# Patient Record
Sex: FEMALE | Race: WHITE | NOT HISPANIC OR LATINO | Employment: OTHER | URBAN - METROPOLITAN AREA
[De-identification: names, ages, dates, MRNs, and addresses within clinical notes are randomized per-mention and may not be internally consistent; named-entity substitution may affect disease eponyms.]

---

## 2017-01-18 ENCOUNTER — ALLSCRIPTS OFFICE VISIT (OUTPATIENT)
Dept: OTHER | Facility: OTHER | Age: 52
End: 2017-01-18

## 2017-01-19 ENCOUNTER — GENERIC CONVERSION - ENCOUNTER (OUTPATIENT)
Dept: OTHER | Facility: OTHER | Age: 52
End: 2017-01-19

## 2017-04-05 ENCOUNTER — ALLSCRIPTS OFFICE VISIT (OUTPATIENT)
Dept: OTHER | Facility: OTHER | Age: 52
End: 2017-04-05

## 2017-05-08 ENCOUNTER — ALLSCRIPTS OFFICE VISIT (OUTPATIENT)
Dept: OTHER | Facility: OTHER | Age: 52
End: 2017-05-08

## 2017-06-07 ENCOUNTER — GENERIC CONVERSION - ENCOUNTER (OUTPATIENT)
Dept: OTHER | Facility: OTHER | Age: 52
End: 2017-06-07

## 2017-07-03 ENCOUNTER — ALLSCRIPTS OFFICE VISIT (OUTPATIENT)
Dept: OTHER | Facility: OTHER | Age: 52
End: 2017-07-03

## 2017-08-14 ENCOUNTER — ALLSCRIPTS OFFICE VISIT (OUTPATIENT)
Dept: OTHER | Facility: OTHER | Age: 52
End: 2017-08-14

## 2017-08-14 DIAGNOSIS — M50.920 MID-CERVICAL DISC DISORDER, UNSPECIFIED: ICD-10-CM

## 2017-08-14 DIAGNOSIS — M54.50 LOW BACK PAIN: ICD-10-CM

## 2017-08-14 DIAGNOSIS — G89.4 CHRONIC PAIN SYNDROME: ICD-10-CM

## 2017-08-14 DIAGNOSIS — M54.16 RADICULOPATHY OF LUMBAR REGION: ICD-10-CM

## 2017-08-14 DIAGNOSIS — M54.2 CERVICALGIA: ICD-10-CM

## 2017-08-14 DIAGNOSIS — M79.10 MYALGIA: ICD-10-CM

## 2017-08-17 ENCOUNTER — GENERIC CONVERSION - ENCOUNTER (OUTPATIENT)
Dept: OTHER | Facility: OTHER | Age: 52
End: 2017-08-17

## 2017-10-04 ENCOUNTER — ALLSCRIPTS OFFICE VISIT (OUTPATIENT)
Dept: OTHER | Facility: OTHER | Age: 52
End: 2017-10-04

## 2017-10-06 ENCOUNTER — APPOINTMENT (EMERGENCY)
Dept: RADIOLOGY | Facility: HOSPITAL | Age: 52
End: 2017-10-06
Payer: COMMERCIAL

## 2017-10-06 ENCOUNTER — HOSPITAL ENCOUNTER (EMERGENCY)
Facility: HOSPITAL | Age: 52
Discharge: HOME | End: 2017-10-06
Admitting: EMERGENCY MEDICINE
Payer: COMMERCIAL

## 2017-10-06 VITALS
BODY MASS INDEX: 31 KG/M2 | DIASTOLIC BLOOD PRESSURE: 84 MMHG | HEART RATE: 82 BPM | WEIGHT: 175 LBS | RESPIRATION RATE: 13 BRPM | OXYGEN SATURATION: 100 % | SYSTOLIC BLOOD PRESSURE: 154 MMHG | TEMPERATURE: 98.5 F

## 2017-10-06 DIAGNOSIS — E86.0 MILD DEHYDRATION: ICD-10-CM

## 2017-10-06 DIAGNOSIS — J06.9 URI (UPPER RESPIRATORY INFECTION): Primary | ICD-10-CM

## 2017-10-06 DIAGNOSIS — M94.0 COSTOCHONDRITIS: ICD-10-CM

## 2017-10-06 LAB
ALBUMIN SERPL BCP-MCNC: 3.7 G/DL (ref 3.5–5)
ALP SERPL-CCNC: 127 U/L (ref 46–116)
ALT SERPL W P-5'-P-CCNC: 37 U/L (ref 12–78)
ANION GAP SERPL CALCULATED.3IONS-SCNC: 13 MMOL/L (ref 4–13)
AST SERPL W P-5'-P-CCNC: 34 U/L (ref 5–45)
BASOPHILS # BLD AUTO: 0 THOUSANDS/ΜL (ref 0–0.1)
BASOPHILS NFR BLD AUTO: 1 % (ref 0–1)
BILIRUB SERPL-MCNC: 0.4 MG/DL (ref 0.2–1)
BUN SERPL-MCNC: 12 MG/DL (ref 5–25)
CALCIUM SERPL-MCNC: 9.7 MG/DL (ref 8.3–10.1)
CHLORIDE SERPL-SCNC: 105 MMOL/L (ref 100–108)
CO2 SERPL-SCNC: 24 MMOL/L (ref 21–32)
CREAT SERPL-MCNC: 0.88 MG/DL (ref 0.6–1.3)
EOSINOPHIL # BLD AUTO: 0.2 THOUSAND/ΜL (ref 0–0.61)
EOSINOPHIL NFR BLD AUTO: 2 % (ref 0–6)
ERYTHROCYTE [DISTWIDTH] IN BLOOD BY AUTOMATED COUNT: 14.7 % (ref 11.6–15.1)
GFR SERPL CREATININE-BSD FRML MDRD: 77 ML/MIN/1.73SQ M
GLUCOSE SERPL-MCNC: 102 MG/DL (ref 65–140)
HCT VFR BLD AUTO: 38.4 % (ref 37–47)
HGB BLD-MCNC: 12.3 G/DL (ref 12–16)
LYMPHOCYTES # BLD AUTO: 2 THOUSANDS/ΜL (ref 0.6–4.47)
LYMPHOCYTES NFR BLD AUTO: 27 % (ref 14–44)
MCH RBC QN AUTO: 26.9 PG (ref 27–31)
MCHC RBC AUTO-ENTMCNC: 32.1 G/DL (ref 31.4–37.4)
MCV RBC AUTO: 84 FL (ref 82–98)
MONOCYTES # BLD AUTO: 0.6 THOUSAND/ΜL (ref 0.17–1.22)
MONOCYTES NFR BLD AUTO: 8 % (ref 4–12)
NEUTROPHILS # BLD AUTO: 4.5 THOUSANDS/ΜL (ref 1.85–7.62)
NEUTS SEG NFR BLD AUTO: 62 % (ref 43–75)
NRBC BLD AUTO-RTO: 0 /100 WBCS
PLATELET # BLD AUTO: 443 THOUSANDS/UL (ref 130–400)
PMV BLD AUTO: 7.9 FL (ref 8.9–12.7)
POTASSIUM SERPL-SCNC: 3.7 MMOL/L (ref 3.5–5.3)
PROT SERPL-MCNC: 7.6 G/DL (ref 6.4–8.2)
RBC # BLD AUTO: 4.57 MILLION/UL (ref 4.2–5.4)
SODIUM SERPL-SCNC: 142 MMOL/L (ref 136–145)
TROPONIN I SERPL-MCNC: <0.02 NG/ML
WBC # BLD AUTO: 7.3 THOUSAND/UL (ref 4.8–10.8)

## 2017-10-06 PROCEDURE — 71020 HB CHEST X-RAY 2VW FRONTAL&LATL: CPT

## 2017-10-06 PROCEDURE — 99285 EMERGENCY DEPT VISIT HI MDM: CPT

## 2017-10-06 PROCEDURE — 94640 AIRWAY INHALATION TREATMENT: CPT

## 2017-10-06 PROCEDURE — 96361 HYDRATE IV INFUSION ADD-ON: CPT

## 2017-10-06 PROCEDURE — 84484 ASSAY OF TROPONIN QUANT: CPT | Performed by: PHYSICIAN ASSISTANT

## 2017-10-06 PROCEDURE — 85025 COMPLETE CBC W/AUTO DIFF WBC: CPT | Performed by: PHYSICIAN ASSISTANT

## 2017-10-06 PROCEDURE — 96360 HYDRATION IV INFUSION INIT: CPT

## 2017-10-06 PROCEDURE — 93005 ELECTROCARDIOGRAM TRACING: CPT | Performed by: PHYSICIAN ASSISTANT

## 2017-10-06 PROCEDURE — 36415 COLL VENOUS BLD VENIPUNCTURE: CPT | Performed by: PHYSICIAN ASSISTANT

## 2017-10-06 PROCEDURE — 80053 COMPREHEN METABOLIC PANEL: CPT | Performed by: PHYSICIAN ASSISTANT

## 2017-10-06 RX ORDER — IPRATROPIUM BROMIDE AND ALBUTEROL SULFATE 2.5; .5 MG/3ML; MG/3ML
3 SOLUTION RESPIRATORY (INHALATION)
Status: DISCONTINUED | OUTPATIENT
Start: 2017-10-06 | End: 2017-10-06 | Stop reason: HOSPADM

## 2017-10-06 RX ORDER — ALBUTEROL SULFATE 2.5 MG/3ML
2.5 SOLUTION RESPIRATORY (INHALATION) ONCE
Status: COMPLETED | OUTPATIENT
Start: 2017-10-06 | End: 2017-10-06

## 2017-10-06 RX ORDER — PREDNISONE 20 MG/1
40 TABLET ORAL DAILY
Qty: 8 TABLET | Refills: 0 | Status: SHIPPED | OUTPATIENT
Start: 2017-10-06 | End: 2017-10-10

## 2017-10-06 RX ORDER — ALBUTEROL SULFATE 2.5 MG/3ML
2.5 SOLUTION RESPIRATORY (INHALATION) EVERY 6 HOURS PRN
Qty: 75 ML | Refills: 0 | Status: SHIPPED | OUTPATIENT
Start: 2017-10-06 | End: 2018-08-28

## 2017-10-06 RX ORDER — HYDROCODONE POLISTIREX AND CHLORPHENIRAMINE POLISTIREX 10; 8 MG/5ML; MG/5ML
5 SUSPENSION, EXTENDED RELEASE ORAL EVERY 12 HOURS PRN
Qty: 120 ML | Refills: 0 | Status: SHIPPED | OUTPATIENT
Start: 2017-10-06 | End: 2017-10-16

## 2017-10-06 RX ORDER — ALBUTEROL SULFATE 90 UG/1
2 AEROSOL, METERED RESPIRATORY (INHALATION) EVERY 6 HOURS PRN
Qty: 1 INHALER | Refills: 0 | Status: SHIPPED | OUTPATIENT
Start: 2017-10-06 | End: 2017-11-05

## 2017-10-06 RX ORDER — PREDNISONE 20 MG/1
60 TABLET ORAL ONCE
Status: COMPLETED | OUTPATIENT
Start: 2017-10-06 | End: 2017-10-06

## 2017-10-06 RX ADMIN — IPRATROPIUM BROMIDE AND ALBUTEROL SULFATE 3 ML: .5; 3 SOLUTION RESPIRATORY (INHALATION) at 15:51

## 2017-10-06 RX ADMIN — PREDNISONE 60 MG: 20 TABLET ORAL at 15:50

## 2017-10-06 RX ADMIN — SODIUM CHLORIDE 1000 ML: 0.9 INJECTION, SOLUTION INTRAVENOUS at 15:30

## 2017-10-06 RX ADMIN — IPRATROPIUM BROMIDE AND ALBUTEROL SULFATE 3 ML: .5; 3 SOLUTION RESPIRATORY (INHALATION) at 16:27

## 2017-10-06 RX ADMIN — ALBUTEROL SULFATE 2.5 MG: 2.5 SOLUTION RESPIRATORY (INHALATION) at 16:27

## 2017-10-06 NOTE — DISCHARGE INSTRUCTIONS
Costochondritis, Ambulatory Care   GENERAL INFORMATION:   Costochondritis  is a condition that causes pain in the cartilage that connects your ribs to your sternum (breastbone)  Cartilage is the tough, bendable tissue that protects your bones  Common symptoms include the following:   · Sharp or dull, aching pain that may come and go or that gets worse over time    · Pain when you touch your chest    · Pain that spreads to your back, abdomen, or down your arm    · Pain that gets worse when you move, breathe deeply, or push or lift an object    · Trouble sleeping or doing your usual activities because of the pain  Seek immediate care for the following symptoms:   · Fever    · The painful areas of your chest look swollen and red, and feel warm to the touch    · Pain prevents you from sleeping  Treatment for costochondritis  may not be needed  Costochondritis pain may go away without treatment, usually within a year  Treatment may include any of the following:  · Acetaminophen  decreases pain  Acetaminophen is available without a doctor's order  Ask how much to take and how often to take it  Follow directions  Acetaminophen can cause liver damage if not taken correctly  · NSAIDs  help decrease swelling and pain or fever  This medicine is available with or without a doctor's order  NSAIDs can cause stomach bleeding or kidney problems in certain people  If you take blood thinner medicine, always ask if NSAIDs are safe for you  Always read the medicine label and follow directions  Do not give these medicines to children under 10months of age without direction from your child's doctor  Manage your symptoms:   · Rest as needed  Avoid painful movements and activities  Do not carry objects, such as a purse or backpack, if this causes pain  Avoid activities such as weightlifting until your pain decreases or goes away  Ask your healthcare provider which activities are best for you to do while you recover      · Apply heat to help decrease pain  Apply heat on the area for 20 to 30 minutes every 2 hours for as many days as directed  · Apply ice to help decrease swelling and pain  Ice may also help prevent tissue damage  Use an ice pack, or put crushed ice in a plastic bag  Cover it with a towel and place it on the painful area for 15 to 20 minutes every hour or as directed  · Do gentle stretching exercises   a doorway and put your hands on the door frame at the level of your ears or shoulders  Take 1 step forward and gently stretch your chest  Try this with your hands higher up on the doorway  Follow up with your healthcare provider as directed:  Write down your questions so you remember to ask them during your visits  CARE AGREEMENT:   You have the right to help plan your care  Learn about your health condition and how it may be treated  Discuss treatment options with your caregivers to decide what care you want to receive  You always have the right to refuse treatment  The above information is an  only  It is not intended as medical advice for individual conditions or treatments  Talk to your doctor, nurse or pharmacist before following any medical regimen to see if it is safe and effective for you  © 2014 [address] is for End User's use only and may not be sold, redistributed or otherwise used for commercial purposes  All illustrations and images included in CareNotes® are the copyrighted property of [employer]  or Jose Harman

## 2017-10-06 NOTE — ED PROVIDER NOTES
History  Chief Complaint   Patient presents with    Chest Pain     Pt reports started with "cold" symptoms on Monday, saw Dr Pati Beavers today and sent here for further eval   Pt c/o chest pain when breathing and cough  Patient is a 52-year-old presenting today with a sore throat and a nonproductive cough x3 days that has gradually worsened and now is mildly short of breath  States that she has had a history of a colectomy she became dehydrated very easily  Was sent in by her PCP for hydration as she has dry mucous membranes  Has been tolerating foods and liquids  Generalized fatigue  Small amount of chest discomfort that is worsened with deep inhalation and coughing and on palpation  Chronic abdominal pain  Denies nausea, vomiting, constipation, diarrhea, rash, headache  Differential includes but is not limited to URI, pneumonia, influenza, bowel obstruction, pneumothorax, strep pharyngitis  Prior to Admission Medications   Prescriptions Last Dose Informant Patient Reported? Taking? ALPRAZolam (XANAX) 0 5 mg tablet 10/5/2017 at Unknown time Self Yes Yes   Sig: Take 0 5 mg by mouth daily at bedtime as needed for anxiety  aspirin 81 MG tablet 10/6/2017 at Unknown time Self Yes Yes   Sig: Take 81 mg by mouth daily  cyclobenzaprine (FLEXERIL) 10 mg tablet 10/5/2017 at Unknown time Self Yes Yes   Sig: Take 10 mg by mouth 3 (three) times a day as needed for muscle spasms  gabapentin (NEURONTIN) 100 mg capsule 10/5/2017 at Unknown time Self Yes Yes   Sig: Take 100 mg by mouth daily at bedtime     levothyroxine 75 mcg tablet 10/6/2017 at Unknown time Self Yes Yes   Sig: Take 75 mcg by mouth daily  oxyCODONE-acetaminophen (PERCOCET) 5-325 mg per tablet 10/5/2017 at Unknown time Self Yes Yes   Sig: Take 1 tablet by mouth every 4 (four) hours as needed for moderate pain Indications: Pain        Facility-Administered Medications: None       Past Medical History:   Diagnosis Date    Anxiety     Cervical disc disorder     Chronic pain     Depression     Dysuria     Lung abnormality     nodules    Lung nodule     Pituitary abnormality (HCC)     tumor    Thrombocytopenia (HCC)     Thyroid disease        Past Surgical History:   Procedure Laterality Date    APPENDECTOMY      AUGMENTATION MAMMAPLASTY Bilateral     CHOLECYSTECTOMY      COLECTOMY      COLON SURGERY      EPIDURAL BLOCK INJECTION N/A 6/23/2016    Procedure: BLOCK / INJECTION EPIDURAL STEROID CERVICAL  C7-T1;  Surgeon: India Velarde MD;  Location: [facility] MA[facility] OR;  Service:     EPIDURAL BLOCK INJECTION N/A 3/31/2016    Procedure: BLOCK / INJECTION EPIDURAL STEROID CERVICAL C7-T1  (C-ARM); Surgeon: India Velarde MD;  Location: [facility] MAIN OR;  Service:     HYSTERECTOMY      PITUITARY SURGERY      DE Hal Oni Mikayla 85 DX/THER SBST EPIDURAL/SUBRACH CERV/THORACIC N/A 1/21/2016    Procedure: BLOCK / INJECTION EPIDURAL STEROID CERVICAL C7-T1 (C-ARM); Surgeon: India Velarde MD;  Location: [facility] MAIN OR;  Service: Pain Management        History reviewed  No pertinent family history  I have reviewed and agree with the history as documented  Social History   Substance Use Topics    Smoking status: Never Smoker    Smokeless tobacco: Never Used    Alcohol use No        Review of Systems   Constitutional: Negative  Negative for appetite change, chills and fever  HENT: Positive for sore throat  Negative for congestion, dental problem, ear pain, facial swelling, mouth sores, postnasal drip, sinus pressure and trouble swallowing  Eyes: Negative  Respiratory: Positive for cough  Negative for apnea, choking, chest tightness, shortness of breath, wheezing and stridor  Cardiovascular: Negative  Negative for chest pain and leg swelling  Gastrointestinal: Positive for abdominal pain  Negative for abdominal distention, anal bleeding, blood in stool, constipation, diarrhea, nausea, rectal pain and vomiting  Genitourinary: Negative  Musculoskeletal: Negative  Negative for arthralgias, neck pain and neck stiffness  Skin: Negative  Negative for rash  Neurological: Negative  Negative for facial asymmetry and headaches  All other systems reviewed and are negative  Physical Exam  ED Triage Vitals [10/06/17 1431]   Temperature Pulse Respirations Blood Pressure SpO2   98 5 °F (36 9 °C) 93 22 154/84 100 %      Temp Source Heart Rate Source Patient Position - Orthostatic VS BP Location FiO2 (%)   Oral Monitor Sitting Right arm --      Pain Score       4           Physical Exam   Constitutional: She is oriented to person, place, and time  She appears well-developed and well-nourished  HENT:   Head: Normocephalic and atraumatic  Right Ear: External ear normal    Left Ear: External ear normal    Nose: Nose normal    Mouth/Throat: Oropharynx is clear and moist    Dry mucous membranes    Eyes: Conjunctivae and EOM are normal  Pupils are equal, round, and reactive to light  Neck: Normal range of motion  Neck supple  Cardiovascular: Normal rate, regular rhythm, normal heart sounds and intact distal pulses  Pulmonary/Chest: Effort normal and breath sounds normal  No respiratory distress  She has no wheezes  She has no rales  She exhibits tenderness  spo2 is 100% within normal limits, mildly increase RR  Good breath sounds bilaterally, equal  No wheezing, rhonchi  Abdominal: Soft  Bowel sounds are normal  She exhibits no distension and no mass  There is tenderness  There is no rebound and no guarding  No hernia  Active bowel sounds  Diffusely tender, per patient this is showed normal and is not exacerbated or worse than normal  No rebound guarding or peritoneal signs  Neurological: She is alert and oriented to person, place, and time  Skin: Skin is warm and dry  Capillary refill takes less than 2 seconds  Rash noted  No erythema  No pallor  Nursing note and vitals reviewed        ED Medications  Medications ipratropium-albuterol (DUO-NEB) 0 5-2 5 mg/mL inhalation solution 3 mL (3 mL Nebulization Given 10/6/17 1551)   ipratropium-albuterol (DUO-NEB) 0 5-2 5 mg/mL inhalation solution 3 mL (3 mL Nebulization Given 10/6/17 1627)   sodium chloride 0 9 % bolus 1,000 mL (1,000 mL Intravenous New Bag 10/6/17 1530)   predniSONE tablet 60 mg (60 mg Oral Given 10/6/17 1550)   albuterol inhalation solution 2 5 mg (2 5 mg Nebulization Given 10/6/17 1627)       Diagnostic Studies  Labs Reviewed   CBC AND DIFFERENTIAL - Abnormal        Result Value Ref Range Status    MCH 26 9 (*) 27 0 - 31 0 pg Final    MPV 7 9 (*) 8 9 - 12 7 fL Final    Platelets 493 (*) 946 - 400 Thousands/uL Final    WBC 7 30  4 80 - 10 80 Thousand/uL Final    RBC 4 57  4 20 - 5 40 Million/uL Final    Hemoglobin 12 3  12 0 - 16 0 g/dL Final    Hematocrit 38 4  37 0 - 47 0 % Final    MCV 84  82 - 98 fL Final    MCHC 32 1  31 4 - 37 4 g/dL Final    RDW 14 7  11 6 - 15 1 % Final    nRBC 0  /100 WBCs Final    Neutrophils Relative 62  43 - 75 % Final    Lymphocytes Relative 27  14 - 44 % Final    Monocytes Relative 8  4 - 12 % Final    Eosinophils Relative 2  0 - 6 % Final    Basophils Relative 1  0 - 1 % Final    Neutrophils Absolute 4 50  1 85 - 7 62 Thousands/µL Final    Lymphocytes Absolute 2 00  0 60 - 4 47 Thousands/µL Final    Monocytes Absolute 0 60  0 17 - 1 22 Thousand/µL Final    Eosinophils Absolute 0 20  0 00 - 0 61 Thousand/µL Final    Basophils Absolute 0 00  0 00 - 0 10 Thousands/µL Final   COMPREHENSIVE METABOLIC PANEL - Abnormal     Alkaline Phosphatase 127 (*) 46 - 116 U/L Final    Sodium 142  136 - 145 mmol/L Final    Potassium 3 7  3 5 - 5 3 mmol/L Final    Comment: Slightly Hemolyzed;  Results May be Affected    Chloride 105  100 - 108 mmol/L Final    CO2 24  21 - 32 mmol/L Final    Anion Gap 13  4 - 13 mmol/L Final    BUN 12  5 - 25 mg/dL Final    Creatinine 0 88  0 60 - 1 30 mg/dL Final    Comment: Standardized to IDMS reference method Glucose 102  65 - 140 mg/dL Final    Comment:   If the patient is fasting, the ADA then defines impaired fasting glucose as > 100 mg/dL and diabetes as > or equal to 123 mg/dL  Specimen collection should occur prior to Sulfasalazine administration due to the potential for falsely depressed results  Specimen collection should occur prior to Sulfapyridine administration due to the potential for falsely elevated results  Calcium 9 7  8 3 - 10 1 mg/dL Final    AST 34  5 - 45 U/L Final    Comment: Slightly Hemolyzed; Results May be Affected  Specimen collection should occur prior to Sulfasalazine administration due to the potential for falsely depressed results  ALT 37  12 - 78 U/L Final    Comment:   Specimen collection should occur prior to Sulfasalazine administration due to the potential for falsely depressed results  Total Protein 7 6  6 4 - 8 2 g/dL Final    Albumin 3 7  3 5 - 5 0 g/dL Final    Total Bilirubin 0 40  0 20 - 1 00 mg/dL Final    eGFR 77  ml/min/1 73sq m Final    Narrative:     National Kidney Disease Education Program recommendations are as follows:  GFR calculation is accurate only with a steady state creatinine  Chronic Kidney disease less than 60 ml/min/1 73 sq  meters  Kidney failure less than 15 ml/min/1 73 sq  meters  TROPONIN I - Normal    Troponin I <0 02  <=0 04 ng/mL Final    Comment: 3Autovalidation override    Narrative:     [employer] Chemistry analyzer 99% cutoff is > 0 04 ng/mL in network labs    o cTnI 99% cutoff is useful only when applied to patients in the clinical setting of myocardial ischemia  o cTnI 99% cutoff should be interpreted in the context of clinical history, ECG findings and possibly cardiac imaging to establish correct diagnosis  o cTnI 99% cutoff may be suggestive but clearly not indicative of a coronary event without the clinical setting of myocardial ischemia  XR chest 2 views   ED Interpretation   No acute intrapulmonary abnormality         Final Result      No active pulmonary disease  Workstation performed: [id number]             Procedures  Procedures      Phone Contacts  ED Phone Contact    ED Course  ED Course as of Oct 06 1659   Fri Oct 06, 2017   1612 Patient re-evaluated, doing much better  No respiratory distress  MDM  Number of Diagnoses or Management Options  Diagnosis management comments: Discussed with patient results of her lab test and imaging studies  Upon re-evaluation patient has significantly increased air flow bilaterally  Appears very comfortable without any difficulty breathing  She is feeling much better after IV fluids and nebulizer treatments and prednisone  Likely the beginning of bronchitis  Mild dehydration and costochondritis as chest pain is reproducible  Will prescribe test the next, informed not to drive or operate heavy machinery while taking  Will also prescribe a nebulizer as well as as albuterol solution  Next 4 days of prednisone  Strict return precautions for any worsening otherwise would like patient to follow up with her PCP for re-evaluation in 3 days  Patient verbalizes understanding and agrees with the above assessment and plan  Amount and/or Complexity of Data Reviewed  Clinical lab tests: reviewed and ordered  Tests in the radiology section of CPT®: ordered and reviewed  Review and summarize past medical records: yes  Independent visualization of images, tracings, or specimens: yes      CritCare Time    Disposition  Final diagnoses:   URI (upper respiratory infection)   Costochondritis   Mild dehydration     ED Disposition     ED Disposition Condition Comment    Discharge  Danielle Meyer U  53  discharge to home/self care      Condition at discharge: Good        Follow-up Information     Follow up With Specialties Details Why Contact Info Additional [address] Emergency Department Emergency Medicine Go to If symptoms worsen such as fevers, chest pain, difficulty breathing  [facility]  [phone] [facility], Ronald Swenson [place], [address]  [address], DO Family Medicine Schedule an appointment as soon as possible for a visit in 3 days for re-evaluation of your symptoms    [address] [place] [address]  [phone]           Patient's Medications   Discharge Prescriptions    ALBUTEROL (2 5 MG/3 ML) 0 083 % NEBULIZER SOLUTION    Take 3 mL by nebulization every 6 (six) hours as needed for wheezing       Start Date: 10/6/2017 End Date: --       Order Dose: 2 5 mg       Quantity: 75 mL    Refills: 0    ALBUTEROL (PROVENTIL HFA,VENTOLIN HFA) 90 MCG/ACT INHALER    Inhale 2 puffs every 6 (six) hours as needed for wheezing or shortness of breath for up to 30 days       Start Date: 10/6/2017 End Date: 11/5/2017       Order Dose: 2 puffs       Quantity: 1 Inhaler    Refills: 0    HYDROCODONE-CHLORPHENIRAMINE POLISTIREX (TUSSIONEX) 10-8 MG/5 ML ER SUSPENSION    Take 5 mL by mouth every 12 (twelve) hours as needed for cough for up to 10 days Max Daily Amount: 10 mL       Start Date: 10/6/2017 End Date: 10/16/2017       Order Dose: 5 mL       Quantity: 120 mL    Refills: 0    PREDNISONE 20 MG TABLET    Take 2 tablets by mouth daily for 4 days       Start Date: 10/6/2017 End Date: 10/10/2017       Order Dose: 40 mg       Quantity: 8 tablet    Refills: 0       Outpatient Discharge Orders  Nebulizer         ED Provider  Electronically Signed by       Nhung Hoyos PA-C  10/06/17 6803

## 2017-10-09 LAB
ATRIAL RATE: 78 BPM
P AXIS: 26 DEGREES
PR INTERVAL: 138 MS
QRS AXIS: 15 DEGREES
QRSD INTERVAL: 80 MS
QT INTERVAL: 420 MS
QTC INTERVAL: 478 MS
T WAVE AXIS: -23 DEGREES
VENTRICULAR RATE: 78 BPM

## 2017-11-13 ENCOUNTER — GENERIC CONVERSION - ENCOUNTER (OUTPATIENT)
Dept: OTHER | Facility: OTHER | Age: 52
End: 2017-11-13

## 2017-11-13 DIAGNOSIS — M54.50 LOW BACK PAIN: ICD-10-CM

## 2017-11-13 DIAGNOSIS — M54.16 RADICULOPATHY OF LUMBAR REGION: ICD-10-CM

## 2017-11-13 DIAGNOSIS — M79.10 MYALGIA: ICD-10-CM

## 2017-11-13 DIAGNOSIS — M50.120 MID-CERVICAL DISC DISORDER, UNSPECIFIED (CODE): ICD-10-CM

## 2017-11-13 DIAGNOSIS — M54.2 CERVICALGIA: ICD-10-CM

## 2017-11-13 DIAGNOSIS — G89.4 CHRONIC PAIN SYNDROME: ICD-10-CM

## 2017-12-07 ENCOUNTER — GENERIC CONVERSION - ENCOUNTER (OUTPATIENT)
Dept: OTHER | Facility: OTHER | Age: 52
End: 2017-12-07

## 2018-01-08 ENCOUNTER — ALLSCRIPTS OFFICE VISIT (OUTPATIENT)
Dept: OTHER | Facility: OTHER | Age: 53
End: 2018-01-08

## 2018-01-08 ENCOUNTER — GENERIC CONVERSION - ENCOUNTER (OUTPATIENT)
Dept: OTHER | Facility: OTHER | Age: 53
End: 2018-01-08

## 2018-01-08 ENCOUNTER — TRANSCRIBE ORDERS (OUTPATIENT)
Dept: ADMINISTRATIVE | Facility: HOSPITAL | Age: 53
End: 2018-01-08

## 2018-01-08 DIAGNOSIS — M79.10 MYALGIA: ICD-10-CM

## 2018-01-08 DIAGNOSIS — G89.4 CHRONIC PAIN SYNDROME: ICD-10-CM

## 2018-01-08 DIAGNOSIS — M50.120 CERVICAL DISC DISORDER WITH RADICULOPATHY OF MID-CERVICAL REGION: Primary | ICD-10-CM

## 2018-01-08 DIAGNOSIS — M50.120 MID-CERVICAL DISC DISORDER, UNSPECIFIED (CODE): ICD-10-CM

## 2018-01-08 DIAGNOSIS — M54.2 CERVICALGIA: ICD-10-CM

## 2018-01-09 NOTE — MISCELLANEOUS
Message   Recorded as Task   Date: 01/18/2017 10:35 AM, Created By: Pattie Quevedo   Task Name: Follow Up   Assigned To: [address] [facility] [facility] Procedure,Team   Regarding Patient: Ji Fleming, Status: Active   Comment:    Katy Rodriguez - 18 Jan 2017 10:35 AM     TASK CREATED  Patient is being billed 2,000$ for every UDS  E-mail sent to [employer] from [facility]  Active Problems    1  Anal fissure (565 0) (K60 2)   2  Cervical disc disorder with radiculopathy of mid-cervical region (723 4) (M50 120)   3  Cervical myofascial pain syndrome (729 1) (M79 1)   4  Chronic cervical pain (723 1,338 29) (M54 2,G89 29)   5  Chronic pain syndrome (338 4) (G89 4)   6  Coccydynia (724 79) (M53 3)   7  Constipation (564 00) (K59 00)   8  Continuous opioid dependence (304 01) (F11 20)   9  Degenerative cervical spinal stenosis (723 0) (M48 02)   10  Dysuria (788 1) (R30 0)   11  Essential thrombocythemia (238 71) (D47 3)   12  Hemorrhoids With Complication (161 5)   13  Hydronephrosis (591) (N13 30)   14  Knee pain, left (719 46) (M25 562)   15  Left ankle sprain (845 00) (S93 402A)   16  Left shoulder pain (719 41) (M25 512)   17  Long-term current use of opiate analgesic (V58 69) (Z79 891)   18  Mild carpal tunnel syndrome, left (354 0) (G56 02)   19  Mild carpal tunnel syndrome, right (354 0) (G56 01)   20  Pruritus ani (698 0) (L29 0)   21  Rectal/anal hemorrhage (569 3) (K62 5)   22  Slow transit constipation (564 01) (K59 01)   23  Strain of peroneal tendon of left foot, initial encounter (845 19) (S86 312A)   24  Tendinitis of left rotator cuff (726 10) (M75 82)   25  Thrombocytopenia (287 5) (D69 6)   26  Thrombosed external hemorrhoids (455 4) (K64 5)   27  Wrist pain, acute, right (518 43) (M22 051)    Current Meds   1  Advil 200 MG Oral Tablet; as needed; Therapy: 06FCJ2988 to  Requested for: 45NGG9692 Recorded   2  ALPRAZolam 0 5 MG Oral Tablet; 1 tab daily; Therapy: 58PVO6209 to  Requested for: 24YIY7919 Recorded   3  Aspirin  MG Oral Tablet Delayed Release; 1 Every Day; Therapy: 13JKH9568 to  Requested for: 58QKP5735 Recorded   4  Cyclobenzaprine HCl - 10 MG Oral Tablet; TAKE 1 TABLET 3 times daily PRN muscle   spasms; Therapy: 90Zhy0438 to (Evaluate:17Feb2017)  Requested for: 77LZT1511; Last   Rx:18Jan2017 Ordered   5  Gralise 600 MG Oral Tablet; TAKE 3 TABLETS AT DINNER TIME; Therapy: 79MBT5872 to (Evaluate:19Mar2017)  Requested for: 94YQR1398; Last   Rx:18Jan2017 Ordered   6  Levothyroxine Sodium 100 MCG Oral Tablet; 1 tab daily; Therapy: 29Feb2012 to  Requested for: 74EYJ4817 Recorded   7  Oxycodone-Acetaminophen 5-325 MG Oral Tablet; TAKE 0 5 TABLET Twice daily PRN   pain; Therapy: 18CPP9800 to (Evaluate:19Mar2017); Last Rx:18Jan2017 Ordered   8  Reglan 10 MG Oral Tablet (Metoclopramide HCl); take 1 tab three times daily before   meals; Therapy: 48Dxq6201 to  Requested for: 82OZV5142 Recorded   9  Vitamin D3 1000 UNIT Oral Tablet; 2 tabs daily; Therapy: 59ALB9497 to  Requested for: 39HBC1778 Recorded    Allergies    1  Amitiza CAPS   2  BIAXIN   3  Erythromycin TABS   4  Levaquin TABS   5  MORPHINE   6  Penicillins   7  Tetracyclines   8  MACROBID    9   VALIUM    Signatures   Electronically signed by : Jakob Jacobs MA; Jan 19 2017 12:22PM EST                       (Author)

## 2018-01-09 NOTE — PROGRESS NOTES
Assessment   1  Chronic pain syndrome (338 4) (G89 4)   2  Chronic cervical pain (723 1,338 29) (M54 2,G89 29)   3  Cervical disc disorder with radiculopathy of mid-cervical region (723 4) (M50 120)   4  Cervical myofascial pain syndrome (729 1) (M79 1)   5  Low back pain (724 2) (M54 5)   6  Lumbar radiculopathy (724 4) (M54 16)    Plan   Cervical disc disorder with radiculopathy of mid-cervical region, Cervical myofascial pain    syndrome, Chronic cervical pain, Chronic pain syndrome    · Cyclobenzaprine HCl - 10 MG Oral Tablet; TAKE 1 TABLET 3 times daily PRN    muscle spasms   Rx By: Via Bactest; Dispense: 30 Days ; #:90 Tablet; Refill: 1;For: Cervical disc disorder with radiculopathy of mid-cervical region, Cervical myofascial pain syndrome, Chronic cervical pain, Chronic pain syndrome; ILIR = N; Sent To: [facility]   · Gabapentin 600 MG Oral Tablet; TAKE 1 TABLET 3 TIMES DAILY   Rx By: Via Bactest; Dispense: 30 Days ; #:90 Tablet; Refill: 1;For: Cervical disc disorder with radiculopathy of mid-cervical region, Cervical myofascial pain syndrome, Chronic cervical pain, Chronic pain syndrome; ILIR = N; Sent To: [facility]   · * MRI CERVICAL SPINE WO CONTRAST; Status:Need Information - Financial    Authorization; Requested OGL:[id number]; Perform:[facility]; SZO:[id number]; For:Cervical disc disorder with radiculopathy of mid-cervical region, Cervical myofascial pain syndrome, Chronic cervical pain, Chronic pain syndrome; Ordered By:Mariama Cash;   · 1 Sandie Hebert MD, [facility] Co-Management  Consult regarding migraine    headaches  Status: Active  Requested for: [id number]   Ordered; For: Cervical disc disorder with radiculopathy of mid-cervical region, Cervical myofascial pain syndrome, Chronic cervical pain, Chronic pain syndrome; Ordered By: Via Bactest Performed:  Due: 01GTI5535  Care Summary provided  : Yes   · 1 - Paul Brownlee MD  (Neurosurgery) Co-Management  *  Status: Active  Requested for:    24TLT9813   Ordered; For: Cervical disc disorder with radiculopathy of mid-cervical region, Cervical myofascial pain syndrome, Chronic cervical pain, Chronic pain syndrome; Ordered By: Zohaib Watts Performed:  Due: 37LUM3280  Care Summary provided  : Yes  Cervical disc disorder with radiculopathy of mid-cervical region, Cervical myofascial pain    syndrome, Chronic cervical pain, Chronic pain syndrome, Low back pain, Lumbar    radiculopathy    · Oxycodone-Acetaminophen 5-325 MG Oral Tablet; TAKE 0 5 TABLET Twice daily    PRN pain; Do Not Fill Before: 53PKE5675   Rx By: Zohaib Watts; Dispense: 30 Days ; #:30 Tablet; Refill: 0;For: Cervical disc disorder with radiculopathy of mid-cervical region, Cervical myofascial pain syndrome, Chronic cervical pain, Chronic pain syndrome, Low back pain, Lumbar radiculopathy; ILIR = N; Print Rx    Discussion/Summary      My impressions and treatment recommendations were discussed in detail with the patient, who verbalized understanding and had no further questions  patient continues to complain primarily of neck pain with cervical radiculopathy into the right upper extremity  She states that she had about 3 weeks worth of relief following the trigger point injections on December 7, 2017  The patient is amenable to undergoing a new MRI of the cervical spine and possibly speaking again to the neurosurgeons about possible cervical spine surgery  She also had several questions regarding the Abbott spinal cord stimulator trial, but I suggested having her see a neurosurgeon before proceeding with a spinal cord stimulator trial  The patient verbalized understanding  As such, I have ordered a MRI of the cervical spine and will have the patient follow up with Dr Kamilah Angelo after the MRI of the cervical spine has been performed     patient also has been complaining of migraine headaches, so I felt a reasonable to have the patient see   Manisha Preston in this regard  that the patient reports overall reduced pain and improved level functioning without significant side effects, I felt a reasonable to continue the patient on cyclobenzaprine 10 mg 1 tablet 3 times daily as needed for muscle spasms, gabapentin 600 mg 3 times daily, and oxycodone/acetaminophen 5/325 mg 0 5 tablet twice daily as needed for pain  risks and side effects of chronic opioid treatment were discussed in detail with the patient  Side effects include but are not limited to nausea, vomiting, GI intolerance, sedation, constipation, mental clouding, opioid-induced hyperalgesia, endocrine dysfunction, addiction, dependence, and tolerance  The patient was asked to take his medications only as prescribed and directed, never in excess, and never for any other reason other than for pain control  The patient was also asked to keep his medications out of the reach of others and away from children, preferably in a locked drawer  The patient verbalized understanding and wished to use these opioid medications  is planned with the patient in 2 months time or sooner as warranted  Discharge instructions were provided  I personally saw and examined the patient and I agree with the above discussed plan of care  The patient has the current Goals: Decreased pain and improved level functioning  Patient is able to Self-Care  Chief Complaint   1  Back Pain   2  Neck Pain    History of Present Illness   Ms Shireen Mahmood is a pleasant 69-year-old  female who presents to [facility] for interval re-evaluation of the above-stated pain complaints  The patient has a past medical and chronic pain history as outlined in the impression section below  She was last seen on December 7, 2017 at which time she had trigger point injections performed to her cervical paraspinal musculature   The patient has been maintained on cyclobenzaprine 10 mg 1 tablet 3 times daily as needed for muscle spasms, gabapentin 600 mg 3 times daily, and oxycodone/acetaminophen 5/325 mg 0 5 tablet twice daily as needed for pain    today's office visit, the patient's pain score is 8/10 on the verbal numerical pain rating scale  The patient states that her pain is constant in nature and describes the quality of her pain as âburning, dull/aching, sharp, throbbing, pressure-like, shooting, and pins and needles  â She continues to use half a tablet of oxycodone/acetaminophen 5/325 mg twice daily as needed for pain  She also uses 3 tablets of gabapentin per day as well as 3 tablets of cyclobenzaprine per day  She reports 30% relief of symptoms with the combination of her medications  She also reported 3 weeks worth of relief following the trigger point injections on December 7, 2017  The patient is reporting worsening right upper extremity weakness, however  She would like to undergo a new MRI of the cervical spine and also meet with her neurosurgeon to see if her symptoms are worsening  than as stated above, the patient denies any interval changes in medications, medical condition, mental condition, symptoms, or allergies since the last office visit  Shannan Juarez presents with complaints of gradual onset of constant episodes of severe bilateral posterior and bilateral anterior neck pain, described as sharp, dull, aching, burning and throbbing, radiating to the bilateral trapezius, bilateral shoulder, bilateral upper arm and bilateral forearm  On a scale of 1 to 10, the patient rates the pain as 8  Symptoms are unchanged  Shannan Juarez presents with complaints of gradual onset of constant episodes of severe bilateral lower back pain, described as sharp, dull, aching, burning and throbbing, radiating to the left thigh  On a scale of 1 to 10, the patient rates the pain as 8  Symptoms are unchanged        Review of Systems        Constitutional: no fever,-- no recent weight gain-- and-- no recent weight loss  Eyes: no double vision-- and-- no blurry vision  Cardiovascular: no chest pain,-- no palpitations-- and-- no lower extremity edema  Respiratory: no complaints of shortness of breath-- and-- no wheezing  Musculoskeletal: muscle weakness,-- joint stiffness-- and-- decreased range of motion, but-- no difficulty walking,-- no joint swelling,-- no limb swelling-- and-- no pain in extremity  Neurological: no dizziness,-- no difficulty swallowing,-- no memory loss,-- no loss of consciousness-- and-- no seizures  Gastrointestinal: no nausea,-- no vomiting,-- no constipation-- and-- no diarrhea  Genitourinary: no difficulty initiating urine stream,-- no genital pain-- and-- no frequent urination  Integumentary: no complaints of skin rash  Psychiatric: no depression  Endocrine: no excessive thirst,-- no adrenal disease,-- no hypothyroidism-- and-- no hyperthyroidism  Hematologic/Lymphatic: no tendency for easy bruising-- and-- no tendency for easy bleeding  ROS reviewed  Active Problems   1  Anal fissure (565 0) (K60 2)   2  Cervical disc disorder with radiculopathy of mid-cervical region (723 4) (M50 120)   3  Cervical myofascial pain syndrome (729 1) (M79 1)   4  Chronic cervical pain (723 1,338 29) (M54 2,G89 29)   5  Chronic pain syndrome (338 4) (G89 4)   6  Coccydynia (724 79) (M53 3)   7  Constipation (564 00) (K59 00)   8  Continuous opioid dependence (304 01) (F11 20)   9  Degenerative cervical spinal stenosis (723 0) (M48 02)   10  Dysuria (788 1) (R30 0)   11  Essential thrombocythemia (238 71) (D47 3)   12  Hemorrhoids With Complication (035 6)   13  Hydronephrosis (591) (N13 30)   14  Knee pain, left (719 46) (M25 562)   15  Left ankle sprain (845 00) (S93 402A)   16  Left shoulder pain (719 41) (M25 512)   17  Long-term current use of opiate analgesic (V58 69) (Z79 891)   18  Low back pain (724 2) (M54 5)   19   Lumbar radiculopathy (724 4) (M54 16)   20  Mild carpal tunnel syndrome, left (354 0) (G56 02)   21  Mild carpal tunnel syndrome, right (354 0) (G56 01)   22  Pruritus ani (698 0) (L29 0)   23  Rectal/anal hemorrhage (569 3) (K62 5)   24  Slow transit constipation (564 01) (K59 01)   25  Strain of peroneal tendon of left foot, initial encounter (845 19) (S86 312A)   26  Tendinitis of left rotator cuff (726 10) (M75 82)   27  Thrombocytopenia (287 5) (D69 6)   28  Thrombosed external hemorrhoids (455 4) (K64 5)   29  Wrist pain, acute, right (719 43) (M25 531)    Past Medical History   1  History of Cervical radicular pain (723 4) (M54 12)   2  History of H/O umbilical hernia repair (V15 29) (Z98 890,Z87 19)   3  History of asthma (V12 69) (Z87 09)   4  History of chest pain (V13 89) (Z87 898)   5  History of esophageal reflux (V12 79) (Z87 19)   6  History of tinnitus (V12 49) (Z86 69)   7  History of Leg pain (729 5) (M79 606)   8  History of Nasal congestion (478 19) (R09 81)   9  History of Weight gain (783 1) (R63 5)    Surgical History   1  History of Breast Surgery Reduction Procedure   2  History of  Section   3  History of Gallbladder Surgery   4  History of Hysterectomy   5  History of Oophorectomy - Unilateral (Removal Of One Ovary)   6  History of Oviductal Surgery Tubotubal Anastomosis   7  History of Pituitary Ablation   8  History of Total Colectomy, Rectal Mucosectomy & Create Ileal [place]   9  History of Umbilical Hernia Repair    Family History   Maternal Grandfather    1  Family history of myocardial infarction (V17 3) (Z82 49)  Paternal Grandfather    2  Family history of myocardial infarction (V17 3) (Z82 49)    Social History    · Four children   ·    · Never smoked tobacco (V49 89) (Z78 9)   · No alcohol use    Current Meds    1  Advil 200 MG Oral Tablet; as needed; Therapy: 15WJB4158 to  Requested for: 82PLL0105 Recorded   2  ALPRAZolam 0 5 MG Oral Tablet; 1 tab daily;      Therapy: 04OCT7922 to  Requested for: 11JKW9906 Recorded   3  Aspirin  MG Oral Tablet Delayed Release; 1 Every Day; Therapy: 05RTG8278 to  Requested for: 07SCT7727 Recorded   4  Cyclobenzaprine HCl - 10 MG Oral Tablet; TAKE 1 TABLET 3 times daily PRN muscle     spasms; Therapy: 10Qam9713 to (Evaluate:12Jan2018)  Requested for: 36GUZ4189; Last     Rx:13Nov2017 Ordered   5  Gabapentin 600 MG Oral Tablet; TAKE 1 TABLET 3 TIMES DAILY; Therapy: 67KIS4533 to (Evaluate:12Jan2018)  Requested for: 06YIY3719; Last     Rx:13Nov2017 Ordered   6  Levothyroxine Sodium 100 MCG Oral Tablet; 1 tab daily; Therapy: 67Lpj2849 to  Requested for: 65BAC7948 Recorded   7  Oxycodone-Acetaminophen 5-325 MG Oral Tablet; TAKE 0 5 TABLET Twice daily PRN     pain; Therapy: 64CYU3835 to (Evaluate:12Jan2018); Last Rx:13Nov2017 Ordered   8  Ventolin  (90 Base) MCG/ACT Inhalation Aerosol Solution; Therapy: (Recorded:13Nov2017) to Recorded   9  Vitamin D3 1000 UNIT Oral Tablet; 2 tabs daily; Therapy: 60ZFZ0214 to  Requested for: 65UTL9013 Recorded    Allergies   1  Amitiza CAPS   2  BIAXIN   3  Erythromycin TABS   4  Levaquin TABS   5  MORPHINE   6  Penicillins   7  Tetracyclines   8  MACROBID  9  VALIUM    Vitals   Vital Signs    Recorded: 86ZRD5894 11:34AM   Temperature 97 F   Heart Rate 80   Respiration 16   Systolic 90   Diastolic 58   Height 5 ft 4 in   Weight 170 lb    BMI Calculated 29 18   BSA Calculated 1 83   Pain Scale 8     Physical Exam        Constitutional      General appearance: Well developed, well nourished, alert, in no distress, non-toxic and no overt pain behavior  Eyes      Sclera: anicteric      HEENT      Hearing grossly intact  Pulmonary      Respiratory effort: Even and unlabored  Cardiovascular      Examination of extremities: No edema or pitting edema present  Skin      Skin and subcutaneous tissue: Normal without rashes or lesions, well hydrated         Psychiatric Mood and affect: Mood and affect appropriate  Neurologic      Cranial nerves: Cranial nerves II-XII grossly intact  Musculoskeletal      Gait and station: Normal        Cervical Spine examination demonstrates Cervical Spine:      Appearance: Normal normal lordosis  Tenderness: cervical spine tenderness,-- left paraspinal tenderness-- and-- left trapezius muscle  Cervical Sensory Exam:   -- Decreased sensation noted in the C6 and C7 dermatomes on the left as compared to the right  Flexion was restricted-- and-- was painful  Extension was restricted-- and-- was painful  Left lateral flexion was not restricted-- and-- was painless  Right lateral flexion was not restricted-- and-- was painless  Rotation to the left was not restricted-- and-- was painless  Rotation to the right was not restricted-- and-- was painless  Cervical facet loading is positive on the left and negative on the right       hand strength was normal bilaterally  wrist strength was normal bilaterally  elbow strength was normal bilaterally  shoulder strength was normal on the right side  Left shoulder flexion was 4/5   Left shoulder extension was 5/5  Left shoulder abduction was 4/5   Left shoulder adduction was 5/5   Lumbar/Sacral Spine examination demonstrates Lumbosacral Spine:      Tenderness: None except at lumbar spine  Flexion was not restricted-- and-- was painful  Extension was not restricted-- and-- was painful  Left lateral flexion was not restricted-- and-- was painless  Right lateral flexion was not restricted-- and-- was painless  Rotation to the left was not restricted-- and-- was painless  Rotation to the right was not restricted-- and-- was painless  Foot and ankle strength was normal bilaterally  Knee strength was normal bilaterally  Hip strength was normal bilaterally        Signatures    Electronically signed by : BERNADETTE Wright ; Jan 8 2018 12:10PM EST (Author)

## 2018-01-09 NOTE — MISCELLANEOUS
Message   Recorded as Task   Date: 09/15/2016 03:53 PM, Created By: Heath West   Task Name: Care Coordination   Assigned To: [address]   Regarding Patient: Stanley Sue, Status: Active   Comment:    Katy Rodriguez - 15 Sep 2016 3:53 PM     TASK CREATED  Patient called stating that the [address] you prescribed was too expensive  Can you send Flexeril to her pharmacy? Brian Cash - 15 Sep 2016 4:15 PM     TASK REPLIED TO: Previously Assigned To [address] [facility] [facility],Team  Cyclobenzaprine sent to pharmacy  Francisca Kovacs - 15 Sep 2016 4:22 PM     TASK EDITED    Pt aware  Active Problems    1  Anal fissure (565 0) (K60 2)   2  Cervical disc disorder with radiculopathy of mid-cervical region (723 4) (M50 12)   3  Cervical myofascial pain syndrome (729 1) (M79 1)   4  Cervical radicular pain (723 4) (M54 12)   5  Chronic cervical pain (723 1,338 29) (M54 2,G89 29)   6  Chronic pain syndrome (338 4) (G89 4)   7  Coccydynia (724 79) (M53 3)   8  Constipation (564 00) (K59 00)   9  Continuous opioid dependence (304 01) (F11 20)   10  Degenerative cervical spinal stenosis (723 0) (M48 02)   11  Dysuria (788 1) (R30 0)   12  Essential thrombocythemia (238 71) (D47 3)   13  Hemorrhoids With Complication (636 5)   14  Hydronephrosis (591) (N13 30)   15  Knee pain, left (719 46) (M25 562)   16  Left ankle sprain (845 00) (S93 402A)   17  Left shoulder pain (719 41) (M25 512)   18  Long-term current use of opiate analgesic (V58 69) (Z79 891)   19  Mild carpal tunnel syndrome, left (354 0) (G56 02)   20  Mild carpal tunnel syndrome, right (354 0) (G56 01)   21  Pruritus ani (698 0) (L29 0)   22  Rectal/anal hemorrhage (569 3) (K62 5)   23  Slow transit constipation (564 01) (K59 01)   24  Strain of peroneal tendon of left foot, initial encounter (845 19) (S86 312A)   25  Tendinitis of left rotator cuff (726 10) (M75 82)   26  Thrombocytopenia (287 5) (D69 6)   27   Thrombosed external hemorrhoids (455  4) (K64 5)   28  Wrist pain, acute, right (719 43) (P73 555)    Current Meds   1  Advil 200 MG Oral Tablet; as needed; Therapy: 82FAZ0334 to  Requested for: 62EFN6635 Recorded   2  ALPRAZolam 0 5 MG Oral Tablet; 1 tab daily; Therapy: 81IVQ4647 to  Requested for: 22FRF4691 Recorded   3  Aspirin  MG Oral Tablet Delayed Release; 1 Every Day; Therapy: 12YHY4559 to  Requested for: 97RGA7687 Recorded   4  Cyclobenzaprine HCl - 10 MG Oral Tablet; TAKE 1 TABLET 3 times daily PRN muscle   spasms; Therapy: 19Rqd6247 to (Evaluate:64Vqf4795)  Requested for: 98Ono6276; Last   Rx:96Gmf7449 Ordered   5  Cyclobenzaprine HCl - 10 MG Oral Tablet; TAKE 1 TABLET 3 times daily PRN muscle   spasms; Therapy: 60ZHA4328 to (Evaluate:28Cdu0362)  Requested for: 57Gnj3144; Last   Rx:12Xbw2154 Ordered   6  Gralise 600 MG Oral Tablet; TAKE 3 TABLETS AT DINNER TIME; Therapy: 77VLP8221 to (Evaluate:00Xwa1746)  Requested for: 19Wmb9891; Last   Rx:37Tjr2265 Ordered   7  Levothyroxine Sodium 100 MCG Oral Tablet; 1 tab daily; Therapy: 17Tjz0388 to  Requested for: 47FNM9936 Recorded   8  Oxycodone-Acetaminophen 5-325 MG Oral Tablet; TAKE 0 5 TABLET Twice daily PRN   pain; Last Rx:55Vzc6019 Ordered   9  Reglan 10 MG Oral Tablet (Metoclopramide HCl); take 1 tab three times daily before   meals; Therapy: 20Jrn1738 to  Requested for: 07QKJ4173 Recorded   10  Vitamin D3 1000 UNIT Oral Tablet; 2 tabs daily; Therapy: 23UCC7468 to  Requested for: 06AVQ7534 Recorded    Allergies    1  Amitiza CAPS   2  BIAXIN   3  Erythromycin TABS   4  Levaquin TABS   5  MORPHINE   6  Penicillins   7  Tetracyclines   8  MACROBID    9   VALIUM    Signatures   Electronically signed by : Anitha Phan RN; Sep 15 2016  4:23PM EST                       (Author)

## 2018-01-09 NOTE — MISCELLANEOUS
Signatures   Electronically signed by : BERNADETTE Aparicio ; Oct  4 2017  8:59AM EST                       (Author)

## 2018-01-10 NOTE — CONSULTS
Assessment    1  Mild carpal tunnel syndrome, right (354 0) (G56 01)   2  Left shoulder pain (719 41) (M25 512)   3  Tendinitis of left rotator cuff (726 10) (M75 82)   4  Degenerative cervical spinal stenosis (723 0) (M48 02)   5  Cervical radicular pain (723 4) (M54 12)   6  Mild carpal tunnel syndrome, left (354 0) (G56 02)   7  Wrist pain, acute, right (719 43) (M25 531)   8  History of Total Colectomy, Rectal Mucosectomy & Create Ileal [place]    Plan   Cervical radicular pain, Chronic cervical pain, Chronic pain syndrome, Tendinitis of left  rotator cuff    · *1 - [facility] Physical Therapy  Consult and treatment modalities as  indicated  Status: Hold For - Scheduling  Requested for: 83CZJ1965   Ordered; For: Cervical radicular pain, Chronic cervical pain, Chronic pain syndrome, Tendinitis of left rotator cuff; Ordered By: Mervat Ramirez Performed:  Order Comments: 2- 3 times weekly 4- 6 weeks Due: 91NMB8811  Care Summary provided  : Yes    *1 - [facility] Physician Referral  Consult  Status: Hold For - Scheduling  Requested for: 88MUH7484  Ordered; For: Left shoulder pain, Tendinitis of left rotator cuff, Wrist pain, acute, right;  Ordered By: Mervat Ramirez  Performed:   Due: 22WJA4138  Follow-up visit in 3 months Evaluation and Treatment  Follow-up, after pain management consultation and treatment, and physical therapy, Dr Ruel glez (with [facility])  Status: Hold For - Scheduling  Requested for: 29OOE1838  Ordered; For: Cervical disc disorder with radiculopathy of mid-cervical region, Cervical radicular pain, Chronic cervical pain, Chronic pain syndrome;  Ordered By: Mervat Ramirez  Performed:   Due: 29OTZ8424     Discussion/Summary    69-year-old female, accompanied by her , presents for neck arm shoulder and hand symptoms left greater than right   MRI cervical spine is reviewed in detail by Dr Ruel Abrams, areas of degenerative changes, minimal central and foraminal impingement appreciated at C5-C6 and C6-C7, on the right greater than left  Additionally examination reveals restrictions of the left shoulder consistent with internal derangement  As well as signs of carpal tunnel syndrome of the left greater than right hand  These findings were reviewed in detail with the patient and her   Recommend continue with pain management additionally course of physical therapy her neck is advised, additionally recommend consultation with orthopedic surgery for shoulder exact evaluation and right wrist  Followup in 3 months to assess status of conditions is also planned  These findings, impressions and recommendations were reviewed in great detail with the patient and her  they expressed understanding and agreement, she specifically was in favor of conservative management and evaluation and treatment of her other comorbid conditions  Her and her 's questions were answered completely and to heir satisfaction  Consultation with orthopedic surgery has been arranged, physical therapy has been ordered, and patient understands continue her followup with Dr Denice Porras  Pain Management  Additionally she understands should there be a significant change in her neurologic status she is to call and return sooner for reassessment  Chief Complaint    1  Arm Pain   2  Hand Problem   3  Neck Pain   4  Shoulder Pain  Initial consultation, neck pain, left shoulder pain, left arm pain, right wrist pain, numbness and tingling in the fingers of the left hand  History of Present Illness  67-year-old female, accompanied by her , presents with the above complaints  Patient reports she's had progressive worsening of her symptoms over the last year however her right neck and right arm have been problematic for more than 15 years, and she reports a history of cervical spasm, upper back spasm and pain starting when she was approximately 6years old   Currently her left neck, upper back, shoulder, arm and hand symptoms are more bothersome than the right  She reports no specific injury precipitating these events, however 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, currently is an  and  with repetitive motion of the upper extremities  She presents today with a brace on her right hand which she purchased at [facility] for right wrist pain  She is extremely reluctant to move her left shoulder secondary to pain in the anterior compartment of her shoulder  She reports she is most comfortable when she is recumbent although admits ambulation is not aggravate to her condition, and she is also relatively comfortable when seated  Her primary care provider has made referral to pain management (Dr Irlanda Alvarado), who she reports has recommended LEONEL  She has had no other conservative management to date, physical therapy or chiropractic manipulation  Reports is somewhat of an insurance issue  She freely admits she favors her right arm for many years and feels this may have exacerbated her left arm symptoms  She has had a MRI cervical spine ordered by her family physician 08-  Mark Diaz presents with complaints of constant episodes of moderate bilateral posterior neck pain, described as sharp, dull, aching, burning, stinging and throbbing, radiating to the bilateral trapezius, bilateral shoulder, left arm, left upper arm, left forearm and left hand  On a scale of 1 to 10, the patient rates the pain as 10  The symptoms resulted from a no known event  Associated symptoms include neck stiffness, muscle spasm, tenderness, impaired range of motion, shoulder pain, headache, upper extremity weakness, tinnitus, impaired hearing, impaired memory and impaired vision  Mark Diaz presents with complaints of shoulder pain     Associated symptoms include clicking, decreased range of motion, numbness in the arm, pain in the arm and pain in the neck, but no swelling, no redness, no warmth and no chest pain  The patient is being seen for an initial evaluation of carpal tunnel syndrome  Symptoms are in both wrists  The patient is right hand dominant  Symptoms:  hand tingling, hand numbness, hand weakness and hand clumsiness, but no muscle atrophy  The patient is currently experiencing symptoms  No associated symptoms are reported  Pennie Barthel presents with complaints of hand problem  Associated symptoms include weak grasp, difficulty opening jars, numbness, tingling and weakness  Pennie Barthel presents with complaints of pain in the arms  Associated symptoms include stiffness, decreased range of motion, numbness, tingling and weakness  Pennie Barthel presents with complaints of shoulder symptoms  wrist symptoms      Review of Systems    Constitutional: feeling tired    The patient presents with complaints of visual disturbances, described as halo vision, floaters and photophobia  ENT: no complaints of earache, no loss of hearing, no nose bleeds, no nasal discharge, no sore throat, no hoarseness  Cardiovascular: No complaints of slow heart rate, no fast heart rate, no chest pain, no palpitations, no leg claudication, no lower extremity edema  Respiratory: No complaints of shortness of breath, no wheezing, no cough, no SOB on exertion, no orthopnea, no PND  Gastrointestinal: No complaints of abdominal pain, no constipation, no nausea or vomiting, no diarrhea, no bloody stools  Genitourinary: No complaints of dysuria, no incontinence, no pelvic pain, no dysmenorrhea, no vaginal discharge or bleeding  Musculoskeletal: arthralgias, joint swelling, limb pain, myalgias and joint stiffness  Integumentary: No complaints of skin rash or lesions, no itching, no skin wounds, no breast pain or lump  Neurological: headache, numbness, tingling (hands and feet) and dizziness, but as noted in HPI     Psychiatric: anxiety and depression  Endocrine: No complaints of proptosis, no hot flashes, no muscle weakness, no deepening of the voice, no feelings of weakness  Hematologic/Lymphatic: No complaints of swollen glands, no swollen glands in the neck, does not bleed easily, does not bruise easily  ROS reviewed  Active Problems    1  Anal fissure (565 0) (K60 2)   2  Cervical disc disorder with radiculopathy of mid-cervical region (723 4) (M50 12)   3  Chronic cervical pain (723 1,338 29) (M54 2,G89 29)   4  Chronic pain syndrome (338 4) (G89 4)   5  Coccydynia (724 79) (M53 3)   6  Constipation (564 00) (K59 00)   7  Dysuria (788 1) (R30 0)   8  Essential thrombocythemia (238 71) (D47 3)   9  Hemorrhoids With Complication (678 8)   10  Hydronephrosis (591) (N13 30)   11  Pruritus ani (698 0) (L29 0)   12  Rectal/anal hemorrhage (569 3) (K62 5)   13  Slow transit constipation (564 01) (K59 01)   14  Thrombocytopenia (287 5) (D69 6)   15  Thrombosed external hemorrhoids (455 4) (K64 5)    Past Medical History    1  History of H/O umbilical hernia repair (V15 29) (Z98 89,Z87 19)   2  History of asthma (V12 69) (Z87 09)   3  History of chest pain (V13 89) (Z87 898)   4  History of esophageal reflux (V12 79) (Z87 19)   5  History of tinnitus (V12 49) (Z86 69)   6  History of Leg pain (729 5) (M79 606)   7  History of Nasal congestion (478 19) (R09 81)   8  History of Weight gain (783 1) (R63 5)    The active problems and past medical history were reviewed and updated today  Surgical History    1  History of Breast Surgery Reduction Procedure   2  History of  Section   3  History of Gallbladder Surgery   4  History of Hysterectomy   5  History of Oophorectomy - Unilateral (Removal Of One Ovary)   6  History of Oviductal Surgery Tubotubal Anastomosis   7  History of Pituitary Ablation   8  History of Total Colectomy, Rectal Mucosectomy & Create Ileal [place]   9   History of Umbilical Hernia Repair    The surgical history was reviewed and updated today  Family History    1  Family history of myocardial infarction (V17 3) (Z82 49)    2  Family history of myocardial infarction (V17 3) (Z82 49)    The family history was reviewed and updated today  Social History    · Four children   ·    · Never smoked tobacco (V49 89) (Z78 9)   · No alcohol use  The social history was reviewed and updated today  The social history was reviewed and is unchanged  Current Meds   1  Advil 200 MG Oral Tablet; as needed; Therapy: 50LMT2903 to  Requested for: 55GUH6791 Recorded   2  ALPRAZolam 0 5 MG Oral Tablet; 1 tab daily; Therapy: 73OXU3449 to  Requested for: 56PTV6573 Recorded   3  Aspirin  MG Oral Tablet Delayed Release; 1 Every Day; Therapy: 24AGK2791 to  Requested for: 52KFK7522 Recorded   4  Cyclobenzaprine HCl - 10 MG Oral Tablet; 1 tab daily; Therapy: 54DLY3269 to  Requested for: 40TLZ1712 Recorded   5  Gabapentin 300 MG Oral Capsule; TAKE 1 CAPSULE 3 TIMES DAILY; Therapy: 17CLN9812 to (Evaluate:61Kpz1362)  Requested for: 25YZG6314; Last   Rx:07Jan2016 Ordered   6  Levothyroxine Sodium 100 MCG Oral Tablet; 1 tab daily; Therapy: 63Tuy0646 to  Requested for: 45XZS2926 Recorded   7  Percocet 5-325 MG Oral Tablet; TAKE 1 TABLET EVERY 6 HOURS AS NEEDED FOR   PAIN;   Therapy: (Recorded:18Jan2016) to Recorded   8  Reglan 10 MG Oral Tablet; take 1 tab three times daily before meals; Therapy: 68Gou0649 to  Requested for: 19SAD9278 Recorded   9  Vitamin D3 1000 UNIT Oral Tablet; 2 tabs daily; Therapy: 75UFU2670 to  Requested for: 31EYG9040 Recorded    The medication list was reviewed and updated today  Allergies    1  Amitiza CAPS   2  BIAXIN   3  Erythromycin TABS   4  Levaquin TABS   5  MORPHINE   6  Penicillins   7  Tetracyclines   8  MACROBID    9   VALIUM    Vitals  Vital Signs [Data Includes: Current Encounter]    Recorded: G5150090 01:49PM   Temperature 98 4 F    Heart Rate 90    Respiration 16 Systolic 597    Diastolic 77    Height 5 ft 3 in    Patient Refused Weight Yes Yes     Physical Exam     Constitutional   General appearance: Abnormal   uncomfortable, within normal limits of ideal weight and appearance reflects stated age  Head and Face Normal on inspection  Neck No JVD  Respiratory Respiratory effort: Normal   Auscultation of lungs: Clear to auscultation bilaterally  Cardiovascular Auscultation of heart: Rate is regular  Rhythm is regular  No heart murmur appreciated  Musculo: Spine   Spine:    Cervical Spine examination demonstrates Cervical Spine: Appearance: Normal  Tenderness: left paraspinal (muscular, paraspinal, C3, C4, C5, C6 and C7 ), left trapezius muscle and right paraspinal (muscular, paraspinal, C3, C4, C5, C6 and C7 )  Flexion was not restricted and was painless  Extension was restricted and was painful  Left lateral flexion was restricted and was painful  Right lateral flexion was restricted and was painful  Rotation to the left was restricted and was painful  Rotation to the right was restricted and was painful  Motor: Normal  (Left shoulder also has marked decreased range of motion, marked anterior compartment, rotator cuff tendinitis, restricted external rotation compared to the right, internal rotation compared to the right, forward elevation is within normal limits bilaterally)  Motor Exam: all motor groups within normal limits of strength & tone bilaterally  Sensory Exam: all sensory within normal limits bilaterally  Deep Tendon Reflexes: all reflexes within normal limits bilaterally  Positive Phalen's and Tinel's sign is also noted left greater than right  Skin warm and dry  Neurologic - Mental Status: Alert and Oriented x3  Mood and affect: Affect is normal   Grossly nonfocal    Motor System General Motor Strength: No pronator drift and no parietal drift  Motor System - Upper Extremities: Normal to inspection and palpation   Strength: Deltoids 5/5 bilaterally  Biceps 5/5 bilaterally  Triceps 5/5 bilaterally  Extensor carpi radials is 5/5 bilaterally  Extensor digitorum 5/5 bilaterally  Intrinsic 5/5 bilaterally   5/5 bilaterally  Muscle tone: Normal bilaterally  Muscle Bulk: Normal bilaterally  Motor System - Lower Extremities: Normal to inspection and palpation  Strength: iliopsoas 5/5 bilaterally  Quadriceps 5/5 bilaterally  Hamstrings 5/5 bilaterally  Gastrocnemius 5/5 bilaterally  Reflexes: Biceps reflexes are 2+ bilaterally  Triceps reflexes are 2+ bilaterally  Achilles reflexes are 2+ bilaterally  Babinski's reflex is 2+ down going bilaterally  Ankle clonus is absent bilaterally  Deep tendon reflexes: 2+ right biceps, 2+ left biceps, 2+ right triceps, 2+ left triceps, 2+ right patella, 2+ left patella, 2+ right ankle jerk, 2+ left ankle jerk, no ankle clonus on the right and no ankle clonus on the left  Rios sign was not present:   Coordination: Finger to nose was normal    Sensory: Sensation grossly intact to light touch  Sensation grossly intact to light touch  Gait and Station: [place] with a normal gait  Results/Data    I personally reviewed the MRI cervical spine 12 Aug 2015 in detail with the patient  MRI Review Multilevel cervical disc degeneration, most prominent at C5-C6 and C6-C7 with borderline foraminal stenosis  Future Appointments    Date/Time Provider Specialty Site   04/18/2016 01:00 PM Jagruti Kirby [facility] Neurosurgery [facility] NEUROSURGICAL   01/21/2016 01:30 PM BERNADETTE Blackburn  Pain Management [facility]   02/15/2016 01:15 PM BERNADETTE Blackburn   Pain Management [facility] SPINE & PAIN MED [facility]     Signatures   Electronically signed by : Tali Minaya [facility]; Jan 18 2016  3:57PM EST                       (Author)    Electronically signed by : Tali Minaya [facility]; Jan 18 2016  4:00PM EST                       (Author)    Electronically signed by : BERNADETTE Andrade ; Jan 18 2016  9:32PM EST (Author)    Electronically signed by : BERNADETTE Nicole ; Jan 18 2016  9:32PM EST                       (Author)

## 2018-01-10 NOTE — MISCELLANEOUS
Message   Recorded as Task   Date: 01/28/2016 10:18 AM, Created By: Kaleb Augustin   Task Name: Follow Up   Assigned To: [address] [facility] [address] Procedure,Team   Regarding Patient: Bharti Wong, Status: Active   Comment:    Francisca Kovacs - 28 Jan 2016 10:18 AM     TASK CREATED  LMOM for pt to return call post C7-T1 JACOBY done on 1/21/16 with Francisca Emmanuel - 28 Jan 2016 10:18 AM     TASK EDITED   Francisca Kovacs - 09 Feb 2016 9:10 AM     TASK EDITED  2nd attempt to reach pt*    LMOM to return call  Francisca Kovacs - 10 Feb 2016 11:59 AM     TASK EDITED  Spoke to ptcristhian that the nerve pain she was experiencing is 100% gone  Her ROM has improved  She continues to have muscle spasms in her neck and alexy shoulders, she is taking cyclobenzaprine as prescribed by her PCP  Pt confirmed f/u 2/15/16   Brian Cash - 10 Feb 2016 1:08 PM     TASK REPLIED TO: Previously Assigned To [place]  Thanks  I will switch her to baclofen  Prescription sent to pharmacy  Francisca Kovacs - 10 Feb 2016 1:21 PM     TASK EDITED  Spoke to pt, instructed pt to stop the cyclobenzaprine and to start the baclofen for her muscle spasms  Pt verbalized understanding  Active Problems    1  Anal fissure (565 0) (K60 2)   2  Cervical disc disorder with radiculopathy of mid-cervical region (723 4) (M50 12)   3  Cervical radicular pain (723 4) (M54 12)   4  Chronic cervical pain (723 1,338 29) (M54 2,G89 29)   5  Chronic pain syndrome (338 4) (G89 4)   6  Coccydynia (724 79) (M53 3)   7  Constipation (564 00) (K59 00)   8  Degenerative cervical spinal stenosis (723 0) (M48 02)   9  Dysuria (788 1) (R30 0)   10  Essential thrombocythemia (238 71) (D47 3)   11  Hemorrhoids With Complication (496 5)   12  Hydronephrosis (591) (N13 30)   13  Left shoulder pain (719 41) (M25 512)   14  Mild carpal tunnel syndrome, left (354 0) (G56 02)   15   Mild carpal tunnel syndrome, right (354 0) (G56 01)   16  Pruritus ani (698 0) (L29 0)   17  Rectal/anal hemorrhage (569 3) (K62 5)   18  Slow transit constipation (564 01) (K59 01)   19  Tendinitis of left rotator cuff (726 10) (M75 82)   20  Thrombocytopenia (287 5) (D69 6)   21  Thrombosed external hemorrhoids (455 4) (K64 5)   22  Wrist pain, acute, right (719 43) (M25 531)    Current Meds   1  Advil 200 MG Oral Tablet; as needed; Therapy: 01OYB4953 to  Requested for: 44JRC4632 Recorded   2  ALPRAZolam 0 5 MG Oral Tablet; 1 tab daily; Therapy: 56YXV4289 to  Requested for: 32QQX3054 Recorded   3  Aspirin  MG Oral Tablet Delayed Release; 1 Every Day; Therapy: 72XHW2129 to  Requested for: 01IFX0168 Recorded   4  Baclofen 10 MG Oral Tablet; TAKE 1 TABLET 3 TIMES DAILY AS NEEDED FOR MUSCLE   SPASM; Therapy: 20VXP5482 to (Evaluate:11Mar2016)  Requested for: 04NII0871; Last   Rx:21Idx2570 Ordered   5  Gabapentin 300 MG Oral Capsule; TAKE 1 CAPSULE 3 TIMES DAILY; Therapy: 49YUN8739 to (Evaluate:06Feb2016)  Requested for: 83NCF6003; Last   Rx:07Jan2016 Ordered   6  Levothyroxine Sodium 100 MCG Oral Tablet; 1 tab daily; Therapy: 48Ggd7686 to  Requested for: 82GGK9833 Recorded   7  Percocet 5-325 MG Oral Tablet (Oxycodone-Acetaminophen); TAKE 1 TABLET EVERY 6   HOURS AS NEEDED FOR PAIN;   Therapy: (Recorded:18Jan2016) to Recorded   8  Reglan 10 MG Oral Tablet (Metoclopramide HCl); take 1 tab three times daily before   meals; Therapy: 82Fvs4398 to  Requested for: 60FCN4042 Recorded   9  Vitamin D3 1000 UNIT Oral Tablet; 2 tabs daily; Therapy: 22MMN7959 to  Requested for: 97PIY9707 Recorded    Allergies    1  Amitiza CAPS   2  BIAXIN   3  Erythromycin TABS   4  Levaquin TABS   5  MORPHINE   6  Penicillins   7  Tetracyclines   8  MACROBID    9   VALIUM    Signatures   Electronically signed by : Gera Ortiz RN; Feb 10 2016  1:22PM EST                       (Author)

## 2018-01-11 NOTE — MISCELLANEOUS
Signatures   Electronically signed by : BERNADETTE Ovalle ; Jul  3 2017  3:58PM EST                       (Author)

## 2018-01-11 NOTE — MISCELLANEOUS
Message   Recorded as Task   Date: 08/16/2017 02:16 PM, Created By: Sandra Oliveros   Task Name: Miscellaneous   Assigned To: [facility] Clinical,Team   Regarding Patient: Melania Mckeon, Status: Active   CommentCleotilde Serum - 16 Aug 2017 2:16 Elmarie Fu  Dr Trish Martin is not covered can you order Gabapentin ? [address] [employer] [address] - 16 Aug 2017 2:56 PM     TASK REPLIED TO: Previously Assigned To [address]  Is that what the patient wants to do? Has she tried gabapentin in the past?   Chikis Erickson - 16 Aug 2017 3:06 PM     TASK IN PROGRESS   Blade Edwards - 16 Aug 2017 3:07 PM     TASK EDITED  Attempted to call patient, Erica Nicolas - 17 Aug 2017 11:03 AM     TASK EDITED  Attempted to call patient, Richard Borden - 17 Aug 2017 2:05 PM     TASK EDITED  Pt has tried gabapentin in the past and is ok going back to that  Please let her know if it can be called in or does she need to pick it up  Please leave message - she is at work and it is difficult to answer her phone   Brandon Masterson - 17 Aug 2017 2:05 PM     TASK EDITED   Hans Hermosillo - 17 Aug 2017 2:21 PM     TASK EDITED  Please advise  [facility] [employer] [address] - 17 Aug 2017 3:05 PM     TASK REPLIED TO: Previously Assigned To [address] to pharmacy  Chikis Erickson - 17 Aug 2017 3:26 PM     TASK EDITED  Left detailed message on voice mail as requested by patient        Active Problems    1  Anal fissure (565 0) (K60 2)   2  Cervical disc disorder with radiculopathy of mid-cervical region (723 4) (M50 120)   3  Cervical myofascial pain syndrome (729 1) (M79 1)   4  Chronic cervical pain (723 1,338 29) (M54 2,G89 29)   5  Chronic pain syndrome (338 4) (G89 4)   6  Coccydynia (724 79) (M53 3)   7  Constipation (564 00) (K59 00)   8  Continuous opioid dependence (304 01) (F11 20)   9  Degenerative cervical spinal stenosis (723 0) (M48 02)   10  Dysuria (788 1) (R30 0)   11   Essential thrombocythemia (238 71) (D47 3)   12  Hemorrhoids With Complication (993 4)   13  Hydronephrosis (591) (N13 30)   14  Knee pain, left (719 46) (M25 562)   15  Left ankle sprain (845 00) (S93 402A)   16  Left shoulder pain (719 41) (M25 512)   17  Long-term current use of opiate analgesic (V58 69) (Z79 891)   18  Low back pain (724 2) (M54 5)   19  Lumbar radiculopathy (724 4) (M54 16)   20  Mild carpal tunnel syndrome, left (354 0) (G56 02)   21  Mild carpal tunnel syndrome, right (354 0) (G56 01)   22  Pruritus ani (698 0) (L29 0)   23  Rectal/anal hemorrhage (569 3) (K62 5)   24  Slow transit constipation (564 01) (K59 01)   25  Strain of peroneal tendon of left foot, initial encounter (845 19) (S86 312A)   26  Tendinitis of left rotator cuff (726 10) (M75 82)   27  Thrombocytopenia (287 5) (D69 6)   28  Thrombosed external hemorrhoids (455 4) (K64 5)   29  Wrist pain, acute, right (719 43) (M25 531)    Current Meds   1  Advil 200 MG Oral Tablet; as needed; Therapy: 91ZXN9467 to  Requested for: 61BDD8191 Recorded   2  ALPRAZolam 0 5 MG Oral Tablet; 1 tab daily; Therapy: 52BIQ7437 to  Requested for: 22VIY3429 Recorded   3  Aspirin  MG Oral Tablet Delayed Release; 1 Every Day; Therapy: 33NPK9615 to  Requested for: 86NRL4478 Recorded   4  Cyclobenzaprine HCl - 10 MG Oral Tablet; TAKE 1 TABLET 3 times daily PRN muscle   spasms; Therapy: 22Ooc7152 to (Evaluate:24Hzr2117)  Requested for: 38Mdk0063; Last   Rx:49Dso7912 Ordered   5  Gabapentin 600 MG Oral Tablet; TAKE 1 TABLET 3 TIMES DAILY; Therapy: 35UQH1619 to (Evaluate:13Svn1299)  Requested for: 94Qva8440; Last   Rx:51Ieu0724 Ordered   6  Levothyroxine Sodium 100 MCG Oral Tablet; 1 tab daily; Therapy: 87Gui9495 to  Requested for: 00QWU7926 Recorded   7  Oxycodone-Acetaminophen 5-325 MG Oral Tablet; TAKE 0 5 TABLET Twice daily PRN   pain; Therapy: 36UBH0308 to (Evaluate:13Oct2017); Last Rx:31Vvb7630 Ordered   8   Vitamin D3 1000 UNIT Oral Tablet; 2 tabs daily; Therapy: 94YWX3957 to  Requested for: 04BEH7765 Recorded    Allergies    1  Amitiza CAPS   2  BIAXIN   3  Erythromycin TABS   4  Levaquin TABS   5  MORPHINE   6  Penicillins   7  Tetracyclines   8  MACROBID    9   VALIUM    Signatures   Electronically signed by : Martin Rolle, ; Aug 17 2017  3:27PM EST                       (Author)

## 2018-01-12 NOTE — MISCELLANEOUS
Signatures   Electronically signed by : BERNADETTE Irene ; Jul  3 2017  1:09PM EST                       (Author)

## 2018-01-13 VITALS
BODY MASS INDEX: 30.12 KG/M2 | TEMPERATURE: 98.1 F | HEIGHT: 63 IN | SYSTOLIC BLOOD PRESSURE: 102 MMHG | DIASTOLIC BLOOD PRESSURE: 62 MMHG | HEART RATE: 62 BPM | WEIGHT: 170 LBS

## 2018-01-13 VITALS
TEMPERATURE: 98 F | WEIGHT: 170 LBS | DIASTOLIC BLOOD PRESSURE: 64 MMHG | SYSTOLIC BLOOD PRESSURE: 118 MMHG | BODY MASS INDEX: 29.02 KG/M2 | HEIGHT: 64 IN

## 2018-01-13 VITALS
DIASTOLIC BLOOD PRESSURE: 66 MMHG | RESPIRATION RATE: 16 BRPM | HEIGHT: 64 IN | SYSTOLIC BLOOD PRESSURE: 114 MMHG | HEART RATE: 72 BPM

## 2018-01-13 NOTE — MISCELLANEOUS
Message   Recorded as Task   Date: 09/23/2016 02:16 PM, Created By: Amy Prado   Task Name: Care Coordination   Assigned To: [address]   Regarding Patient: Herbert Cameron, Status: In Progress   Comment:    Brian Cash - 23 Sep 2016 2:16 PM     TASK CREATED  Alprazolam was positive on the patient's last UDT  Because of the new FDA black box warning for opioid medications with benzodiazepines, the patient will have to decide whether she wants to continue opioid medication prescribing by myself or continue benzodiazepine therapy with her prescribing physician  If she continues opioid medications with myself, she will have to be weaned off of benzodiazepines  YearReviews Melanie Newelly - 23 Sep 2016 2:56 PM     TASK EDITED  Attempted to call the pt  and LMOM to CB  Glenny Armstrong - 23 Sep 2016 2:56 PM     TASK IN PROGRESS   Mahsa James - 23 Sep 2016 3:23 PM     TASK EDITED  Pt left vm on the [facility] triage line at 3:08 that this is a frustrating system to reach someone at  Someone left her message to c/b about her UDS  She is going out of [place] tomorrow and requests c/b today please  C/B # [phone]   Glenny Armstrong - 23 Sep 2016 3:33 PM     TASK EDITED  S/w the pt  and she does take 0 5mg of xanax at [facility] and she has been taking it for the past ten years and she has no problem with it and she only takes the pain medication when she needs it  Dr Husam Ontiveros orders the xanax and she states you were aware of that  How does she wean off of the xanax? AS to advise and CB in a week after she is back from vacation  Amy Prado - 26 Sep 2016 7:10 AM     TASK REPLIED TO: Previously Assigned To Amy Prado  Please have patient speak to Dr aPm Winkler about weaning instructions regarding the Xanax  Glenny Armstrong - 26 Sep 2016 10:38 AM     TASK EDITED  Attempted to call the pt  and left a detailed mom in regards to the previous task  Pt  to CB if any questions  Glenny Armstrong - 26 Sep 2016 10:38 AM     TASK IN PROGRESS        Active Problems    1  Anal fissure (565 0) (K60 2)   2  Cervical disc disorder with radiculopathy of mid-cervical region (723 4) (M50 120)   3  Cervical myofascial pain syndrome (729 1) (M79 1)   4  Cervical radicular pain (723 4) (M54 12)   5  Chronic cervical pain (723 1,338 29) (M54 2,G89 29)   6  Chronic pain syndrome (338 4) (G89 4)   7  Coccydynia (724 79) (M53 3)   8  Constipation (564 00) (K59 00)   9  Continuous opioid dependence (304 01) (F11 20)   10  Degenerative cervical spinal stenosis (723 0) (M48 02)   11  Dysuria (788 1) (R30 0)   12  Essential thrombocythemia (238 71) (D47 3)   13  Hemorrhoids With Complication (524 1)   14  Hydronephrosis (591) (N13 30)   15  Knee pain, left (719 46) (M25 562)   16  Left ankle sprain (845 00) (S93 402A)   17  Left shoulder pain (719 41) (M25 512)   18  Long-term current use of opiate analgesic (V58 69) (Z79 891)   19  Mild carpal tunnel syndrome, left (354 0) (G56 02)   20  Mild carpal tunnel syndrome, right (354 0) (G56 01)   21  Pruritus ani (698 0) (L29 0)   22  Rectal/anal hemorrhage (569 3) (K62 5)   23  Slow transit constipation (564 01) (K59 01)   24  Strain of peroneal tendon of left foot, initial encounter (845 19) (S86 312A)   25  Tendinitis of left rotator cuff (726 10) (M75 82)   26  Thrombocytopenia (287 5) (D69 6)   27  Thrombosed external hemorrhoids (455 4) (K64 5)   28  Wrist pain, acute, right (719 43) (M25 531)    Current Meds   1  Advil 200 MG Oral Tablet; as needed; Therapy: 34NLX5339 to  Requested for: 79FGG8702 Recorded   2  ALPRAZolam 0 5 MG Oral Tablet; 1 tab daily; Therapy: 77FKF3651 to  Requested for: 81YBE1048 Recorded   3  Aspirin  MG Oral Tablet Delayed Release; 1 Every Day; Therapy: 92OVA9859 to  Requested for: 89ZBL2464 Recorded   4   Cyclobenzaprine HCl - 10 MG Oral Tablet; TAKE 1 TABLET 3 times daily PRN muscle   spasms; Therapy: 66Rbj2823 to (Evaluate:47Jpg1836)  Requested for: 40Eyf3620; Last   Rx:89Phk2135 Ordered   5  Cyclobenzaprine HCl - 10 MG Oral Tablet; TAKE 1 TABLET 3 times daily PRN muscle   spasms; Therapy: 13PQK3015 to (Evaluate:36Itx3782)  Requested for: 33Mqo4177; Last   Rx:68Tmb9166 Ordered   6  Gralise 600 MG Oral Tablet; TAKE 3 TABLETS AT DINNER TIME; Therapy: 53JVZ7879 to (Evaluate:65Vbo6833)  Requested for: 95Txw6763; Last   Rx:62Tza9742 Ordered   7  Levothyroxine Sodium 100 MCG Oral Tablet; 1 tab daily; Therapy: 72Qvw6259 to  Requested for: 94AQO2792 Recorded   8  Oxycodone-Acetaminophen 5-325 MG Oral Tablet; TAKE 0 5 TABLET Twice daily PRN   pain; Last Rx:99Pap2601 Ordered   9  Reglan 10 MG Oral Tablet (Metoclopramide HCl); take 1 tab three times daily before   meals; Therapy: 54Bxm0144 to  Requested for: 19LBP4410 Recorded   10  Vitamin D3 1000 UNIT Oral Tablet; 2 tabs daily; Therapy: 87BJN1925 to  Requested for: 63RYA9050 Recorded    Allergies    1  Amitiza CAPS   2  BIAXIN   3  Erythromycin TABS   4  Levaquin TABS   5  MORPHINE   6  Penicillins   7  Tetracyclines   8  MACROBID    9   VALIUM    Signatures   Electronically signed by : Marisol Gaxiola, ; Sep 30 2016  9:52AM EST                       (Author)

## 2018-01-14 VITALS
SYSTOLIC BLOOD PRESSURE: 122 MMHG | WEIGHT: 170 LBS | HEART RATE: 6 BPM | BODY MASS INDEX: 39.34 KG/M2 | TEMPERATURE: 98.4 F | DIASTOLIC BLOOD PRESSURE: 72 MMHG | HEIGHT: 55 IN

## 2018-01-14 NOTE — MISCELLANEOUS
Message   Recorded as Task   Date: 03/08/2016 02:00 PM, Created By: Becky Ortiz   Task Name: Care Coordination   Assigned To: [facility] [place]   Regarding Patient: Laura Ortiz, Status: Active   Comment:    Francisca Kovacs - 08 Mar 2016 2:00 PM     TASK CREATED  Received approval from Dr Uribe for pt to hold ASA 6 days prior to C7-T1 JACOBY  Call placed to pt to give further instruction, lmom for pt to return call  Francisca Kovacs - 08 Mar 2016 2:02 PM     TASK EDITED   Becky Ortiz - 08 Mar 2016 2:02 PM     TASK EDITED   Becky Ortiz - 09 Mar 2016 9:42 AM     TASK EDITED  Pt returned call, aware of approval to hold ASA  Pt is scheduled for C7-T1 JACOBY on 3/31/16  Instructed pt start holding asa on 3/25/16  Pt verbalized understanding  Fer Flores - 09 Mar 2016 10:01 AM     TASK REPLIED TO: Previously Assigned To [place]  Thanks  Active Problems    1  Anal fissure (565 0) (K60 2)   2  Cervical disc disorder with radiculopathy of mid-cervical region (723 4) (M50 12)   3  Cervical radicular pain (723 4) (M54 12)   4  Chronic cervical pain (723 1,338 29) (M54 2,G89 29)   5  Chronic pain syndrome (338 4) (G89 4)   6  Coccydynia (724 79) (M53 3)   7  Constipation (564 00) (K59 00)   8  Degenerative cervical spinal stenosis (723 0) (M48 02)   9  Dysuria (788 1) (R30 0)   10  Essential thrombocythemia (238 71) (D47 3)   11  Hemorrhoids With Complication (817 2)   12  Hydronephrosis (591) (N13 30)   13  Left shoulder pain (719 41) (M25 512)   14  Mild carpal tunnel syndrome, left (354 0) (G56 02)   15  Mild carpal tunnel syndrome, right (354 0) (G56 01)   16  Pruritus ani (698 0) (L29 0)   17  Rectal/anal hemorrhage (569 3) (K62 5)   18  Slow transit constipation (564 01) (K59 01)   19  Tendinitis of left rotator cuff (726 10) (M75 82)   20  Thrombocytopenia (287 5) (D69 6)   21  Thrombosed external hemorrhoids (455 4) (K64 5)   22   Wrist pain, acute, right (842 22) (B00 142)    Current Meds   1  Advil 200 MG Oral Tablet; as needed; Therapy: 81YBA4576 to  Requested for: 56RCR8633 Recorded   2  ALPRAZolam 0 5 MG Oral Tablet; 1 tab daily; Therapy: 17PJB1036 to  Requested for: 69MAA1954 Recorded   3  Aspirin  MG Oral Tablet Delayed Release; 1 Every Day; Therapy: 94DPD0054 to  Requested for: 98JQK2663 Recorded   4  Baclofen 10 MG Oral Tablet; TAKE 1 TABLET 3 TIMES DAILY AS NEEDED FOR MUSCLE   SPASM; Therapy: 52HQR8806 to (Evaluate:11Mar2016)  Requested for: 49CNT2403; Last   Rx:10Feb2016 Ordered   5  Gabapentin 300 MG Oral Capsule; TAKE 1 CAPSULE 3 TIMES DAILY; Therapy: 94BRY8373 to (Frutoso Chasten)  Requested for: 47BNT4443; Last   Rx:07Mar2016 Ordered   6  Levothyroxine Sodium 100 MCG Oral Tablet; 1 tab daily; Therapy: 41Qar9463 to  Requested for: 55KZP7176 Recorded   7  Percocet 5-325 MG Oral Tablet (Oxycodone-Acetaminophen); TAKE 1 TABLET EVERY 6   HOURS AS NEEDED FOR PAIN;   Therapy: (Recorded:18Jan2016) to Recorded   8  Reglan 10 MG Oral Tablet (Metoclopramide HCl); take 1 tab three times daily before   meals; Therapy: 26Gsf3340 to  Requested for: 57FLW9912 Recorded   9  Vitamin D3 1000 UNIT Oral Tablet; 2 tabs daily; Therapy: 57TYE2182 to  Requested for: 31VYO4535 Recorded    Allergies    1  Amitiza CAPS   2  BIAXIN   3  Erythromycin TABS   4  Levaquin TABS   5  MORPHINE   6  Penicillins   7  Tetracyclines   8  MACROBID    9   VALIUM    Signatures   Electronically signed by : Charo Thurman RN; Mar  9 2016 10:16AM EST                       (Author)

## 2018-01-15 NOTE — MISCELLANEOUS
Message   Recorded as Task   Date: 04/07/2016 11:16 AM, Created By: Harpal Govea   Task Name: Follow Up   Assigned To: [address] [facility] Procedure,Team   Regarding Patient: Rolo Wilkins, Status: In Progress   Comment:    Katy Rodriguez - 07 Apr 2016 11:16 AM     TASK CREATED  L/ M to return call  S/P C7-T1 JACOBY done 3/31/16 by Dr Howard RodriguezKaty - 08 Apr 2016 9:32 AM     TASK IN PROGRESS   Katy Rodriguez - 14 Apr 2016 2:30 PM     TASK EDITED  L/m to return call and reminded her of her appt with Dr Howard Buchanan on 4/25/16  S/p C7-T1 JACOBY done 3/31/16 by Dr Howard RodriguezJully - 21 Apr 2016 10:15 AM     TASK EDITED  Patient reports she received zero relief from her procedure  Her pain level today is a 5-6  She states that her pain is worse now then it was before she had the procedure  Patient has a f/u appt with you on 4/25/16  S/p C7-T1 JACOBY done 3/31/16 by Dr Howard Grecoy - 21 Apr 2016 11:29 AM     TASK REPLIED TO: Previously Assigned To Brian Cash  Options:    1  Repeat JACOBY  2  Surgery  3  Spinal cord stimulator trial    Please inform patient and let me know which way she would like to proceed  MichaelJully - 21 Apr 2016 2:22 PM     TASK EDITED  Patient is interested in doing the Spinal cord stimulator and would like to discuss it with you at her f/u appt on 4/25/16  S/p C7-T1 JACOBY done 3/31/16 by Dr Howard Dewey - 21 Apr 2016 3:13 PM     TASK REPLIED TO: Previously Assigned To [place]  THanks  Please remind me when she comes in about this        Active Problems    1  Anal fissure (565 0) (K60 2)   2  Cervical disc disorder with radiculopathy of mid-cervical region (723 4) (M50 12)   3  Cervical radicular pain (723 4) (M54 12)   4  Chronic cervical pain (723 1,338 29) (M54 2,G89 29)   5  Chronic pain syndrome (338 4) (G89 4)   6  Coccydynia (724 79) (M53 3)   7  Constipation (564 00) (K59 00)   8  Degenerative cervical spinal stenosis (723 0) (M48 02)   9  Dysuria (788 1) (R30 0)   10  Essential thrombocythemia (238 71) (D47 3)   11  Hemorrhoids With Complication (146 9)   12  Hydronephrosis (591) (N13 30)   13  Left shoulder pain (719 41) (M25 512)   14  Mild carpal tunnel syndrome, left (354 0) (G56 02)   15  Mild carpal tunnel syndrome, right (354 0) (G56 01)   16  Pruritus ani (698 0) (L29 0)   17  Rectal/anal hemorrhage (569 3) (K62 5)   18  Slow transit constipation (564 01) (K59 01)   19  Tendinitis of left rotator cuff (726 10) (M75 82)   20  Thrombocytopenia (287 5) (D69 6)   21  Thrombosed external hemorrhoids (455 4) (K64 5)   22  Wrist pain, acute, right (719 43) (M25 531)    Current Meds   1  Advil 200 MG Oral Tablet; as needed; Therapy: 36GGW8797 to  Requested for: 32ZTO9933 Recorded   2  ALPRAZolam 0 5 MG Oral Tablet; 1 tab daily; Therapy: 35NVE1471 to  Requested for: 71JYL8115 Recorded   3  Aspirin  MG Oral Tablet Delayed Release; 1 Every Day; Therapy: 38DCC5360 to  Requested for: 94FLS2303 Recorded   4  Baclofen 10 MG Oral Tablet; TAKE 1 TABLET 3 TIMES DAILY AS NEEDED FOR MUSCLE   SPASM; Therapy: 70ZBN8891 to (Evaluate:11Mar2016)  Requested for: 60ADZ6260; Last   Rx:36Grn9260 Ordered   5  Gabapentin 300 MG Oral Capsule; TAKE 1 CAPSULE 3 TIMES DAILY; Therapy: 22NXA4476 to (Verneice Na)  Requested for: 52RKD0345; Last   Rx:07Mar2016 Ordered   6  Levothyroxine Sodium 100 MCG Oral Tablet; 1 tab daily; Therapy: 38Fum7723 to  Requested for: 54GIW5536 Recorded   7  Percocet 5-325 MG Oral Tablet (Oxycodone-Acetaminophen); TAKE 1 TABLET EVERY 6   HOURS AS NEEDED FOR PAIN;   Therapy: (Recorded:18Jan2016) to Recorded   8  Reglan 10 MG Oral Tablet (Metoclopramide HCl); take 1 tab three times daily before   meals; Therapy: 92Cdl3592 to  Requested for: 28WQV9874 Recorded   9  Vitamin D3 1000 UNIT Oral Tablet; 2 tabs daily; Therapy: 01ETS7385 to  Requested for: 82FLW7598 Recorded    Allergies    1  Amitiza CAPS   2   BIAXIN   3  Erythromycin TABS   4  Levaquin TABS   5  MORPHINE   6  Penicillins   7  Tetracyclines   8  MACROBID    9  VALIUM    Signatures   Electronically signed by : Cammy Escalante MA;  Apr 21 2016  3:33PM EST                       (Author)

## 2018-01-17 NOTE — MISCELLANEOUS
Message   Recorded as Task   Date: 2017 10:47 AM, Created By: Tiara Mann   Task Name: Care Coordination   Assigned To: [address] [facility] [address] [facility] [address], [address] Procedure,Team   Regarding Patient: Nghia Aguilar, Status: In Progress   Comment:    Katy Rodriguez - 12 May 2017 10:47 AM     TASK CREATED  Per Tima T  @ [facility] MRI Lumbar was denied  We can do Peer to Peer or [facility] the [facility]  P to P # [phone]  [facility]# [id number]   Brian Cash - 12 May 2017 11:09 AM     TASK REPLIED TO: Previously Assigned To Leonidas Josue  Patient needs to either come in for a formal appointment to evaluate lumbar region or move forward with PT/chiropractic care before MRI L-spine is approved  Katy Rodriguez - 18 May 2017 9:44 AM     TASK EDITED  L/m with dr Emy Webster recommendation and to call us back with a decision  Katy Rodriguez - 18 May 2017 12:47 PM     TASK EDITED   Tiara Mann - 18 May 2017 12:47 PM     TASK EDITED   Katy Rodriguez - 24 May 2017 2:12 PM     TASK EDITED  L/m stating Dr Vern Maldonado recommendation and to return our call with her decision  Active Problems    1  Anal fissure (565 0) (K60 2)   2  Cervical disc disorder with radiculopathy of mid-cervical region (723 4) (M50 120)   3  Cervical myofascial pain syndrome (729 1) (M79 1)   4  Chronic cervical pain (723 1,338 29) (M54 2,G89 29)   5  Chronic pain syndrome (338 4) (G89 4)   6  Coccydynia (724 79) (M53 3)   7  Constipation (564 00) (K59 00)   8  Continuous opioid dependence (304 01) (F11 20)   9  Degenerative cervical spinal stenosis (723 0) (M48 02)   10  Dysuria (788 1) (R30 0)   11  Essential thrombocythemia (238 71) (D47 3)   12  Hemorrhoids With Complication (240 5)   13  Hydronephrosis (591) (N13 30)   14  Knee pain, left (719 46) (M25 562)   15  Left ankle sprain (845 00) (S93 402A)   16  Left shoulder pain (719 41) (M25 512)   17  Long-term current use of opiate analgesic (V58 69) (Z79 891)   18  Low back pain (724 2) (M54 5)   19   Lumbar radiculopathy (724 4) (M54 16)   20  Mild carpal tunnel syndrome, left (354 0) (G56 02)   21  Mild carpal tunnel syndrome, right (354 0) (G56 01)   22  Pruritus ani (698 0) (L29 0)   23  Rectal/anal hemorrhage (569 3) (K62 5)   24  Slow transit constipation (564 01) (K59 01)   25  Strain of peroneal tendon of left foot, initial encounter (845 19) (S86 312A)   26  Tendinitis of left rotator cuff (726 10) (M75 82)   27  Thrombocytopenia (287 5) (D69 6)   28  Thrombosed external hemorrhoids (455 4) (K64 5)   29  Wrist pain, acute, right (719 43) (M25 531)    Current Meds   1  Advil 200 MG Oral Tablet; as needed; Therapy: 54XOV6303 to  Requested for: 62TXA0087 Recorded   2  ALPRAZolam 0 5 MG Oral Tablet; 1 tab daily; Therapy: 52BYH3549 to  Requested for: 40YIB3540 Recorded   3  Aspirin  MG Oral Tablet Delayed Release; 1 Every Day; Therapy: 41WOS3115 to  Requested for: 80RUS5064 Recorded   4  Cyclobenzaprine HCl - 10 MG Oral Tablet; TAKE 1 TABLET 3 times daily PRN muscle   spasms; Therapy: 15Qrv0675 to (Evaluate:04Jun2017)  Requested for: 05Apr2017; Last   Rx:05Apr2017 Ordered   5  Gralise 600 MG Oral Tablet; TAKE 3 TABLETS AT DINNER TIME; Therapy: 16FRM3092 to (Evaluate:04Jun2017)  Requested for: 43ZIO0543; Last   Rx:05Apr2017 Ordered   6  Levothyroxine Sodium 100 MCG Oral Tablet; 1 tab daily; Therapy: 86Uwq8989 to  Requested for: 86USB4362 Recorded   7  Oxycodone-Acetaminophen 5-325 MG Oral Tablet; TAKE 0 5 TABLET Twice daily PRN   pain; Therapy: 09CSP6389 to (Evaluate:14Nzm0477); Last Rx:03Fqn6702 Ordered   8  Reglan 10 MG Oral Tablet (Metoclopramide HCl); take 1 tab three times daily before   meals; Therapy: 82Jmw5710 to  Requested for: 67INB8319 Recorded   9  Vitamin D3 1000 UNIT Oral Tablet; 2 tabs daily; Therapy: 35DLW9858 to  Requested for: 41DJS0200 Recorded    Allergies    1  Amitiza CAPS   2  BIAXIN   3  Erythromycin TABS   4  Levaquin TABS   5  MORPHINE   6  Penicillins   7  Tetracyclines   8  MACROBID    9   VALIUM    Signatures   Electronically signed by : Loreto Stover MA; Jun 7 2017  9:05AM EST                       (Author)

## 2018-01-22 VITALS
HEIGHT: 64 IN | SYSTOLIC BLOOD PRESSURE: 104 MMHG | BODY MASS INDEX: 29.02 KG/M2 | DIASTOLIC BLOOD PRESSURE: 62 MMHG | TEMPERATURE: 98.2 F | WEIGHT: 170 LBS

## 2018-01-23 VITALS
RESPIRATION RATE: 16 BRPM | TEMPERATURE: 97 F | BODY MASS INDEX: 29.02 KG/M2 | HEIGHT: 64 IN | SYSTOLIC BLOOD PRESSURE: 90 MMHG | HEART RATE: 80 BPM | DIASTOLIC BLOOD PRESSURE: 58 MMHG | WEIGHT: 170 LBS

## 2018-01-23 NOTE — MISCELLANEOUS
Message   Recorded as Task   Date: 01/08/2018 01:29 PM, Created By: Francia Varma   Task Name: Miscellaneous   Assigned To: [address] [facility] [facility] Clinical,Team   Regarding Patient: Chasity Gorman, Status: Active   Comment:    Francia Varma - 08 Jan 2018 1:29 PM     TASK CREATED  Patient called [place] office stating she scheduled MRI appt on 01/29/17 and to please cb when MRI was Authorized  Please call patient at   Huber Paredes - 08 Jan 2018 2:18 PM     TASK EDITED  s/w pt, advised that the auth dept will contact her when the [place] is obtained  Pt verbalized understanding  Will cb if questions or concerns arise  Active Problems    1  Anal fissure (565 0) (K60 2)   2  Cervical disc disorder with radiculopathy of mid-cervical region (723 4) (M50 120)   3  Cervical myofascial pain syndrome (729 1) (M79 1)   4  Chronic cervical pain (723 1,338 29) (M54 2,G89 29)   5  Chronic pain syndrome (338 4) (G89 4)   6  Coccydynia (724 79) (M53 3)   7  Constipation (564 00) (K59 00)   8  Continuous opioid dependence (304 01) (F11 20)   9  Degenerative cervical spinal stenosis (723 0) (M48 02)   10  Dysuria (788 1) (R30 0)   11  Essential thrombocythemia (238 71) (D47 3)   12  Hemorrhoids With Complication (524 5)   13  Hydronephrosis (591) (N13 30)   14  Knee pain, left (719 46) (M25 562)   15  Left ankle sprain (845 00) (S93 402A)   16  Left shoulder pain (719 41) (M25 512)   17  Long-term current use of opiate analgesic (V58 69) (Z79 891)   18  Low back pain (724 2) (M54 5)   19  Lumbar radiculopathy (724 4) (M54 16)   20  Mild carpal tunnel syndrome, left (354 0) (G56 02)   21  Mild carpal tunnel syndrome, right (354 0) (G56 01)   22  Pruritus ani (698 0) (L29 0)   23  Rectal/anal hemorrhage (569 3) (K62 5)   24  Slow transit constipation (564 01) (K59 01)   25  Strain of peroneal tendon of left foot, initial encounter (845 19) (S86 312A)   26  Tendinitis of left rotator cuff (726 10) (B85 82)   27  Thrombocytopenia (287 5) (D69 6)   28  Thrombosed external hemorrhoids (455 4) (K64 5)   29  Wrist pain, acute, right (719 43) (M25 531)    Current Meds   1  Advil 200 MG Oral Tablet; as needed; Therapy: 57XSO6679 to  Requested for: 30EVK7038 Recorded   2  ALPRAZolam 0 5 MG Oral Tablet; 1 tab daily; Therapy: 62KVE1942 to  Requested for: 57IVT0115 Recorded   3  Aspirin  MG Oral Tablet Delayed Release; 1 Every Day; Therapy: 47PDG7467 to  Requested for: 61DKE4468 Recorded   4  Cyclobenzaprine HCl - 10 MG Oral Tablet; TAKE 1 TABLET 3 times daily PRN muscle   spasms; Therapy: 18Xtq1101 to (Reinaldo Rideau)  Requested for: 41FXC4300; Last   Rx:08Jan2018 Ordered   5  Gabapentin 600 MG Oral Tablet; TAKE 1 TABLET 3 TIMES DAILY; Therapy: 69ZDP6747 to (Reinaldo Rideau)  Requested for: 92EWX1337; Last   Rx:08Jan2018 Ordered   6  Levothyroxine Sodium 100 MCG Oral Tablet; 1 tab daily; Therapy: 06Vng0143 to  Requested for: 04AUG5059 Recorded   7  Oxycodone-Acetaminophen 5-325 MG Oral Tablet; TAKE 0 5 TABLET Twice daily PRN   pain; Therapy: 81UAB6922 to (Evaluate:18Mar2018); Last Rx:08Jan2018 Ordered   8  Ventolin  (90 Base) MCG/ACT Inhalation Aerosol Solution; Therapy: (Recorded:13Nov2017) to Recorded   9  Vitamin D3 1000 UNIT Oral Tablet; 2 tabs daily; Therapy: 77POW7804 to  Requested for: 48TTS0534 Recorded    Allergies    1  Amitiza CAPS   2  BIAXIN   3  Erythromycin TABS   4  Levaquin TABS   5  MORPHINE   6  Penicillins   7  Tetracyclines   8  MACROBID    9   VALIUM    Signatures   Electronically signed by : Brenda Reece, ; Jan 8 2018  2:18PM EST                       (Author)

## 2018-01-24 VITALS
DIASTOLIC BLOOD PRESSURE: 76 MMHG | WEIGHT: 170 LBS | BODY MASS INDEX: 29.02 KG/M2 | TEMPERATURE: 98 F | HEIGHT: 64 IN | SYSTOLIC BLOOD PRESSURE: 98 MMHG | HEART RATE: 98 BPM

## 2018-01-29 ENCOUNTER — HOSPITAL ENCOUNTER (OUTPATIENT)
Dept: RADIOLOGY | Facility: HOSPITAL | Age: 53
Discharge: HOME | End: 2018-01-29
Attending: ANESTHESIOLOGY
Payer: COMMERCIAL

## 2018-01-29 DIAGNOSIS — M54.2 CERVICALGIA: ICD-10-CM

## 2018-01-29 DIAGNOSIS — M79.10 MYALGIA: ICD-10-CM

## 2018-01-29 DIAGNOSIS — G89.4 CHRONIC PAIN SYNDROME: ICD-10-CM

## 2018-01-29 DIAGNOSIS — M50.120 MID-CERVICAL DISC DISORDER, UNSPECIFIED (CODE): ICD-10-CM

## 2018-01-29 PROCEDURE — 72141 MRI NECK SPINE W/O DYE: CPT

## 2018-01-30 ENCOUNTER — TELEPHONE (OUTPATIENT)
Dept: PAIN MEDICINE | Facility: CLINIC | Age: 53
End: 2018-01-30

## 2018-01-30 NOTE — TELEPHONE ENCOUNTER
S/W pt  Advised pt of same  Pt states she has the referral for Dr Sherri Frank and will keep us posted on everything

## 2018-01-30 NOTE — TELEPHONE ENCOUNTER
----- Message from Tory Guo MD sent at 1/30/2018 12:19 PM EST -----  The patient's MRI of the cervical spine dated January 30, 2018 shows a small right disc herniation at the C6-C7 level along with a central disc herniation at the T3-T4 level   Based on my last note, I think the patient should follow up with Dr Jerrod Wells for further treatment options before considering a spinal cord stimulator trial

## 2018-03-12 ENCOUNTER — TELEPHONE (OUTPATIENT)
Dept: PAIN MEDICINE | Facility: CLINIC | Age: 53
End: 2018-03-12

## 2018-03-12 NOTE — TELEPHONE ENCOUNTER
Patient called [facility] states has [employer] and does not require insurance referral

## 2018-03-15 ENCOUNTER — OFFICE VISIT (OUTPATIENT)
Dept: PAIN MEDICINE | Facility: CLINIC | Age: 53
End: 2018-03-15
Payer: COMMERCIAL

## 2018-03-15 VITALS
TEMPERATURE: 97.8 F | WEIGHT: 175 LBS | RESPIRATION RATE: 15 BRPM | DIASTOLIC BLOOD PRESSURE: 50 MMHG | SYSTOLIC BLOOD PRESSURE: 118 MMHG | HEIGHT: 63 IN | HEART RATE: 75 BPM | BODY MASS INDEX: 31.01 KG/M2

## 2018-03-15 DIAGNOSIS — G89.4 CHRONIC PAIN SYNDROME: Primary | ICD-10-CM

## 2018-03-15 DIAGNOSIS — M54.2 NECK PAIN: ICD-10-CM

## 2018-03-15 DIAGNOSIS — M50.120 CERVICAL DISC DISORDER WITH RADICULOPATHY OF MID-CERVICAL REGION: ICD-10-CM

## 2018-03-15 PROCEDURE — 99214 OFFICE O/P EST MOD 30 MIN: CPT | Performed by: ANESTHESIOLOGY

## 2018-03-15 RX ORDER — OXYCODONE HYDROCHLORIDE AND ACETAMINOPHEN 5; 325 MG/1; MG/1
0.5 TABLET ORAL 2 TIMES DAILY PRN
Qty: 30 TABLET | Refills: 0 | Status: SHIPPED | OUTPATIENT
Start: 2018-03-24 | End: 2018-03-15 | Stop reason: SDUPTHER

## 2018-03-15 RX ORDER — TRAZODONE HYDROCHLORIDE 50 MG/1
TABLET ORAL
Refills: 0 | COMMUNITY
Start: 2018-01-10

## 2018-03-15 RX ORDER — OXYCODONE HYDROCHLORIDE AND ACETAMINOPHEN 5; 325 MG/1; MG/1
0.5 TABLET ORAL 2 TIMES DAILY PRN
Qty: 30 TABLET | Refills: 0 | Status: SHIPPED | OUTPATIENT
Start: 2018-04-23 | End: 2018-05-15 | Stop reason: SDUPTHER

## 2018-03-15 RX ORDER — ALBUTEROL SULFATE 90 UG/1
AEROSOL, METERED RESPIRATORY (INHALATION)
COMMUNITY

## 2018-03-15 RX ORDER — CYCLOBENZAPRINE HCL 10 MG
10 TABLET ORAL 3 TIMES DAILY PRN
Qty: 90 TABLET | Refills: 1 | Status: SHIPPED | OUTPATIENT
Start: 2018-03-15 | End: 2018-05-15 | Stop reason: SDUPTHER

## 2018-03-15 RX ORDER — GABAPENTIN 600 MG/1
TABLET ORAL
Refills: 0 | COMMUNITY
Start: 2018-01-08 | End: 2018-03-15 | Stop reason: ALTCHOICE

## 2018-03-15 RX ORDER — MELATONIN
2 DAILY
COMMUNITY
Start: 2011-07-11

## 2018-03-15 RX ORDER — IBUPROFEN 200 MG
TABLET ORAL
COMMUNITY
Start: 2011-07-11 | End: 2019-07-15 | Stop reason: ALTCHOICE

## 2018-03-15 NOTE — LETTER
March 15, 2018     Skyla CarranzasDO  [address] [facility] [address] [place] [address]    Patient: May Menjivar   YOB: 1965   Date of Visit: 3/15/2018       Dear Dr Marie Alcantara: Thank you for referring Lilian Ramirez to me for evaluation  Below are my notes for this consultation  If you have questions, please do not hesitate to call me  I look forward to following your patient along with you  Sincerely,        Fang Hayward MD        CC: No Recipients  Fang Hayward MD  3/15/2018 12:18 PM  Sign at close encounter  Pain Medicine Follow-Up Note    Assessment:  1  Chronic pain syndrome    2  Neck pain    3  Cervical disc disorder with radiculopathy of mid-cervical region        Plan:  New Medications Ordered This Visit   Medications    cholecalciferol (VITAMIN D3) 1,000 units tablet     Sig: Take 2 tablets by mouth daily    traZODone (DESYREL) 50 mg tablet     Refill:  0    ibuprofen (ADVIL) 200 mg tablet     Sig: Take by mouth    albuterol (VENTOLIN HFA) 90 mcg/act inhaler     Sig: Inhale    Gabapentin, Once-Daily, (GRALISE) 600 MG TABS     Sig: Take 3 tablets (1,800 mg total) by mouth every evening     Dispense:  90 tablet     Refill:  1    cyclobenzaprine (FLEXERIL) 10 mg tablet     Sig: Take 1 tablet (10 mg total) by mouth 3 (three) times a day as needed for muscle spasms     Dispense:  90 tablet     Refill:  1     My impressions and treatment recommendations were discussed in detail with the patient who verbalized understanding and had no further questions  At today's office visit, the patient is requesting that she switch back to Gralise 600 mg 3 tablets in the evening  She states that this worked better for her than the gabapentin and she recently changed her insurance  She would like to trial Gralise again    I also felt a reasonable to continue her on cyclobenzaprine 10 mg 1 tablet 3 times daily as needed for muscle spasms as well as oxycodone/acetaminophen 5/325 mg 0 5 tablets twice daily as needed for pain  The risks and side effects of chronic opioid treatment were discussed in detail with the patient  Side effects include but are not limited to nausea, vomiting, GI intolerance, sedation, constipation, mental clouding, opioid-induced hyperalgesia, endocrine dysfunction, addiction, dependence, and tolerance  The patient was asked to take his medications only as prescribed and directed, never in excess, and never for any other reason other than for pain control  The patient was also asked to keep his medications out of the reach of others and away from children, preferably in a locked drawer  The patient verbalized understanding and wished to use these opioid medications  [place] Prescription Drug Monitoring Program report was reviewed and was appropriate     Follow-up is planned in 2 months time or sooner as warranted  Discharge instructions were provided  I personally saw and examined the patient and I agree with the above discussed plan of care  History of Present Illness:    Katerina Corcoran is a 47 y o  female who presents to [facility] for interval re-evaluation of the above stated pain complaints  The patient has a past medical and chronic pain history as outlined in the assessment section  She was last seen on January 8, 2018 at which time she was maintained on oxycodone/acetaminophen 5/325 mg 0 5 tablets twice daily as needed for pain, gabapentin 600 mg 3 times daily, and cyclobenzaprine 10 mg 1 tablet 3 times daily as needed for muscle spasms  At today's office visit, the patient's pain score is 8/10 on the verbal numerical pain rating scale  The patient is complaining primarily of neck pain with bilateral upper extremity radicular symptoms  She also complains of occasional low back pain and intermittent lower extremity radicular symptoms    She states that her pain is worse in the morning and night and constant in nature  She describes the quality of her pain as dull/aching, throbbing, pressure-like, shooting, and numbness    The patient states that she is using half a tablet of oxycodone/acetaminophen twice a day  She also takes gabapentin 600 mg 3 times daily and cyclobenzaprine 10 mg 3 times daily  She is wondering if she can switch back to Gralise from the gabapentin since the Gralise worked better for her  Other than as stated above, the patient denies any interval changes in medications, medical condition, mental condition, symptoms, or allergies since the last office visit  Review of Systems:    Review of Systems   Respiratory: Negative for shortness of breath  Cardiovascular: Negative for chest pain  Gastrointestinal: Negative for constipation, diarrhea, nausea and vomiting  Musculoskeletal: Positive for gait problem (decreased range of motion) and joint swelling (joint stiffness)  Negative for arthralgias and myalgias  Skin: Negative for rash  Neurological: Positive for weakness (muscle)  Negative for dizziness and seizures  All other systems reviewed and are negative          Patient Active Problem List   Diagnosis    Chronic pain syndrome    Acute muscle stiffness of neck    Cervical disc disorder with radiculopathy of mid-cervical region    Neck pain       Past Medical History:   Diagnosis Date    Anxiety     Cervical disc disorder     Chronic pain     Depression     Dysuria     Lung abnormality     nodules    Lung nodule     Pituitary abnormality (HCC)     tumor    Thrombocytopenia ([address] )     Thyroid disease        Past Surgical History:   Procedure Laterality Date    APPENDECTOMY      AUGMENTATION MAMMAPLASTY Bilateral     CHOLECYSTECTOMY      COLECTOMY      COLON SURGERY      EPIDURAL BLOCK INJECTION N/A 6/23/2016    Procedure: BLOCK / INJECTION EPIDURAL STEROID CERVICAL  C7-T1;  Surgeon: Juventino Marx MD;  Location: [facility] MAIN OR;  Service:     EPIDURAL BLOCK INJECTION N/A 3/31/2016    Procedure: BLOCK / INJECTION EPIDURAL STEROID CERVICAL C7-T1  (C-ARM); Surgeon: Arely Chan MD;  Location: [facility] MAIN OR;  Service:     HYSTERECTOMY      PITUITARY SURGERY      CA [address] DX/THER SBST EPIDURAL/SUBRACH CERV/THORACIC N/A 1/21/2016    Procedure: BLOCK / INJECTION EPIDURAL STEROID CERVICAL C7-T1 (C-ARM); Surgeon: Arely Chan MD;  Location: [facility] MAIN OR;  Service: Pain Management        No family history on file  Social History     Occupational History    Not on file  Social History Main Topics    Smoking status: Never Smoker    Smokeless tobacco: Never Used    Alcohol use No    Drug use: No    Sexual activity: Not on file         Current Outpatient Prescriptions:     cholecalciferol (VITAMIN D3) 1,000 units tablet, Take 2 tablets by mouth daily, Disp: , Rfl:     ibuprofen (ADVIL) 200 mg tablet, Take by mouth, Disp: , Rfl:     albuterol (2 5 mg/3 mL) 0 083 % nebulizer solution, Take 3 mL by nebulization every 6 (six) hours as needed for wheezing, Disp: 75 mL, Rfl: 0    albuterol (VENTOLIN HFA) 90 mcg/act inhaler, Inhale, Disp: , Rfl:     aspirin 81 MG tablet, Take 81 mg by mouth daily  , Disp: , Rfl:     cyclobenzaprine (FLEXERIL) 10 mg tablet, Take 1 tablet (10 mg total) by mouth 3 (three) times a day as needed for muscle spasms, Disp: 90 tablet, Rfl: 1    Gabapentin, Once-Daily, (GRALISE) 600 MG TABS, Take 3 tablets (1,800 mg total) by mouth every evening, Disp: 90 tablet, Rfl: 1    levothyroxine 75 mcg tablet, Take 75 mcg by mouth daily  , Disp: , Rfl:     oxyCODONE-acetaminophen (PERCOCET) 5-325 mg per tablet, Take 1 tablet by mouth every 4 (four) hours as needed for moderate pain Indications: Pain , Disp: , Rfl:     traZODone (DESYREL) 50 mg tablet, , Disp: , Rfl: 0    Allergies   Allergen Reactions    Levaquin [Levofloxacin In D5w] Shortness Of Breath     Also hives      Amitiza [Lubiprostone] Other (See Comments) and Hives     Reaction Date: 10Aug2011;   Migraines and vomiting    Biaxin [Clarithromycin] Hives    Erythromycin Hives     Reaction Date: 10Aug2011;    Lambert Bulls [Nitrofurantoin Monohyd Macro] Other (See Comments)     C/o passing out    Penicillins Hives     Reaction Date: 10Aug2011;     Tetracyclines & Related Hives    Morphine And Related Rash    Valium [Diazepam] Rash and Vomiting     Reaction Date: 14Jun2012;        Physical Exam:    /50 (BP Location: Left arm, Patient Position: Sitting, Cuff Size: Standard)   Pulse 75   Temp 97 8 °F (36 6 °C) (Oral)   Resp 15   Ht 5' 3" (1 6 m)   Wt 79 4 kg (175 lb)   BMI 31 00 kg/m²      Constitutional:normal, well developed, well nourished, alert, in no distress and non-toxic and no overt pain behavior  Eyes:anicteric  HEENT:grossly intact  Neck:supple, symmetric, trachea midline and no masses   Pulmonary:even and unlabored  Cardiovascular:No edema or pitting edema present  Skin:Normal without rashes or lesions and well hydrated  Psychiatric:Mood and affect appropriate  Neurologic:Cranial Nerves II-XII grossly intact  Musculoskeletal:normal      Imaging  No orders to display     MRI CERVICAL SPINE WITHOUT CONTRAST   1/29/2018     INDICATION:  G89 4: Chronic pain syndrome  M54 2: Cervicalgia  M50 120: Mid-cervical disc disorder, unspecified  M79 1: Myalgia  History taken directly from the electronic ordering system  CHRONIC NECK PAIN X 10 YEARS    GETTING WORSE    LOSING FEELING IN FINGERS     COMPARISON:  8/12/2015     TECHNIQUE:  Sagittal T1, sagittal T2, sagittal inversion recovery, axial T2, axial  2D merge          IMAGE QUALITY:  Diagnostic     FINDINGS:     ALIGNMENT:  Normal alignment of the cervical spine  No compression fracture  No subluxation  No scoliosis      MARROW SIGNAL:  Scattered degenerative endplate changes  No focally suspicious marrow lesions    No bone marrow edema or compression abnormality       CERVICAL AND VISUALIZED THORACIC CORD:  Normal signal within the visualized cord      PREVERTEBRAL AND PARASPINAL SOFT TISSUES:   Normal              VISUALIZED POSTERIOR FOSSA:  The visualized posterior fossa demonstrates no abnormal signal      CERVICAL DISC SPACES:          C2-C3:  Normal      C3-C4:  Normal      C4-C5:  Normal      C5-C6:  There is a disc osteophyte complex with a superimposed left neural foraminal disc protrusion  Moderate central canal narrowing  Moderate to severe left neural foraminal narrowing  Mild right neural foraminal narrowing  This level is similar   to the prior study      C6-C7:  Small right paracentral disc herniation, protrusion type with disc osteophyte complex noted  Mild to moderate right neural foraminal narrowing  Minimal central canal narrowing  Mild left neural foraminal narrowing  This level is similar to   the prior study      C7-T1:  Normal      UPPER THORACIC DISC SPACES:  At T3-T4 there is a central disc herniation, extrusion type, herniated disc material extending caudally along the dorsal margin of T5  There is approximately 12 mm of inferior extension of this disc herniation which causes   mild to moderate central canal narrowing at this level  Neural foramina bilaterally patent  This level is similar to the prior study      Otherwise visualized thoracic levels are intact      IMPRESSION:     Scattered spondylotic changes at C5-C6, C6-C7 and T3-T4 are stable from the previous study  No new disc herniation    No cord compression or cord signal abnormality                 Workstation performed: [id number]      Imaging     MRI cervical spine wo contrast (Order #[id number]) on 1/29/2018 - Imaging Information   Result History     MRI cervical spine wo contrast (Order #[id number]) on 1/30/2018 - Order Result History Report   Result Notes     Notes Recorded by Soledad Greene MD on 1/30/2018 at 12:19 PM EST  The patient's MRI of the cervical spine dated January 30, 2018 shows a small right disc herniation at the C6-C7 level along with a central disc herniation at the T3-T4 level   Based on my last note, I think the patient should follow up with Dr Elma Sterling for further treatment options before considering a spinal cord stimulator trial

## 2018-03-15 NOTE — PROGRESS NOTES
Pain Medicine Follow-Up Note    Assessment:  1  Chronic pain syndrome    2  Neck pain    3  Cervical disc disorder with radiculopathy of mid-cervical region        Plan:  New Medications Ordered This Visit   Medications    cholecalciferol (VITAMIN D3) 1,000 units tablet     Sig: Take 2 tablets by mouth daily    traZODone (DESYREL) 50 mg tablet     Refill:  0    ibuprofen (ADVIL) 200 mg tablet     Sig: Take by mouth    albuterol (VENTOLIN HFA) 90 mcg/act inhaler     Sig: Inhale    Gabapentin, Once-Daily, (GRALISE) 600 MG TABS     Sig: Take 3 tablets (1,800 mg total) by mouth every evening     Dispense:  90 tablet     Refill:  1    cyclobenzaprine (FLEXERIL) 10 mg tablet     Sig: Take 1 tablet (10 mg total) by mouth 3 (three) times a day as needed for muscle spasms     Dispense:  90 tablet     Refill:  1     My impressions and treatment recommendations were discussed in detail with the patient who verbalized understanding and had no further questions  At today's office visit, the patient is requesting that she switch back to Gralise 600 mg 3 tablets in the evening  She states that this worked better for her than the gabapentin and she recently changed her insurance  She would like to trial Gralise again  I also felt a reasonable to continue her on cyclobenzaprine 10 mg 1 tablet 3 times daily as needed for muscle spasms as well as oxycodone/acetaminophen 5/325 mg 0 5 tablets twice daily as needed for pain  The risks and side effects of chronic opioid treatment were discussed in detail with the patient  Side effects include but are not limited to nausea, vomiting, GI intolerance, sedation, constipation, mental clouding, opioid-induced hyperalgesia, endocrine dysfunction, addiction, dependence, and tolerance  The patient was asked to take his medications only as prescribed and directed, never in excess, and never for any other reason other than for pain control   The patient was also asked to keep his medications out of the reach of others and away from children, preferably in a locked drawer  The patient verbalized understanding and wished to use these opioid medications  [place] Prescription Drug Monitoring Program report was reviewed and was appropriate     Follow-up is planned in 2 months time or sooner as warranted  Discharge instructions were provided  I personally saw and examined the patient and I agree with the above discussed plan of care  History of Present Illness:    Mayra Romero is a 47 y o  female who presents to [facility] for interval re-evaluation of the above stated pain complaints  The patient has a past medical and chronic pain history as outlined in the assessment section  She was last seen on January 8, 2018 at which time she was maintained on oxycodone/acetaminophen 5/325 mg 0 5 tablets twice daily as needed for pain, gabapentin 600 mg 3 times daily, and cyclobenzaprine 10 mg 1 tablet 3 times daily as needed for muscle spasms  At today's office visit, the patient's pain score is 8/10 on the verbal numerical pain rating scale  The patient is complaining primarily of neck pain with bilateral upper extremity radicular symptoms  She also complains of occasional low back pain and intermittent lower extremity radicular symptoms  She states that her pain is worse in the morning and night and constant in nature  She describes the quality of her pain as dull/aching, throbbing, pressure-like, shooting, and numbness    The patient states that she is using half a tablet of oxycodone/acetaminophen twice a day  She also takes gabapentin 600 mg 3 times daily and cyclobenzaprine 10 mg 3 times daily  She is wondering if she can switch back to Gralise from the gabapentin since the Gralise worked better for her      Other than as stated above, the patient denies any interval changes in medications, medical condition, mental condition, symptoms, or allergies since the last office visit  Review of Systems:    Review of Systems   Respiratory: Negative for shortness of breath  Cardiovascular: Negative for chest pain  Gastrointestinal: Negative for constipation, diarrhea, nausea and vomiting  Musculoskeletal: Positive for gait problem (decreased range of motion) and joint swelling (joint stiffness)  Negative for arthralgias and myalgias  Skin: Negative for rash  Neurological: Positive for weakness (muscle)  Negative for dizziness and seizures  All other systems reviewed and are negative  Patient Active Problem List   Diagnosis    Chronic pain syndrome    Acute muscle stiffness of neck    Cervical disc disorder with radiculopathy of mid-cervical region    Neck pain       Past Medical History:   Diagnosis Date    Anxiety     Cervical disc disorder     Chronic pain     Depression     Dysuria     Lung abnormality     nodules    Lung nodule     Pituitary abnormality (HCC)     tumor    Thrombocytopenia ([address] )     Thyroid disease        Past Surgical History:   Procedure Laterality Date    APPENDECTOMY      AUGMENTATION MAMMAPLASTY Bilateral     CHOLECYSTECTOMY      COLECTOMY      COLON SURGERY      EPIDURAL BLOCK INJECTION N/A 6/23/2016    Procedure: BLOCK / INJECTION EPIDURAL STEROID CERVICAL  C7-T1;  Surgeon: Enriqueta Escalona MD;  Location: [place] [address] MAIN OR;  Service:     EPIDURAL BLOCK INJECTION N/A 3/31/2016    Procedure: BLOCK / INJECTION EPIDURAL STEROID CERVICAL C7-T1  (C-ARM); Surgeon: Enriqueta Escalona MD;  Location: [facility] MAIN OR;  Service:     HYSTERECTOMY      PITUITARY SURGERY      VT [address] DX/THER SBST EPIDURAL/SUBRACH CERV/THORACIC N/A 1/21/2016    Procedure: BLOCK / INJECTION EPIDURAL STEROID CERVICAL C7-T1 (C-ARM); Surgeon: Enriqueta Escalona MD;  Location: [facility] MAIN OR;  Service: Pain Management        No family history on file  Social History     Occupational History    Not on file       Social History Main Topics    Smoking status: Never Smoker    Smokeless tobacco: Never Used    Alcohol use No    Drug use: No    Sexual activity: Not on file         Current Outpatient Prescriptions:     cholecalciferol (VITAMIN D3) 1,000 units tablet, Take 2 tablets by mouth daily, Disp: , Rfl:     ibuprofen (ADVIL) 200 mg tablet, Take by mouth, Disp: , Rfl:     albuterol (2 5 mg/3 mL) 0 083 % nebulizer solution, Take 3 mL by nebulization every 6 (six) hours as needed for wheezing, Disp: 75 mL, Rfl: 0    albuterol (VENTOLIN HFA) 90 mcg/act inhaler, Inhale, Disp: , Rfl:     aspirin 81 MG tablet, Take 81 mg by mouth daily  , Disp: , Rfl:     cyclobenzaprine (FLEXERIL) 10 mg tablet, Take 1 tablet (10 mg total) by mouth 3 (three) times a day as needed for muscle spasms, Disp: 90 tablet, Rfl: 1    Gabapentin, Once-Daily, (GRALISE) 600 MG TABS, Take 3 tablets (1,800 mg total) by mouth every evening, Disp: 90 tablet, Rfl: 1    levothyroxine 75 mcg tablet, Take 75 mcg by mouth daily  , Disp: , Rfl:     oxyCODONE-acetaminophen (PERCOCET) 5-325 mg per tablet, Take 1 tablet by mouth every 4 (four) hours as needed for moderate pain Indications: Pain , Disp: , Rfl:     traZODone (DESYREL) 50 mg tablet, , Disp: , Rfl: 0    Allergies   Allergen Reactions    Levaquin [Levofloxacin In D5w] Shortness Of Breath     Also hives      Amitiza [Lubiprostone] Other (See Comments) and Hives     Reaction Date: 10Aug2011;   Migraines and vomiting    Biaxin [Clarithromycin] Hives    Erythromycin Hives     Reaction Date: 10Aug2011;     Macrobid [Nitrofurantoin Monohyd Macro] Other (See Comments)     C/o passing out    Penicillins Hives     Reaction Date: 10Aug2011;     Tetracyclines & Related Hives    Morphine And Related Rash    Valium [Diazepam] Rash and Vomiting     Reaction Date: 14Jun2012;        Physical Exam:    /50 (BP Location: Left arm, Patient Position: Sitting, Cuff Size: Standard)   Pulse 75   Temp 97 8 °F (36 6 °C) (Oral)   Resp 15   Ht 5' 3" (1 6 m)   Wt 79 4 kg (175 lb)   BMI 31 00 kg/m²     Constitutional:normal, well developed, well nourished, alert, in no distress and non-toxic and no overt pain behavior  Eyes:anicteric  HEENT:grossly intact  Neck:supple, symmetric, trachea midline and no masses   Pulmonary:even and unlabored  Cardiovascular:No edema or pitting edema present  Skin:Normal without rashes or lesions and well hydrated  Psychiatric:Mood and affect appropriate  Neurologic:Cranial Nerves II-XII grossly intact  Musculoskeletal:normal      Imaging  No orders to display     MRI CERVICAL SPINE WITHOUT CONTRAST   1/29/2018     INDICATION:  G89 4: Chronic pain syndrome  M54 2: Cervicalgia  M50 120: Mid-cervical disc disorder, unspecified  M79 1: Myalgia  History taken directly from the electronic ordering system  CHRONIC NECK PAIN X 10 YEARS    GETTING WORSE    LOSING FEELING IN FINGERS     COMPARISON:  8/12/2015     TECHNIQUE:  Sagittal T1, sagittal T2, sagittal inversion recovery, axial T2, axial  2D merge          IMAGE QUALITY:  Diagnostic     FINDINGS:     ALIGNMENT:  Normal alignment of the cervical spine  No compression fracture  No subluxation  No scoliosis      MARROW SIGNAL:  Scattered degenerative endplate changes  No focally suspicious marrow lesions  No bone marrow edema or compression abnormality       CERVICAL AND VISUALIZED THORACIC CORD:  Normal signal within the visualized cord      PREVERTEBRAL AND PARASPINAL SOFT TISSUES:   Normal              VISUALIZED POSTERIOR FOSSA:  The visualized posterior fossa demonstrates no abnormal signal      CERVICAL DISC SPACES:          C2-C3:  Normal      C3-C4:  Normal      C4-C5:  Normal      C5-C6:  There is a disc osteophyte complex with a superimposed left neural foraminal disc protrusion  Moderate central canal narrowing  Moderate to severe left neural foraminal narrowing  Mild right neural foraminal narrowing    This level is similar   to the prior study      C6-C7:  Small right paracentral disc herniation, protrusion type with disc osteophyte complex noted  Mild to moderate right neural foraminal narrowing  Minimal central canal narrowing  Mild left neural foraminal narrowing  This level is similar to   the prior study      C7-T1:  Normal      UPPER THORACIC DISC SPACES:  At T3-T4 there is a central disc herniation, extrusion type, herniated disc material extending caudally along the dorsal margin of T5  There is approximately 12 mm of inferior extension of this disc herniation which causes   mild to moderate central canal narrowing at this level  Neural foramina bilaterally patent  This level is similar to the prior study      Otherwise visualized thoracic levels are intact      IMPRESSION:     Scattered spondylotic changes at C5-C6, C6-C7 and T3-T4 are stable from the previous study  No new disc herniation  No cord compression or cord signal abnormality                 Workstation performed: [id number]      Imaging     MRI cervical spine wo contrast (Order #[id number]) on 1/29/2018 - Imaging Information   Result History     MRI cervical spine wo contrast (Order #[id number]) on 1/30/2018 - Order Result History Report   Result Notes     Notes Recorded by Elizabeth Quinonez MD on 1/30/2018 at 12:19 PM EST  The patient's MRI of the cervical spine dated January 30, 2018 shows a small right disc herniation at the C6-C7 level along with a central disc herniation at the T3-T4 level   Based on my last note, I think the patient should follow up with Dr Dixie Mcdaniel for further treatment options before considering a spinal cord stimulator trial

## 2018-03-16 ENCOUNTER — TELEPHONE (OUTPATIENT)
Dept: OBGYN CLINIC | Facility: HOSPITAL | Age: 53
End: 2018-03-16

## 2018-03-16 ENCOUNTER — TELEPHONE (OUTPATIENT)
Dept: PAIN MEDICINE | Facility: CLINIC | Age: 53
End: 2018-03-16

## 2018-03-16 NOTE — TELEPHONE ENCOUNTER
S/w pt and advised meds were sent yesterday and receipt confrimed by pharmacy  S/w pharmacy and meds are being filled at this time and PA for gralise approved  Pt will be out of oxycodone on Monday  EPIC script states to fill 4/23  Advised pt I will check with [facility]   Please advise  Advised pt office will update her Monday due to late hour

## 2018-03-16 NOTE — TELEPHONE ENCOUNTER
Caller: patient  Call back number: [phone]    Patient called stating her oxycodone was supposed to be sent to the pharmacy  So was flexeral  Neither of them were received by the pharmacy  She is asking why these were not sent   Please advise

## 2018-03-19 NOTE — TELEPHONE ENCOUNTER
S/W Ilana Gallagher at [address] [facility]  Verified that pt does have two e-scripts on file  Pt can have the first one filled on 3/24/18 and the second e-script filled on 4/23/18  LVMMOM for patient letting her know that she can get the prescription filled on 3/24/18  Advised pt to call our office if she has any questions  Provided c/b #, [place]

## 2018-03-19 NOTE — TELEPHONE ENCOUNTER
S/W pt  Pt states that she normally picks up her prescription for oxycodone-acetaminophen 5-325 mg  At her last office visit she said AS advised her that he can now e-scribe the prescription  Pt states that it says her prescription is not to be refilled before 4/23/18  But, she said she  should have a prescription from March-April  Please advise  Thanks

## 2018-03-19 NOTE — TELEPHONE ENCOUNTER
Pt is calling and is awaiting a phone call back concerning her medications   Please call pt back at [phone]

## 2018-03-19 NOTE — TELEPHONE ENCOUNTER
Aware  Thanks  2 e-prescriptions were sent at her last office visit  Please confirm with the pharmacy

## 2018-04-25 ENCOUNTER — TELEPHONE (OUTPATIENT)
Dept: PAIN MEDICINE | Facility: CLINIC | Age: 53
End: 2018-04-25

## 2018-04-25 NOTE — TELEPHONE ENCOUNTER
JORDAN    S/w Martine Longoria who requested diagnosis codees and if there was a plan to taper pt off meds  Provided diagnosis codes  Advised unsure if there is a plan to taper  Pharmacist advised no further info needed at this time and pt will be able to fill script today

## 2018-05-15 ENCOUNTER — OFFICE VISIT (OUTPATIENT)
Dept: PAIN MEDICINE | Facility: CLINIC | Age: 53
End: 2018-05-15
Payer: COMMERCIAL

## 2018-05-15 VITALS
HEART RATE: 93 BPM | HEIGHT: 63 IN | RESPIRATION RATE: 15 BRPM | WEIGHT: 175 LBS | BODY MASS INDEX: 31.01 KG/M2 | SYSTOLIC BLOOD PRESSURE: 102 MMHG | DIASTOLIC BLOOD PRESSURE: 50 MMHG

## 2018-05-15 DIAGNOSIS — G89.29 CHRONIC LEFT-SIDED LOW BACK PAIN WITH LEFT-SIDED SCIATICA: ICD-10-CM

## 2018-05-15 DIAGNOSIS — M54.42 CHRONIC LEFT-SIDED LOW BACK PAIN WITH LEFT-SIDED SCIATICA: ICD-10-CM

## 2018-05-15 DIAGNOSIS — M54.16 LUMBAR RADICULOPATHY: ICD-10-CM

## 2018-05-15 DIAGNOSIS — G89.4 CHRONIC PAIN SYNDROME: Primary | ICD-10-CM

## 2018-05-15 DIAGNOSIS — M54.2 NECK PAIN: ICD-10-CM

## 2018-05-15 DIAGNOSIS — M50.120 CERVICAL DISC DISORDER WITH RADICULOPATHY OF MID-CERVICAL REGION: ICD-10-CM

## 2018-05-15 PROCEDURE — 99214 OFFICE O/P EST MOD 30 MIN: CPT | Performed by: ANESTHESIOLOGY

## 2018-05-15 RX ORDER — OXYCODONE HYDROCHLORIDE AND ACETAMINOPHEN 5; 325 MG/1; MG/1
0.5 TABLET ORAL 2 TIMES DAILY PRN
Qty: 30 TABLET | Refills: 0 | Status: SHIPPED | OUTPATIENT
Start: 2018-05-25 | End: 2018-07-24 | Stop reason: SDUPTHER

## 2018-05-15 RX ORDER — OXYCODONE HYDROCHLORIDE AND ACETAMINOPHEN 5; 325 MG/1; MG/1
0.5 TABLET ORAL 2 TIMES DAILY PRN
Qty: 30 TABLET | Refills: 0 | Status: SHIPPED | OUTPATIENT
Start: 2018-06-24 | End: 2018-07-24 | Stop reason: SDUPTHER

## 2018-05-15 RX ORDER — GABAPENTIN 600 MG/1
3 TABLET, FILM COATED ORAL EVERY EVENING
Qty: 90 TABLET | Refills: 1 | Status: SHIPPED | OUTPATIENT
Start: 2018-05-15 | End: 2018-07-24

## 2018-05-15 RX ORDER — CYCLOBENZAPRINE HCL 10 MG
10 TABLET ORAL 3 TIMES DAILY PRN
Qty: 90 TABLET | Refills: 1 | Status: SHIPPED | OUTPATIENT
Start: 2018-05-15 | End: 2018-09-24 | Stop reason: SDUPTHER

## 2018-05-15 NOTE — LETTER
May 15, 2018     Noel Caceres   [address] [facility]  [address]  [address] [place] [address]    Patient: Jazmyn Pink   YOB: 1965   Date of Visit: 5/15/2018       Dear Dr Pelaez Clubs: Thank you for referring Dario Lieberman to me for evaluation  Below are my notes for this consultation  If you have questions, please do not hesitate to call me  I look forward to following your patient along with you  Sincerely,        Renay Maxwell MD        CC: No Recipients  Renay Maxwell MD  5/15/2018 12:09 PM  Sign at close encounter  Pain Medicine Follow-Up Note    Assessment:  1  Chronic pain syndrome    2  Neck pain    3  Cervical disc disorder with radiculopathy of mid-cervical region    4  Chronic left-sided low back pain with left-sided sciatica    5   Lumbar radiculopathy        Plan:  Orders Placed This Encounter   Procedures    Ambulatory referral to Physical Therapy     Standing Status:   Future     Standing Expiration Date:   11/15/2018     Referral Priority:   Routine     Referral Type:   Physical Therapy     Referral Reason:   Specialty Services Required     Requested Specialty:   Physical Therapy     Number of Visits Requested:   1     Expiration Date:   5/15/2019       New Medications Ordered This Visit   Medications    oxyCODONE-acetaminophen (PERCOCET) 5-325 mg per tablet     Sig: Take 0 5 tablets by mouth 2 (two) times a day as needed for severe pain for up to 30 days Max Daily Amount: 1 tablet     Dispense:  30 tablet     Refill:  0    oxyCODONE-acetaminophen (PERCOCET) 5-325 mg per tablet     Sig: Take 0 5 tablets by mouth 2 (two) times a day as needed (pain) for up to 30 days Max Daily Amount: 1 tablet     Dispense:  30 tablet     Refill:  0    GRALISE 600 MG TABS     Sig: Take 3 tablets (1,800 mg total) by mouth every evening     Dispense:  90 tablet     Refill:  1    cyclobenzaprine (FLEXERIL) 10 mg tablet     Sig: Take 1 tablet (10 mg total) by mouth 3 (three) times a day as needed for muscle spasms     Dispense:  90 tablet     Refill:  1     My impressions and treatment recommendations were discussed in detail with the patient who verbalized understanding and had no further questions  Given that the patient reports overall reduced pain and improved level functioning without significant side effects, I felt a reasonable to continue the patient on oxycodone/acetaminophen 5/325 mg 0 5 tablets twice daily as needed for pain, Gralise 600 mg 3 tablets every evening, and cyclobenzaprine 10 mg 1 tablet 3 times daily as needed for muscle spasms  The risks and side effects of chronic opioid treatment were discussed in detail with the patient  Side effects include but are not limited to nausea, vomiting, GI intolerance, sedation, constipation, mental clouding, opioid-induced hyperalgesia, endocrine dysfunction, addiction, dependence, and tolerance  The patient was asked to take his medications only as prescribed and directed, never in excess, and never for any other reason other than for pain control  The patient was also asked to keep his medications out of the reach of others and away from children, preferably in a locked drawer  The patient verbalized understanding and wished to use these opioid medications  A urine drug test was collected at today's office visit as part of our medication management protocol  The point of care testing results were appropriate for what was being prescribed  The specimen will be sent for confirmatory testing  The drug screen is medically necessary because the patient is either dependent on opioid medication or is being considered for opioid medication therapy and the results could impact ongoing or future treatment  The drug screen is to evaluate for the presence or absence of prescribed, non-prescribed, and/or illicit drugs and substances      [place] Prescription Drug Monitoring Program report was reviewed and was appropriate      The patient is also complaining of low back pain with left lower extremity radicular symptoms in what appears to be the left S1 distribution  I did feel reasonable to prescribe the patient a course of physical therapy 2-3 times per week for 4-6 weeks  The patient does not respond to physical therapy, I will obtain an MRI of the lumbar spine  Follow-up is planned in 2 months time or sooner as warranted  Discharge instructions were provided  I personally saw and examined the patient and I agree with the above discussed plan of care  History of Present Illness:    Jerod Reynolds is a 47 y o  female who presents to [facility] for interval re-evaluation of the above stated pain complaints  The patient has a past medical and chronic pain history as outlined in the assessment section  She was last seen on March 15, 2018 at which time she was maintained on oxycodone/acetaminophen 5/325 mg 0 5 tablets twice daily as needed for pain, cyclobenzaprine 10 mg 1 tablet 3 times daily as needed for muscle spasms, and Gralise 600 mg 3 tablets every evening  At today's office visit, the patient's pain score is 8/10 on the verbal numerical pain rating scale  The patient states that her pain is primarily in her neck with radiation into the right upper extremity as well as her low back with radiation into the left lower extremity what appears to be the left S1 distribution  She states that her pain is worse at night and constant in nature  She describes the quality of her pain as dull/aching, sharp, throbbing, pressure-like, numbness, and pins and needles    The patient reports 30% relief of symptoms with the combination of her medications  She is currently using 0 5 tablets of oxycodone/acetaminophen twice daily as needed for pain  She denies opioid induced constipation      Other than as stated above, the patient denies any interval changes in medications, medical condition, mental condition, symptoms, or allergies since the last office visit  Review of Systems:    Review of Systems   Respiratory: Negative for shortness of breath  Cardiovascular: Negative for chest pain  Gastrointestinal: Negative for constipation, diarrhea, nausea and vomiting  Musculoskeletal: Positive for gait problem (difficulty walking/ decreased range of motion) and joint swelling (joint stiffness)  Negative for arthralgias and myalgias  Skin: Negative for rash  Neurological: Positive for weakness (muscle weakness)  Negative for dizziness and seizures  All other systems reviewed and are negative  Patient Active Problem List   Diagnosis    Chronic pain syndrome    Acute muscle stiffness of neck    Cervical disc disorder with radiculopathy of mid-cervical region    Neck pain    Chronic left-sided low back pain with left-sided sciatica    Lumbar radiculopathy       Past Medical History:   Diagnosis Date    Anxiety     Cervical disc disorder     Chronic pain     Depression     Dysuria     Lung abnormality     nodules    Lung nodule     Pituitary abnormality (HCC)     tumor    Thrombocytopenia (HCC)     Thyroid disease        Past Surgical History:   Procedure Laterality Date    APPENDECTOMY      AUGMENTATION MAMMAPLASTY Bilateral     CHOLECYSTECTOMY      COLECTOMY      COLON SURGERY      EPIDURAL BLOCK INJECTION N/A 6/23/2016    Procedure: BLOCK / INJECTION EPIDURAL STEROID CERVICAL  C7-T1;  Surgeon: Maranda Laurent MD;  [facility] MAIN OR;  Service:     EPIDURAL BLOCK INJECTION N/A 3/31/2016    Procedure: BLOCK / INJECTION EPIDURAL STEROID CERVICAL C7-T1  (C-ARM); Surgeon: Maranda Laurent MD;  Location: [facility] MAIN OR;  Service:     HYSTERECTOMY      PITUITARY SURGERY      IL Hal Oni Mikayla 84 DX/THER SBST EPIDURAL/SUBRACH CERV/THORACIC N/A 1/21/2016    Procedure: BLOCK / INJECTION EPIDURAL STEROID CERVICAL C7-T1 (C-ARM);   Surgeon: Maranda Laurent MD;  Location: [facility] MAIN OR;  Service: Pain Management        No family history on file  Social History     Occupational History    Not on file  Social History Main Topics    Smoking status: Never Smoker    Smokeless tobacco: Never Used    Alcohol use No    Drug use: No    Sexual activity: Not on file         Current Outpatient Prescriptions:     albuterol (2 5 mg/3 mL) 0 083 % nebulizer solution, Take 3 mL by nebulization every 6 (six) hours as needed for wheezing, Disp: 75 mL, Rfl: 0    albuterol (VENTOLIN HFA) 90 mcg/act inhaler, Inhale, Disp: , Rfl:     aspirin 81 MG tablet, Take 81 mg by mouth daily  , Disp: , Rfl:     cholecalciferol (VITAMIN D3) 1,000 units tablet, Take 2 tablets by mouth daily, Disp: , Rfl:     cyclobenzaprine (FLEXERIL) 10 mg tablet, Take 1 tablet (10 mg total) by mouth 3 (three) times a day as needed for muscle spasms, Disp: 90 tablet, Rfl: 1    GRALISE 600 MG TABS, Take 3 tablets (1,800 mg total) by mouth every evening, Disp: 90 tablet, Rfl: 1    ibuprofen (ADVIL) 200 mg tablet, Take by mouth, Disp: , Rfl:     levothyroxine 75 mcg tablet, Take 75 mcg by mouth daily  , Disp: , Rfl:     [START ON 5/25/2018] oxyCODONE-acetaminophen (PERCOCET) 5-325 mg per tablet, Take 0 5 tablets by mouth 2 (two) times a day as needed for severe pain for up to 30 days Max Daily Amount: 1 tablet, Disp: 30 tablet, Rfl: 0    traZODone (DESYREL) 50 mg tablet, , Disp: , Rfl: 0    [START ON 6/24/2018] oxyCODONE-acetaminophen (PERCOCET) 5-325 mg per tablet, Take 0 5 tablets by mouth 2 (two) times a day as needed (pain) for up to 30 days Max Daily Amount: 1 tablet, Disp: 30 tablet, Rfl: 0    Allergies   Allergen Reactions    Levaquin [Levofloxacin In D5w] Shortness Of Breath     Also hives      Amitiza [Lubiprostone] Other (See Comments) and Hives     Reaction Date: 10Aug2011;   Migraines and vomiting    Biaxin [Clarithromycin] Hives    Erythromycin Hives     Reaction Date: 10Aug2011;     Macrobid [Nitrofurantoin Monohyd Macro] Other (See Comments)     C/o passing out    Penicillins Hives     Reaction Date: 10Aug2011;     Tetracyclines & Related Hives    Morphine And Related Rash    Valium [Diazepam] Rash and Vomiting     Reaction Date: 14Jun2012;        Physical Exam:    /50 (BP Location: Right arm, Patient Position: Sitting, Cuff Size: Standard)   Pulse 93   Resp 15   Ht 5' 3" (1 6 m)   Wt 79 4 kg (175 lb)   BMI 31 00 kg/m²      Constitutional:overweight  Eyes:anicteric  HEENT:grossly intact  Neck:supple, symmetric, trachea midline and no masses   Pulmonary:even and unlabored  Cardiovascular:No edema or pitting edema present  Skin:Normal without rashes or lesions and well hydrated  Psychiatric:Mood and affect appropriate  Neurologic:Cranial Nerves II-XII grossly intact  Musculoskeletal:normal     Lumbar Spine Exam    Appearance:  Normal lordosis  Palpation/Tenderness:  left lumbar paraspinal tenderness  left sacroiliac joint tenderness   JAMAL testing is negative bilaterally  Sensory:  no sensory deficits noted  Range of Motion:  Lumbar facet loading is positive on the left and negative on the right  Imaging  No orders to display   1/29/18  MRI CERVICAL SPINE WITHOUT CONTRAST     INDICATION:  G89 4: Chronic pain syndrome  M54 2: Cervicalgia  M50 120: Mid-cervical disc disorder, unspecified  M79 1: Myalgia  History taken directly from the electronic ordering system  CHRONIC NECK PAIN X 10 YEARS    GETTING WORSE    LOSING FEELING IN FINGERS     COMPARISON:  8/12/2015     TECHNIQUE:  Sagittal T1, sagittal T2, sagittal inversion recovery, axial T2, axial  2D merge          IMAGE QUALITY:  Diagnostic     FINDINGS:     ALIGNMENT:  Normal alignment of the cervical spine  No compression fracture  No subluxation  No scoliosis      MARROW SIGNAL:  Scattered degenerative endplate changes  No focally suspicious marrow lesions    No bone marrow edema or compression abnormality       CERVICAL AND VISUALIZED THORACIC CORD:  Normal signal within the visualized cord      PREVERTEBRAL AND PARASPINAL SOFT TISSUES:   Normal              VISUALIZED POSTERIOR FOSSA:  The visualized posterior fossa demonstrates no abnormal signal      CERVICAL DISC SPACES:          C2-C3:  Normal      C3-C4:  Normal      C4-C5:  Normal      C5-C6:  There is a disc osteophyte complex with a superimposed left neural foraminal disc protrusion  Moderate central canal narrowing  Moderate to severe left neural foraminal narrowing  Mild right neural foraminal narrowing  This level is similar   to the prior study      C6-C7:  Small right paracentral disc herniation, protrusion type with disc osteophyte complex noted  Mild to moderate right neural foraminal narrowing  Minimal central canal narrowing  Mild left neural foraminal narrowing  This level is similar to   the prior study      C7-T1:  Normal      UPPER THORACIC DISC SPACES:  At T3-T4 there is a central disc herniation, extrusion type, herniated disc material extending caudally along the dorsal margin of T5  There is approximately 12 mm of inferior extension of this disc herniation which causes   mild to moderate central canal narrowing at this level  Neural foramina bilaterally patent  This level is similar to the prior study      Otherwise visualized thoracic levels are intact      IMPRESSION:     Scattered spondylotic changes at C5-C6, C6-C7 and T3-T4 are stable from the previous study  No new disc herniation    No cord compression or cord signal abnormality                 Workstation performed: [id number]      Imaging     MRI cervical spine wo contrast (Order #[id number]) on 1/29/2018 - Imaging Information   Result History     MRI cervical spine wo contrast (Order #[id number]) on 1/30/2018 - Order Result History Report   Result Notes     Notes Recorded by Tip Rose MD on 1/30/2018 at 12:19 PM EST  The patient's MRI of the cervical spine dated January 30, 2018 shows a small right disc herniation at the C6-C7 level along with a central disc herniation at the T3-T4 level   Based on my last note, I think the patient should follow up with Dr Rangel Napier for further treatment options before considering a spinal cord stimulator trial            Orders Placed This Encounter   Procedures    Ambulatory referral to Physical Therapy

## 2018-05-15 NOTE — PROGRESS NOTES
Pain Medicine Follow-Up Note    Assessment:  1  Chronic pain syndrome    2  Neck pain    3  Cervical disc disorder with radiculopathy of mid-cervical region    4  Chronic left-sided low back pain with left-sided sciatica    5  Lumbar radiculopathy        Plan:  Orders Placed This Encounter   Procedures    Ambulatory referral to Physical Therapy     Standing Status:   Future     Standing Expiration Date:   11/15/2018     Referral Priority:   Routine     Referral Type:   Physical Therapy     Referral Reason:   Specialty Services Required     Requested Specialty:   Physical Therapy     Number of Visits Requested:   1     Expiration Date:   5/15/2019       New Medications Ordered This Visit   Medications    oxyCODONE-acetaminophen (PERCOCET) 5-325 mg per tablet     Sig: Take 0 5 tablets by mouth 2 (two) times a day as needed for severe pain for up to 30 days Max Daily Amount: 1 tablet     Dispense:  30 tablet     Refill:  0    oxyCODONE-acetaminophen (PERCOCET) 5-325 mg per tablet     Sig: Take 0 5 tablets by mouth 2 (two) times a day as needed (pain) for up to 30 days Max Daily Amount: 1 tablet     Dispense:  30 tablet     Refill:  0    GRALISE 600 MG TABS     Sig: Take 3 tablets (1,800 mg total) by mouth every evening     Dispense:  90 tablet     Refill:  1    cyclobenzaprine (FLEXERIL) 10 mg tablet     Sig: Take 1 tablet (10 mg total) by mouth 3 (three) times a day as needed for muscle spasms     Dispense:  90 tablet     Refill:  1     My impressions and treatment recommendations were discussed in detail with the patient who verbalized understanding and had no further questions        Given that the patient reports overall reduced pain and improved level functioning without significant side effects, I felt a reasonable to continue the patient on oxycodone/acetaminophen 5/325 mg 0 5 tablets twice daily as needed for pain, Gralise 600 mg 3 tablets every evening, and cyclobenzaprine 10 mg 1 tablet 3 times daily as needed for muscle spasms  The risks and side effects of chronic opioid treatment were discussed in detail with the patient  Side effects include but are not limited to nausea, vomiting, GI intolerance, sedation, constipation, mental clouding, opioid-induced hyperalgesia, endocrine dysfunction, addiction, dependence, and tolerance  The patient was asked to take his medications only as prescribed and directed, never in excess, and never for any other reason other than for pain control  The patient was also asked to keep his medications out of the reach of others and away from children, preferably in a locked drawer  The patient verbalized understanding and wished to use these opioid medications  A urine drug test was collected at today's office visit as part of our medication management protocol  The point of care testing results were appropriate for what was being prescribed  The specimen will be sent for confirmatory testing  The drug screen is medically necessary because the patient is either dependent on opioid medication or is being considered for opioid medication therapy and the results could impact ongoing or future treatment  The drug screen is to evaluate for the presence or absence of prescribed, non-prescribed, and/or illicit drugs and substances  [place] Prescription Drug Monitoring Program report was reviewed and was appropriate      The patient is also complaining of low back pain with left lower extremity radicular symptoms in what appears to be the left S1 distribution  I did feel reasonable to prescribe the patient a course of physical therapy 2-3 times per week for 4-6 weeks  The patient does not respond to physical therapy, I will obtain an MRI of the lumbar spine  Follow-up is planned in 2 months time or sooner as warranted  Discharge instructions were provided  I personally saw and examined the patient and I agree with the above discussed plan of care      History of Present Illness:    Greta Coffman is a 47 y o  female who presents to [facility] for interval re-evaluation of the above stated pain complaints  The patient has a past medical and chronic pain history as outlined in the assessment section  She was last seen on March 15, 2018 at which time she was maintained on oxycodone/acetaminophen 5/325 mg 0 5 tablets twice daily as needed for pain, cyclobenzaprine 10 mg 1 tablet 3 times daily as needed for muscle spasms, and Gralise 600 mg 3 tablets every evening  At today's office visit, the patient's pain score is 8/10 on the verbal numerical pain rating scale  The patient states that her pain is primarily in her neck with radiation into the right upper extremity as well as her low back with radiation into the left lower extremity what appears to be the left S1 distribution  She states that her pain is worse at night and constant in nature  She describes the quality of her pain as dull/aching, sharp, throbbing, pressure-like, numbness, and pins and needles    The patient reports 30% relief of symptoms with the combination of her medications  She is currently using 0 5 tablets of oxycodone/acetaminophen twice daily as needed for pain  She denies opioid induced constipation  Other than as stated above, the patient denies any interval changes in medications, medical condition, mental condition, symptoms, or allergies since the last office visit  Review of Systems:    Review of Systems   Respiratory: Negative for shortness of breath  Cardiovascular: Negative for chest pain  Gastrointestinal: Negative for constipation, diarrhea, nausea and vomiting  Musculoskeletal: Positive for gait problem (difficulty walking/ decreased range of motion) and joint swelling (joint stiffness)  Negative for arthralgias and myalgias  Skin: Negative for rash  Neurological: Positive for weakness (muscle weakness)  Negative for dizziness and seizures  All other systems reviewed and are negative  Patient Active Problem List   Diagnosis    Chronic pain syndrome    Acute muscle stiffness of neck    Cervical disc disorder with radiculopathy of mid-cervical region    Neck pain    Chronic left-sided low back pain with left-sided sciatica    Lumbar radiculopathy       Past Medical History:   Diagnosis Date    Anxiety     Cervical disc disorder     Chronic pain     Depression     Dysuria     Lung abnormality     nodules    Lung nodule     Pituitary abnormality (HCC)     tumor    Thrombocytopenia (HCC)     Thyroid disease        Past Surgical History:   Procedure Laterality Date    APPENDECTOMY      AUGMENTATION MAMMAPLASTY Bilateral     CHOLECYSTECTOMY      COLECTOMY      COLON SURGERY      EPIDURAL BLOCK INJECTION N/A 6/23/2016    Procedure: BLOCK / INJECTION EPIDURAL STEROID CERVICAL  C7-T1;  Surgeon: Ortiz Gregg MD;  Location: [facility] MAIN OR;  Service:     EPIDURAL BLOCK INJECTION N/A 3/31/2016    Procedure: BLOCK / INJECTION EPIDURAL STEROID CERVICAL C7-T1  (C-ARM); Surgeon: Ortiz Gregg MD;  Location: [facility] MAIN OR;  Service:     HYSTERECTOMY      PITUITARY SURGERY      NM Hal Oni Mikayla 85 DX/THER SBST EPIDURAL/SUBRACH CERV/THORACIC N/A 1/21/2016    Procedure: BLOCK / INJECTION EPIDURAL STEROID CERVICAL C7-T1 (C-ARM); Surgeon: Ortiz Gregg MD;  Location: [facility] MAIN OR;  Service: Pain Management        No family history on file  Social History     Occupational History    Not on file       Social History Main Topics    Smoking status: Never Smoker    Smokeless tobacco: Never Used    Alcohol use No    Drug use: No    Sexual activity: Not on file         Current Outpatient Prescriptions:     albuterol (2 5 mg/3 mL) 0 083 % nebulizer solution, Take 3 mL by nebulization every 6 (six) hours as needed for wheezing, Disp: 75 mL, Rfl: 0    albuterol (VENTOLIN HFA) 90 mcg/act inhaler, Inhale, Disp: , Rfl:     aspirin 81 MG tablet, Take 81 mg by mouth daily  , Disp: , Rfl:     cholecalciferol (VITAMIN D3) 1,000 units tablet, Take 2 tablets by mouth daily, Disp: , Rfl:     cyclobenzaprine (FLEXERIL) 10 mg tablet, Take 1 tablet (10 mg total) by mouth 3 (three) times a day as needed for muscle spasms, Disp: 90 tablet, Rfl: 1    GRALISE 600 MG TABS, Take 3 tablets (1,800 mg total) by mouth every evening, Disp: 90 tablet, Rfl: 1    ibuprofen (ADVIL) 200 mg tablet, Take by mouth, Disp: , Rfl:     levothyroxine 75 mcg tablet, Take 75 mcg by mouth daily  , Disp: , Rfl:     [START ON 5/25/2018] oxyCODONE-acetaminophen (PERCOCET) 5-325 mg per tablet, Take 0 5 tablets by mouth 2 (two) times a day as needed for severe pain for up to 30 days Max Daily Amount: 1 tablet, Disp: 30 tablet, Rfl: 0    traZODone (DESYREL) 50 mg tablet, , Disp: , Rfl: 0    [START ON 6/24/2018] oxyCODONE-acetaminophen (PERCOCET) 5-325 mg per tablet, Take 0 5 tablets by mouth 2 (two) times a day as needed (pain) for up to 30 days Max Daily Amount: 1 tablet, Disp: 30 tablet, Rfl: 0    Allergies   Allergen Reactions    Levaquin [Levofloxacin In D5w] Shortness Of Breath     Also hives      Amitiza [Lubiprostone] Other (See Comments) and Hives     Reaction Date: 10Aug2011;   Migraines and vomiting    Biaxin [Clarithromycin] Hives    Erythromycin Hives     Reaction Date: 10Aug2011;     Macrobid [Nitrofurantoin Monohyd Macro] Other (See Comments)     C/o passing out    Penicillins Hives     Reaction Date: 10Aug2011;     Tetracyclines & Related Hives    Morphine And Related Rash    Valium [Diazepam] Rash and Vomiting     Reaction Date: 14Jun2012;        Physical Exam:    /50 (BP Location: Right arm, Patient Position: Sitting, Cuff Size: Standard)   Pulse 93   Resp 15   Ht 5' 3" (1 6 m)   Wt 79 4 kg (175 lb)   BMI 31 00 kg/m²     Constitutional:overweight  Eyes:anicteric  HEENT:grossly intact  Neck:supple, symmetric, trachea midline and no masses   Pulmonary:even and unlabored  Cardiovascular:No edema or pitting edema present  Skin:Normal without rashes or lesions and well hydrated  Psychiatric:Mood and affect appropriate  Neurologic:Cranial Nerves II-XII grossly intact  Musculoskeletal:normal     Lumbar Spine Exam    Appearance:  Normal lordosis  Palpation/Tenderness:  left lumbar paraspinal tenderness  left sacroiliac joint tenderness   JAMAL testing is negative bilaterally  Sensory:  no sensory deficits noted  Range of Motion:  Lumbar facet loading is positive on the left and negative on the right  Imaging  No orders to display   1/29/18  MRI CERVICAL SPINE WITHOUT CONTRAST     INDICATION:  G89 4: Chronic pain syndrome  M54 2: Cervicalgia  M50 120: Mid-cervical disc disorder, unspecified  M79 1: Myalgia  History taken directly from the electronic ordering system  CHRONIC NECK PAIN X 10 YEARS    GETTING WORSE    LOSING FEELING IN FINGERS     COMPARISON:  8/12/2015     TECHNIQUE:  Sagittal T1, sagittal T2, sagittal inversion recovery, axial T2, axial  2D merge          IMAGE QUALITY:  Diagnostic     FINDINGS:     ALIGNMENT:  Normal alignment of the cervical spine  No compression fracture  No subluxation  No scoliosis      MARROW SIGNAL:  Scattered degenerative endplate changes  No focally suspicious marrow lesions  No bone marrow edema or compression abnormality       CERVICAL AND VISUALIZED THORACIC CORD:  Normal signal within the visualized cord      PREVERTEBRAL AND PARASPINAL SOFT TISSUES:   Normal              VISUALIZED POSTERIOR FOSSA:  The visualized posterior fossa demonstrates no abnormal signal      CERVICAL DISC SPACES:          C2-C3:  Normal      C3-C4:  Normal      C4-C5:  Normal      C5-C6:  There is a disc osteophyte complex with a superimposed left neural foraminal disc protrusion  Moderate central canal narrowing  Moderate to severe left neural foraminal narrowing  Mild right neural foraminal narrowing    This level is similar   to the prior study      C6-C7:  Small right paracentral disc herniation, protrusion type with disc osteophyte complex noted  Mild to moderate right neural foraminal narrowing  Minimal central canal narrowing  Mild left neural foraminal narrowing  This level is similar to   the prior study      C7-T1:  Normal      UPPER THORACIC DISC SPACES:  At T3-T4 there is a central disc herniation, extrusion type, herniated disc material extending caudally along the dorsal margin of T5  There is approximately 12 mm of inferior extension of this disc herniation which causes   mild to moderate central canal narrowing at this level  Neural foramina bilaterally patent  This level is similar to the prior study      Otherwise visualized thoracic levels are intact      IMPRESSION:     Scattered spondylotic changes at C5-C6, C6-C7 and T3-T4 are stable from the previous study  No new disc herniation  No cord compression or cord signal abnormality                 Workstation performed: [id number]      Imaging     MRI cervical spine wo contrast (Order #[id number]) on 1/29/2018 - Imaging Information   Result History     MRI cervical spine wo contrast (Order #[id number]) on 1/30/2018 - Order Result History Report   Result Notes     Notes Recorded by Natalie Bashir MD on 1/30/2018 at 12:19 PM EST  The patient's MRI of the cervical spine dated January 30, 2018 shows a small right disc herniation at the C6-C7 level along with a central disc herniation at the T3-T4 level   Based on my last note, I think the patient should follow up with Dr Manuela Quan for further treatment options before considering a spinal cord stimulator trial            Orders Placed This Encounter   Procedures    Ambulatory referral to Physical Therapy

## 2018-07-24 ENCOUNTER — TELEPHONE (OUTPATIENT)
Dept: PAIN MEDICINE | Facility: CLINIC | Age: 53
End: 2018-07-24

## 2018-07-24 ENCOUNTER — OFFICE VISIT (OUTPATIENT)
Dept: PAIN MEDICINE | Facility: CLINIC | Age: 53
End: 2018-07-24
Payer: COMMERCIAL

## 2018-07-24 VITALS — SYSTOLIC BLOOD PRESSURE: 110 MMHG | DIASTOLIC BLOOD PRESSURE: 60 MMHG

## 2018-07-24 DIAGNOSIS — M54.2 NECK PAIN: ICD-10-CM

## 2018-07-24 DIAGNOSIS — M50.120 CERVICAL DISC DISORDER WITH RADICULOPATHY OF MID-CERVICAL REGION: ICD-10-CM

## 2018-07-24 DIAGNOSIS — G89.4 CHRONIC PAIN SYNDROME: Primary | ICD-10-CM

## 2018-07-24 PROCEDURE — 99214 OFFICE O/P EST MOD 30 MIN: CPT | Performed by: ANESTHESIOLOGY

## 2018-07-24 RX ORDER — GABAPENTIN 600 MG/1
600 TABLET ORAL 3 TIMES DAILY
Qty: 90 TABLET | Refills: 1 | Status: SHIPPED | OUTPATIENT
Start: 2018-07-24 | End: 2018-09-24 | Stop reason: SDUPTHER

## 2018-07-24 RX ORDER — OXYCODONE HYDROCHLORIDE AND ACETAMINOPHEN 5; 325 MG/1; MG/1
0.5 TABLET ORAL 2 TIMES DAILY PRN
Qty: 30 TABLET | Refills: 0 | Status: SHIPPED | OUTPATIENT
Start: 2018-07-24 | End: 2018-09-24 | Stop reason: SDUPTHER

## 2018-07-24 RX ORDER — OXYCODONE HYDROCHLORIDE AND ACETAMINOPHEN 5; 325 MG/1; MG/1
0.5 TABLET ORAL 2 TIMES DAILY PRN
Qty: 30 TABLET | Refills: 0 | Status: SHIPPED | OUTPATIENT
Start: 2018-08-23 | End: 2018-08-27 | Stop reason: SDUPTHER

## 2018-07-24 RX ORDER — HYDROCORTISONE ACETATE 25 MG/1
SUPPOSITORY RECTAL
Refills: 0 | COMMUNITY
Start: 2018-06-05

## 2018-07-24 NOTE — PROGRESS NOTES
Pt is c/o back pain   Pain Medicine Follow-Up Note    Assessment:  1  Chronic pain syndrome    2  Neck pain    3  Cervical disc disorder with radiculopathy of mid-cervical region        Plan:  New Medications Ordered This Visit   Medications    hydrocortisone (ANUSOL-HC) 25 mg suppository     Refill:  0    oxyCODONE-acetaminophen (PERCOCET) 5-325 mg per tablet     Sig: Take 0 5 tablets by mouth 2 (two) times a day as needed (pain) for up to 30 days Max Daily Amount: 1 tablet     Dispense:  30 tablet     Refill:  0    oxyCODONE-acetaminophen (PERCOCET) 5-325 mg per tablet     Sig: Take 0 5 tablets by mouth 2 (two) times a day as needed for severe pain for up to 30 days Max Daily Amount: 1 tablet     Dispense:  30 tablet     Refill:  0    gabapentin (NEURONTIN) 600 MG tablet     Sig: Take 1 tablet (600 mg total) by mouth 3 (three) times a day     Dispense:  90 tablet     Refill:  1     My impressions and treatment recommendations were discussed in detail with the patient who verbalized understanding and had no further questions  The patient has been reporting worsening right upper extremity radicular symptoms  She is also complaining of worsening pain in her upper back region as well as her neck  As such, I felt a reasonable to offer the patient a C6-C7 cervical epidural steroid injection since this could be potentially therapeutic  The procedures, its risks, and benefits were explained in detail to the patient  Risks include but are not limited to bleeding, infection, hematoma formation, abscess formation, weakness, headache, failure the pain to improve, nerve irritation or damage, and potential worsening of the pain  The patient verbalized understanding and wished to proceed with the procedure  I also felt a reasonable to continue the patient on oxycodone/acetaminophen 5/325 mg 0 5 tablets twice daily as needed for pain   The patient also reports that Gralise is too expensive and she would like to switch back to Gabapentin  As such, I have given the patient a prescription for gabapentin 600 mg 3 times daily  Side effects were reviewed with the patient  The risks and side effects of chronic opioid treatment were discussed in detail with the patient  Side effects include but are not limited to nausea, vomiting, GI intolerance, sedation, constipation, mental clouding, opioid-induced hyperalgesia, endocrine dysfunction, addiction, dependence, and tolerance  The patient was asked to take his medications only as prescribed and directed, never in excess, and never for any other reason other than for pain control  The patient was also asked to keep his medications out of the reach of others and away from children, preferably in a locked drawer  The patient verbalized understanding and wished to use these opioid medications  [place] Prescription Drug Monitoring Program report was reviewed and was appropriate     Follow-up is planned in 4 weeks time or sooner as warranted  Discharge instructions were provided  I personally saw and examined the patient and I agree with the above discussed plan of care  History of Present Illness:    Greta Coffman is a 47 y o  female who presents to [facility] for interval re-evaluation of the above stated pain complaints  The patient has a past medical and chronic pain history as outlined in the assessment section  She was last seen on May 15, 2018  At today's office visit, the patient's pain score is 7 to 8/10 on the verbal numerical pain rating scale  The patient states that her pain is steady throughout the day and constant in nature  She reports the quality of her pain as burning, dull/aching, sharp, throbbing, shooting, numbness, and pins and needles    The patient states about 30% pain relief with Gralise 600 mg 3 tablets daily and oxycodone/acetaminophen 5/325 mg 0 5 tablets twice daily as needed for pain   She does report that she would like to switch back to Gabapentin because Gralise is too expensive  She denies opioid induced constipation  Other than as stated above, the patient denies any interval changes in medications, medical condition, mental condition, symptoms, or allergies since the last office visit  Review of Systems:    Review of Systems   Respiratory: Negative for shortness of breath  Cardiovascular: Negative for chest pain  Gastrointestinal: Negative for constipation, diarrhea, nausea and vomiting  Musculoskeletal: Negative for arthralgias, gait problem, joint swelling and myalgias  Muscle weakness  Decreased rom  Joint stiffness   Skin: Negative for rash  Neurological: Negative for dizziness, seizures and weakness  All other systems reviewed and are negative  Patient Active Problem List   Diagnosis    Chronic pain syndrome    Acute muscle stiffness of neck    Cervical disc disorder with radiculopathy of mid-cervical region    Neck pain    Chronic left-sided low back pain with left-sided sciatica    Lumbar radiculopathy       Past Medical History:   Diagnosis Date    Anxiety     Cervical disc disorder     Chronic pain     Depression     Dysuria     Lung abnormality     nodules    Lung nodule     Pituitary abnormality (HCC)     tumor    Thrombocytopenia (HCC)     Thyroid disease        Past Surgical History:   Procedure Laterality Date    APPENDECTOMY      AUGMENTATION MAMMAPLASTY Bilateral     CHOLECYSTECTOMY      COLECTOMY      COLON SURGERY      EPIDURAL BLOCK INJECTION N/A 6/23/2016    Procedure: BLOCK / INJECTION EPIDURAL STEROID CERVICAL  C7-T1;  Surgeon: Tracy Cason MD;  Location: [facility] MAIN OR;  Service:     EPIDURAL BLOCK INJECTION N/A 3/31/2016    Procedure: BLOCK / INJECTION EPIDURAL STEROID CERVICAL C7-T1  (C-ARM);   Surgeon: Tracy Cason MD;  Location: [facility] MAIN OR;  Service:     HYSTERECTOMY      PITUITARY SURGERY      [place] NJX DX/THER SBST EPIDURAL/SUBRACH CERV/THORACIC N/A 1/21/2016    Procedure: BLOCK / INJECTION EPIDURAL STEROID CERVICAL C7-T1 (C-ARM); Surgeon: Anish Lanier MD;  Location: [facility] MAIN OR;  Service: Pain Management        No family history on file  Social History     Occupational History    Not on file  Social History Main Topics    Smoking status: Never Smoker    Smokeless tobacco: Never Used    Alcohol use No    Drug use: No    Sexual activity: Not on file         Current Outpatient Prescriptions:     albuterol (2 5 mg/3 mL) 0 083 % nebulizer solution, Take 3 mL by nebulization every 6 (six) hours as needed for wheezing, Disp: 75 mL, Rfl: 0    albuterol (VENTOLIN HFA) 90 mcg/act inhaler, Inhale, Disp: , Rfl:     aspirin 81 MG tablet, Take 81 mg by mouth daily  , Disp: , Rfl:     cholecalciferol (VITAMIN D3) 1,000 units tablet, Take 2 tablets by mouth daily, Disp: , Rfl:     cyclobenzaprine (FLEXERIL) 10 mg tablet, Take 1 tablet (10 mg total) by mouth 3 (three) times a day as needed for muscle spasms, Disp: 90 tablet, Rfl: 1    hydrocortisone (ANUSOL-HC) 25 mg suppository, , Disp: , Rfl: 0    ibuprofen (ADVIL) 200 mg tablet, Take by mouth, Disp: , Rfl:     levothyroxine 75 mcg tablet, Take 75 mcg by mouth daily  , Disp: , Rfl:     oxyCODONE-acetaminophen (PERCOCET) 5-325 mg per tablet, Take 0 5 tablets by mouth 2 (two) times a day as needed (pain) for up to 30 days Max Daily Amount: 1 tablet, Disp: 30 tablet, Rfl: 0    traZODone (DESYREL) 50 mg tablet, , Disp: , Rfl: 0    gabapentin (NEURONTIN) 600 MG tablet, Take 1 tablet (600 mg total) by mouth 3 (three) times a day, Disp: 90 tablet, Rfl: 1    [START ON 8/23/2018] oxyCODONE-acetaminophen (PERCOCET) 5-325 mg per tablet, Take 0 5 tablets by mouth 2 (two) times a day as needed for severe pain for up to 30 days Max Daily Amount: 1 tablet, Disp: 30 tablet, Rfl: 0    Allergies   Allergen Reactions    Levaquin [Levofloxacin In D5w] Shortness Of Breath     Also hives      Amitiza [Lubiprostone] Other (See Comments) and Hives     Reaction Date: 10Aug2011;   Migraines and vomiting    Biaxin [Clarithromycin] Hives    Cephradine     Erythromycin Hives     Reaction Date: 10Aug2011;    [employer] [Nitrofurantoin Monohyd Macro] Other (See Comments)     C/o passing out    Morphine     Penicillins Hives     Reaction Date: 10Aug2011;     Sulfa Antibiotics     Tetracycline     Tetracyclines & Related Hives    Morphine And Related Rash    Valium [Diazepam] Rash and Vomiting     Reaction Date: 14Jun2012;        Physical Exam:    /60     Constitutional:normal, well developed, well nourished, alert, in no distress and non-toxic and no overt pain behavior  Eyes:anicteric  HEENT:grossly intact  Neck:supple, symmetric, trachea midline and no masses   Pulmonary:even and unlabored  Cardiovascular:No edema or pitting edema present  Skin:Normal without rashes or lesions and well hydrated  Psychiatric:Mood and affect appropriate  Neurologic:Cranial Nerves II-XII grossly intact  Musculoskeletal:normal    MRI cervical spine wo contrast   Status: Final result   PACS Images     Show images for MRI cervical spine wo contrast   Order Report      Order Details   Study Result     MRI CERVICAL SPINE WITHOUT CONTRAST     INDICATION:  G89 4: Chronic pain syndrome  M54 2: Cervicalgia  M50 120: Mid-cervical disc disorder, unspecified  M79 1: Myalgia  History taken directly from the electronic ordering system  CHRONIC NECK PAIN X 10 YEARS    GETTING WORSE    LOSING FEELING IN FINGERS     COMPARISON:  8/12/2015     TECHNIQUE:  Sagittal T1, sagittal T2, sagittal inversion recovery, axial T2, axial  2D merge          IMAGE QUALITY:  Diagnostic     FINDINGS:     ALIGNMENT:  Normal alignment of the cervical spine  No compression fracture  No subluxation  No scoliosis      MARROW SIGNAL:  Scattered degenerative endplate changes  No focally suspicious marrow lesions    No bone marrow edema or compression abnormality       CERVICAL AND VISUALIZED THORACIC CORD:  Normal signal within the visualized cord      PREVERTEBRAL AND PARASPINAL SOFT TISSUES:   Normal              VISUALIZED POSTERIOR FOSSA:  The visualized posterior fossa demonstrates no abnormal signal      CERVICAL DISC SPACES:          C2-C3:  Normal      C3-C4:  Normal      C4-C5:  Normal      C5-C6:  There is a disc osteophyte complex with a superimposed left neural foraminal disc protrusion  Moderate central canal narrowing  Moderate to severe left neural foraminal narrowing  Mild right neural foraminal narrowing  This level is similar   to the prior study      C6-C7:  Small right paracentral disc herniation, protrusion type with disc osteophyte complex noted  Mild to moderate right neural foraminal narrowing  Minimal central canal narrowing  Mild left neural foraminal narrowing  This level is similar to   the prior study      C7-T1:  Normal      UPPER THORACIC DISC SPACES:  At T3-T4 there is a central disc herniation, extrusion type, herniated disc material extending caudally along the dorsal margin of T5  There is approximately 12 mm of inferior extension of this disc herniation which causes   mild to moderate central canal narrowing at this level  Neural foramina bilaterally patent  This level is similar to the prior study      Otherwise visualized thoracic levels are intact      IMPRESSION:     Scattered spondylotic changes at C5-C6, C6-C7 and T3-T4 are stable from the previous study  No new disc herniation    No cord compression or cord signal abnormality                 Workstation performed: [id number]

## 2018-07-24 NOTE — LETTER
July 24, 2018     Martina Wilder DO  [address] [facility]  [address]  [address] [place] [address]    Patient: Ezra Gonzalez   YOB: 1965   Date of Visit: 7/24/2018       Dear Dr Ros Rivera: Thank you for referring Wayna Halsted to me for evaluation  Below are my notes for this consultation  If you have questions, please do not hesitate to call me  I look forward to following your patient along with you  Sincerely,        Omar Fernandez MD        CC: No Recipients  Omar Fernandez MD  7/24/2018 11:43 AM  Sign at close encounter  Pt is c/o back pain   Pain Medicine Follow-Up Note    Assessment:  1  Chronic pain syndrome    2  Neck pain    3  Cervical disc disorder with radiculopathy of mid-cervical region        Plan:  New Medications Ordered This Visit   Medications    hydrocortisone (ANUSOL-HC) 25 mg suppository     Refill:  0    oxyCODONE-acetaminophen (PERCOCET) 5-325 mg per tablet     Sig: Take 0 5 tablets by mouth 2 (two) times a day as needed (pain) for up to 30 days Max Daily Amount: 1 tablet     Dispense:  30 tablet     Refill:  0    oxyCODONE-acetaminophen (PERCOCET) 5-325 mg per tablet     Sig: Take 0 5 tablets by mouth 2 (two) times a day as needed for severe pain for up to 30 days Max Daily Amount: 1 tablet     Dispense:  30 tablet     Refill:  0    gabapentin (NEURONTIN) 600 MG tablet     Sig: Take 1 tablet (600 mg total) by mouth 3 (three) times a day     Dispense:  90 tablet     Refill:  1     My impressions and treatment recommendations were discussed in detail with the patient who verbalized understanding and had no further questions  The patient has been reporting worsening right upper extremity radicular symptoms  She is also complaining of worsening pain in her upper back region as well as her neck  As such, I felt a reasonable to offer the patient a C6-C7 cervical epidural steroid injection since this could be potentially therapeutic      The procedures, its risks, and benefits were explained in detail to the patient  Risks include but are not limited to bleeding, infection, hematoma formation, abscess formation, weakness, headache, failure the pain to improve, nerve irritation or damage, and potential worsening of the pain  The patient verbalized understanding and wished to proceed with the procedure  I also felt a reasonable to continue the patient on oxycodone/acetaminophen 5/325 mg 0 5 tablets twice daily as needed for pain  The patient also reports that Gralise is too expensive and she would like to switch back to Gabapentin  As such, I have given the patient a prescription for gabapentin 600 mg 3 times daily  Side effects were reviewed with the patient  The risks and side effects of chronic opioid treatment were discussed in detail with the patient  Side effects include but are not limited to nausea, vomiting, GI intolerance, sedation, constipation, mental clouding, opioid-induced hyperalgesia, endocrine dysfunction, addiction, dependence, and tolerance  The patient was asked to take his medications only as prescribed and directed, never in excess, and never for any other reason other than for pain control  The patient was also asked to keep his medications out of the reach of others and away from children, preferably in a locked drawer  The patient verbalized understanding and wished to use these opioid medications  [place] Prescription Drug Monitoring Program report was reviewed and was appropriate     Follow-up is planned in 4 weeks time or sooner as warranted  Discharge instructions were provided  I personally saw and examined the patient and I agree with the above discussed plan of care  History of Present Illness:    Marguerite Goode is a 47 y o  female who presents to [facility] for interval re-evaluation of the above stated pain complaints   The patient has a past medical and chronic pain history as outlined in the assessment section  She was last seen on May 15, 2018  At today's office visit, the patient's pain score is 7 to 8/10 on the verbal numerical pain rating scale  The patient states that her pain is steady throughout the day and constant in nature  She reports the quality of her pain as burning, dull/aching, sharp, throbbing, shooting, numbness, and pins and needles    The patient states about 30% pain relief with Gralise 600 mg 3 tablets daily and oxycodone/acetaminophen 5/325 mg 0 5 tablets twice daily as needed for pain  She does report that she would like to switch back to Gabapentin because Gralise is too expensive  She denies opioid induced constipation  Other than as stated above, the patient denies any interval changes in medications, medical condition, mental condition, symptoms, or allergies since the last office visit  Review of Systems:    Review of Systems   Respiratory: Negative for shortness of breath  Cardiovascular: Negative for chest pain  Gastrointestinal: Negative for constipation, diarrhea, nausea and vomiting  Musculoskeletal: Negative for arthralgias, gait problem, joint swelling and myalgias  Muscle weakness  Decreased rom  Joint stiffness   Skin: Negative for rash  Neurological: Negative for dizziness, seizures and weakness  All other systems reviewed and are negative          Patient Active Problem List   Diagnosis    Chronic pain syndrome    Acute muscle stiffness of neck    Cervical disc disorder with radiculopathy of mid-cervical region    Neck pain    Chronic left-sided low back pain with left-sided sciatica    Lumbar radiculopathy       Past Medical History:   Diagnosis Date    Anxiety     Cervical disc disorder     Chronic pain     Depression     Dysuria     Lung abnormality     nodules    Lung nodule     Pituitary abnormality (HCC)     tumor    Thrombocytopenia ([address] )     Thyroid disease        Past Surgical History:   Procedure Laterality Date    APPENDECTOMY      AUGMENTATION MAMMAPLASTY Bilateral     CHOLECYSTECTOMY      COLECTOMY      COLON SURGERY      EPIDURAL BLOCK INJECTION N/A 6/23/2016    Procedure: BLOCK / INJECTION EPIDURAL STEROID CERVICAL  C7-T1;  Surgeon: Kleber Candelaria MD;  Location: [facility] MAIN OR;  Service:     EPIDURAL BLOCK INJECTION N/A 3/31/2016    Procedure: BLOCK / INJECTION EPIDURAL STEROID CERVICAL C7-T1  (C-ARM); Surgeon: Kleber aCndelaria MD;  Location: [facility] MAIN OR;  Service:     HYSTERECTOMY      PITUITARY SURGERY      [place] [address] DX/THER SBST EPIDURAL/SUBRACH CERV/THORACIC N/A 1/21/2016    Procedure: BLOCK / INJECTION EPIDURAL STEROID CERVICAL C7-T1 (C-ARM); Surgeon: Kleber Candelaria MD;  Location: [facility] MAIN OR;  Service: Pain Management        No family history on file  Social History     Occupational History    Not on file  Social History Main Topics    Smoking status: Never Smoker    Smokeless tobacco: Never Used    Alcohol use No    Drug use: No    Sexual activity: Not on file         Current Outpatient Prescriptions:     albuterol (2 5 mg/3 mL) 0 083 % nebulizer solution, Take 3 mL by nebulization every 6 (six) hours as needed for wheezing, Disp: 75 mL, Rfl: 0    albuterol (VENTOLIN HFA) 90 mcg/act inhaler, Inhale, Disp: , Rfl:     aspirin 81 MG tablet, Take 81 mg by mouth daily  , Disp: , Rfl:     cholecalciferol (VITAMIN D3) 1,000 units tablet, Take 2 tablets by mouth daily, Disp: , Rfl:     cyclobenzaprine (FLEXERIL) 10 mg tablet, Take 1 tablet (10 mg total) by mouth 3 (three) times a day as needed for muscle spasms, Disp: 90 tablet, Rfl: 1    hydrocortisone (ANUSOL-HC) 25 mg suppository, , Disp: , Rfl: 0    ibuprofen (ADVIL) 200 mg tablet, Take by mouth, Disp: , Rfl:     levothyroxine 75 mcg tablet, Take 75 mcg by mouth daily  , Disp: , Rfl:     oxyCODONE-acetaminophen (PERCOCET) 5-325 mg per tablet, Take 0 5 tablets by mouth 2 (two) times a day as needed (pain) for up to 30 days Max Daily Amount: 1 tablet, Disp: 30 tablet, Rfl: 0    traZODone (DESYREL) 50 mg tablet, , Disp: , Rfl: 0    gabapentin (NEURONTIN) 600 MG tablet, Take 1 tablet (600 mg total) by mouth 3 (three) times a day, Disp: 90 tablet, Rfl: 1    [START ON 8/23/2018] oxyCODONE-acetaminophen (PERCOCET) 5-325 mg per tablet, Take 0 5 tablets by mouth 2 (two) times a day as needed for severe pain for up to 30 days Max Daily Amount: 1 tablet, Disp: 30 tablet, Rfl: 0    Allergies   Allergen Reactions    Levaquin [Levofloxacin In D5w] Shortness Of Breath     Also hives      Amitiza [Lubiprostone] Other (See Comments) and Hives     Reaction Date: 10Aug2011;   Migraines and vomiting    Biaxin [Clarithromycin] Hives    Cephradine     Erythromycin Hives     Reaction Date: 10Aug2011;     Macrobid [Nitrofurantoin Monohyd Macro] Other (See Comments)     C/o passing out    Morphine     Penicillins Hives     Reaction Date: 10Aug2011;     Sulfa Antibiotics     Tetracycline     Tetracyclines & Related Hives    Morphine And Related Rash    Valium [Diazepam] Rash and Vomiting     Reaction Date: 14Jun2012;        Physical Exam:    /60     Constitutional:normal, well developed, well nourished, alert, in no distress and non-toxic and no overt pain behavior    Eyes:anicteric  HEENT:grossly intact  Neck:supple, symmetric, trachea midline and no masses   Pulmonary:even and unlabored  Cardiovascular:No edema or pitting edema present  Skin:Normal without rashes or lesions and well hydrated  Psychiatric:Mood and affect appropriate  Neurologic:Cranial Nerves II-XII grossly intact  Musculoskeletal:normal    MRI cervical spine wo contrast   Status: Final result   PACS Images     Show images for MRI cervical spine wo contrast   Order Report      Order Details   Study Result     MRI CERVICAL SPINE WITHOUT CONTRAST     INDICATION:  G89 4: Chronic pain syndrome  M54 2: Cervicalgia  M50 120: Mid-cervical disc disorder, unspecified  M79 1: Myalgia  History taken directly from the electronic ordering system  CHRONIC NECK PAIN X 10 YEARS    GETTING WORSE    LOSING FEELING IN FINGERS     COMPARISON:  8/12/2015     TECHNIQUE:  Sagittal T1, sagittal T2, sagittal inversion recovery, axial T2, axial  2D merge          IMAGE QUALITY:  Diagnostic     FINDINGS:     ALIGNMENT:  Normal alignment of the cervical spine  No compression fracture  No subluxation  No scoliosis      MARROW SIGNAL:  Scattered degenerative endplate changes  No focally suspicious marrow lesions  No bone marrow edema or compression abnormality       CERVICAL AND VISUALIZED THORACIC CORD:  Normal signal within the visualized cord      PREVERTEBRAL AND PARASPINAL SOFT TISSUES:   Normal              VISUALIZED POSTERIOR FOSSA:  The visualized posterior fossa demonstrates no abnormal signal      CERVICAL DISC SPACES:          C2-C3:  Normal      C3-C4:  Normal      C4-C5:  Normal      C5-C6:  There is a disc osteophyte complex with a superimposed left neural foraminal disc protrusion  Moderate central canal narrowing  Moderate to severe left neural foraminal narrowing  Mild right neural foraminal narrowing  This level is similar   to the prior study      C6-C7:  Small right paracentral disc herniation, protrusion type with disc osteophyte complex noted  Mild to moderate right neural foraminal narrowing  Minimal central canal narrowing  Mild left neural foraminal narrowing  This level is similar to   the prior study      C7-T1:  Normal      UPPER THORACIC DISC SPACES:  At T3-T4 there is a central disc herniation, extrusion type, herniated disc material extending caudally along the dorsal margin of T5  There is approximately 12 mm of inferior extension of this disc herniation which causes   mild to moderate central canal narrowing at this level  Neural foramina bilaterally patent    This level is similar to the prior study      Otherwise visualized thoracic levels are intact      IMPRESSION:     Scattered spondylotic changes at C5-C6, C6-C7 and T3-T4 are stable from the previous study  No new disc herniation    No cord compression or cord signal abnormality                 Workstation performed: [id number]

## 2018-07-24 NOTE — TELEPHONE ENCOUNTER
Pt was seen in office today by Dr Jaye Naqvi ordered a C6 C7 JACOBY  Pt is currently taking aspirin prescribed by Dr Pam Winkler  Faxed anti-coag hodl request to Dr Pam Winkler at [phone]   Will schedule once approved by Dr Pam Winkler

## 2018-07-31 PROBLEM — M54.2 CERVICALGIA: Status: ACTIVE | Noted: 2018-07-31

## 2018-07-31 NOTE — TELEPHONE ENCOUNTER
Received anti-coag hold request back from Dr Alexandr Phillips approving 6 day aspirin hold  Scheduled pt for C6 C7 JACOBY for 8/8/18  Went over pre-procedure instructions below:    Pt was instructed to hold aspirin for 6 days with last dose on 8/1/18  Pt is also taking advil and was instructed to hold for 1 day with last dose on 8/6/18    Nothing to eat or drink 1 hr prior to procedure  Need to arrange transportation  Proper clothing for procedure  If ill or placed on antibiotics please call to reschedule

## 2018-07-31 NOTE — TELEPHONE ENCOUNTER
I received a phone call from patient wanted to check statues for Asprin  Also, patient stated she have increase numbness and tingling in her hand since last appointment 07/24 but today 07/31 her hand went numb for about 20 mins where she couldn't feel anything and afraid to hold her grandchildren can someone please give her call back today thanks   VX#[phone]

## 2018-08-08 ENCOUNTER — HOSPITAL ENCOUNTER (OUTPATIENT)
Dept: RADIOLOGY | Facility: HOSPITAL | Age: 53
Setting detail: OUTPATIENT SURGERY
Discharge: HOME | End: 2018-08-08
Payer: COMMERCIAL

## 2018-08-08 ENCOUNTER — HOSPITAL ENCOUNTER (OUTPATIENT)
Facility: AMBULARY SURGERY CENTER | Age: 53
Setting detail: OUTPATIENT SURGERY
Discharge: HOME | End: 2018-08-08
Attending: ANESTHESIOLOGY | Admitting: ANESTHESIOLOGY
Payer: COMMERCIAL

## 2018-08-08 VITALS
SYSTOLIC BLOOD PRESSURE: 113 MMHG | HEART RATE: 77 BPM | WEIGHT: 185 LBS | TEMPERATURE: 97.7 F | BODY MASS INDEX: 32.77 KG/M2 | RESPIRATION RATE: 18 BRPM | DIASTOLIC BLOOD PRESSURE: 68 MMHG | OXYGEN SATURATION: 97 %

## 2018-08-08 PROCEDURE — 72020 X-RAY EXAM OF SPINE 1 VIEW: CPT

## 2018-08-08 PROCEDURE — 62321 NJX INTERLAMINAR CRV/THRC: CPT | Performed by: ANESTHESIOLOGY

## 2018-08-08 RX ORDER — METHYLPREDNISOLONE ACETATE 80 MG/ML
INJECTION, SUSPENSION INTRA-ARTICULAR; INTRALESIONAL; INTRAMUSCULAR; SOFT TISSUE AS NEEDED
Status: DISCONTINUED | OUTPATIENT
Start: 2018-08-08 | End: 2018-08-08 | Stop reason: HOSPADM

## 2018-08-08 RX ORDER — LIDOCAINE WITH 8.4% SOD BICARB 0.9%(10ML)
SYRINGE (ML) INJECTION AS NEEDED
Status: DISCONTINUED | OUTPATIENT
Start: 2018-08-08 | End: 2018-08-08 | Stop reason: HOSPADM

## 2018-08-08 NOTE — H&P (VIEW-ONLY)
Pt is c/o back pain   Pain Medicine Follow-Up Note    Assessment:  1  Chronic pain syndrome    2  Neck pain    3  Cervical disc disorder with radiculopathy of mid-cervical region        Plan:  New Medications Ordered This Visit   Medications    hydrocortisone (ANUSOL-HC) 25 mg suppository     Refill:  0    oxyCODONE-acetaminophen (PERCOCET) 5-325 mg per tablet     Sig: Take 0 5 tablets by mouth 2 (two) times a day as needed (pain) for up to 30 days Max Daily Amount: 1 tablet     Dispense:  30 tablet     Refill:  0    oxyCODONE-acetaminophen (PERCOCET) 5-325 mg per tablet     Sig: Take 0 5 tablets by mouth 2 (two) times a day as needed for severe pain for up to 30 days Max Daily Amount: 1 tablet     Dispense:  30 tablet     Refill:  0    gabapentin (NEURONTIN) 600 MG tablet     Sig: Take 1 tablet (600 mg total) by mouth 3 (three) times a day     Dispense:  90 tablet     Refill:  1     My impressions and treatment recommendations were discussed in detail with the patient who verbalized understanding and had no further questions  The patient has been reporting worsening right upper extremity radicular symptoms  She is also complaining of worsening pain in her upper back region as well as her neck  As such, I felt a reasonable to offer the patient a C6-C7 cervical epidural steroid injection since this could be potentially therapeutic  The procedures, its risks, and benefits were explained in detail to the patient  Risks include but are not limited to bleeding, infection, hematoma formation, abscess formation, weakness, headache, failure the pain to improve, nerve irritation or damage, and potential worsening of the pain  The patient verbalized understanding and wished to proceed with the procedure  I also felt a reasonable to continue the patient on oxycodone/acetaminophen 5/325 mg 0 5 tablets twice daily as needed for pain   The patient also reports that Gralise is too expensive and she would like to switch back to Gabapentin  As such, I have given the patient a prescription for gabapentin 600 mg 3 times daily  Side effects were reviewed with the patient  The risks and side effects of chronic opioid treatment were discussed in detail with the patient  Side effects include but are not limited to nausea, vomiting, GI intolerance, sedation, constipation, mental clouding, opioid-induced hyperalgesia, endocrine dysfunction, addiction, dependence, and tolerance  The patient was asked to take his medications only as prescribed and directed, never in excess, and never for any other reason other than for pain control  The patient was also asked to keep his medications out of the reach of others and away from children, preferably in a locked drawer  The patient verbalized understanding and wished to use these opioid medications  [place] Prescription Drug Monitoring Program report was reviewed and was appropriate     Follow-up is planned in 4 weeks time or sooner as warranted  Discharge instructions were provided  I personally saw and examined the patient and I agree with the above discussed plan of care  History of Present Illness:    Tung Villa is a 47 y o  female who presents to [facility] for interval re-evaluation of the above stated pain complaints  The patient has a past medical and chronic pain history as outlined in the assessment section  She was last seen on May 15, 2018  At today's office visit, the patient's pain score is 7 to 8/10 on the verbal numerical pain rating scale  The patient states that her pain is steady throughout the day and constant in nature  She reports the quality of her pain as burning, dull/aching, sharp, throbbing, shooting, numbness, and pins and needles    The patient states about 30% pain relief with Gralise 600 mg 3 tablets daily and oxycodone/acetaminophen 5/325 mg 0 5 tablets twice daily as needed for pain   She does report that she would like to switch back to Gabapentin because Gralise is too expensive  She denies opioid induced constipation  Other than as stated above, the patient denies any interval changes in medications, medical condition, mental condition, symptoms, or allergies since the last office visit  Review of Systems:    Review of Systems   Respiratory: Negative for shortness of breath  Cardiovascular: Negative for chest pain  Gastrointestinal: Negative for constipation, diarrhea, nausea and vomiting  Musculoskeletal: Negative for arthralgias, gait problem, joint swelling and myalgias  Muscle weakness  Decreased rom  Joint stiffness   Skin: Negative for rash  Neurological: Negative for dizziness, seizures and weakness  All other systems reviewed and are negative  Patient Active Problem List   Diagnosis    Chronic pain syndrome    Acute muscle stiffness of neck    Cervical disc disorder with radiculopathy of mid-cervical region    Neck pain    Chronic left-sided low back pain with left-sided sciatica    Lumbar radiculopathy       Past Medical History:   Diagnosis Date    Anxiety     Cervical disc disorder     Chronic pain     Depression     Dysuria     Lung abnormality     nodules    Lung nodule     Pituitary abnormality (HCC)     tumor    Thrombocytopenia (HCC)     Thyroid disease        Past Surgical History:   Procedure Laterality Date    APPENDECTOMY      AUGMENTATION MAMMAPLASTY Bilateral     CHOLECYSTECTOMY      COLECTOMY      COLON SURGERY      EPIDURAL BLOCK INJECTION N/A 6/23/2016    Procedure: BLOCK / INJECTION EPIDURAL STEROID CERVICAL  C7-T1;  Surgeon: Consuelo Morales MD;  Location: [place] [address] MAIN OR;  Service:     EPIDURAL BLOCK INJECTION N/A 3/31/2016    Procedure: BLOCK / INJECTION EPIDURAL STEROID CERVICAL C7-T1  (C-ARM);   Surgeon: Consuelo Morales MD;  Location: [facility] MAIN OR;  Service:     HYSTERECTOMY      PITUITARY SURGERY      OR NJX DX/THER SBST EPIDURAL/SUBRACH CERV/THORACIC N/A 1/21/2016    Procedure: BLOCK / INJECTION EPIDURAL STEROID CERVICAL C7-T1 (C-ARM); Surgeon: Yue Lozano MD;  Location: [facility] MAIN OR;  Service: Pain Management        No family history on file  Social History     Occupational History    Not on file  Social History Main Topics    Smoking status: Never Smoker    Smokeless tobacco: Never Used    Alcohol use No    Drug use: No    Sexual activity: Not on file         Current Outpatient Prescriptions:     albuterol (2 5 mg/3 mL) 0 083 % nebulizer solution, Take 3 mL by nebulization every 6 (six) hours as needed for wheezing, Disp: 75 mL, Rfl: 0    albuterol (VENTOLIN HFA) 90 mcg/act inhaler, Inhale, Disp: , Rfl:     aspirin 81 MG tablet, Take 81 mg by mouth daily  , Disp: , Rfl:     cholecalciferol (VITAMIN D3) 1,000 units tablet, Take 2 tablets by mouth daily, Disp: , Rfl:     cyclobenzaprine (FLEXERIL) 10 mg tablet, Take 1 tablet (10 mg total) by mouth 3 (three) times a day as needed for muscle spasms, Disp: 90 tablet, Rfl: 1    hydrocortisone (ANUSOL-HC) 25 mg suppository, , Disp: , Rfl: 0    ibuprofen (ADVIL) 200 mg tablet, Take by mouth, Disp: , Rfl:     levothyroxine 75 mcg tablet, Take 75 mcg by mouth daily  , Disp: , Rfl:     oxyCODONE-acetaminophen (PERCOCET) 5-325 mg per tablet, Take 0 5 tablets by mouth 2 (two) times a day as needed (pain) for up to 30 days Max Daily Amount: 1 tablet, Disp: 30 tablet, Rfl: 0    traZODone (DESYREL) 50 mg tablet, , Disp: , Rfl: 0    gabapentin (NEURONTIN) 600 MG tablet, Take 1 tablet (600 mg total) by mouth 3 (three) times a day, Disp: 90 tablet, Rfl: 1    [START ON 8/23/2018] oxyCODONE-acetaminophen (PERCOCET) 5-325 mg per tablet, Take 0 5 tablets by mouth 2 (two) times a day as needed for severe pain for up to 30 days Max Daily Amount: 1 tablet, Disp: 30 tablet, Rfl: 0    Allergies   Allergen Reactions    Levaquin [Levofloxacin In D5w] Shortness Of Breath     Also hives      Amitiza [Lubiprostone] Other (See Comments) and Hives     Reaction Date: 10Aug2011;   Migraines and vomiting    Biaxin [Clarithromycin] Hives    Cephradine     Erythromycin Hives     Reaction Date: 10Aug2011;    Merril Dates [Nitrofurantoin Monohyd Macro] Other (See Comments)     C/o passing out    Morphine     Penicillins Hives     Reaction Date: 10Aug2011;     Sulfa Antibiotics     Tetracycline     Tetracyclines & Related Hives    Morphine And Related Rash    Valium [Diazepam] Rash and Vomiting     Reaction Date: 14Jun2012;        Physical Exam:    /60     Constitutional:normal, well developed, well nourished, alert, in no distress and non-toxic and no overt pain behavior  Eyes:anicteric  HEENT:grossly intact  Neck:supple, symmetric, trachea midline and no masses   Pulmonary:even and unlabored  Cardiovascular:No edema or pitting edema present  Skin:Normal without rashes or lesions and well hydrated  Psychiatric:Mood and affect appropriate  Neurologic:Cranial Nerves II-XII grossly intact  Musculoskeletal:normal    MRI cervical spine wo contrast   Status: Final result   PACS Images     Show images for MRI cervical spine wo contrast   Order Report      Order Details   Study Result     MRI CERVICAL SPINE WITHOUT CONTRAST     INDICATION:  G89 4: Chronic pain syndrome  M54 2: Cervicalgia  M50 120: Mid-cervical disc disorder, unspecified  M79 1: Myalgia  History taken directly from the electronic ordering system  CHRONIC NECK PAIN X 10 YEARS    GETTING WORSE    LOSING FEELING IN FINGERS     COMPARISON:  8/12/2015     TECHNIQUE:  Sagittal T1, sagittal T2, sagittal inversion recovery, axial T2, axial  2D merge          IMAGE QUALITY:  Diagnostic     FINDINGS:     ALIGNMENT:  Normal alignment of the cervical spine  No compression fracture  No subluxation  No scoliosis      MARROW SIGNAL:  Scattered degenerative endplate changes  No focally suspicious marrow lesions    No bone marrow edema or compression abnormality       CERVICAL AND VISUALIZED THORACIC CORD:  Normal signal within the visualized cord      PREVERTEBRAL AND PARASPINAL SOFT TISSUES:   Normal              VISUALIZED POSTERIOR FOSSA:  The visualized posterior fossa demonstrates no abnormal signal      CERVICAL DISC SPACES:          C2-C3:  Normal      C3-C4:  Normal      C4-C5:  Normal      C5-C6:  There is a disc osteophyte complex with a superimposed left neural foraminal disc protrusion  Moderate central canal narrowing  Moderate to severe left neural foraminal narrowing  Mild right neural foraminal narrowing  This level is similar   to the prior study      C6-C7:  Small right paracentral disc herniation, protrusion type with disc osteophyte complex noted  Mild to moderate right neural foraminal narrowing  Minimal central canal narrowing  Mild left neural foraminal narrowing  This level is similar to   the prior study      C7-T1:  Normal      UPPER THORACIC DISC SPACES:  At T3-T4 there is a central disc herniation, extrusion type, herniated disc material extending caudally along the dorsal margin of T5  There is approximately 12 mm of inferior extension of this disc herniation which causes   mild to moderate central canal narrowing at this level  Neural foramina bilaterally patent  This level is similar to the prior study      Otherwise visualized thoracic levels are intact      IMPRESSION:     Scattered spondylotic changes at C5-C6, C6-C7 and T3-T4 are stable from the previous study  No new disc herniation    No cord compression or cord signal abnormality                 Workstation performed: [id number]

## 2018-08-08 NOTE — DISCHARGE INSTRUCTIONS
Epidural Steroid Injection   WHAT YOU NEED TO KNOW:   An epidural steroid injection (LEONEL) is a procedure to inject steroid medicine into the epidural space  The epidural space is between your spinal cord and vertebrae  Steroids reduce inflammation and fluid buildup in your spine that may be causing pain  You may be given pain medicine along with the steroids  ACTIVITY  · Do not drive or operate machinery today  · No strenuous activity today - bending, lifting, etc   · You may resume normal activites starting tomorrow - start slowly and as tolerated  · You may shower today, but no tub baths or hot tubs  · You may have numbness for several hours from the local anesthetic  Please use caution and common sense, especially with weight-bearing activities  CARE OF THE INJECTION SITE  · If you have soreness or pain, apply ice to the area today (20 minutes on/20 minutes off)  · Starting tomorrow, you may use warm, moist heat or ice if needed  · You may have an increase or change in your discomfort for 36-48 hours after your treatment  · Apply ice and continue with any pain medication you have been prescribed  · Notify [facility] if you have any of the following: redness, drainage, swelling, headache, stiff neck or fever above 100°F     SPECIAL INSTRUCTIONS  · Our office will contact you in approximately 7 days for a progress report  MEDICATIONS  · Continue to take all routine medications  · Our office may have instructed you to hold some medications  If you have a problem specifically related to your procedure, please call our office at [phone]  Problems not related to your procedure should be directed to your primary care physician

## 2018-08-08 NOTE — OP NOTE
ATTENDING PHYSICIAN:  India Velarde MD     PROCEDURE:  Cervical epidural steroid injection with steroid and local anesthetic under fluoroscopy at the C6-C7 level  PREPROCEDURE DIAGNOSIS:  Neck pain and upper extremity radicular symptoms  POSTPROCEDURE DIAGNOSIS:  Neck pain and upper extremity radicular symptoms  ANESTHESIA:  Local     ESTIMATED BLOOD LOSS:  Minimal     COMPLICATIONS:  None  LOCATION:  [address]  CONSENT:  Today's procedure, its potential benefits as well as its risks and potential side effects were reviewed  Discussed risks of the procedure including bleeding, infection, nerve irritation or damage, reactions to the medications, headache, failure of the pain to improve, and potential worsening of the pain were explained to the patient who verbalized understanding and who wished to proceed  Written informed consent is thereby obtained  DESCRIPTION OF THE PROCEDURE:  After written informed consent was obtained, the patient was taken to the fluoroscopy suite and placed in the prone position  Anatomical landmarks were identified by way of fluoroscopy in multiple views  The skin of the cervical region was prepped and draped in the usual sterile fashion  Strict aseptic technique was utilized  The skin and subcutaneous tissues at the needle entry site were infiltrated with 3 mL of 1% preservative-free lidocaine using a 25-gauge 1-1/2-inch needle  A 20-gauge Tuohy needle was then incrementally advanced under fluoroscopy using a loss of resistance technique  Upon entering into the epidural space, a positive loss of resistance to air was noted and a characteristic "pop" was felt  Proper placement into the epidural space was confirmed with a hanging column technique where preservative-free normal saline was noted to flow freely to gravity in to the epidural space as well as by the administration of contrast to delineate the epidural space   There were no paresthesias reported  After negative aspiration for CSF or heme, a 6 mL injectate consisting of 1 mL of Depo-Medrol 80 mg/mL and 1 mL of Depo-Medrol 40 mg/mL mixed with 4 mL of preservative-free normal saline was slowly injected  The patient tolerated the procedure well and all needles were removed with the tips intact  Hemostasis was maintained  There were no apparent paresthesias or complications  The skin was wiped clean and a Band-Aid was placed as appropriate  The patient was monitored for an appropriate period of time following the procedure and remained hemodynamically stable and neurovascularly intact following the procedure  The patient was ultimately discharged to home with supervision in good condition and instructed to call the office in a few days for an update or sooner as warranted  I was present for and participated in all key and critical portions of this procedure      Maranda Laurent MD  8/8/2018  1:21 PM

## 2018-08-15 ENCOUNTER — TELEPHONE (OUTPATIENT)
Dept: PAIN MEDICINE | Facility: CLINIC | Age: 53
End: 2018-08-15

## 2018-08-15 NOTE — TELEPHONE ENCOUNTER
S/p C6 C7 Cervical Epidural Steroid Injection on 8/8/18 by Dr Khadar Stubbs, follow up on 9/10 @ 9:30am        LMWCB#  Need rate of pain and % of relief

## 2018-08-17 NOTE — TELEPHONE ENCOUNTER
2nd attempt  Left message with call back number  Asked pt to get update on pain with pain score and % or relief

## 2018-08-27 ENCOUNTER — TELEPHONE (OUTPATIENT)
Dept: PAIN MEDICINE | Facility: MEDICAL CENTER | Age: 53
End: 2018-08-27

## 2018-08-27 RX ORDER — OXYCODONE HYDROCHLORIDE AND ACETAMINOPHEN 5; 325 MG/1; MG/1
0.5 TABLET ORAL 2 TIMES DAILY PRN
Qty: 30 TABLET | Refills: 0 | Status: SHIPPED | OUTPATIENT
Start: 2018-08-27 | End: 2018-09-24 | Stop reason: SDUPTHER

## 2018-08-27 NOTE — TELEPHONE ENCOUNTER
Sent new e-prescription for the pharmacy  Please let the pharmacists at her pharmacy know not to delete the e-prescriptions if they are sent to with two different fill dates

## 2018-08-27 NOTE — TELEPHONE ENCOUNTER
S/w pharmacy, the rx for Percocet was never received for 8/23/18  Pt requesting rx to be sent to [email or web address] on file  Pt has been out since 8/23

## 2018-08-27 NOTE — TELEPHONE ENCOUNTER
S/w pt, she is interested in a referral to a surgeon for her pain  Pt said she has an appt on 9/10 and she said she will pick it up then  Told pt that I am still waiting to hear back from [facility] regarding her medication refill

## 2018-08-27 NOTE — TELEPHONE ENCOUNTER
Dr Deras Never referral placed in EPIC  Please print and mail it to the patient or give her the number for their office so that she can call and set up the appointment

## 2018-08-27 NOTE — TELEPHONE ENCOUNTER
Patient states refill for oxyCODONE-acetaminophen (PERCOCET) 5-325 mg per tablet was never rec'd at pharmacy, please send to [facility] in [place], [place]; her last dose was 8/24/18 and she is in severe pain

## 2018-08-28 ENCOUNTER — OFFICE VISIT (OUTPATIENT)
Dept: URGENT CARE | Facility: CLINIC | Age: 53
End: 2018-08-28
Payer: COMMERCIAL

## 2018-08-28 VITALS
BODY MASS INDEX: 32.78 KG/M2 | HEART RATE: 106 BPM | HEIGHT: 63 IN | WEIGHT: 185 LBS | RESPIRATION RATE: 16 BRPM | OXYGEN SATURATION: 100 % | TEMPERATURE: 98.9 F | DIASTOLIC BLOOD PRESSURE: 78 MMHG | SYSTOLIC BLOOD PRESSURE: 122 MMHG

## 2018-08-28 DIAGNOSIS — L03.011 CELLULITIS OF FINGER OF RIGHT HAND: ICD-10-CM

## 2018-08-28 DIAGNOSIS — T14.8XXA ANIMAL BITE: Primary | ICD-10-CM

## 2018-08-28 PROCEDURE — 99213 OFFICE O/P EST LOW 20 MIN: CPT | Performed by: PHYSICIAN ASSISTANT

## 2018-08-28 PROCEDURE — 90715 TDAP VACCINE 7 YRS/> IM: CPT

## 2018-08-28 RX ORDER — AMOXICILLIN AND CLAVULANATE POTASSIUM 875; 125 MG/1; MG/1
1 TABLET, FILM COATED ORAL EVERY 12 HOURS SCHEDULED
Qty: 14 TABLET | Refills: 0 | Status: SHIPPED | OUTPATIENT
Start: 2018-08-28 | End: 2018-09-04

## 2018-08-28 NOTE — PATIENT INSTRUCTIONS
augmentin as she has tolerated pcn in the past  Ice the hand  Elevate, motrin for pain  Tetanus vaccine administered here  Follow up with PCP in 3-5 days  Proceed to  ER if symptoms worsen

## 2018-08-28 NOTE — PROGRESS NOTES
[facility]        NAME: Jagdeep Goodwin is a 47 y o  female  : 1965    MRN: [id number]  DATE: 2018  TIME: 6:51 PM    Assessment and Plan   Animal bite [T14  8XXA]  1  Animal bite     2  Cellulitis of finger of right hand  amoxicillin-clavulanate (AUGMENTIN) 875-125 mg per tablet         Patient Instructions     augmentin as she has tolerated pcn in the past  Ice the hand  Elevate, motrin for pain  Tetanus vaccine administered here  Follow up with PCP in 3-5 days  Proceed to  ER if symptoms worsen  Chief Complaint     Chief Complaint   Patient presents with    Animal Bite     x 2 days, right hand ring finger red/swollen/painful, took advil, cleaned area         History of Present Illness         28-year-old female presents with right ring finger pain and swelling for 2 days  Yesterday she was bit by a pet rat at home  The rash does not leave the house  The rash is not vaccinated  She does not know when her last tetanus shot is  No fever or chills  Says is painful to move her hand  Review of Systems   Review of Systems      Current Medications       Current Outpatient Prescriptions:     albuterol (VENTOLIN HFA) 90 mcg/act inhaler, Inhale, Disp: , Rfl:     aspirin 81 MG tablet, Take 81 mg by mouth daily  , Disp: , Rfl:     cholecalciferol (VITAMIN D3) 1,000 units tablet, Take 2 tablets by mouth daily, Disp: , Rfl:     cyclobenzaprine (FLEXERIL) 10 mg tablet, Take 1 tablet (10 mg total) by mouth 3 (three) times a day as needed for muscle spasms, Disp: 90 tablet, Rfl: 1    gabapentin (NEURONTIN) 600 MG tablet, Take 1 tablet (600 mg total) by mouth 3 (three) times a day, Disp: 90 tablet, Rfl: 1    hydrocortisone (ANUSOL-HC) 25 mg suppository, , Disp: , Rfl: 0    ibuprofen (ADVIL) 200 mg tablet, Take by mouth, Disp: , Rfl:     levothyroxine 75 mcg tablet, Take 75 mcg by mouth daily  , Disp: , Rfl:     oxyCODONE-acetaminophen (PERCOCET) 5-325 mg per tablet, Take 0 5 tablets by mouth 2 (two) times a day as needed for severe pain for up to 30 days Max Daily Amount: 1 tablet, Disp: 30 tablet, Rfl: 0    traZODone (DESYREL) 50 mg tablet, , Disp: , Rfl: 0    amoxicillin-clavulanate (AUGMENTIN) 875-125 mg per tablet, Take 1 tablet by mouth every 12 (twelve) hours for 7 days, Disp: 14 tablet, Rfl: 0    oxyCODONE-acetaminophen (PERCOCET) 5-325 mg per tablet, Take 0 5 tablets by mouth 2 (two) times a day as needed (pain) for up to 30 days Max Daily Amount: 1 tablet, Disp: 30 tablet, Rfl: 0    Current Allergies     Allergies as of 08/28/2018 - Reviewed 08/28/2018   Allergen Reaction Noted    Levaquin [levofloxacin in d5w] Shortness Of Breath 01/21/2016    Amitiza [lubiprostone] Other (See Comments) and Hives 08/10/2011    Biaxin [clarithromycin] Hives 01/21/2016    Cephradine      Erythromycin Hives 08/10/2011    Macrobid [nitrofurantoin monohyd macro] Other (See Comments) 01/21/2016    Morphine      Penicillins Hives 08/10/2011    Sulfa antibiotics      Tetracycline      Tetracyclines & related Hives 01/21/2016    Morphine and related Rash 01/21/2016    Valium [diazepam] Rash and Vomiting 06/14/2012            The following portions of the patient's history were reviewed and updated as appropriate: allergies, current medications, past family history, past medical history, past social history, past surgical history and problem list      Past Medical History:   Diagnosis Date    Anxiety     Asthma     exercise induced asthma    Cervical disc disorder     Chronic pain     Depression     Dysuria     Lung abnormality     nodules    Lung nodule     Pituitary abnormality ([facility] [address] )     tumor    Thrombocytopenia ([facility] [address] )     Thyroid disease        Past Surgical History:   Procedure Laterality Date    APPENDECTOMY      AUGMENTATION MAMMAPLASTY Bilateral     CHOLECYSTECTOMY      COLECTOMY      COLON SURGERY      EPIDURAL BLOCK INJECTION N/A 6/23/2016    Procedure: BLOCK / INJECTION EPIDURAL STEROID CERVICAL  C7-T1;  Surgeon: Dequan Fragoso MD;  Location[facility] MAIN OR;  Service:     EPIDURAL BLOCK INJECTION N/A 3/31/2016    Procedure: BLOCK / INJECTION EPIDURAL STEROID CERVICAL C7-T1  (C-ARM); Surgeon: Dequan Fragoso MD;  Location: [facility] MAIN OR;  Service:     EPIDURAL BLOCK INJECTION N/A 8/8/2018    Procedure: C6 C7 Cervical Epidural Steroid Injection (75832); Surgeon: Dequan Fragoso MD;  Location: [facility] MAIN OR;  Service: Pain Management     HYSTERECTOMY      PITUITARY SURGERY      [place] [address] DX/THER SBST EPIDURAL/SUBRACH CERV/THORACIC N/A 1/21/2016    Procedure: BLOCK / INJECTION EPIDURAL STEROID CERVICAL C7-T1 (C-ARM); Surgeon: Dequan Fragoso MD;  Location: [facility] MAIN OR;  Service: Pain Management        Family History   Problem Relation Age of Onset    Thyroid disease Mother     Hyperlipidemia Mother     Depression Mother     Thyroid disease Father     Hyperlipidemia Father     Depression Father          Medications have been verified  Objective   /78 (BP Location: Left arm, Patient Position: Sitting, Cuff Size: Standard)   Pulse (!) 106   Temp 98 9 °F (37 2 °C) (Tympanic)   Resp 16   Ht 5' 3" (1 6 m)   Wt 83 9 kg (185 lb)   SpO2 100%   BMI 32 77 kg/m²        Physical Exam     Physical Exam   Constitutional: She appears well-developed and well-nourished  Musculoskeletal:     Right hand ring finger erythema tenderness and swelling  She has full range of  Motion of the finger but is painful to move  No visible wound  No drainage expressed

## 2018-09-24 ENCOUNTER — OFFICE VISIT (OUTPATIENT)
Dept: PAIN MEDICINE | Facility: CLINIC | Age: 53
End: 2018-09-24
Payer: COMMERCIAL

## 2018-09-24 VITALS
SYSTOLIC BLOOD PRESSURE: 94 MMHG | RESPIRATION RATE: 16 BRPM | WEIGHT: 182 LBS | BODY MASS INDEX: 32.25 KG/M2 | DIASTOLIC BLOOD PRESSURE: 42 MMHG | HEIGHT: 63 IN | HEART RATE: 69 BPM

## 2018-09-24 DIAGNOSIS — M54.2 NECK PAIN: ICD-10-CM

## 2018-09-24 DIAGNOSIS — M50.120 CERVICAL DISC DISORDER WITH RADICULOPATHY OF MID-CERVICAL REGION: ICD-10-CM

## 2018-09-24 DIAGNOSIS — Z79.891 LONG-TERM CURRENT USE OF OPIATE ANALGESIC: ICD-10-CM

## 2018-09-24 DIAGNOSIS — F11.20 UNCOMPLICATED OPIOID DEPENDENCE (HCC): ICD-10-CM

## 2018-09-24 DIAGNOSIS — G89.4 CHRONIC PAIN SYNDROME: Primary | ICD-10-CM

## 2018-09-24 PROCEDURE — 80305 DRUG TEST PRSMV DIR OPT OBS: CPT | Performed by: ANESTHESIOLOGY

## 2018-09-24 PROCEDURE — 99214 OFFICE O/P EST MOD 30 MIN: CPT | Performed by: ANESTHESIOLOGY

## 2018-09-24 RX ORDER — OXYCODONE HYDROCHLORIDE AND ACETAMINOPHEN 5; 325 MG/1; MG/1
0.5 TABLET ORAL 2 TIMES DAILY PRN
Qty: 30 TABLET | Refills: 0 | Status: SHIPPED | OUTPATIENT
Start: 2018-10-26 | End: 2018-12-04 | Stop reason: SDUPTHER

## 2018-09-24 RX ORDER — GABAPENTIN 600 MG/1
600 TABLET ORAL 3 TIMES DAILY
Qty: 90 TABLET | Refills: 1 | Status: SHIPPED | OUTPATIENT
Start: 2018-09-24 | End: 2018-12-04 | Stop reason: SDUPTHER

## 2018-09-24 RX ORDER — CYCLOBENZAPRINE HCL 10 MG
10 TABLET ORAL 3 TIMES DAILY PRN
Qty: 90 TABLET | Refills: 1 | Status: SHIPPED | OUTPATIENT
Start: 2018-09-24 | End: 2018-12-04 | Stop reason: ALTCHOICE

## 2018-09-24 RX ORDER — OXYCODONE HYDROCHLORIDE AND ACETAMINOPHEN 5; 325 MG/1; MG/1
0.5 TABLET ORAL 2 TIMES DAILY PRN
Qty: 30 TABLET | Refills: 0 | Status: SHIPPED | OUTPATIENT
Start: 2018-09-26 | End: 2018-12-04 | Stop reason: SDUPTHER

## 2018-09-24 NOTE — PROGRESS NOTES
Pain Medicine Follow-Up Note    Assessment:  1  Chronic pain syndrome    2  Neck pain    3  Cervical disc disorder with radiculopathy of mid-cervical region        Plan:  Orders Placed This Encounter   Procedures    MRI cervical spine without contrast     Standing Status:   Future     Standing Expiration Date:   9/24/2022     Scheduling Instructions: There is no preparation for this test  Please leave your jewelry and valuables at home, wedding rings are the exception  Please bring your insurance cards, a form of photo ID and a list of your medications with you  Arrive 15 minutes prior to your appointment time in order to register  Please bring any prior CT or MRI studies of this area that were not performed at a [facility]  To schedule this appointment, please contact Central Scheduling at [phone]  Order Specific Question:   Is the patient pregnant? Answer:   Unknown     Order Specific Question:   What is the patient's sedation requirement?      Answer:   Unknown    Ambulatory referral to Neurosurgery     Standing Status:   Future     Standing Expiration Date:   3/24/2019     Referral Priority:   Routine     Referral Type:   Consult - AMB     Referral Reason:   Specialty Services Required     Referred to Provider:   Janel Lisa MD     Requested Specialty:   Neurosurgery     Number of Visits Requested:   1     Expiration Date:   9/24/2019       New Medications Ordered This Visit   Medications    mupirocin (BACTROBAN) 2 % ointment     Refill:  0    oxyCODONE-acetaminophen (PERCOCET) 5-325 mg per tablet     Sig: Take 0 5 tablets by mouth 2 (two) times a day as needed for severe pain for up to 30 days Max Daily Amount: 1 tablet     Dispense:  30 tablet     Refill:  0    oxyCODONE-acetaminophen (PERCOCET) 5-325 mg per tablet     Sig: Take 0 5 tablets by mouth 2 (two) times a day as needed (pain) for up to 30 days Max Daily Amount: 1 tablet     Dispense:  30 tablet     Refill: 0    cyclobenzaprine (FLEXERIL) 10 mg tablet     Sig: Take 1 tablet (10 mg total) by mouth 3 (three) times a day as needed for muscle spasms     Dispense:  90 tablet     Refill:  1    gabapentin (NEURONTIN) 600 MG tablet     Sig: Take 1 tablet (600 mg total) by mouth 3 (three) times a day     Dispense:  90 tablet     Refill:  1     My impressions and treatment recommendations were discussed in detail with the patient who verbalized understanding and had no further questions  The patient reports no significant relief following the C6-C7 cervical epidural steroid injection performed on August 8, 2018  She actually reports an acute worsening of her pain and states that her migraine headaches became out of control after the injection  As such, since the patient is not responding to cervical epidural steroid injections and considering that she still has sides of neck pain and cervical radiculopathy, I felt a reasonable to have the patient undergo a MRI of the cervical spine as well as have the patient follow up with Dr Deshawn Garcia for a neurosurgical evaluation  I also felt it reasonable to continue the patient on oxycodone/acetaminophen 5/325 mg 0 5 tablets twice daily as needed for pain, cyclobenzaprine 10 mg 3 times daily as needed for muscle spasms, and gabapentin 600 mg 3 times daily  The risks and side effects of chronic opioid treatment were discussed in detail with the patient  Side effects include but are not limited to nausea, vomiting, GI intolerance, sedation, constipation, mental clouding, opioid-induced hyperalgesia, endocrine dysfunction, addiction, dependence, and tolerance  The patient was asked to take his medications only as prescribed and directed, never in excess, and never for any other reason other than for pain control  The patient was also asked to keep his medications out of the reach of others and away from children, preferably in a locked drawer   The patient verbalized understanding and wished to use these opioid medications  A urine drug test was collected at today's office visit as part of our medication management protocol  The point of care testing results were appropriate for what was being prescribed  The specimen will be sent for confirmatory testing  The drug screen is medically necessary because the patient is either dependent on opioid medication or is being considered for opioid medication therapy and the results could impact ongoing or future treatment  The drug screen is to evaluate for the presence or absence of prescribed, non-prescribed, and/or illicit drugs and substances  [place] Prescription Drug Monitoring Program report was reviewed and was appropriate     Follow-up is planned in 2 months time or sooner as warranted  Discharge instructions were provided  I personally saw and examined the patient and I agree with the above discussed plan of care  History of Present Illness:    Shanelle Hook is a 47 y o  female who presents to [facility] for interval re-evaluation of the above stated pain complaints  The patient has a past medical and chronic pain history as outlined in the assessment section  She was last seen on August 8, 2018 at which time she underwent a C6-C7 cervical epidural steroid injection  She has also been maintained on oxycodone/acetaminophen 5/325 mg 0 5 tablets twice daily as needed for pain, gabapentin 600 mg 3 times daily, and cyclobenzaprine 10 mg 3 times daily as needed for muscle spasms  At today's office visit, the patient's pain score is 7 to 8/10 on the verbal numerical pain rating scale  The patient states that her pain is primarily in her neck with radiation into the bilateral upper extremities  She describes her pain as constant in nature and reports the quality of her pain as dull/aching, sharp, throbbing, pressure-like, and pins and needles    She reports that she had an acute worsening of the pain following the C6-C7 cervical epidural steroid injection on August 8, 2018  She does not wish to undergo repeat cervical epidural steroid injections  She does wish to meet with a surgeon to see if she has any surgical options  The patient reports that she is taking the medications as prescribed and she is getting some clinical benefit with the combination of her medications  Last Urine Drug Screen:  May 15, 2018    Other than as stated above, the patient denies any interval changes in medications, medical condition, mental condition, symptoms, or allergies since the last office visit  Review of Systems:    Review of Systems   Respiratory: Negative for shortness of breath  Cardiovascular: Negative for chest pain  Gastrointestinal: Negative for constipation, diarrhea, nausea and vomiting  Musculoskeletal: Positive for gait problem (decreased range of motion) and joint swelling (joint stiffness)  Negative for arthralgias and myalgias  Skin: Negative for rash  Neurological: Positive for weakness (muscle weakness)  Negative for dizziness and seizures  All other systems reviewed and are negative          Patient Active Problem List   Diagnosis    Chronic pain syndrome    Acute muscle stiffness of neck    Cervical disc disorder with radiculopathy of mid-cervical region    Neck pain    Chronic left-sided low back pain with left-sided sciatica    Lumbar radiculopathy    Cervicalgia       Past Medical History:   Diagnosis Date    Anxiety     Asthma     exercise induced asthma    Cervical disc disorder     Chronic pain     Depression     Dysuria     Lung abnormality     nodules    Lung nodule     Pituitary abnormality (HCC)     tumor    Thrombocytopenia (HCC)     Thyroid disease        Past Surgical History:   Procedure Laterality Date    APPENDECTOMY      AUGMENTATION MAMMAPLASTY Bilateral     CHOLECYSTECTOMY      COLECTOMY      COLON SURGERY      EPIDURAL BLOCK INJECTION N/A 6/23/2016    Procedure: BLOCK / INJECTION EPIDURAL STEROID CERVICAL  C7-T1;  Surgeon: Yue Lozano MD;  Location: [facility] MAIN OR;  Service:     EPIDURAL BLOCK INJECTION N/A 3/31/2016    Procedure: BLOCK / INJECTION EPIDURAL STEROID CERVICAL C7-T1  (C-ARM); Surgeon: Yue Lozano MD;  Location: [facility] MAIN OR;  Service:     EPIDURAL BLOCK INJECTION N/A 8/8/2018    Procedure: C6 C7 Cervical Epidural Steroid Injection (20284); Surgeon: Yue Lozano MD;  Location: [facility] MAIN OR;  Service: Pain Management     HYSTERECTOMY      PITUITARY SURGERY      [place] [address] DX/THER SBST EPIDURAL/SUBRACH CERV/THORACIC N/A 1/21/2016    Procedure: BLOCK / INJECTION EPIDURAL STEROID CERVICAL C7-T1 (C-ARM); Surgeon: Yue Lozano MD;  Location: [facility] MAIN OR;  Service: Pain Management        Family History   Problem Relation Age of Onset    Thyroid disease Mother     Hyperlipidemia Mother     Depression Mother     Thyroid disease Father     Hyperlipidemia Father     Depression Father        Social History     Occupational History    Not on file  Social History Main Topics    Smoking status: Never Smoker    Smokeless tobacco: Never Used    Alcohol use No    Drug use: No    Sexual activity: Not on file         Current Outpatient Prescriptions:     albuterol (VENTOLIN HFA) 90 mcg/act inhaler, Inhale, Disp: , Rfl:     aspirin 81 MG tablet, Take 81 mg by mouth daily  , Disp: , Rfl:     cholecalciferol (VITAMIN D3) 1,000 units tablet, Take 2 tablets by mouth daily, Disp: , Rfl:     cyclobenzaprine (FLEXERIL) 10 mg tablet, Take 1 tablet (10 mg total) by mouth 3 (three) times a day as needed for muscle spasms, Disp: 90 tablet, Rfl: 1    gabapentin (NEURONTIN) 600 MG tablet, Take 1 tablet (600 mg total) by mouth 3 (three) times a day, Disp: 90 tablet, Rfl: 1    hydrocortisone (ANUSOL-HC) 25 mg suppository, , Disp: , Rfl: 0    ibuprofen (ADVIL) 200 mg tablet, Take by mouth, Disp: , Rfl:     levothyroxine 75 mcg tablet, Take 75 mcg by mouth daily  , Disp: , Rfl:     mupirocin (BACTROBAN) 2 % ointment, , Disp: , Rfl: 0    [START ON 9/26/2018] oxyCODONE-acetaminophen (PERCOCET) 5-325 mg per tablet, Take 0 5 tablets by mouth 2 (two) times a day as needed for severe pain for up to 30 days Max Daily Amount: 1 tablet, Disp: 30 tablet, Rfl: 0    traZODone (DESYREL) 50 mg tablet, , Disp: , Rfl: 0    [START ON 10/26/2018] oxyCODONE-acetaminophen (PERCOCET) 5-325 mg per tablet, Take 0 5 tablets by mouth 2 (two) times a day as needed (pain) for up to 30 days Max Daily Amount: 1 tablet, Disp: 30 tablet, Rfl: 0    Allergies   Allergen Reactions    Levaquin [Levofloxacin In D5w] Shortness Of Breath     Also hives      Amitiza [Lubiprostone] Other (See Comments) and Hives     Reaction Date: 10Aug2011;   Migraines and vomiting    Biaxin [Clarithromycin] Hives    Cephradine     Erythromycin Hives     Reaction Date: 10Aug2011;     Macrobid [Nitrofurantoin Monohyd Macro] Other (See Comments)     C/o passing out    Morphine     Penicillins Hives     Reaction Date: 10Aug2011;     Sulfa Antibiotics     Tetracycline     Tetracyclines & Related Hives    Morphine And Related Rash    Valium [Diazepam] Rash and Vomiting     Reaction Date: 14Jun2012;        Physical Exam:    BP (!) 94/42 (BP Location: Left arm, Patient Position: Sitting, Cuff Size: Standard)   Pulse 69   Resp 16   Ht 5' 3" (1 6 m)   Wt 82 6 kg (182 lb)   BMI 32 24 kg/m²     Constitutional:obese  Eyes:anicteric  HEENT:grossly intact  Neck:supple, symmetric, trachea midline and no masses   Pulmonary:even and unlabored  Cardiovascular:No edema or pitting edema present  Skin:Normal without rashes or lesions and well hydrated  Psychiatric:Mood and affect appropriate  Neurologic:Cranial Nerves II-XII grossly intact  Musculoskeletal:normal      Imaging  MRI cervical spine without contrast    (Results Pending)         Orders Placed This Encounter   Procedures    MRI cervical spine without contrast    Ambulatory referral to Neurosurgery

## 2018-09-24 NOTE — LETTER
September 24, 2018     Kelley Mancilla DO  [facility]  [address]  [address] [place] [address]    Patient: Courtney Sorto   YOB: 1965   Date of Visit: 9/24/2018       Dear Dr Aubrey Grewal: Thank you for referring Carroll Gardiner to me for evaluation  Below are my notes for this consultation  If you have questions, please do not hesitate to call me  I look forward to following your patient along with you  Sincerely,        Tip Rose MD        CC: No Recipients  Tip Rose MD  9/24/2018 11:02 AM  Sign at close encounter  Pain Medicine Follow-Up Note    Assessment:  1  Chronic pain syndrome    2  Neck pain    3  Cervical disc disorder with radiculopathy of mid-cervical region        Plan:  Orders Placed This Encounter   Procedures    MRI cervical spine without contrast     Standing Status:   Future     Standing Expiration Date:   9/24/2022     Scheduling Instructions: There is no preparation for this test  Please leave your jewelry and valuables at home, wedding rings are the exception  Please bring your insurance cards, a form of photo ID and a list of your medications with you  Arrive 15 minutes prior to your appointment time in order to register  Please bring any prior CT or MRI studies of this area that were not performed at a [facility]  To schedule this appointment, please contact Central Scheduling at [phone]  Order Specific Question:   Is the patient pregnant? Answer:   Unknown     Order Specific Question:   What is the patient's sedation requirement?      Answer:   Unknown    Ambulatory referral to Neurosurgery     Standing Status:   Future     Standing Expiration Date:   3/24/2019     Referral Priority:   Routine     Referral Type:   Consult - AMB     Referral Reason:   Specialty Services Required     Referred to Provider:   Lu Carpenter MD     Requested Specialty:   Neurosurgery     Number of Visits Requested:   1     Expiration Date:   9/24/2019       New Medications Ordered This Visit   Medications    mupirocin (BACTROBAN) 2 % ointment     Refill:  0    oxyCODONE-acetaminophen (PERCOCET) 5-325 mg per tablet     Sig: Take 0 5 tablets by mouth 2 (two) times a day as needed for severe pain for up to 30 days Max Daily Amount: 1 tablet     Dispense:  30 tablet     Refill:  0    oxyCODONE-acetaminophen (PERCOCET) 5-325 mg per tablet     Sig: Take 0 5 tablets by mouth 2 (two) times a day as needed (pain) for up to 30 days Max Daily Amount: 1 tablet     Dispense:  30 tablet     Refill:  0    cyclobenzaprine (FLEXERIL) 10 mg tablet     Sig: Take 1 tablet (10 mg total) by mouth 3 (three) times a day as needed for muscle spasms     Dispense:  90 tablet     Refill:  1    gabapentin (NEURONTIN) 600 MG tablet     Sig: Take 1 tablet (600 mg total) by mouth 3 (three) times a day     Dispense:  90 tablet     Refill:  1     My impressions and treatment recommendations were discussed in detail with the patient who verbalized understanding and had no further questions  The patient reports no significant relief following the C6-C7 cervical epidural steroid injection performed on August 8, 2018  She actually reports an acute worsening of her pain and states that her migraine headaches became out of control after the injection  As such, since the patient is not responding to cervical epidural steroid injections and considering that she still has sides of neck pain and cervical radiculopathy, I felt a reasonable to have the patient undergo a MRI of the cervical spine as well as have the patient follow up with Dr Pieter Sparks for a neurosurgical evaluation  I also felt it reasonable to continue the patient on oxycodone/acetaminophen 5/325 mg 0 5 tablets twice daily as needed for pain, cyclobenzaprine 10 mg 3 times daily as needed for muscle spasms, and gabapentin 600 mg 3 times daily      The risks and side effects of chronic opioid treatment were discussed in detail with the patient  Side effects include but are not limited to nausea, vomiting, GI intolerance, sedation, constipation, mental clouding, opioid-induced hyperalgesia, endocrine dysfunction, addiction, dependence, and tolerance  The patient was asked to take his medications only as prescribed and directed, never in excess, and never for any other reason other than for pain control  The patient was also asked to keep his medications out of the reach of others and away from children, preferably in a locked drawer  The patient verbalized understanding and wished to use these opioid medications  A urine drug test was collected at today's office visit as part of our medication management protocol  The point of care testing results were appropriate for what was being prescribed  The specimen will be sent for confirmatory testing  The drug screen is medically necessary because the patient is either dependent on opioid medication or is being considered for opioid medication therapy and the results could impact ongoing or future treatment  The drug screen is to evaluate for the presence or absence of prescribed, non-prescribed, and/or illicit drugs and substances  [place] Prescription Drug Monitoring Program report was reviewed and was appropriate     Follow-up is planned in 2 months time or sooner as warranted  Discharge instructions were provided  I personally saw and examined the patient and I agree with the above discussed plan of care  History of Present Illness:    Kade Kim is a 47 y o  female who presents to [facility] for interval re-evaluation of the above stated pain complaints  The patient has a past medical and chronic pain history as outlined in the assessment section  She was last seen on August 8, 2018 at which time she underwent a C6-C7 cervical epidural steroid injection   She has also been maintained on oxycodone/acetaminophen 5/325 mg 0 5 tablets twice daily as needed for pain, gabapentin 600 mg 3 times daily, and cyclobenzaprine 10 mg 3 times daily as needed for muscle spasms  At today's office visit, the patient's pain score is 7 to 8/10 on the verbal numerical pain rating scale  The patient states that her pain is primarily in her neck with radiation into the bilateral upper extremities  She describes her pain as constant in nature and reports the quality of her pain as dull/aching, sharp, throbbing, pressure-like, and pins and needles    She reports that she had an acute worsening of the pain following the C6-C7 cervical epidural steroid injection on August 8, 2018  She does not wish to undergo repeat cervical epidural steroid injections  She does wish to meet with a surgeon to see if she has any surgical options  The patient reports that she is taking the medications as prescribed and she is getting some clinical benefit with the combination of her medications  Last Urine Drug Screen:  May 15, 2018    Other than as stated above, the patient denies any interval changes in medications, medical condition, mental condition, symptoms, or allergies since the last office visit  Review of Systems:    Review of Systems   Respiratory: Negative for shortness of breath  Cardiovascular: Negative for chest pain  Gastrointestinal: Negative for constipation, diarrhea, nausea and vomiting  Musculoskeletal: Positive for gait problem (decreased range of motion) and joint swelling (joint stiffness)  Negative for arthralgias and myalgias  Skin: Negative for rash  Neurological: Positive for weakness (muscle weakness)  Negative for dizziness and seizures  All other systems reviewed and are negative          Patient Active Problem List   Diagnosis    Chronic pain syndrome    Acute muscle stiffness of neck    Cervical disc disorder with radiculopathy of mid-cervical region    Neck pain    Chronic left-sided low back pain with left-sided sciatica    Lumbar radiculopathy    Cervicalgia       Past Medical History:   Diagnosis Date    Anxiety     Asthma     exercise induced asthma    Cervical disc disorder     Chronic pain     Depression     Dysuria     Lung abnormality     nodules    Lung nodule     Pituitary abnormality (HCC)     tumor    Thrombocytopenia (HCC)     Thyroid disease        Past Surgical History:   Procedure Laterality Date    APPENDECTOMY      AUGMENTATION MAMMAPLASTY Bilateral     CHOLECYSTECTOMY      COLECTOMY      COLON SURGERY      EPIDURAL BLOCK INJECTION N/A 6/23/2016    Procedure: BLOCK / INJECTION EPIDURAL STEROID CERVICAL  C7-T1;  Surgeon: Les Garcia MD;  Location: [facility] MAIN OR;  Service:     EPIDURAL BLOCK INJECTION N/A 3/31/2016    Procedure: BLOCK / INJECTION EPIDURAL STEROID CERVICAL C7-T1  (C-ARM); Surgeon: Les Garcia MD;  Location: [facility] MAIN OR;  Service:     EPIDURAL BLOCK INJECTION N/A 8/8/2018    Procedure: C6 C7 Cervical Epidural Steroid Injection (65066); Surgeon: Les Garcia MD;  Location: [facility] MAIN OR;  Service: Pain Management     HYSTERECTOMY      PITUITARY SURGERY      [place] [address] DX/THER SBST EPIDURAL/SUBRACH CERV/THORACIC N/A 1/21/2016    Procedure: BLOCK / INJECTION EPIDURAL STEROID CERVICAL C7-T1 (C-ARM); Surgeon: Les Garcia MD;  Location: [facility] MAIN OR;  Service: Pain Management        Family History   Problem Relation Age of Onset    Thyroid disease Mother     Hyperlipidemia Mother     Depression Mother     Thyroid disease Father     Hyperlipidemia Father     Depression Father        Social History     Occupational History    Not on file       Social History Main Topics    Smoking status: Never Smoker    Smokeless tobacco: Never Used    Alcohol use No    Drug use: No    Sexual activity: Not on file         Current Outpatient Prescriptions:     albuterol (VENTOLIN HFA) 90 mcg/act inhaler, Inhale, Disp: , Rfl:     aspirin 81 MG tablet, Take 81 mg by mouth daily  , Disp: , Rfl:     cholecalciferol (VITAMIN D3) 1,000 units tablet, Take 2 tablets by mouth daily, Disp: , Rfl:     cyclobenzaprine (FLEXERIL) 10 mg tablet, Take 1 tablet (10 mg total) by mouth 3 (three) times a day as needed for muscle spasms, Disp: 90 tablet, Rfl: 1    gabapentin (NEURONTIN) 600 MG tablet, Take 1 tablet (600 mg total) by mouth 3 (three) times a day, Disp: 90 tablet, Rfl: 1    hydrocortisone (ANUSOL-HC) 25 mg suppository, , Disp: , Rfl: 0    ibuprofen (ADVIL) 200 mg tablet, Take by mouth, Disp: , Rfl:     levothyroxine 75 mcg tablet, Take 75 mcg by mouth daily  , Disp: , Rfl:     mupirocin (BACTROBAN) 2 % ointment, , Disp: , Rfl: 0    [START ON 9/26/2018] oxyCODONE-acetaminophen (PERCOCET) 5-325 mg per tablet, Take 0 5 tablets by mouth 2 (two) times a day as needed for severe pain for up to 30 days Max Daily Amount: 1 tablet, Disp: 30 tablet, Rfl: 0    traZODone (DESYREL) 50 mg tablet, , Disp: , Rfl: 0    [START ON 10/26/2018] oxyCODONE-acetaminophen (PERCOCET) 5-325 mg per tablet, Take 0 5 tablets by mouth 2 (two) times a day as needed (pain) for up to 30 days Max Daily Amount: 1 tablet, Disp: 30 tablet, Rfl: 0    Allergies   Allergen Reactions    Levaquin [Levofloxacin In D5w] Shortness Of Breath     Also hives      Amitiza [Lubiprostone] Other (See Comments) and Hives     Reaction Date: 10Aug2011;   Migraines and vomiting    Biaxin [Clarithromycin] Hives    Cephradine     Erythromycin Hives     Reaction Date: 10Aug2011;     Macrobid [Nitrofurantoin Monohyd Macro] Other (See Comments)     C/o passing out    Morphine     Penicillins Hives     Reaction Date: 10Aug2011;     Sulfa Antibiotics     Tetracycline     Tetracyclines & Related Hives    Morphine And Related Rash    Valium [Diazepam] Rash and Vomiting     Reaction Date: 14Jun2012;        Physical Exam:    BP (!) 94/42 (BP Location: Left arm, Patient Position: Sitting, Cuff Size: Standard)   Pulse 69 Resp 16   Ht 5' 3" (1 6 m)   Wt 82 6 kg (182 lb)   BMI 32 24 kg/m²      Constitutional:obese  Eyes:anicteric  HEENT:grossly intact  Neck:supple, symmetric, trachea midline and no masses   Pulmonary:even and unlabored  Cardiovascular:No edema or pitting edema present  Skin:Normal without rashes or lesions and well hydrated  Psychiatric:Mood and affect appropriate  Neurologic:Cranial Nerves II-XII grossly intact  Musculoskeletal:normal      Imaging  MRI cervical spine without contrast    (Results Pending)         Orders Placed This Encounter   Procedures    MRI cervical spine without contrast    Ambulatory referral to Neurosurgery

## 2018-12-04 ENCOUNTER — OFFICE VISIT (OUTPATIENT)
Dept: PAIN MEDICINE | Facility: CLINIC | Age: 53
End: 2018-12-04
Payer: COMMERCIAL

## 2018-12-04 VITALS
DIASTOLIC BLOOD PRESSURE: 64 MMHG | SYSTOLIC BLOOD PRESSURE: 100 MMHG | BODY MASS INDEX: 32.24 KG/M2 | HEIGHT: 63 IN | RESPIRATION RATE: 18 BRPM | HEART RATE: 90 BPM

## 2018-12-04 DIAGNOSIS — G89.4 CHRONIC PAIN SYNDROME: Primary | ICD-10-CM

## 2018-12-04 DIAGNOSIS — M54.2 NECK PAIN: ICD-10-CM

## 2018-12-04 DIAGNOSIS — M50.120 CERVICAL DISC DISORDER WITH RADICULOPATHY OF MID-CERVICAL REGION: ICD-10-CM

## 2018-12-04 PROCEDURE — 99214 OFFICE O/P EST MOD 30 MIN: CPT | Performed by: ANESTHESIOLOGY

## 2018-12-04 RX ORDER — OXYCODONE HYDROCHLORIDE AND ACETAMINOPHEN 5; 325 MG/1; MG/1
0.5 TABLET ORAL 2 TIMES DAILY PRN
Qty: 30 TABLET | Refills: 0 | Status: SHIPPED | OUTPATIENT
Start: 2019-01-03 | End: 2019-02-04 | Stop reason: SDUPTHER

## 2018-12-04 RX ORDER — GABAPENTIN 600 MG/1
600 TABLET ORAL 3 TIMES DAILY
Qty: 90 TABLET | Refills: 1 | Status: SHIPPED | OUTPATIENT
Start: 2018-12-04 | End: 2019-06-17

## 2018-12-04 RX ORDER — OXYCODONE HYDROCHLORIDE AND ACETAMINOPHEN 5; 325 MG/1; MG/1
0.5 TABLET ORAL 2 TIMES DAILY PRN
Qty: 30 TABLET | Refills: 0 | Status: SHIPPED | OUTPATIENT
Start: 2018-12-04 | End: 2019-02-04 | Stop reason: SDUPTHER

## 2018-12-04 RX ORDER — CHLORZOXAZONE 500 MG/1
500 TABLET ORAL 3 TIMES DAILY PRN
Qty: 90 TABLET | Refills: 1 | Status: SHIPPED | OUTPATIENT
Start: 2018-12-04 | End: 2019-02-04 | Stop reason: ALTCHOICE

## 2018-12-04 NOTE — PROGRESS NOTES
Pain Medicine Follow-Up Note    Assessment:  1  Chronic pain syndrome    2  Neck pain    3  Cervical disc disorder with radiculopathy of mid-cervical region        Plan:  New Medications Ordered This Visit   Medications    gabapentin (NEURONTIN) 600 MG tablet     Sig: Take 1 tablet (600 mg total) by mouth 3 (three) times a day     Dispense:  90 tablet     Refill:  1    oxyCODONE-acetaminophen (PERCOCET) 5-325 mg per tablet     Sig: Take 0 5 tablets by mouth 2 (two) times a day as needed for severe pain for up to 30 days Max Daily Amount: 1 tablet     Dispense:  30 tablet     Refill:  0    oxyCODONE-acetaminophen (PERCOCET) 5-325 mg per tablet     Sig: Take 0 5 tablets by mouth 2 (two) times a day as needed (pain) for up to 30 days Max Daily Amount: 1 tablet     Dispense:  30 tablet     Refill:  0    chlorzoxazone (PARAFON FORTE) 500 mg tablet     Sig: Take 1 tablet (500 mg total) by mouth 3 (three) times a day as needed for muscle spasms     Dispense:  90 tablet     Refill:  1     My impressions and treatment recommendations were discussed in detail with the patient who verbalized understanding and had no further questions  The patient reports that she is having severe muscle spasms in her thoracic spine region  She reports that the cyclobenzaprine is not giving her as much relief as it had previously  As such, I felt a reasonable to discontinue cyclobenzaprine at today's visit  I will trial the patient on chlorzoxazone 500 mg 1 tablet up to 3 times daily as needed for muscle spasms  I will also continue the patient on gabapentin 600 mg 3 times daily as well as oxycodone/acetaminophen 5/325 mg 0 5 tablets twice daily as needed for pain  I sent E prescriptions to the pharmacy dated December 4, 2018 and January 3, 2019  The risks and side effects of chronic opioid treatment were discussed in detail with the patient   Side effects include but are not limited to nausea, vomiting, GI intolerance, sedation, constipation, mental clouding, opioid-induced hyperalgesia, endocrine dysfunction, addiction, dependence, and tolerance  The patient was asked to take his medications only as prescribed and directed, never in excess, and never for any other reason other than for pain control  The patient was also asked to keep his medications out of the reach of others and away from children, preferably in a locked drawer  The patient verbalized understanding and wished to use these opioid medications  [place] Prescription Drug Monitoring Program report was reviewed and was appropriate     Follow-up is planned in 2 months time or sooner as warranted  Discharge instructions were provided  I personally saw and examined the patient and I agree with the above discussed plan of care  History of Present Illness:    Rah Pedraza is a 47 y o  female who presents to [facility] for interval re-evaluation of the above stated pain complaints  The patient has a past medical and chronic pain history as outlined in the assessment section  She was last seen on September 24, 2018 at which time she was maintained on cyclobenzaprine 10 mg 1 tablet 3 times daily as needed for muscle spasms, gabapentin 600 mg 3 times daily, and oxycodone/acetaminophen 5/325 mg 0 5 tablet twice daily as needed for pain  At today's office visit, the patient's pain score is 8 to 10/10 on the verbal numerical pain rating scale  The patient states that her pain is primarily in her neck and thoracic spine region  She states that the throughout thoracic spine region has gotten worse and is causing her significant amount of pain  She describes her pain as constant in nature and reports the quality of her pain as burning, dull/aching, sharp, throbbing, shooting, numbness, and pins needles    She reports 30% relief of symptoms with the combination of her medications    She also reports that the neurosurgeons in [facility] as well as the facility itself is not a primary provider in her plan and she would have to pay extra to have her tests performed here  I asked her to have her MRI done wherever her insurance allows  I also asked her to speak to her insurance to find out what neurosurgeons are in her network  Other than as stated above, the patient denies any interval changes in medications, medical condition, mental condition, symptoms, or allergies since the last office visit  Review of Systems:    Review of Systems   Respiratory: Negative for shortness of breath  Cardiovascular: Negative for chest pain  Gastrointestinal: Negative for constipation, diarrhea, nausea and vomiting  Musculoskeletal: Positive for gait problem (difficulty walking/ decreased range of motion) and joint swelling (joint stiffness)  Negative for arthralgias and myalgias  Skin: Negative for rash  Neurological: Positive for weakness (muscle weakness)  Negative for dizziness and seizures  All other systems reviewed and are negative          Patient Active Problem List   Diagnosis    Chronic pain syndrome    Acute muscle stiffness of neck    Cervical disc disorder with radiculopathy of mid-cervical region    Neck pain    Chronic left-sided low back pain with left-sided sciatica    Lumbar radiculopathy    Cervicalgia       Past Medical History:   Diagnosis Date    Anxiety     Asthma     exercise induced asthma    Cervical disc disorder     Chronic pain     Depression     Dysuria     Lung abnormality     nodules    Lung nodule     Pituitary abnormality (HCC)     tumor    Thrombocytopenia ([address] )     Thyroid disease        Past Surgical History:   Procedure Laterality Date    APPENDECTOMY      AUGMENTATION MAMMAPLASTY Bilateral     CHOLECYSTECTOMY      COLECTOMY      COLON SURGERY      EPIDURAL BLOCK INJECTION N/A 6/23/2016    Procedure: BLOCK / INJECTION EPIDURAL STEROID CERVICAL  C7-T1;  Surgeon: Tiffany Crowley, MD;  [facility];  Service:     EPIDURAL BLOCK INJECTION N/A 3/31/2016    Procedure: BLOCK / INJECTION EPIDURAL STEROID CERVICAL C7-T1  (C-ARM); Surgeon: Leslie Almaraz MD;  Location: [facility] MAIN OR;  Service:     EPIDURAL BLOCK INJECTION N/A 8/8/2018    Procedure: C6 C7 Cervical Epidural Steroid Injection (81093); Surgeon: Leslie Almaraz MD;  Location: [facility] MAIN OR;  Service: Pain Management     HYSTERECTOMY      PITUITARY SURGERY      [place] [address] DX/THER SBST EPIDURAL/SUBRACH CERV/THORACIC N/A 1/21/2016    Procedure: BLOCK / INJECTION EPIDURAL STEROID CERVICAL C7-T1 (C-ARM); Surgeon: Leslie Almaraz MD;  Location: [facility] MAIN OR;  Service: Pain Management        Family History   Problem Relation Age of Onset    Thyroid disease Mother     Hyperlipidemia Mother     Depression Mother     Thyroid disease Father     Hyperlipidemia Father     Depression Father        Social History     Occupational History    Not on file  Social History Main Topics    Smoking status: Never Smoker    Smokeless tobacco: Never Used    Alcohol use No    Drug use: No    Sexual activity: Not on file         Current Outpatient Prescriptions:     albuterol (VENTOLIN HFA) 90 mcg/act inhaler, Inhale, Disp: , Rfl:     aspirin 81 MG tablet, Take 81 mg by mouth daily  , Disp: , Rfl:     cholecalciferol (VITAMIN D3) 1,000 units tablet, Take 2 tablets by mouth daily, Disp: , Rfl:     gabapentin (NEURONTIN) 600 MG tablet, Take 1 tablet (600 mg total) by mouth 3 (three) times a day, Disp: 90 tablet, Rfl: 1    hydrocortisone (ANUSOL-HC) 25 mg suppository, , Disp: , Rfl: 0    ibuprofen (ADVIL) 200 mg tablet, Take by mouth, Disp: , Rfl:     levothyroxine 75 mcg tablet, Take 75 mcg by mouth daily  , Disp: , Rfl:     mupirocin (BACTROBAN) 2 % ointment, , Disp: , Rfl: 0    traZODone (DESYREL) 50 mg tablet, , Disp: , Rfl: 0    chlorzoxazone (PARAFON FORTE) 500 mg tablet, Take 1 tablet (500 mg total) by mouth 3 (three) times a day as needed for muscle spasms, Disp: 90 tablet, Rfl: 1    oxyCODONE-acetaminophen (PERCOCET) 5-325 mg per tablet, Take 0 5 tablets by mouth 2 (two) times a day as needed for severe pain for up to 30 days Max Daily Amount: 1 tablet, Disp: 30 tablet, Rfl: 0    [START ON 1/3/2019] oxyCODONE-acetaminophen (PERCOCET) 5-325 mg per tablet, Take 0 5 tablets by mouth 2 (two) times a day as needed (pain) for up to 30 days Max Daily Amount: 1 tablet, Disp: 30 tablet, Rfl: 0    Allergies   Allergen Reactions    Levaquin [Levofloxacin In D5w] Shortness Of Breath     Also hives      Amitiza [Lubiprostone] Other (See Comments) and Hives     Reaction Date: 10Aug2011;   Migraines and vomiting    Biaxin [Clarithromycin] Hives    Cephradine     Erythromycin Hives     Reaction Date: 10Aug2011;     Macrobid [Nitrofurantoin Monohyd Macro] Other (See Comments)     C/o passing out    Morphine     Penicillins Hives     Reaction Date: 10Aug2011;     Sulfa Antibiotics     Tetracycline     Tetracyclines & Related Hives    Morphine And Related Rash    Valium [Diazepam] Rash and Vomiting     Reaction Date: 14Jun2012;        Physical Exam:    /64 (BP Location: Right arm, Patient Position: Sitting, Cuff Size: Standard)   Pulse 90   Resp 18   Ht 5' 3" (1 6 m)   BMI 32 24 kg/m²     Constitutional:normal, well developed, well nourished, alert, in no distress and non-toxic and no overt pain behavior    Eyes:anicteric  HEENT:grossly intact  Neck:supple, symmetric, trachea midline and no masses   Pulmonary:even and unlabored  Cardiovascular:No edema or pitting edema present  Skin:Normal without rashes or lesions and well hydrated  Psychiatric:Mood and affect appropriate  Neurologic:Cranial Nerves II-XII grossly intact  Musculoskeletal:normal

## 2018-12-04 NOTE — LETTER
December 4, 2018     Shireen Morgan, [address] [address]    Patient: Quique Kim   YOB: 1965   Date of Visit: 12/4/2018       Dear Dr Casimiro Hartmann: Thank you for referring Hussein Vega to me for evaluation  Below are my notes for this consultation  If you have questions, please do not hesitate to call me  I look forward to following your patient along with you  Sincerely,        Samir Marrufo MD        CC: No Recipients  Samir Marrufo MD  12/4/2018 10:54 AM  Sign at close encounter  Pain Medicine Follow-Up Note    Assessment:  1  Chronic pain syndrome    2  Neck pain    3  Cervical disc disorder with radiculopathy of mid-cervical region        Plan:  New Medications Ordered This Visit   Medications    gabapentin (NEURONTIN) 600 MG tablet     Sig: Take 1 tablet (600 mg total) by mouth 3 (three) times a day     Dispense:  90 tablet     Refill:  1    oxyCODONE-acetaminophen (PERCOCET) 5-325 mg per tablet     Sig: Take 0 5 tablets by mouth 2 (two) times a day as needed for severe pain for up to 30 days Max Daily Amount: 1 tablet     Dispense:  30 tablet     Refill:  0    oxyCODONE-acetaminophen (PERCOCET) 5-325 mg per tablet     Sig: Take 0 5 tablets by mouth 2 (two) times a day as needed (pain) for up to 30 days Max Daily Amount: 1 tablet     Dispense:  30 tablet     Refill:  0    chlorzoxazone (PARAFON FORTE) 500 mg tablet     Sig: Take 1 tablet (500 mg total) by mouth 3 (three) times a day as needed for muscle spasms     Dispense:  90 tablet     Refill:  1     My impressions and treatment recommendations were discussed in detail with the patient who verbalized understanding and had no further questions  The patient reports that she is having severe muscle spasms in her thoracic spine region  She reports that the cyclobenzaprine is not giving her as much relief as it had previously    As such, I felt a reasonable to discontinue cyclobenzaprine at today's visit  I will trial the patient on chlorzoxazone 500 mg 1 tablet up to 3 times daily as needed for muscle spasms  I will also continue the patient on gabapentin 600 mg 3 times daily as well as oxycodone/acetaminophen 5/325 mg 0 5 tablets twice daily as needed for pain  I sent E prescriptions to the pharmacy dated December 4, 2018 and January 3, 2019  The risks and side effects of chronic opioid treatment were discussed in detail with the patient  Side effects include but are not limited to nausea, vomiting, GI intolerance, sedation, constipation, mental clouding, opioid-induced hyperalgesia, endocrine dysfunction, addiction, dependence, and tolerance  The patient was asked to take his medications only as prescribed and directed, never in excess, and never for any other reason other than for pain control  The patient was also asked to keep his medications out of the reach of others and away from children, preferably in a locked drawer  The patient verbalized understanding and wished to use these opioid medications  [place] Prescription Drug Monitoring Program report was reviewed and was appropriate     Follow-up is planned in 2 months time or sooner as warranted  Discharge instructions were provided  I personally saw and examined the patient and I agree with the above discussed plan of care  History of Present Illness:    Montse Samuel is a 47 y o  female who presents to [facility] for interval re-evaluation of the above stated pain complaints  The patient has a past medical and chronic pain history as outlined in the assessment section  She was last seen on September 24, 2018 at which time she was maintained on cyclobenzaprine 10 mg 1 tablet 3 times daily as needed for muscle spasms, gabapentin 600 mg 3 times daily, and oxycodone/acetaminophen 5/325 mg 0 5 tablet twice daily as needed for pain      At today's office visit, the patient's pain score is 8 to 10/10 on the verbal numerical pain rating scale  The patient states that her pain is primarily in her neck and thoracic spine region  She states that the throughout thoracic spine region has gotten worse and is causing her significant amount of pain  She describes her pain as constant in nature and reports the quality of her pain as burning, dull/aching, sharp, throbbing, shooting, numbness, and pins needles    She reports 30% relief of symptoms with the combination of her medications  She also reports that the neurosurgeons in [facility] as well as the facility itself is not a primary provider in her plan and she would have to pay extra to have her tests performed here  I asked her to have her MRI done wherever her insurance allows  I also asked her to speak to her insurance to find out what neurosurgeons are in her network  Other than as stated above, the patient denies any interval changes in medications, medical condition, mental condition, symptoms, or allergies since the last office visit  Review of Systems:    Review of Systems   Respiratory: Negative for shortness of breath  Cardiovascular: Negative for chest pain  Gastrointestinal: Negative for constipation, diarrhea, nausea and vomiting  Musculoskeletal: Positive for gait problem (difficulty walking/ decreased range of motion) and joint swelling (joint stiffness)  Negative for arthralgias and myalgias  Skin: Negative for rash  Neurological: Positive for weakness (muscle weakness)  Negative for dizziness and seizures  All other systems reviewed and are negative          Patient Active Problem List   Diagnosis    Chronic pain syndrome    Acute muscle stiffness of neck    Cervical disc disorder with radiculopathy of mid-cervical region    Neck pain    Chronic left-sided low back pain with left-sided sciatica    Lumbar radiculopathy    Cervicalgia       Past Medical History:   Diagnosis Date    Anxiety     Asthma     exercise induced asthma    Cervical disc disorder     Chronic pain     Depression     Dysuria     Lung abnormality     nodules    Lung nodule     Pituitary abnormality (HCC)     tumor    Thrombocytopenia (HCC)     Thyroid disease        Past Surgical History:   Procedure Laterality Date    APPENDECTOMY      AUGMENTATION MAMMAPLASTY Bilateral     CHOLECYSTECTOMY      COLECTOMY      COLON SURGERY      EPIDURAL BLOCK INJECTION N/A 6/23/2016    Procedure: BLOCK / INJECTION EPIDURAL STEROID CERVICAL  C7-T1;  Surgeon: Raven Simpson MD;  Location: [facility] MAIN OR;  Service:     EPIDURAL BLOCK INJECTION N/A 3/31/2016    Procedure: BLOCK / INJECTION EPIDURAL STEROID CERVICAL C7-T1  (C-ARM); Surgeon: Raven Simpson MD;  Location: [facility] MAIN OR;  Service:     EPIDURAL BLOCK INJECTION N/A 8/8/2018    Procedure: C6 C7 Cervical Epidural Steroid Injection (20833); Surgeon: Raven Simpson MD;  Location: [facility] MAIN OR;  Service: Pain Management     HYSTERECTOMY      PITUITARY SURGERY      [place] Hal Oni Mikayla [address] DX/THER SBST EPIDURAL/SUBRACH CERV/THORACIC N/A 1/21/2016    Procedure: BLOCK / INJECTION EPIDURAL STEROID CERVICAL C7-T1 (C-ARM); Surgeon: Raven Simpson MD;  Location: [facility] MAIN OR;  Service: Pain Management        Family History   Problem Relation Age of Onset    Thyroid disease Mother     Hyperlipidemia Mother     Depression Mother     Thyroid disease Father     Hyperlipidemia Father     Depression Father        Social History     Occupational History    Not on file  Social History Main Topics    Smoking status: Never Smoker    Smokeless tobacco: Never Used    Alcohol use No    Drug use: No    Sexual activity: Not on file         Current Outpatient Prescriptions:     albuterol (VENTOLIN HFA) 90 mcg/act inhaler, Inhale, Disp: , Rfl:     aspirin 81 MG tablet, Take 81 mg by mouth daily  , Disp: , Rfl:     cholecalciferol (VITAMIN D3) 1,000 units tablet, Take 2 tablets by mouth daily, Disp: , Rfl:    gabapentin (NEURONTIN) 600 MG tablet, Take 1 tablet (600 mg total) by mouth 3 (three) times a day, Disp: 90 tablet, Rfl: 1    hydrocortisone (ANUSOL-HC) 25 mg suppository, , Disp: , Rfl: 0    ibuprofen (ADVIL) 200 mg tablet, Take by mouth, Disp: , Rfl:     levothyroxine 75 mcg tablet, Take 75 mcg by mouth daily  , Disp: , Rfl:     mupirocin (BACTROBAN) 2 % ointment, , Disp: , Rfl: 0    traZODone (DESYREL) 50 mg tablet, , Disp: , Rfl: 0    chlorzoxazone (PARAFON FORTE) 500 mg tablet, Take 1 tablet (500 mg total) by mouth 3 (three) times a day as needed for muscle spasms, Disp: 90 tablet, Rfl: 1    oxyCODONE-acetaminophen (PERCOCET) 5-325 mg per tablet, Take 0 5 tablets by mouth 2 (two) times a day as needed for severe pain for up to 30 days Max Daily Amount: 1 tablet, Disp: 30 tablet, Rfl: 0    [START ON 1/3/2019] oxyCODONE-acetaminophen (PERCOCET) 5-325 mg per tablet, Take 0 5 tablets by mouth 2 (two) times a day as needed (pain) for up to 30 days Max Daily Amount: 1 tablet, Disp: 30 tablet, Rfl: 0    Allergies   Allergen Reactions    Levaquin [Levofloxacin In D5w] Shortness Of Breath     Also hives      Amitiza [Lubiprostone] Other (See Comments) and Hives     Reaction Date: 10Aug2011;   Migraines and vomiting    Biaxin [Clarithromycin] Hives    Cephradine     Erythromycin Hives     Reaction Date: 10Aug2011;     Macrobid [Nitrofurantoin Monohyd Macro] Other (See Comments)     C/o passing out    Morphine     Penicillins Hives     Reaction Date: 10Aug2011;     Sulfa Antibiotics     Tetracycline     Tetracyclines & Related Hives    Morphine And Related Rash    Valium [Diazepam] Rash and Vomiting     Reaction Date: 14Jun2012;        Physical Exam:    /64 (BP Location: Right arm, Patient Position: Sitting, Cuff Size: Standard)   Pulse 90   Resp 18   Ht 5' 3" (1 6 m)   BMI 32 24 kg/m²      Constitutional:normal, well developed, well nourished, alert, in no distress and non-toxic and no overt pain behavior    Eyes:anicteric  HEENT:grossly intact  Neck:supple, symmetric, trachea midline and no masses   Pulmonary:even and unlabored  Cardiovascular:No edema or pitting edema present  Skin:Normal without rashes or lesions and well hydrated  Psychiatric:Mood and affect appropriate  Neurologic:Cranial Nerves II-XII grossly intact  Musculoskeletal:normal

## 2018-12-05 ENCOUNTER — TELEPHONE (OUTPATIENT)
Dept: PAIN MEDICINE | Facility: CLINIC | Age: 53
End: 2018-12-05

## 2018-12-05 NOTE — TELEPHONE ENCOUNTER
Rangel Allen from [employer] is calling stating that they do not have this med in stock & they also contacted [facility] & they do not carry it either  Rangel Allen is stating that it is an older muscle relaxer that has been phased out by other muscle relaxers  Please advise

## 2018-12-05 NOTE — TELEPHONE ENCOUNTER
[facility] on  line stating chlorzoxazone is on back order until mid January and pt would like to know if they can have a prescription for cyclobenzaprine  Please advise and send to nurse pool   Thanks

## 2018-12-05 NOTE — TELEPHONE ENCOUNTER
Contacted both [facility] and [facility] in [facility] [address]  They have this med in stock  Will contact pt to see if this is agreeable

## 2019-02-04 ENCOUNTER — OFFICE VISIT (OUTPATIENT)
Dept: PAIN MEDICINE | Facility: CLINIC | Age: 54
End: 2019-02-04
Payer: COMMERCIAL

## 2019-02-04 VITALS
SYSTOLIC BLOOD PRESSURE: 100 MMHG | HEIGHT: 63 IN | BODY MASS INDEX: 32.24 KG/M2 | HEART RATE: 83 BPM | RESPIRATION RATE: 18 BRPM | DIASTOLIC BLOOD PRESSURE: 54 MMHG

## 2019-02-04 DIAGNOSIS — G89.4 CHRONIC PAIN SYNDROME: Primary | ICD-10-CM

## 2019-02-04 DIAGNOSIS — M50.120 CERVICAL DISC DISORDER WITH RADICULOPATHY OF MID-CERVICAL REGION: ICD-10-CM

## 2019-02-04 DIAGNOSIS — M54.2 NECK PAIN: ICD-10-CM

## 2019-02-04 PROCEDURE — 99214 OFFICE O/P EST MOD 30 MIN: CPT | Performed by: ANESTHESIOLOGY

## 2019-02-04 RX ORDER — CYCLOBENZAPRINE HCL 10 MG
10 TABLET ORAL 3 TIMES DAILY PRN
Qty: 90 TABLET | Refills: 1 | Status: SHIPPED | OUTPATIENT
Start: 2019-02-04 | End: 2019-03-27 | Stop reason: SDUPTHER

## 2019-02-04 RX ORDER — OXYCODONE HYDROCHLORIDE AND ACETAMINOPHEN 5; 325 MG/1; MG/1
0.5 TABLET ORAL 2 TIMES DAILY PRN
Qty: 30 TABLET | Refills: 0 | Status: SHIPPED | OUTPATIENT
Start: 2019-02-05 | End: 2019-03-27 | Stop reason: SDUPTHER

## 2019-02-04 RX ORDER — OXYCODONE HYDROCHLORIDE AND ACETAMINOPHEN 5; 325 MG/1; MG/1
0.5 TABLET ORAL 2 TIMES DAILY PRN
Qty: 30 TABLET | Refills: 0 | Status: SHIPPED | OUTPATIENT
Start: 2019-03-07 | End: 2019-03-27 | Stop reason: SDUPTHER

## 2019-02-04 NOTE — LETTER
February 4, 2019     Doni Khalil, [address] [address]    Patient: Rocky Lopez   YOB: 1965   Date of Visit: 2/4/2019       Dear Dr Rea Acharya: Thank you for referring Magdalena Klinefelter to me for evaluation  Below are my notes for this consultation  If you have questions, please do not hesitate to call me  I look forward to following your patient along with you  Sincerely,        Isabell Hansen MD        CC: No Recipients  Isabell Hansen MD  2/4/2019 11:01 AM  Sign at close encounter  Pain Medicine Follow-Up Note    Assessment:  1  Chronic pain syndrome    2  Neck pain    3  Cervical disc disorder with radiculopathy of mid-cervical region        Plan:  Orders Placed This Encounter   Procedures    MRI cervical spine without contrast     Standing Status:   Future     Standing Expiration Date:   2/4/2023     Scheduling Instructions: There is no preparation for this test  Please leave your jewelry and valuables at home, wedding rings are the exception  Please bring your insurance cards, a form of photo ID and a list of your medications with you  Arrive 15 minutes prior to your appointment time in order to register  Please bring any prior CT or MRI studies of this area that were not performed at a [facility] facility  To schedule this appointment, please contact Central Scheduling at [phone]  Order Specific Question:   Is the patient pregnant? Answer:   Unknown     Order Specific Question:   What is the patient's sedation requirement?      Answer:   Unknown       New Medications Ordered This Visit   Medications    oxyCODONE-acetaminophen (PERCOCET) 5-325 mg per tablet     Sig: Take 0 5 tablets by mouth 2 (two) times a day as needed for severe pain for up to 30 days Max Daily Amount: 1 tablet     Dispense:  30 tablet     Refill:  0    oxyCODONE-acetaminophen (PERCOCET) 5-325 mg per tablet     Sig: Take 0 5 tablets by mouth 2 (two) times a day as needed (pain) for up to 30 days Max Daily Amount: 1 tablet     Dispense:  30 tablet     Refill:  0    cyclobenzaprine (FLEXERIL) 10 mg tablet     Sig: Take 1 tablet (10 mg total) by mouth 3 (three) times a day as needed for muscle spasms     Dispense:  90 tablet     Refill:  1     My impressions and treatment recommendations were discussed in detail with the patient who verbalized understanding and had no further questions  The patient reports that her insurance company will no longer approve the chlorzoxazone  She has been using cyclobenzaprine  As such, I feel reasonable to continue using cyclobenzaprine 10 mg 3 times daily as needed for muscle spasms  She will also continue gabapentin 600 mg 3 times daily as well as oxycodone/acetaminophen 5/325 mg 0 5 tablets twice daily as needed for pain  I sent e-refills for the oxycodone/acetaminophen dated February 5, 2019 and March 7, 2019  The risks and side effects of chronic opioid treatment were discussed in detail with the patient  Side effects include but are not limited to nausea, vomiting, GI intolerance, sedation, constipation, mental clouding, opioid-induced hyperalgesia, endocrine dysfunction, addiction, dependence, and tolerance  The patient was asked to take his medications only as prescribed and directed, never in excess, and never for any other reason other than for pain control  The patient was also asked to keep his medications out of the reach of others and away from children, preferably in a locked drawer  The patient verbalized understanding and wished to use these opioid medications  A urine drug test was collected at today's office visit as part of our medication management protocol  The point of care testing results were appropriate for what was being prescribed  The specimen will be sent for confirmatory testing   The drug screen is medically necessary because the patient is either dependent on opioid medication or is being considered for opioid medication therapy and the results could impact ongoing or future treatment  The drug screen is to evaluate for the presence or absence of prescribed, non-prescribed, and/or illicit drugs and substances  [place] Prescription Drug Monitoring Program report was reviewed and was appropriate     Follow-up is planned in 2 months time or sooner as warranted  Discharge instructions were provided  I personally saw and examined the patient and I agree with the above discussed plan of care  History of Present Illness:    Janice Snyder is a 47 y o  female who presents to [facility] for interval re-evaluation of the above stated pain complaints  The patient has a past medical and chronic pain history as outlined in the assessment section  She was last seen on December 4, 2018 at which time she was maintained on gabapentin 600 mg 3 times daily as well as oxycodone/acetaminophen 5/325 mg 0 5 tablets twice daily as needed for pain  At today's office visit, the patient's pain score is 8/10 on the verbal numerical pain rating scale  The patient states that her pain is primarily in her neck with radiation into the right upper extremity  She states that her pain is constant in nature  She reports the quality of her pain as dull/aching, sharp, pressure-like, numbness, and pins and needles    She does report excellent pain relief with the gabapentin 600 mg 3 times daily as well as the oxycodone/acetaminophen 5/325 mg 0 5 tablets twice daily as needed for pain  She denies opioid induced constipation  Other than as stated above, the patient denies any interval changes in medications, medical condition, mental condition, symptoms, or allergies since the last office visit  Review of Systems:    Review of Systems   Respiratory: Negative for shortness of breath  Cardiovascular: Negative for chest pain     Gastrointestinal: Negative for constipation, diarrhea, nausea and vomiting  Musculoskeletal: Positive for gait problem (difficulty walking/ decreased range of motion) and joint swelling (joint stiffness)  Negative for arthralgias and myalgias  Skin: Negative for rash  Neurological: Positive for weakness (muscle weakness)  Negative for dizziness and seizures  All other systems reviewed and are negative  Patient Active Problem List   Diagnosis    Chronic pain syndrome    Acute muscle stiffness of neck    Cervical disc disorder with radiculopathy of mid-cervical region    Neck pain    Chronic left-sided low back pain with left-sided sciatica    Lumbar radiculopathy    Cervicalgia       Past Medical History:   Diagnosis Date    Anxiety     Asthma     exercise induced asthma    Cervical disc disorder     Chronic pain     Depression     Dysuria     Lung abnormality     nodules    Lung nodule     Pituitary abnormality (HCC)     tumor    Thrombocytopenia (HCC)     Thyroid disease        Past Surgical History:   Procedure Laterality Date    APPENDECTOMY      AUGMENTATION MAMMAPLASTY Bilateral     CHOLECYSTECTOMY      COLECTOMY      COLON SURGERY      EPIDURAL BLOCK INJECTION N/A 6/23/2016    Procedure: BLOCK / INJECTION EPIDURAL STEROID CERVICAL  C7-T1;  Surgeon: Gopi Esparza MD;  Location: [place] [address] MAIN OR;  Service:     EPIDURAL BLOCK INJECTION N/A 3/31/2016    Procedure: BLOCK / INJECTION EPIDURAL STEROID CERVICAL C7-T1  (C-ARM); Surgeon: Gopi Esparza MD;  Location: [facility] MAIN OR;  Service:     EPIDURAL BLOCK INJECTION N/A 8/8/2018    Procedure: C6 C7 Cervical Epidural Steroid Injection (96206); Surgeon: Gopi Esparza MD;  Location: [facility] MAIN OR;  Service: Pain Management     HYSTERECTOMY      PITUITARY SURGERY      DE Hal [address] DX/THER SBST EPIDURAL/SUBRACH CERV/THORACIC N/A 1/21/2016    Procedure: BLOCK / INJECTION EPIDURAL STEROID CERVICAL C7-T1 (C-ARM);   Surgeon: Gopi Esparza MD;  Location: [facility] MAIN OR; Service: Pain Management        Family History   Problem Relation Age of Onset    Thyroid disease Mother     Hyperlipidemia Mother     Depression Mother     Thyroid disease Father     Hyperlipidemia Father     Depression Father        Social History     Occupational History    Not on file  Social History Main Topics    Smoking status: Never Smoker    Smokeless tobacco: Never Used    Alcohol use No    Drug use: No    Sexual activity: Not on file         Current Outpatient Prescriptions:     albuterol (VENTOLIN HFA) 90 mcg/act inhaler, Inhale, Disp: , Rfl:     aspirin 81 MG tablet, Take 81 mg by mouth daily  , Disp: , Rfl:     cholecalciferol (VITAMIN D3) 1,000 units tablet, Take 2 tablets by mouth daily, Disp: , Rfl:     gabapentin (NEURONTIN) 600 MG tablet, Take 1 tablet (600 mg total) by mouth 3 (three) times a day, Disp: 90 tablet, Rfl: 1    hydrocortisone (ANUSOL-HC) 25 mg suppository, , Disp: , Rfl: 0    ibuprofen (ADVIL) 200 mg tablet, Take by mouth, Disp: , Rfl:     levothyroxine 75 mcg tablet, Take 75 mcg by mouth daily  , Disp: , Rfl:     mupirocin (BACTROBAN) 2 % ointment, , Disp: , Rfl: 0    traZODone (DESYREL) 50 mg tablet, , Disp: , Rfl: 0    cyclobenzaprine (FLEXERIL) 10 mg tablet, Take 1 tablet (10 mg total) by mouth 3 (three) times a day as needed for muscle spasms, Disp: 90 tablet, Rfl: 1    [START ON 2/5/2019] oxyCODONE-acetaminophen (PERCOCET) 5-325 mg per tablet, Take 0 5 tablets by mouth 2 (two) times a day as needed for severe pain for up to 30 days Max Daily Amount: 1 tablet, Disp: 30 tablet, Rfl: 0    [START ON 3/7/2019] oxyCODONE-acetaminophen (PERCOCET) 5-325 mg per tablet, Take 0 5 tablets by mouth 2 (two) times a day as needed (pain) for up to 30 days Max Daily Amount: 1 tablet, Disp: 30 tablet, Rfl: 0    Allergies   Allergen Reactions    Levaquin [Levofloxacin In D5w] Shortness Of Breath     Also hives      Amitiza [Lubiprostone] Other (See Comments) and Hives Reaction Date: 10Aug2011;   Migraines and vomiting    Biaxin [Clarithromycin] Hives    Cephradine     Erythromycin Hives     Reaction Date: 10Aug2011;    Jenness Lightning [Nitrofurantoin Monohyd Macro] Other (See Comments)     C/o passing out    Morphine     Penicillins Hives     Reaction Date: 10Aug2011;     Sulfa Antibiotics     Tetracycline     Tetracyclines & Related Hives    Morphine And Related Rash    Valium [Diazepam] Rash and Vomiting     Reaction Date: 14Jun2012;        Physical Exam:    /54 (BP Location: Right arm, Patient Position: Sitting, Cuff Size: Standard)   Pulse 83   Resp 18   Ht 5' 3" (1 6 m)   BMI 32 24 kg/m²      Constitutional:obese  Eyes:anicteric  HEENT:grossly intact  Neck:supple, symmetric, trachea midline and no masses   Pulmonary:even and unlabored  Cardiovascular:No edema or pitting edema present  Skin:Normal without rashes or lesions and well hydrated  Psychiatric:Mood and affect appropriate  Neurologic:Cranial Nerves II-XII grossly intact  Musculoskeletal:normal      Imaging  MRI cervical spine without contrast    (Results Pending)         Orders Placed This Encounter   Procedures    MRI cervical spine without contrast

## 2019-02-04 NOTE — PROGRESS NOTES
Pain Medicine Follow-Up Note    Assessment:  1  Chronic pain syndrome    2  Neck pain    3  Cervical disc disorder with radiculopathy of mid-cervical region        Plan:  Orders Placed This Encounter   Procedures    MRI cervical spine without contrast     Standing Status:   Future     Standing Expiration Date:   2/4/2023     Scheduling Instructions: There is no preparation for this test  Please leave your jewelry and valuables at home, wedding rings are the exception  Please bring your insurance cards, a form of photo ID and a list of your medications with you  Arrive 15 minutes prior to your appointment time in order to register  Please bring any prior CT or MRI studies of this area that were not performed at a [facility]  To schedule this appointment, please contact Central Scheduling at [phone]  Order Specific Question:   Is the patient pregnant? Answer:   Unknown     Order Specific Question:   What is the patient's sedation requirement? Answer:   Unknown       New Medications Ordered This Visit   Medications    oxyCODONE-acetaminophen (PERCOCET) 5-325 mg per tablet     Sig: Take 0 5 tablets by mouth 2 (two) times a day as needed for severe pain for up to 30 days Max Daily Amount: 1 tablet     Dispense:  30 tablet     Refill:  0    oxyCODONE-acetaminophen (PERCOCET) 5-325 mg per tablet     Sig: Take 0 5 tablets by mouth 2 (two) times a day as needed (pain) for up to 30 days Max Daily Amount: 1 tablet     Dispense:  30 tablet     Refill:  0    cyclobenzaprine (FLEXERIL) 10 mg tablet     Sig: Take 1 tablet (10 mg total) by mouth 3 (three) times a day as needed for muscle spasms     Dispense:  90 tablet     Refill:  1     My impressions and treatment recommendations were discussed in detail with the patient who verbalized understanding and had no further questions  The patient reports that her insurance company will no longer approve the chlorzoxazone    She has been using cyclobenzaprine  As such, I feel reasonable to continue using cyclobenzaprine 10 mg 3 times daily as needed for muscle spasms  She will also continue gabapentin 600 mg 3 times daily as well as oxycodone/acetaminophen 5/325 mg 0 5 tablets twice daily as needed for pain  I sent e-refills for the oxycodone/acetaminophen dated February 5, 2019 and March 7, 2019  The risks and side effects of chronic opioid treatment were discussed in detail with the patient  Side effects include but are not limited to nausea, vomiting, GI intolerance, sedation, constipation, mental clouding, opioid-induced hyperalgesia, endocrine dysfunction, addiction, dependence, and tolerance  The patient was asked to take his medications only as prescribed and directed, never in excess, and never for any other reason other than for pain control  The patient was also asked to keep his medications out of the reach of others and away from children, preferably in a locked drawer  The patient verbalized understanding and wished to use these opioid medications  A urine drug test was collected at today's office visit as part of our medication management protocol  The point of care testing results were appropriate for what was being prescribed  The specimen will be sent for confirmatory testing  The drug screen is medically necessary because the patient is either dependent on opioid medication or is being considered for opioid medication therapy and the results could impact ongoing or future treatment  The drug screen is to evaluate for the presence or absence of prescribed, non-prescribed, and/or illicit drugs and substances  [place] Prescription Drug Monitoring Program report was reviewed and was appropriate     Follow-up is planned in 2 months time or sooner as warranted  Discharge instructions were provided  I personally saw and examined the patient and I agree with the above discussed plan of care      History of Present Illness:    Shelly Kapoor is a 47 y o  female who presents to [facility] for interval re-evaluation of the above stated pain complaints  The patient has a past medical and chronic pain history as outlined in the assessment section  She was last seen on December 4, 2018 at which time she was maintained on gabapentin 600 mg 3 times daily as well as oxycodone/acetaminophen 5/325 mg 0 5 tablets twice daily as needed for pain  At today's office visit, the patient's pain score is 8/10 on the verbal numerical pain rating scale  The patient states that her pain is primarily in her neck with radiation into the right upper extremity  She states that her pain is constant in nature  She reports the quality of her pain as dull/aching, sharp, pressure-like, numbness, and pins and needles    She does report excellent pain relief with the gabapentin 600 mg 3 times daily as well as the oxycodone/acetaminophen 5/325 mg 0 5 tablets twice daily as needed for pain  She denies opioid induced constipation  Other than as stated above, the patient denies any interval changes in medications, medical condition, mental condition, symptoms, or allergies since the last office visit  Review of Systems:    Review of Systems   Respiratory: Negative for shortness of breath  Cardiovascular: Negative for chest pain  Gastrointestinal: Negative for constipation, diarrhea, nausea and vomiting  Musculoskeletal: Positive for gait problem (difficulty walking/ decreased range of motion) and joint swelling (joint stiffness)  Negative for arthralgias and myalgias  Skin: Negative for rash  Neurological: Positive for weakness (muscle weakness)  Negative for dizziness and seizures  All other systems reviewed and are negative          Patient Active Problem List   Diagnosis    Chronic pain syndrome    Acute muscle stiffness of neck    Cervical disc disorder with radiculopathy of mid-cervical region  Neck pain    Chronic left-sided low back pain with left-sided sciatica    Lumbar radiculopathy    Cervicalgia       Past Medical History:   Diagnosis Date    Anxiety     Asthma     exercise induced asthma    Cervical disc disorder     Chronic pain     Depression     Dysuria     Lung abnormality     nodules    Lung nodule     Pituitary abnormality (HCC)     tumor    Thrombocytopenia (HCC)     Thyroid disease        Past Surgical History:   Procedure Laterality Date    APPENDECTOMY      AUGMENTATION MAMMAPLASTY Bilateral     CHOLECYSTECTOMY      COLECTOMY      COLON SURGERY      EPIDURAL BLOCK INJECTION N/A 6/23/2016    Procedure: BLOCK / INJECTION EPIDURAL STEROID CERVICAL  C7-T1;  Surgeon: Destinee Andrea MD;  Location: [facility] MAIN OR;  Service:     EPIDURAL BLOCK INJECTION N/A 3/31/2016    Procedure: BLOCK / INJECTION EPIDURAL STEROID CERVICAL C7-T1  (C-ARM); Surgeon: Destinee Andrea MD;  Location: [facility] MAIN OR;  Service:     EPIDURAL BLOCK INJECTION N/A 8/8/2018    Procedure: C6 C7 Cervical Epidural Steroid Injection (72644); Surgeon: Destinee Andrea MD;  Location: [facility] MAIN OR;  Service: Pain Management     HYSTERECTOMY      PITUITARY SURGERY      NM [address] 85 DX/THER SBST EPIDURAL/SUBRACH CERV/THORACIC N/A 1/21/2016    Procedure: BLOCK / INJECTION EPIDURAL STEROID CERVICAL C7-T1 (C-ARM); Surgeon: Destinee Andrea MD;  Location: [facility] MAIN OR;  Service: Pain Management        Family History   Problem Relation Age of Onset    Thyroid disease Mother     Hyperlipidemia Mother     Depression Mother     Thyroid disease Father     Hyperlipidemia Father     Depression Father        Social History     Occupational History    Not on file       Social History Main Topics    Smoking status: Never Smoker    Smokeless tobacco: Never Used    Alcohol use No    Drug use: No    Sexual activity: Not on file         Current Outpatient Prescriptions:     albuterol (VENTOLIN HFA) 90 mcg/act inhaler, Inhale, Disp: , Rfl:     aspirin 81 MG tablet, Take 81 mg by mouth daily  , Disp: , Rfl:     cholecalciferol (VITAMIN D3) 1,000 units tablet, Take 2 tablets by mouth daily, Disp: , Rfl:     gabapentin (NEURONTIN) 600 MG tablet, Take 1 tablet (600 mg total) by mouth 3 (three) times a day, Disp: 90 tablet, Rfl: 1    hydrocortisone (ANUSOL-HC) 25 mg suppository, , Disp: , Rfl: 0    ibuprofen (ADVIL) 200 mg tablet, Take by mouth, Disp: , Rfl:     levothyroxine 75 mcg tablet, Take 75 mcg by mouth daily  , Disp: , Rfl:     mupirocin (BACTROBAN) 2 % ointment, , Disp: , Rfl: 0    traZODone (DESYREL) 50 mg tablet, , Disp: , Rfl: 0    cyclobenzaprine (FLEXERIL) 10 mg tablet, Take 1 tablet (10 mg total) by mouth 3 (three) times a day as needed for muscle spasms, Disp: 90 tablet, Rfl: 1    [START ON 2/5/2019] oxyCODONE-acetaminophen (PERCOCET) 5-325 mg per tablet, Take 0 5 tablets by mouth 2 (two) times a day as needed for severe pain for up to 30 days Max Daily Amount: 1 tablet, Disp: 30 tablet, Rfl: 0    [START ON 3/7/2019] oxyCODONE-acetaminophen (PERCOCET) 5-325 mg per tablet, Take 0 5 tablets by mouth 2 (two) times a day as needed (pain) for up to 30 days Max Daily Amount: 1 tablet, Disp: 30 tablet, Rfl: 0    Allergies   Allergen Reactions    Levaquin [Levofloxacin In D5w] Shortness Of Breath     Also hives      Amitiza [Lubiprostone] Other (See Comments) and Hives     Reaction Date: 10Aug2011;   Migraines and vomiting    Biaxin [Clarithromycin] Hives    Cephradine     Erythromycin Hives     Reaction Date: 10Aug2011;     Macrobid [Nitrofurantoin Monohyd Macro] Other (See Comments)     C/o passing out    Morphine     Penicillins Hives     Reaction Date: 10Aug2011;     Sulfa Antibiotics     Tetracycline     Tetracyclines & Related Hives    Morphine And Related Rash    Valium [Diazepam] Rash and Vomiting     Reaction Date: 14Jun2012;        Physical Exam:    /54 (BP Location: Right arm, Patient Position: Sitting, Cuff Size: Standard)   Pulse 83   Resp 18   Ht 5' 3" (1 6 m)   BMI 32 24 kg/m²     Constitutional:obese  Eyes:anicteric  HEENT:grossly intact  Neck:supple, symmetric, trachea midline and no masses   Pulmonary:even and unlabored  Cardiovascular:No edema or pitting edema present  Skin:Normal without rashes or lesions and well hydrated  Psychiatric:Mood and affect appropriate  Neurologic:Cranial Nerves II-XII grossly intact  Musculoskeletal:normal      Imaging  MRI cervical spine without contrast    (Results Pending)         Orders Placed This Encounter   Procedures    MRI cervical spine without contrast

## 2019-03-06 ENCOUNTER — TELEPHONE (OUTPATIENT)
Dept: PAIN MEDICINE | Facility: CLINIC | Age: 54
End: 2019-03-06

## 2019-03-06 NOTE — TELEPHONE ENCOUNTER
S/w spoke w pharmacy they are having a problem filling her refill for oxyCODONE-acetaminophen (PERCOCET) 5-325 mg per tablet    [id number] ([facility]) [id number], [place], [address]    Please call pt w/ out come

## 2019-03-06 NOTE — TELEPHONE ENCOUNTER
LMOM to CB, CB# provided  Rx for Percocet due to be filled tomorrow 3/7/19  [facility] notified the same

## 2019-03-27 ENCOUNTER — OFFICE VISIT (OUTPATIENT)
Dept: PAIN MEDICINE | Facility: CLINIC | Age: 54
End: 2019-03-27
Payer: COMMERCIAL

## 2019-03-27 VITALS
HEART RATE: 84 BPM | SYSTOLIC BLOOD PRESSURE: 80 MMHG | DIASTOLIC BLOOD PRESSURE: 40 MMHG | RESPIRATION RATE: 18 BRPM | BODY MASS INDEX: 32.24 KG/M2 | HEIGHT: 63 IN

## 2019-03-27 DIAGNOSIS — G89.4 CHRONIC PAIN SYNDROME: Primary | ICD-10-CM

## 2019-03-27 DIAGNOSIS — M54.2 NECK PAIN: ICD-10-CM

## 2019-03-27 DIAGNOSIS — M50.120 CERVICAL DISC DISORDER WITH RADICULOPATHY OF MID-CERVICAL REGION: ICD-10-CM

## 2019-03-27 DIAGNOSIS — G89.29 CHRONIC PAIN OF BOTH SHOULDERS: ICD-10-CM

## 2019-03-27 DIAGNOSIS — M25.511 CHRONIC PAIN OF BOTH SHOULDERS: ICD-10-CM

## 2019-03-27 DIAGNOSIS — M25.512 CHRONIC PAIN OF BOTH SHOULDERS: ICD-10-CM

## 2019-03-27 PROCEDURE — 99214 OFFICE O/P EST MOD 30 MIN: CPT | Performed by: ANESTHESIOLOGY

## 2019-03-27 RX ORDER — OXYCODONE HYDROCHLORIDE AND ACETAMINOPHEN 5; 325 MG/1; MG/1
0.5 TABLET ORAL 2 TIMES DAILY PRN
Qty: 30 TABLET | Refills: 0 | Status: SHIPPED | OUTPATIENT
Start: 2019-05-06 | End: 2019-04-25 | Stop reason: SDUPTHER

## 2019-03-27 RX ORDER — CYCLOBENZAPRINE HCL 10 MG
10 TABLET ORAL 3 TIMES DAILY PRN
Qty: 90 TABLET | Refills: 1 | Status: SHIPPED | OUTPATIENT
Start: 2019-03-27 | End: 2019-06-17 | Stop reason: SDUPTHER

## 2019-03-27 RX ORDER — OXYCODONE HYDROCHLORIDE AND ACETAMINOPHEN 5; 325 MG/1; MG/1
0.5 TABLET ORAL 2 TIMES DAILY PRN
Qty: 30 TABLET | Refills: 0 | Status: SHIPPED | OUTPATIENT
Start: 2019-04-06 | End: 2019-06-17 | Stop reason: SDUPTHER

## 2019-03-27 NOTE — PROGRESS NOTES
Pain Medicine Follow-Up Note    Assessment:  1  Chronic pain syndrome    2  Neck pain    3  Cervical disc disorder with radiculopathy of mid-cervical region    4  Chronic pain of both shoulders        Plan:  Orders Placed This Encounter   Procedures    Ambulatory referral to Orthopedic Surgery     Standing Status:   Future     Standing Expiration Date:   9/27/2019     Referral Priority:   Routine     Referral Type:   Consult - AMB     Referral Reason:   Specialty Services Required     Referred to Provider:   Ghulam Mckee DO     Requested Specialty:   Orthopedic Surgery     Number of Visits Requested:   1     Expiration Date:   3/27/2020       New Medications Ordered This Visit   Medications    oxyCODONE-acetaminophen (PERCOCET) 5-325 mg per tablet     Sig: Take 0 5 tablets by mouth 2 (two) times a day as needed for severe pain for up to 30 daysMax Daily Amount: 1 tablet     Dispense:  30 tablet     Refill:  0    oxyCODONE-acetaminophen (PERCOCET) 5-325 mg per tablet     Sig: Take 0 5 tablets by mouth 2 (two) times a day as needed (pain) for up to 30 daysMax Daily Amount: 1 tablet     Dispense:  30 tablet     Refill:  0    cyclobenzaprine (FLEXERIL) 10 mg tablet     Sig: Take 1 tablet (10 mg total) by mouth 3 (three) times a day as needed for muscle spasms     Dispense:  90 tablet     Refill:  1     My impressions and treatment recommendations were discussed in detail with the patient who verbalized understanding and had no further questions  Given that the patient reports overall reduced pain and improved level functioning without significant side effects, I felt a reasonable to continue the patient on oxycodone/acetaminophen 5/325 mg 0 5 tablets twice daily as needed for pain, cyclobenzaprine 10 mg 3 times daily as needed for muscle spasms, and gabapentin 600 mg 3 times daily  The patient does not require a Gabapentin refill at today's visit    I sent E prescriptions for the oxycodone/acetaminophen dated April 6, 2019 and May 6, 2019  The risks and side effects of chronic opioid treatment were discussed in detail with the patient  Side effects include but are not limited to nausea, vomiting, GI intolerance, sedation, constipation, mental clouding, opioid-induced hyperalgesia, endocrine dysfunction, addiction, dependence, and tolerance  The patient was asked to take his medications only as prescribed and directed, never in excess, and never for any other reason other than for pain control  The patient was also asked to keep his medications out of the reach of others and away from children, preferably in a locked drawer  The patient verbalized understanding and wished to use these opioid medications  [place] Prescription Drug Monitoring Program report was reviewed and was appropriate     The patient is complaining of bilateral shoulder pain along with upper back pain at today's visit  She did state that the trigger point injections did help her significantly in the past   She would like to undergo trigger point injections at today's visit  As such, I felt a reasonable to have the patient undergo trigger point injections to her cervical and upper trapezius musculature since this could be potentially therapeutic  The procedures, its risks, and benefits were explained in detail to the patient  Risks include but are not limited to bleeding, infection, hematoma formation, abscess formation, weakness, headache, failure the pain to improve, nerve irritation or damage, and potential worsening of the pain  The patient verbalized understanding and wished to proceed with the procedure  I also felt a reasonable to have the patient see Dr Kendrick Thompson regarding an evaluation for her bilateral shoulder pain  She did report in the past that she was diagnosed with a rotator cuff tear  As such, I asked her to see Dr Kendrick Thompson to see if this could be evaluated further      Follow-up is planned in 2 months time or sooner as warranted  Discharge instructions were provided  I personally saw and examined the patient and I agree with the above discussed plan of care  History of Present Illness:    Nicolasa Capellan is a 47 y o  female who presents to [facility] for interval re-evaluation of the above stated pain complaints  The patient has a past medical and chronic pain history as outlined in the assessment section  She was last seen on February 4, 2019 at which time she was maintained on gabapentin 600 mg 3 times daily, cyclobenzaprine 10 mg 3 times daily as needed for muscle spasms, and oxycodone/acetaminophen 5/325 mg 0 5 tablets twice daily as needed for pain  At today's office visit, the patient's pain score is 8 to 9/10 on the verbal numerical pain rating scale  The patient states that her pain is worse at night and constant in nature  She reports the quality of her pain as sharp, throbbing, pressure-like, shooting, numbness, and pins and needles    She is reporting an acute exacerbation refer bilateral upper trapezius and cervical paraspinal musculature tenderness  She is also complaining of bilateral shoulder pain currently  She would like to undergo trigger point injections at this time to help alleviate her pain  She also reports that she is continuing her gabapentin, cyclobenzaprine, and oxycodone/acetaminophen  She denies opioid induced constipation  Other than as stated above, the patient denies any interval changes in medications, medical condition, mental condition, symptoms, or allergies since the last office visit  Review of Systems:    Review of Systems   Constitutional: Negative for fever and unexpected weight change  HENT: Negative for trouble swallowing  Eyes: Negative for visual disturbance  Respiratory: Negative for shortness of breath and wheezing  Cardiovascular: Negative for chest pain and palpitations     Gastrointestinal: Negative for constipation, diarrhea, nausea and vomiting  Endocrine: Negative for cold intolerance, heat intolerance and polydipsia  Genitourinary: Negative for difficulty urinating and frequency  Musculoskeletal: Positive for gait problem (difficulty walking/ decreased range of motion) and joint swelling (joint stiffness)  Negative for arthralgias and myalgias  Skin: Negative for rash  Neurological: Positive for weakness (muscle weakness)  Negative for dizziness, seizures, syncope and headaches  Hematological: Does not bruise/bleed easily  Psychiatric/Behavioral: Negative for dysphoric mood  All other systems reviewed and are negative  Patient Active Problem List   Diagnosis    Chronic pain syndrome    Acute muscle stiffness of neck    Cervical disc disorder with radiculopathy of mid-cervical region    Neck pain    Chronic left-sided low back pain with left-sided sciatica    Lumbar radiculopathy    Cervicalgia       Past Medical History:   Diagnosis Date    Anxiety     Asthma     exercise induced asthma    Cervical disc disorder     Chronic pain     Depression     Dysuria     Lung abnormality     nodules    Lung nodule     Pituitary abnormality (HCC)     tumor    Thrombocytopenia (HCC)     Thyroid disease        Past Surgical History:   Procedure Laterality Date    APPENDECTOMY      AUGMENTATION MAMMAPLASTY Bilateral     CHOLECYSTECTOMY      COLECTOMY      COLON SURGERY      EPIDURAL BLOCK INJECTION N/A 6/23/2016    Procedure: BLOCK / INJECTION EPIDURAL STEROID CERVICAL  C7-T1;  Surgeon: Tory Guo MD;  [facility] MAIN OR;  Service:     EPIDURAL BLOCK INJECTION N/A 3/31/2016    Procedure: BLOCK / INJECTION EPIDURAL STEROID CERVICAL C7-T1  (C-ARM); Surgeon: Tory Guo MD;  Location: [facility] MAIN OR;  Service:     EPIDURAL BLOCK INJECTION N/A 8/8/2018    Procedure: C6 C7 Cervical Epidural Steroid Injection (00064);   Surgeon: Tory Guo MD;  [facility]: [facility] OR;  Service: Pain Management     HYSTERECTOMY      PITUITARY SURGERY      SC NJX DX/THER SBST EPIDURAL/SUBRACH CERV/THORACIC N/A 1/21/2016    Procedure: BLOCK / INJECTION EPIDURAL STEROID CERVICAL C7-T1 (C-ARM); Surgeon: Destinee Andrea MD;  Location: [facility] MAIN OR;  Service: Pain Management        Family History   Problem Relation Age of Onset    Thyroid disease Mother     Hyperlipidemia Mother     Depression Mother     Thyroid disease Father     Hyperlipidemia Father     Depression Father        Social History     Occupational History    Not on file   Tobacco Use    Smoking status: Never Smoker    Smokeless tobacco: Never Used   Substance and Sexual Activity    Alcohol use: No    Drug use: No    Sexual activity: Not on file         Current Outpatient Medications:     albuterol (VENTOLIN HFA) 90 mcg/act inhaler, Inhale, Disp: , Rfl:     aspirin 81 MG tablet, Take 81 mg by mouth daily  , Disp: , Rfl:     cholecalciferol (VITAMIN D3) 1,000 units tablet, Take 2 tablets by mouth daily, Disp: , Rfl:     cyclobenzaprine (FLEXERIL) 10 mg tablet, Take 1 tablet (10 mg total) by mouth 3 (three) times a day as needed for muscle spasms, Disp: 90 tablet, Rfl: 1    gabapentin (NEURONTIN) 600 MG tablet, Take 1 tablet (600 mg total) by mouth 3 (three) times a day, Disp: 90 tablet, Rfl: 1    hydrocortisone (ANUSOL-HC) 25 mg suppository, , Disp: , Rfl: 0    ibuprofen (ADVIL) 200 mg tablet, Take by mouth, Disp: , Rfl:     levothyroxine 75 mcg tablet, Take 75 mcg by mouth daily  , Disp: , Rfl:     mupirocin (BACTROBAN) 2 % ointment, , Disp: , Rfl: 0    [START ON 5/6/2019] oxyCODONE-acetaminophen (PERCOCET) 5-325 mg per tablet, Take 0 5 tablets by mouth 2 (two) times a day as needed (pain) for up to 30 daysMax Daily Amount: 1 tablet, Disp: 30 tablet, Rfl: 0    traZODone (DESYREL) 50 mg tablet, , Disp: , Rfl: 0    [START ON 4/6/2019] oxyCODONE-acetaminophen (PERCOCET) 5-325 mg per tablet, Take 0 5 tablets by mouth 2 (two) times a day as needed for severe pain for up to 30 daysMax Daily Amount: 1 tablet, Disp: 30 tablet, Rfl: 0    Allergies   Allergen Reactions    Levaquin [Levofloxacin In D5w] Shortness Of Breath     Also hives      Amitiza [Lubiprostone] Other (See Comments) and Hives     Reaction Date: 10Aug2011;   Migraines and vomiting    Biaxin [Clarithromycin] Hives    Cephradine     Erythromycin Hives     Reaction Date: 10Aug2011;    Roland Bowling [Nitrofurantoin Monohyd Macro] Other (See Comments)     C/o passing out    Morphine     Penicillins Hives     Reaction Date: 10Aug2011;     Sulfa Antibiotics     Tetracycline     Tetracyclines & Related Hives    Morphine And Related Rash    Valium [Diazepam] Rash and Vomiting     Reaction Date: 14Jun2012;        Physical Exam:    BP (!) 80/40 (BP Location: Left arm, Patient Position: Sitting, Cuff Size: Standard)   Pulse 84   Resp 18   Ht 5' 3" (1 6 m)   BMI 32 24 kg/m²     Constitutional:obese  Eyes:anicteric  HEENT:grossly intact  Neck:supple, symmetric, trachea midline and no masses   Pulmonary:even and unlabored  Cardiovascular:No edema or pitting edema present  Skin:Normal without rashes or lesions and well hydrated  Psychiatric:Mood and affect appropriate  Neurologic:Cranial Nerves II-XII grossly intact  Musculoskeletal:normal    Orders Placed This Encounter   Procedures    Ambulatory referral to Orthopedic Surgery

## 2019-04-15 ENCOUNTER — PROCEDURE VISIT (OUTPATIENT)
Dept: PAIN MEDICINE | Facility: CLINIC | Age: 54
End: 2019-04-15
Payer: COMMERCIAL

## 2019-04-15 VITALS — BODY MASS INDEX: 32.24 KG/M2 | HEIGHT: 63 IN

## 2019-04-15 DIAGNOSIS — M43.6 ACUTE MUSCLE STIFFNESS OF NECK: ICD-10-CM

## 2019-04-15 DIAGNOSIS — M54.2 NECK PAIN: ICD-10-CM

## 2019-04-15 DIAGNOSIS — G89.4 CHRONIC PAIN SYNDROME: Primary | ICD-10-CM

## 2019-04-15 DIAGNOSIS — M54.2 CERVICALGIA: ICD-10-CM

## 2019-04-15 PROCEDURE — 20553 NJX 1/MLT TRIGGER POINTS 3/>: CPT | Performed by: ANESTHESIOLOGY

## 2019-04-15 RX ORDER — LIDOCAINE HYDROCHLORIDE 10 MG/ML
50 INJECTION, SOLUTION INFILTRATION; PERINEURAL ONCE
Status: COMPLETED | OUTPATIENT
Start: 2019-04-15 | End: 2019-04-15

## 2019-04-15 RX ORDER — METHYLPREDNISOLONE ACETATE 40 MG/ML
40 INJECTION, SUSPENSION INTRA-ARTICULAR; INTRALESIONAL; INTRAMUSCULAR; SOFT TISSUE ONCE
Status: COMPLETED | OUTPATIENT
Start: 2019-04-15 | End: 2019-04-15

## 2019-04-15 RX ADMIN — LIDOCAINE HYDROCHLORIDE 50 ML: 10 INJECTION, SOLUTION INFILTRATION; PERINEURAL at 08:05

## 2019-04-15 RX ADMIN — METHYLPREDNISOLONE ACETATE 40 MG: 40 INJECTION, SUSPENSION INTRA-ARTICULAR; INTRALESIONAL; INTRAMUSCULAR; SOFT TISSUE at 08:06

## 2019-04-25 ENCOUNTER — OFFICE VISIT (OUTPATIENT)
Dept: OBGYN CLINIC | Facility: CLINIC | Age: 54
End: 2019-04-25
Payer: COMMERCIAL

## 2019-04-25 ENCOUNTER — APPOINTMENT (OUTPATIENT)
Dept: RADIOLOGY | Facility: CLINIC | Age: 54
End: 2019-04-25
Payer: COMMERCIAL

## 2019-04-25 VITALS
SYSTOLIC BLOOD PRESSURE: 115 MMHG | HEIGHT: 63 IN | HEART RATE: 80 BPM | DIASTOLIC BLOOD PRESSURE: 74 MMHG | BODY MASS INDEX: 32.24 KG/M2

## 2019-04-25 DIAGNOSIS — M25.512 CHRONIC PAIN OF BOTH SHOULDERS: ICD-10-CM

## 2019-04-25 DIAGNOSIS — G89.29 CHRONIC PAIN OF BOTH SHOULDERS: ICD-10-CM

## 2019-04-25 DIAGNOSIS — M75.50 SUBACROMIAL BURSITIS: Primary | ICD-10-CM

## 2019-04-25 DIAGNOSIS — M25.511 CHRONIC PAIN OF BOTH SHOULDERS: ICD-10-CM

## 2019-04-25 PROCEDURE — 99213 OFFICE O/P EST LOW 20 MIN: CPT | Performed by: ORTHOPAEDIC SURGERY

## 2019-04-25 PROCEDURE — 73030 X-RAY EXAM OF SHOULDER: CPT

## 2019-04-25 PROCEDURE — 20610 DRAIN/INJ JOINT/BURSA W/O US: CPT | Performed by: ORTHOPAEDIC SURGERY

## 2019-04-25 RX ORDER — LIDOCAINE HYDROCHLORIDE 10 MG/ML
4 INJECTION, SOLUTION INFILTRATION; PERINEURAL
Status: COMPLETED | OUTPATIENT
Start: 2019-04-25 | End: 2019-04-25

## 2019-04-25 RX ORDER — DEXAMETHASONE SODIUM PHOSPHATE 100 MG/10ML
40 INJECTION INTRAMUSCULAR; INTRAVENOUS
Status: COMPLETED | OUTPATIENT
Start: 2019-04-25 | End: 2019-04-25

## 2019-04-25 RX ADMIN — DEXAMETHASONE SODIUM PHOSPHATE 40 MG: 100 INJECTION INTRAMUSCULAR; INTRAVENOUS at 10:52

## 2019-04-25 RX ADMIN — LIDOCAINE HYDROCHLORIDE 4 ML: 10 INJECTION, SOLUTION INFILTRATION; PERINEURAL at 10:52

## 2019-04-25 RX ADMIN — DEXAMETHASONE SODIUM PHOSPHATE 40 MG: 100 INJECTION INTRAMUSCULAR; INTRAVENOUS at 10:53

## 2019-04-25 RX ADMIN — LIDOCAINE HYDROCHLORIDE 4 ML: 10 INJECTION, SOLUTION INFILTRATION; PERINEURAL at 10:53

## 2019-06-17 ENCOUNTER — OFFICE VISIT (OUTPATIENT)
Dept: PAIN MEDICINE | Facility: CLINIC | Age: 54
End: 2019-06-17
Payer: COMMERCIAL

## 2019-06-17 VITALS
DIASTOLIC BLOOD PRESSURE: 60 MMHG | HEIGHT: 63 IN | SYSTOLIC BLOOD PRESSURE: 102 MMHG | HEART RATE: 27 BPM | RESPIRATION RATE: 18 BRPM | BODY MASS INDEX: 32.24 KG/M2

## 2019-06-17 DIAGNOSIS — G89.4 CHRONIC PAIN SYNDROME: Primary | ICD-10-CM

## 2019-06-17 DIAGNOSIS — M50.120 CERVICAL DISC DISORDER WITH RADICULOPATHY OF MID-CERVICAL REGION: ICD-10-CM

## 2019-06-17 DIAGNOSIS — M54.2 NECK PAIN: ICD-10-CM

## 2019-06-17 PROCEDURE — 99214 OFFICE O/P EST MOD 30 MIN: CPT | Performed by: ANESTHESIOLOGY

## 2019-06-17 RX ORDER — CYCLOBENZAPRINE HCL 10 MG
10 TABLET ORAL 3 TIMES DAILY PRN
Qty: 90 TABLET | Refills: 1 | Status: SHIPPED | OUTPATIENT
Start: 2019-06-17 | End: 2019-08-13 | Stop reason: SDUPTHER

## 2019-06-17 RX ORDER — OXYCODONE HYDROCHLORIDE AND ACETAMINOPHEN 5; 325 MG/1; MG/1
0.5 TABLET ORAL 2 TIMES DAILY PRN
Qty: 30 TABLET | Refills: 0 | Status: SHIPPED | OUTPATIENT
Start: 2019-06-17 | End: 2019-08-13 | Stop reason: SDUPTHER

## 2019-06-17 RX ORDER — OXYCODONE HYDROCHLORIDE AND ACETAMINOPHEN 5; 325 MG/1; MG/1
0.5 TABLET ORAL 2 TIMES DAILY PRN
Qty: 30 TABLET | Refills: 0 | Status: SHIPPED | OUTPATIENT
Start: 2019-07-17 | End: 2019-08-13 | Stop reason: SDUPTHER

## 2019-06-25 ENCOUNTER — PROCEDURE VISIT (OUTPATIENT)
Dept: PAIN MEDICINE | Facility: CLINIC | Age: 54
End: 2019-06-25
Payer: COMMERCIAL

## 2019-06-25 VITALS
DIASTOLIC BLOOD PRESSURE: 70 MMHG | BODY MASS INDEX: 32.24 KG/M2 | HEIGHT: 63 IN | SYSTOLIC BLOOD PRESSURE: 107 MMHG | HEART RATE: 92 BPM

## 2019-06-25 DIAGNOSIS — M79.18 MYOFASCIAL PAIN SYNDROME: ICD-10-CM

## 2019-06-25 DIAGNOSIS — G89.4 CHRONIC PAIN SYNDROME: Primary | ICD-10-CM

## 2019-06-25 DIAGNOSIS — G89.29 CHRONIC LEFT-SIDED LOW BACK PAIN WITHOUT SCIATICA: ICD-10-CM

## 2019-06-25 DIAGNOSIS — M54.50 CHRONIC LEFT-SIDED LOW BACK PAIN WITHOUT SCIATICA: ICD-10-CM

## 2019-06-25 PROCEDURE — 20553 NJX 1/MLT TRIGGER POINTS 3/>: CPT | Performed by: ANESTHESIOLOGY

## 2019-06-25 RX ORDER — LIDOCAINE HYDROCHLORIDE 10 MG/ML
7 INJECTION, SOLUTION INFILTRATION; PERINEURAL ONCE
Status: COMPLETED | OUTPATIENT
Start: 2019-06-25 | End: 2019-06-25

## 2019-06-25 RX ORDER — METHYLPREDNISOLONE ACETATE 40 MG/ML
40 INJECTION, SUSPENSION INTRA-ARTICULAR; INTRALESIONAL; INTRAMUSCULAR; SOFT TISSUE ONCE
Status: COMPLETED | OUTPATIENT
Start: 2019-06-25 | End: 2019-06-25

## 2019-06-25 RX ADMIN — LIDOCAINE HYDROCHLORIDE 7 ML: 10 INJECTION, SOLUTION INFILTRATION; PERINEURAL at 14:22

## 2019-06-25 RX ADMIN — METHYLPREDNISOLONE ACETATE 40 MG: 40 INJECTION, SUSPENSION INTRA-ARTICULAR; INTRALESIONAL; INTRAMUSCULAR; SOFT TISSUE at 14:24

## 2019-07-15 ENCOUNTER — OFFICE VISIT (OUTPATIENT)
Dept: URGENT CARE | Facility: CLINIC | Age: 54
End: 2019-07-15
Payer: COMMERCIAL

## 2019-07-15 VITALS
BODY MASS INDEX: 32.43 KG/M2 | HEART RATE: 93 BPM | WEIGHT: 183 LBS | OXYGEN SATURATION: 99 % | SYSTOLIC BLOOD PRESSURE: 116 MMHG | RESPIRATION RATE: 16 BRPM | TEMPERATURE: 97.7 F | DIASTOLIC BLOOD PRESSURE: 78 MMHG | HEIGHT: 63 IN

## 2019-07-15 DIAGNOSIS — M26.622 ARTHRALGIA OF LEFT TEMPOROMANDIBULAR JOINT: Primary | ICD-10-CM

## 2019-07-15 PROCEDURE — G0383 LEV 4 HOSP TYPE B ED VISIT: HCPCS | Performed by: PHYSICIAN ASSISTANT

## 2019-07-15 PROCEDURE — S9083 URGENT CARE CENTER GLOBAL: HCPCS | Performed by: PHYSICIAN ASSISTANT

## 2019-07-15 PROCEDURE — 99284 EMERGENCY DEPT VISIT MOD MDM: CPT | Performed by: PHYSICIAN ASSISTANT

## 2019-07-15 RX ORDER — IBUPROFEN 600 MG/1
600 TABLET ORAL EVERY 6 HOURS PRN
Qty: 30 TABLET | Refills: 0 | Status: SHIPPED | OUTPATIENT
Start: 2019-07-15

## 2019-07-15 NOTE — PATIENT INSTRUCTIONS
Temporomandibular Disorder   WHAT YOU NEED TO KNOW:   Temporomandibular disorder is a condition that causes pain in your jaw  The disorder affects the joint between your temporal bone and your mandible (jawbone)  The muscles and nerves around the joint are also affected  DISCHARGE INSTRUCTIONS:   Medicines:   · Pain medicine: You may be given a prescription medicine to decrease pain  Do not wait until the pain is severe before you take this medicine  · NSAIDs:  These medicines decrease swelling and pain  You can buy NSAIDs without a doctor's order  Ask your healthcare provider which medicine is right for you, and how much to take  Take as directed  NSAIDs can cause stomach bleeding or kidney problems if not taken correctly  · Muscle relaxers  help decrease pain and muscle spasms  · Take your medicine as directed  Contact your healthcare provider if you think your medicine is not helping or if you have side effects  Tell him of her if you are allergic to any medicine  Keep a list of the medicines, vitamins, and herbs you take  Include the amounts, and when and why you take them  Bring the list or the pill bottles to follow-up visits  Carry your medicine list with you in case of an emergency  Follow up with your healthcare provider as directed:  Write down your questions so you remember to ask them during your visits  Manage your symptoms:   · Eat soft foods: Your healthcare provider may suggest that you eat only soft foods for several days  A dietitian may work with you to find foods that are easier to bite, chew, or swallow  Examples are soup, applesauce, cottage cheese, pudding, yogurt, and soft fruits  · Use jaw supporting devices:  Splints may be used to support your jaw or keep your jaw from moving  You may need to wear a mouth guard to keep you from clenching or grinding your teeth while you are sleeping  · Use ice and heat:  Ice helps decrease swelling and pain   Ice may also help prevent tissue damage  Use an ice pack, or put crushed ice in a plastic bag  Cover it with a towel and place it on your jaw for 15 to 20 minutes every hour or as directed  After the first 24 to 48 hours, use heat to decrease pain, swelling, and muscle spasms  Apply heat on the area for 20 to 30 minutes every 2 hours for as many days as directed  Use a heating pad, moist warm compress, or a hot water bottle  · Go to physical therapy:  A physical therapist teaches you exercises to help improve movement and strength, and to decrease pain in your jaw  A speech therapist may help you with swallowing and speech exercises  Contact your healthcare provider if:   · You have a fever  · Your splint or mouth guard is loose  · You have questions or concerns about your condition or care  Return to the emergency department if:   · You have nausea, are vomiting, or cannot keep liquids down  · You have pain that does not go away even after you take your pain medicine  · You have problems breathing, talking, drinking, eating, or swallowing  · Your splint or mouth guard gets damaged or broken  © 2017 [facility] Information is for End User's use only and may not be sold, redistributed or otherwise used for commercial purposes  All illustrations and images included in CareNotes® are the copyrighted property of A D A M [employer]  or Jose Harman  The above information is an  only  It is not intended as medical advice for individual conditions or treatments  Talk to your doctor, nurse or pharmacist before following any medical regimen to see if it is safe and effective for you

## 2019-07-15 NOTE — PROGRESS NOTES
[facility]        NAME: Master Martin is a 47 y o  female  : 1965    MRN: [id number]  DATE: July 15, 2019  TIME: 3:41 PM    Assessment and Plan   Arthralgia of left temporomandibular joint [M26 622]  1  Arthralgia of left temporomandibular joint  ibuprofen (MOTRIN) 600 mg tablet         Patient Instructions     Patient Instructions   Temporomandibular Disorder   WHAT YOU NEED TO KNOW:   Temporomandibular disorder is a condition that causes pain in your jaw  The disorder affects the joint between your temporal bone and your mandible (jawbone)  The muscles and nerves around the joint are also affected  DISCHARGE INSTRUCTIONS:   Medicines:   · Pain medicine: You may be given a prescription medicine to decrease pain  Do not wait until the pain is severe before you take this medicine  · NSAIDs:  These medicines decrease swelling and pain  You can buy NSAIDs without a doctor's order  Ask your healthcare provider which medicine is right for you, and how much to take  Take as directed  NSAIDs can cause stomach bleeding or kidney problems if not taken correctly  · Muscle relaxers  help decrease pain and muscle spasms  · Take your medicine as directed  Contact your healthcare provider if you think your medicine is not helping or if you have side effects  Tell him of her if you are allergic to any medicine  Keep a list of the medicines, vitamins, and herbs you take  Include the amounts, and when and why you take them  Bring the list or the pill bottles to follow-up visits  Carry your medicine list with you in case of an emergency  Follow up with your healthcare provider as directed:  Write down your questions so you remember to ask them during your visits  Manage your symptoms:   · Eat soft foods: Your healthcare provider may suggest that you eat only soft foods for several days  A dietitian may work with you to find foods that are easier to bite, chew, or swallow   Examples are soup, applesauce, cottage cheese, pudding, yogurt, and soft fruits  · Use jaw supporting devices:  Splints may be used to support your jaw or keep your jaw from moving  You may need to wear a mouth guard to keep you from clenching or grinding your teeth while you are sleeping  · Use ice and heat:  Ice helps decrease swelling and pain  Ice may also help prevent tissue damage  Use an ice pack, or put crushed ice in a plastic bag  Cover it with a towel and place it on your jaw for 15 to 20 minutes every hour or as directed  After the first 24 to 48 hours, use heat to decrease pain, swelling, and muscle spasms  Apply heat on the area for 20 to 30 minutes every 2 hours for as many days as directed  Use a heating pad, moist warm compress, or a hot water bottle  · Go to physical therapy:  A physical therapist teaches you exercises to help improve movement and strength, and to decrease pain in your jaw  A speech therapist may help you with swallowing and speech exercises  Contact your healthcare provider if:   · You have a fever  · Your splint or mouth guard is loose  · You have questions or concerns about your condition or care  Return to the emergency department if:   · You have nausea, are vomiting, or cannot keep liquids down  · You have pain that does not go away even after you take your pain medicine  · You have problems breathing, talking, drinking, eating, or swallowing  · Your splint or mouth guard gets damaged or broken  © 2017 [address]  Information is for End User's use only and may not be sold, redistributed or otherwise used for commercial purposes  All illustrations and images included in CareNotes® are the copyrighted property of [employer]  or Jose Harman  The above information is an  only  It is not intended as medical advice for individual conditions or treatments   Talk to your doctor, nurse or pharmacist before following any medical regimen to see if it is safe and effective for you  Follow up with PCP in 3-5 days  Proceed to  ER if symptoms worsen  Chief Complaint     Chief Complaint   Patient presents with    Earache     Pt c/o L ear and L sided facial pain x 2 days    Eye Problem     States she had some L eye drainage this morning, feels scratchy         History of Present Illness        71-year-old female presents to the clinic complaining of left ear pain and left facial pain for 2 days  She woke up with some crusting in her left sinus of bit scratchy today  No drainage since he woke up  No vision changes  No cough with some congestion  No fever  No ear drainage or change in hearing  Review of Systems   Review of Systems   Constitutional: Negative  HENT: Positive for congestion and ear pain  Negative for sinus pressure, sinus pain and sore throat  Eyes: Positive for discharge  Respiratory: Negative for cough  Cardiovascular: Negative  Current Medications       Current Outpatient Medications:     METOPROLOL TARTRATE PO, Take by mouth, Disp: , Rfl:     albuterol (VENTOLIN HFA) 90 mcg/act inhaler, Inhale, Disp: , Rfl:     aspirin 81 MG tablet, Take 81 mg by mouth daily  , Disp: , Rfl:     cholecalciferol (VITAMIN D3) 1,000 units tablet, Take 2 tablets by mouth daily, Disp: , Rfl:     cyclobenzaprine (FLEXERIL) 10 mg tablet, Take 1 tablet (10 mg total) by mouth 3 (three) times a day as needed for muscle spasms, Disp: 90 tablet, Rfl: 1    hydrocortisone (ANUSOL-HC) 25 mg suppository, , Disp: , Rfl: 0    ibuprofen (MOTRIN) 600 mg tablet, Take 1 tablet (600 mg total) by mouth every 6 (six) hours as needed for mild pain, Disp: 30 tablet, Rfl: 0    levothyroxine 75 mcg tablet, Take 75 mcg by mouth daily  , Disp: , Rfl:     mupirocin (BACTROBAN) 2 % ointment, , Disp: , Rfl: 0    oxyCODONE-acetaminophen (PERCOCET) 5-325 mg per tablet, Take 0 5 tablets by mouth 2 (two) times a day as needed for severe pain for up to 30 daysMax Daily Amount: 1 tablet, Disp: 30 tablet, Rfl: 0    [START ON 7/17/2019] oxyCODONE-acetaminophen (PERCOCET) 5-325 mg per tablet, Take 0 5 tablets by mouth 2 (two) times a day as needed for severe pain for up to 30 daysMax Daily Amount: 1 tablet, Disp: 30 tablet, Rfl: 0    traZODone (DESYREL) 50 mg tablet, , Disp: , Rfl: 0    Current Allergies     Allergies as of 07/15/2019 - Reviewed 07/15/2019   Allergen Reaction Noted    Levaquin [levofloxacin in d5w] Shortness Of Breath 01/21/2016    Amitiza [lubiprostone] Other (See Comments) and Hives 08/10/2011    Biaxin [clarithromycin] Hives 01/21/2016    Cephradine      Erythromycin Hives 08/10/2011    Macrobid [nitrofurantoin monohyd macro] Other (See Comments) 01/21/2016    Morphine      Penicillins Hives 08/10/2011    Sulfa antibiotics      Tetracycline      Tetracyclines & related Hives 01/21/2016    Morphine and related Rash 01/21/2016    Valium [diazepam] Rash and Vomiting 06/14/2012            The following portions of the patient's history were reviewed and updated as appropriate: allergies, current medications, past family history, past medical history, past social history, past surgical history and problem list      Past Medical History:   Diagnosis Date    Anxiety     Asthma     exercise induced asthma    Cervical disc disorder     Chronic pain     Depression     Dysuria     Lung abnormality     nodules    Lung nodule     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    Thrombocytopenia ([facility] [address] )     Thyroid disease        Past Surgical History:   Procedure Laterality Date    APPENDECTOMY      AUGMENTATION MAMMAPLASTY Bilateral     CHOLECYSTECTOMY      COLECTOMY      COLON SURGERY      EPIDURAL BLOCK INJECTION N/A 6/23/2016    Procedure: BLOCK / INJECTION EPIDURAL STEROID CERVICAL  C7-T1;  Surgeon: Maranda Laurent MD;  Location: [facility] MAIN OR;  Service:     EPIDURAL BLOCK INJECTION N/A 3/31/2016    Procedure: BLOCK / INJECTION EPIDURAL STEROID CERVICAL C7-T1  (C-ARM); Surgeon: Kleber Candelaria MD;  Location: [facility] MAIN OR;  Service:     EPIDURAL BLOCK INJECTION N/A 8/8/2018    Procedure: C6 C7 Cervical Epidural Steroid Injection (42791); Surgeon: Kleber Candelaria MD;  Location: [facility] MAIN OR;  Service: Pain Management     HYSTERECTOMY      PITUITARY SURGERY      [place] [address] DX/THER SBST EPIDURAL/SUBRACH CERV/THORACIC N/A 1/21/2016    Procedure: BLOCK / INJECTION EPIDURAL STEROID CERVICAL C7-T1 (C-ARM); Surgeon: Kleber Candelaria MD;  Location: [facility] MAIN OR;  Service: Pain Management        Family History   Problem Relation Age of Onset    Thyroid disease Mother     Hyperlipidemia Mother     Depression Mother     Thyroid disease Father     Hyperlipidemia Father     Depression Father     No Known Problems Sister     No Known Problems Brother     No Known Problems Maternal Aunt     No Known Problems Maternal Uncle     No Known Problems Paternal Aunt     No Known Problems Paternal Uncle     No Known Problems Maternal Grandmother     No Known Problems Maternal Grandfather     No Known Problems Paternal Grandmother     No Known Problems Paternal Grandfather     ADD / ADHD Neg Hx     Anesthesia problems Neg Hx     Cancer Neg Hx     Clotting disorder Neg Hx     Collagen disease Neg Hx     Diabetes Neg Hx     Dislocations Neg Hx     Learning disabilities Neg Hx     Neurological problems Neg Hx     Osteoporosis Neg Hx     Rheumatologic disease Neg Hx     Scoliosis Neg Hx     Vascular Disease Neg Hx          Medications have been verified  Objective   /78   Pulse 93   Temp 97 7 °F (36 5 °C) (Temporal)   Resp 16   Ht 5' 3" (1 6 m)   Wt 83 kg (183 lb)   SpO2 99%   BMI 32 42 kg/m²        Physical Exam     Physical Exam   Constitutional: She appears well-developed and well-nourished  No distress  HENT:   Head: Normocephalic and atraumatic     Right Ear: External ear normal    Left Ear: External ear normal    Nose: Nose normal    Mouth/Throat: Oropharynx is clear and moist      Left TM joint is tender  She is tender along the left side of the face  No paresthesias or decreased sensation  Full range of motion the jaw with pain with range of motion  No pop  Cardiovascular: Normal rate and regular rhythm     Pulmonary/Chest: Effort normal and breath sounds normal

## 2019-08-13 ENCOUNTER — OFFICE VISIT (OUTPATIENT)
Dept: PAIN MEDICINE | Facility: CLINIC | Age: 54
End: 2019-08-13

## 2019-08-13 VITALS
SYSTOLIC BLOOD PRESSURE: 114 MMHG | HEIGHT: 63 IN | WEIGHT: 185 LBS | BODY MASS INDEX: 32.78 KG/M2 | HEART RATE: 71 BPM | DIASTOLIC BLOOD PRESSURE: 78 MMHG

## 2019-08-13 DIAGNOSIS — G89.4 CHRONIC PAIN SYNDROME: Primary | ICD-10-CM

## 2019-08-13 DIAGNOSIS — F11.20 UNCOMPLICATED OPIOID DEPENDENCE (HCC): ICD-10-CM

## 2019-08-13 DIAGNOSIS — M54.2 NECK PAIN: ICD-10-CM

## 2019-08-13 DIAGNOSIS — M50.120 CERVICAL DISC DISORDER WITH RADICULOPATHY OF MID-CERVICAL REGION: ICD-10-CM

## 2019-08-13 DIAGNOSIS — Z79.891 LONG-TERM CURRENT USE OF OPIATE ANALGESIC: ICD-10-CM

## 2019-08-13 PROCEDURE — 99214 OFFICE O/P EST MOD 30 MIN: CPT | Performed by: ANESTHESIOLOGY

## 2019-08-13 PROCEDURE — 80305 DRUG TEST PRSMV DIR OPT OBS: CPT | Performed by: ANESTHESIOLOGY

## 2019-08-13 RX ORDER — LEVOTHYROXINE SODIUM 0.05 MG/1
TABLET ORAL
COMMUNITY
Start: 2019-08-01

## 2019-08-13 RX ORDER — OXYCODONE HYDROCHLORIDE AND ACETAMINOPHEN 5; 325 MG/1; MG/1
0.5 TABLET ORAL 2 TIMES DAILY PRN
Qty: 30 TABLET | Refills: 0 | Status: SHIPPED | OUTPATIENT
Start: 2019-08-16 | End: 2019-09-30 | Stop reason: SDUPTHER

## 2019-08-13 RX ORDER — CYCLOBENZAPRINE HCL 10 MG
10 TABLET ORAL 3 TIMES DAILY PRN
Qty: 90 TABLET | Refills: 1 | Status: SHIPPED | OUTPATIENT
Start: 2019-08-13 | End: 2019-09-30 | Stop reason: SDUPTHER

## 2019-08-13 RX ORDER — OXYCODONE HYDROCHLORIDE AND ACETAMINOPHEN 5; 325 MG/1; MG/1
0.5 TABLET ORAL 2 TIMES DAILY PRN
Qty: 30 TABLET | Refills: 0 | Status: SHIPPED | OUTPATIENT
Start: 2019-09-15 | End: 2019-09-26

## 2019-08-13 RX ORDER — METOPROLOL SUCCINATE 25 MG/1
TABLET, EXTENDED RELEASE ORAL
COMMUNITY
Start: 2019-05-21

## 2019-08-13 NOTE — PROGRESS NOTES
Pain Medicine Follow-Up Note    Assessment:  1  Chronic pain syndrome    2  Neck pain    3  Cervical disc disorder with radiculopathy of mid-cervical region        Plan:  New Medications Ordered This Visit   Medications    levothyroxine 50 mcg tablet    metoprolol succinate (TOPROL-XL) 25 mg 24 hr tablet    cyclobenzaprine (FLEXERIL) 10 mg tablet     Sig: Take 1 tablet (10 mg total) by mouth 3 (three) times a day as needed for muscle spasms     Dispense:  90 tablet     Refill:  1    oxyCODONE-acetaminophen (PERCOCET) 5-325 mg per tablet     Sig: Take 0 5 tablets by mouth 2 (two) times a day as needed for severe pain for up to 30 daysMax Daily Amount: 1 tablet     Dispense:  30 tablet     Refill:  0    oxyCODONE-acetaminophen (PERCOCET) 5-325 mg per tablet     Sig: Take 0 5 tablets by mouth 2 (two) times a day as needed for severe pain for up to 30 daysMax Daily Amount: 1 tablet     Dispense:  30 tablet     Refill:  0     My impressions and treatment recommendations were discussed in detail with the patient who verbalized understanding and had no further questions  Given that the patient reports overall reduced pain and improved level functioning without significant side effects, I felt a reasonable to continue the patient on oxycodone/acetaminophen 5/325 milligrams half a tablet twice daily as needed for pain, cyclobenzaprine 10 milligrams 3 times daily as needed for muscle spasms, and gabapentin 600 milligrams 1 tablet once daily  I sent E prescriptions for the oxycodone/acetaminophen dated August 16, 2019 and September 15, 2019 to her pharmacy  The risks and side effects of chronic opioid treatment were discussed in detail with the patient  Side effects include but are not limited to nausea, vomiting, GI intolerance, sedation, constipation, mental clouding, opioid-induced hyperalgesia, endocrine dysfunction, addiction, dependence, and tolerance   The patient was asked to take his medications only as prescribed and directed, never in excess, and never for any other reason other than for pain control  The patient was also asked to keep his medications out of the reach of others and away from children, preferably in a locked drawer  The patient verbalized understanding and wished to use these opioid medications  A urine drug test was collected at today's office visit as part of our medication management protocol  The point of care testing results were appropriate for what was being prescribed  The specimen will be sent for confirmatory testing  The drug screen is medically necessary because the patient is either dependent on opioid medication or is being considered for opioid medication therapy and the results could impact ongoing or future treatment  The drug screen is to evaluate for the presence or absence of prescribed, non-prescribed, and/or illicit drugs and substances  [place] Prescription Drug Monitoring Program report was reviewed and was appropriate     Follow-up is planned in 2 months time or sooner as warranted  Discharge instructions were provided  I personally saw and examined the patient and I agree with the above discussed plan of care  History of Present Illness:    Tatianna Sosa is a 47 y o  female who presents to [facility] for interval re-evaluation of the above stated pain complaints  The patient has a past medical and chronic pain history as outlined in the assessment section  At today's office visit, the patient's pain score is 7 to 8/10 on the verbal numerical pain rating scale  The patient states that her pain is primarily in her neck and bilateral shoulders  She also is complaining of low back pain  She states that her pain is constant in nature  She reports the quality of her pain as dull/aching, sharp, throbbing, cramping, pressure like, shooting, numbness, and pins and needles    She is reporting 30 percent pain relief with the combination of her medications  She denies opioid induced constipation currently  Pain Contract Signed:   Last Urine Drug Screen:  02/04/19    Other than as stated above, the patient denies any interval changes in medications, medical condition, mental condition, symptoms, or allergies since the last office visit  Review of Systems:    Review of Systems   Respiratory: Negative for shortness of breath  Cardiovascular: Negative for chest pain  Gastrointestinal: Negative for constipation, diarrhea, nausea and vomiting  Musculoskeletal: Negative for arthralgias, gait problem, joint swelling and myalgias  Difficulty walking   Decreased ROM  Joint Stiffness   Pain Extremity: LEGS   Skin: Negative for rash  Neurological: Positive for weakness (muscle )  Negative for dizziness and seizures  All other systems reviewed and are negative          Patient Active Problem List   Diagnosis    Chronic pain syndrome    Acute muscle stiffness of neck    Cervical disc disorder with radiculopathy of mid-cervical region    Neck pain    Chronic left-sided low back pain with left-sided sciatica    Lumbar radiculopathy    Cervicalgia       Past Medical History:   Diagnosis Date    Anxiety     Asthma     exercise induced asthma    Cervical disc disorder     Chronic pain     Depression     Dysuria     Lung abnormality     nodules    Lung nodule     Pituitary abnormality (HCC)     tumor    Thrombocytopenia (HCC)     Thyroid disease        Past Surgical History:   Procedure Laterality Date    APPENDECTOMY      AUGMENTATION MAMMAPLASTY Bilateral     CHOLECYSTECTOMY      COLECTOMY      COLON SURGERY      EPIDURAL BLOCK INJECTION N/A 6/23/2016    Procedure: BLOCK / INJECTION EPIDURAL STEROID CERVICAL  C7-T1;  Surgeon: Tacho Shoemaker MD;  [facility] MAIN OR;  Service:     EPIDURAL BLOCK INJECTION N/A 3/31/2016    Procedure: BLOCK / INJECTION EPIDURAL STEROID CERVICAL C7-T1  (C-ARM); Surgeon: Dashawn Francois MD;  Location: [facility] MAIN OR;  Service:     EPIDURAL BLOCK INJECTION N/A 8/8/2018    Procedure: C6 C7 Cervical Epidural Steroid Injection (38208); Surgeon: Dashawn Francois MD;  Location: [facility] MAIN OR;  Service: Pain Management     HYSTERECTOMY      PITUITARY SURGERY      [place] [address] DX/THER SBST EPIDURAL/SUBRACH CERV/THORACIC N/A 1/21/2016    Procedure: BLOCK / INJECTION EPIDURAL STEROID CERVICAL C7-T1 (C-ARM); Surgeon: Dashawn Francois MD;  Location: [facility] MAIN OR;  Service: Pain Management        Family History   Problem Relation Age of Onset    Thyroid disease Mother     Hyperlipidemia Mother     Depression Mother     Thyroid disease Father     Hyperlipidemia Father     Depression Father     No Known Problems Sister     No Known Problems Brother     No Known Problems Maternal Aunt     No Known Problems Maternal Uncle     No Known Problems Paternal Aunt     No Known Problems Paternal Uncle     No Known Problems Maternal Grandmother     No Known Problems Maternal Grandfather     No Known Problems Paternal Grandmother     No Known Problems Paternal Grandfather     ADD / ADHD Neg Hx     Anesthesia problems Neg Hx     Cancer Neg Hx     Clotting disorder Neg Hx     Collagen disease Neg Hx     Diabetes Neg Hx     Dislocations Neg Hx     Learning disabilities Neg Hx     Neurological problems Neg Hx     Osteoporosis Neg Hx     Rheumatologic disease Neg Hx     Scoliosis Neg Hx     Vascular Disease Neg Hx        Social History     Occupational History    Not on file   Tobacco Use    Smoking status: Never Smoker    Smokeless tobacco: Never Used   Substance and Sexual Activity    Alcohol use: No    Drug use: No    Sexual activity: Not on file         Current Outpatient Medications:     albuterol (VENTOLIN HFA) 90 mcg/act inhaler, Inhale, Disp: , Rfl:     aspirin 81 MG tablet, Take 81 mg by mouth daily  , Disp: , Rfl:     cholecalciferol (VITAMIN D3) 1,000 units tablet, Take 2 tablets by mouth daily, Disp: , Rfl:     cyclobenzaprine (FLEXERIL) 10 mg tablet, Take 1 tablet (10 mg total) by mouth 3 (three) times a day as needed for muscle spasms, Disp: 90 tablet, Rfl: 1    hydrocortisone (ANUSOL-HC) 25 mg suppository, , Disp: , Rfl: 0    ibuprofen (MOTRIN) 600 mg tablet, Take 1 tablet (600 mg total) by mouth every 6 (six) hours as needed for mild pain, Disp: 30 tablet, Rfl: 0    levothyroxine 50 mcg tablet, , Disp: , Rfl:     metoprolol succinate (TOPROL-XL) 25 mg 24 hr tablet, , Disp: , Rfl:     mupirocin (BACTROBAN) 2 % ointment, , Disp: , Rfl: 0    [START ON 9/15/2019] oxyCODONE-acetaminophen (PERCOCET) 5-325 mg per tablet, Take 0 5 tablets by mouth 2 (two) times a day as needed for severe pain for up to 30 daysMax Daily Amount: 1 tablet, Disp: 30 tablet, Rfl: 0    traZODone (DESYREL) 50 mg tablet, , Disp: , Rfl: 0    [START ON 8/16/2019] oxyCODONE-acetaminophen (PERCOCET) 5-325 mg per tablet, Take 0 5 tablets by mouth 2 (two) times a day as needed for severe pain for up to 30 daysMax Daily Amount: 1 tablet, Disp: 30 tablet, Rfl: 0    Allergies   Allergen Reactions    Levaquin [Levofloxacin In D5w] Shortness Of Breath     Also hives      Amitiza [Lubiprostone] Other (See Comments) and Hives     Reaction Date: 10Aug2011;   Migraines and vomiting    Biaxin [Clarithromycin] Hives    Cephradine     Erythromycin Hives     Reaction Date: 10Aug2011;     Macrobid [Nitrofurantoin Monohyd Macro] Other (See Comments)     C/o passing out    Morphine     Penicillins Hives     Reaction Date: 10Aug2011;     Sulfa Antibiotics     Tetracycline     Tetracyclines & Related Hives    Morphine And Related Rash    Valium [Diazepam] Rash and Vomiting     Reaction Date: 14Jun2012;        Physical Exam:    /78   Pulse 71   Ht 5' 3" (1 6 m)   Wt 83 9 kg (185 lb)   BMI 32 77 kg/m²     Constitutional:obese  Eyes:anicteric  HEENT:grossly intact  Neck:supple, symmetric, trachea midline and no masses   Pulmonary:even and unlabored  Cardiovascular:No edema or pitting edema present  Skin:Normal without rashes or lesions and well hydrated  Psychiatric:Mood and affect appropriate  Neurologic:Cranial Nerves II-XII grossly intact  Musculoskeletal:normal

## 2019-09-20 ENCOUNTER — TELEPHONE (OUTPATIENT)
Dept: OBGYN CLINIC | Facility: CLINIC | Age: 54
End: 2019-09-20

## 2019-09-23 ENCOUNTER — TELEPHONE (OUTPATIENT)
Dept: PAIN MEDICINE | Facility: CLINIC | Age: 54
End: 2019-09-23

## 2019-09-24 ENCOUNTER — TELEPHONE (OUTPATIENT)
Dept: PAIN MEDICINE | Facility: CLINIC | Age: 54
End: 2019-09-24

## 2019-09-26 ENCOUNTER — TELEPHONE (OUTPATIENT)
Dept: PAIN MEDICINE | Facility: CLINIC | Age: 54
End: 2019-09-26

## 2019-09-26 DIAGNOSIS — M50.120 CERVICAL DISC DISORDER WITH RADICULOPATHY OF MID-CERVICAL REGION: ICD-10-CM

## 2019-09-26 DIAGNOSIS — G89.4 CHRONIC PAIN SYNDROME: ICD-10-CM

## 2019-09-26 DIAGNOSIS — M54.2 NECK PAIN: ICD-10-CM

## 2019-09-26 NOTE — TELEPHONE ENCOUNTER
Can you please call the patient and confirm with her that she is noting pain relief from her Percocet and Cyclobenzaprine without side effects?

## 2019-09-26 NOTE — TELEPHONE ENCOUNTER
(Lmom for pt to cb to advise provider will be out the office f/u appt has to be r/s and provider will returning on 10/21/19)-SD

## 2019-09-27 NOTE — TELEPHONE ENCOUNTER
S/w the patient and she is noting pain relief when she does take it, but she does have to take it everyday  She denies having SE's  She stated she does have enough meds until 10/15

## 2019-09-30 RX ORDER — CYCLOBENZAPRINE HCL 10 MG
10 TABLET ORAL 3 TIMES DAILY PRN
Qty: 90 TABLET | Refills: 0 | Status: SHIPPED | OUTPATIENT
Start: 2019-09-30 | End: 2020-01-14 | Stop reason: SDUPTHER

## 2019-09-30 RX ORDER — OXYCODONE HYDROCHLORIDE AND ACETAMINOPHEN 5; 325 MG/1; MG/1
0.5 TABLET ORAL 2 TIMES DAILY PRN
Qty: 30 TABLET | Refills: 0 | Status: SHIPPED | OUTPATIENT
Start: 2019-09-30 | End: 2019-10-24 | Stop reason: SDUPTHER

## 2019-09-30 NOTE — TELEPHONE ENCOUNTER
I e-scribed a 30 day supply for both the Percocet and Cyclobenzaprine to fill when she needs them  She needs to make sure she has a f/u OV with Dr Henderson Neither in th next 4-5 weeks before she is out of medications

## 2019-10-24 ENCOUNTER — TELEPHONE (OUTPATIENT)
Dept: PAIN MEDICINE | Facility: CLINIC | Age: 54
End: 2019-10-24

## 2019-10-24 ENCOUNTER — OFFICE VISIT (OUTPATIENT)
Dept: PAIN MEDICINE | Facility: CLINIC | Age: 54
End: 2019-10-24
Payer: COMMERCIAL

## 2019-10-24 VITALS — SYSTOLIC BLOOD PRESSURE: 124 MMHG | DIASTOLIC BLOOD PRESSURE: 80 MMHG | HEART RATE: 76 BPM

## 2019-10-24 DIAGNOSIS — M70.61 TROCHANTERIC BURSITIS OF RIGHT HIP: ICD-10-CM

## 2019-10-24 DIAGNOSIS — M50.120 CERVICAL DISC DISORDER WITH RADICULOPATHY OF MID-CERVICAL REGION: ICD-10-CM

## 2019-10-24 DIAGNOSIS — M54.50 CHRONIC RIGHT-SIDED LOW BACK PAIN WITHOUT SCIATICA: ICD-10-CM

## 2019-10-24 DIAGNOSIS — G89.4 CHRONIC PAIN SYNDROME: Primary | ICD-10-CM

## 2019-10-24 DIAGNOSIS — Z79.891 LONG-TERM CURRENT USE OF OPIATE ANALGESIC: ICD-10-CM

## 2019-10-24 DIAGNOSIS — M54.2 NECK PAIN: ICD-10-CM

## 2019-10-24 DIAGNOSIS — G89.29 CHRONIC RIGHT-SIDED LOW BACK PAIN WITHOUT SCIATICA: ICD-10-CM

## 2019-10-24 DIAGNOSIS — F11.20 UNCOMPLICATED OPIOID DEPENDENCE (HCC): ICD-10-CM

## 2019-10-24 DIAGNOSIS — M54.2 CERVICALGIA: ICD-10-CM

## 2019-10-24 PROCEDURE — 80305 DRUG TEST PRSMV DIR OPT OBS: CPT | Performed by: ANESTHESIOLOGY

## 2019-10-24 PROCEDURE — 99214 OFFICE O/P EST MOD 30 MIN: CPT | Performed by: ANESTHESIOLOGY

## 2019-10-24 RX ORDER — OXYCODONE HYDROCHLORIDE AND ACETAMINOPHEN 5; 325 MG/1; MG/1
0.5 TABLET ORAL 2 TIMES DAILY PRN
Qty: 30 TABLET | Refills: 0 | Status: SHIPPED | OUTPATIENT
Start: 2019-12-15 | End: 2020-01-14 | Stop reason: SDUPTHER

## 2019-10-24 RX ORDER — OXYCODONE HYDROCHLORIDE AND ACETAMINOPHEN 5; 325 MG/1; MG/1
0.5 TABLET ORAL 2 TIMES DAILY PRN
Qty: 30 TABLET | Refills: 0 | Status: SHIPPED | OUTPATIENT
Start: 2019-11-15 | End: 2020-01-14 | Stop reason: SDUPTHER

## 2019-10-24 NOTE — H&P (VIEW-ONLY)
Pain Medicine Follow-Up Note    Assessment:  1  Chronic pain syndrome    2  Neck pain    3  Cervical disc disorder with radiculopathy of mid-cervical region    4  Chronic right-sided low back pain without sciatica    5  Trochanteric bursitis of right hip        Plan:  Orders Placed This Encounter   Procedures    Ambulatory referral to Physical Therapy     Standing Status:   Future     Standing Expiration Date:   10/24/2020     Referral Priority:   Routine     Referral Type:   Physical Therapy     Referral Reason:   Specialty Services Required     Requested Specialty:   Physical Therapy     Number of Visits Requested:   1     Expiration Date:   10/24/2020       New Medications Ordered This Visit   Medications    oxyCODONE-acetaminophen (PERCOCET) 5-325 mg per tablet     Sig: Take 0 5 tablets by mouth 2 (two) times a day as needed for severe painMax Daily Amount: 1 tablet     Dispense:  30 tablet     Refill:  0    oxyCODONE-acetaminophen (PERCOCET) 5-325 mg per tablet     Sig: Take 0 5 tablets by mouth 2 (two) times a day as needed for severe painMax Daily Amount: 1 tablet     Dispense:  30 tablet     Refill:  0     My impressions and treatment recommendations were discussed in detail with the patient who verbalized understanding and had no further questions  Given that the patient has signs and symptoms consistent with right trochanteric bursitis, discussed the rationale of undergoing a right trochanteric bursa injection under fluoroscopic guidance since this could be potentially therapeutic  The procedures, its risks, and benefits were explained in detail to the patient  Risks include but are not limited to bleeding, infection, hematoma formation, abscess formation, weakness, headache, failure the pain to improve, nerve irritation or damage, and potential worsening of the pain  The patient verbalized understanding and wished to proceed with the procedure      I also felt a reasonable to have the patient undergo a course of physical therapy for her low back and right hip pain  I gave her a referral to undergo physical therapy 2-3 times per week for 4-6 weeks  I do also feel reasonable to continue the patient on cyclobenzaprine 10 mg 3 times daily as needed for muscle spasms as well as oxycodone/acetaminophen 5/325 mg 0 5 tablets twice daily as needed for pain  I sent E prescriptions to the pharmacy dated November 15, 2019 as well as December 15, 2019  The risks and side effects of chronic opioid treatment were discussed in detail with the patient  Side effects include but are not limited to nausea, vomiting, GI intolerance, sedation, constipation, mental clouding, opioid-induced hyperalgesia, endocrine dysfunction, addiction, dependence, and tolerance  The patient was asked to take his medications only as prescribed and directed, never in excess, and never for any other reason other than for pain control  The patient was also asked to keep his medications out of the reach of others and away from children, preferably in a locked drawer  The patient verbalized understanding and wished to use these opioid medications  A urine drug test was collected at today's office visit as part of our medication management protocol  The point of care testing results were appropriate for what was being prescribed  The specimen will be sent for confirmatory testing  The drug screen is medically necessary because the patient is either dependent on opioid medication or is being considered for opioid medication therapy and the results could impact ongoing or future treatment  The drug screen is to evaluate for the presence or absence of prescribed, non-prescribed, and/or illicit drugs and substances  The [place] prescription monitoring program was reviewed at today's visit  Follow-up is planned in 2 months time or sooner as warranted  Discharge instructions were provided   I personally saw and examined the patient and I agree with the above discussed plan of care  History of Present Illness:    Ivanna Parker is a 49 y o  female who presents to [facility] for interval re-evaluation of the above stated pain complaints  The patient has a past medical and chronic pain history as outlined in the assessment section  She was last seen on August 13, 2019  At today's office visit, the patient's pain score is 9/10 on the verbal numerical pain rating scale  The patient states that her pain is primarily in her neck with radiation into the right upper extremity  She is also complaining of a new pain in the right side of her low back as well as right hip at today's visit  She is ambulating with the assistance of a cane stating that she is in severe pain involving her right trochanteric bursa region as well as her right buttocks region  She states that her pain is constant in nature and reports the quality of her pain is burning, sharp, throbbing, shooting, and numbness    The patient is reporting that she would like to undergo an interventional pain procedure for her right trochanteric bursitis today  She is also interested in undergoing a course of physical therapy for her low back and lower extremity pain  In addition, the patient is reporting good pain relief with oxycodone/acetaminophen 5/325 mg 0 5 tablets twice daily as needed for pain as well as cyclobenzaprine 10 mg 3 times daily as needed for muscle spasms  Other than as stated above, the patient denies any interval changes in medications, medical condition, mental condition, symptoms, or allergies since the last office visit  Review of Systems:    Review of Systems   Constitutional: Negative for activity change and chills  HENT: Negative for congestion, postnasal drip, sneezing and sore throat  Eyes: Negative for discharge  Respiratory: Negative for choking and shortness of breath      Cardiovascular: Negative for chest pain  Gastrointestinal: Negative for abdominal distention, constipation and vomiting  Endocrine: Negative for cold intolerance and polyphagia  Genitourinary: Negative for difficulty urinating, frequency, menstrual problem, urgency and vaginal bleeding  Musculoskeletal: Positive for gait problem and neck pain  Negative for arthralgias  Decreased ROM  Muscle weakness  Joint stiffness  Pain in rt leg/thigh (burning pain)   Skin: Negative for color change and rash  Allergic/Immunologic: Negative for food allergies  Neurological: Negative for tremors, seizures, speech difficulty and headaches  Hematological: Does not bruise/bleed easily  Psychiatric/Behavioral: Negative for confusion, self-injury and suicidal ideas  The patient is not hyperactive            Patient Active Problem List   Diagnosis    Chronic pain syndrome    Acute muscle stiffness of neck    Cervical disc disorder with radiculopathy of mid-cervical region    Neck pain    Chronic left-sided low back pain with left-sided sciatica    Lumbar radiculopathy    Cervicalgia       Past Medical History:   Diagnosis Date    Anxiety     Asthma     exercise induced asthma    Cervical disc disorder     Chronic pain     Chronic pain disorder     Depression     Dysuria     Low back pain     Lung abnormality     nodules    Lung nodule     Neck pain     Peripheral neuropathy     Pituitary abnormality (HCC)     tumor    Thrombocytopenia (HCC)     Thyroid disease        Past Surgical History:   Procedure Laterality Date    APPENDECTOMY      AUGMENTATION MAMMAPLASTY Bilateral     CHOLECYSTECTOMY      COLECTOMY      COLON SURGERY      EPIDURAL BLOCK INJECTION N/A 6/23/2016    Procedure: BLOCK / INJECTION EPIDURAL STEROID CERVICAL  C7-T1;  Surgeon: Marijean Lesches, MD;  Location: [facility] MAIN OR;  Service:     EPIDURAL BLOCK INJECTION N/A 3/31/2016    Procedure: BLOCK / INJECTION EPIDURAL STEROID CERVICAL C7-T1  (C-ARM); Surgeon: Magdalena Garnica MD;  Location: [facility] MAIN OR;  Service:     EPIDURAL BLOCK INJECTION N/A 8/8/2018    Procedure: C6 C7 Cervical Epidural Steroid Injection (39223); Surgeon: Magdalena Garnica MD;  Location: [facility] MAIN OR;  Service: Pain Management     HYSTERECTOMY      PITUITARY SURGERY      IL Hal Oni Mikayla 85 DX/THER SBST EPIDURAL/SUBRACH CERV/THORACIC N/A 1/21/2016    Procedure: BLOCK / INJECTION EPIDURAL STEROID CERVICAL C7-T1 (C-ARM); Surgeon: Magdalena Garnica MD;  Location: [facility] MAIN OR;  Service: Pain Management        Family History   Problem Relation Age of Onset    Thyroid disease Mother     Hyperlipidemia Mother     Depression Mother     Thyroid disease Father     Hyperlipidemia Father     Depression Father     No Known Problems Sister     No Known Problems Brother     No Known Problems Maternal Aunt     No Known Problems Maternal Uncle     No Known Problems Paternal Aunt     No Known Problems Paternal Uncle     No Known Problems Maternal Grandmother     No Known Problems Maternal Grandfather     No Known Problems Paternal Grandmother     No Known Problems Paternal Grandfather     No Known Problems Daughter     No Known Problems Son     ADD / ADHD Neg Hx     Anesthesia problems Neg Hx     Cancer Neg Hx     Clotting disorder Neg Hx     Collagen disease Neg Hx     Diabetes Neg Hx     Dislocations Neg Hx     Learning disabilities Neg Hx     Neurological problems Neg Hx     Osteoporosis Neg Hx     Rheumatologic disease Neg Hx     Scoliosis Neg Hx     Vascular Disease Neg Hx        Social History     Occupational History    Not on file   Tobacco Use    Smoking status: Never Smoker    Smokeless tobacco: Never Used   Substance and Sexual Activity    Alcohol use: No    Drug use: No    Sexual activity: Not on file         Current Outpatient Medications:     albuterol (VENTOLIN HFA) 90 mcg/act inhaler, Inhale, Disp: , Rfl:     aspirin 81 MG tablet, Take 81 mg by mouth daily  , Disp: , Rfl:     cholecalciferol (VITAMIN D3) 1,000 units tablet, Take 2 tablets by mouth daily, Disp: , Rfl:     cyclobenzaprine (FLEXERIL) 10 mg tablet, Take 1 tablet (10 mg total) by mouth 3 (three) times a day as needed for muscle spasms, Disp: 90 tablet, Rfl: 0    ibuprofen (MOTRIN) 600 mg tablet, Take 1 tablet (600 mg total) by mouth every 6 (six) hours as needed for mild pain, Disp: 30 tablet, Rfl: 0    levothyroxine 50 mcg tablet, , Disp: , Rfl:     metoprolol succinate (TOPROL-XL) 25 mg 24 hr tablet, , Disp: , Rfl:     [START ON 11/15/2019] oxyCODONE-acetaminophen (PERCOCET) 5-325 mg per tablet, Take 0 5 tablets by mouth 2 (two) times a day as needed for severe painMax Daily Amount: 1 tablet, Disp: 30 tablet, Rfl: 0    traZODone (DESYREL) 50 mg tablet, , Disp: , Rfl: 0    hydrocortisone (ANUSOL-HC) 25 mg suppository, , Disp: , Rfl: 0    mupirocin (BACTROBAN) 2 % ointment, , Disp: , Rfl: 0    [START ON 12/15/2019] oxyCODONE-acetaminophen (PERCOCET) 5-325 mg per tablet, Take 0 5 tablets by mouth 2 (two) times a day as needed for severe painMax Daily Amount: 1 tablet, Disp: 30 tablet, Rfl: 0    Allergies   Allergen Reactions    Levaquin [Levofloxacin In D5w] Shortness Of Breath     Also hives      Amitiza [Lubiprostone] Other (See Comments) and Hives     Reaction Date: 10Aug2011;   Migraines and vomiting    Biaxin [Clarithromycin] Hives    Cephradine     Erythromycin Hives     Reaction Date: 10Aug2011;     Macrobid [Nitrofurantoin Monohyd Macro] Other (See Comments)     C/o passing out    Morphine     Penicillins Hives     Reaction Date: 10Aug2011;     Sulfa Antibiotics     Tetracycline     Tetracyclines & Related Hives    Morphine And Related Rash    Valium [Diazepam] Rash and Vomiting     Reaction Date: 14Jun2012;        Physical Exam:    /80   Pulse 76     Constitutional:obese  Eyes:anicteric  HEENT:grossly intact  Neck:supple, symmetric, trachea midline and no masses Pulmonary:even and unlabored  Cardiovascular:No edema or pitting edema present  Skin:Normal without rashes or lesions and well hydrated  Psychiatric:Mood and affect appropriate  Neurologic:Cranial Nerves II-XII grossly intact  Musculoskeletal:antalgic, the patient is ambulating with the assistance of a cane  Tenderness noted over the right trochanteric bursa  Tenderness noted mildly over the right sacroiliac joint      Orders Placed This Encounter   Procedures    Ambulatory referral to Physical Therapy

## 2019-10-24 NOTE — TELEPHONE ENCOUNTER
Called pt in regards to scheduling procedure and pt stated she forgot to mention she needs a refill for gabapentin   Please discuss with clinical  Thanks

## 2019-10-24 NOTE — PROGRESS NOTES
Pain Medicine Follow-Up Note    Assessment:  1  Chronic pain syndrome    2  Neck pain    3  Cervical disc disorder with radiculopathy of mid-cervical region    4  Chronic right-sided low back pain without sciatica    5  Trochanteric bursitis of right hip        Plan:  Orders Placed This Encounter   Procedures    Ambulatory referral to Physical Therapy     Standing Status:   Future     Standing Expiration Date:   10/24/2020     Referral Priority:   Routine     Referral Type:   Physical Therapy     Referral Reason:   Specialty Services Required     Requested Specialty:   Physical Therapy     Number of Visits Requested:   1     Expiration Date:   10/24/2020       New Medications Ordered This Visit   Medications    oxyCODONE-acetaminophen (PERCOCET) 5-325 mg per tablet     Sig: Take 0 5 tablets by mouth 2 (two) times a day as needed for severe painMax Daily Amount: 1 tablet     Dispense:  30 tablet     Refill:  0    oxyCODONE-acetaminophen (PERCOCET) 5-325 mg per tablet     Sig: Take 0 5 tablets by mouth 2 (two) times a day as needed for severe painMax Daily Amount: 1 tablet     Dispense:  30 tablet     Refill:  0     My impressions and treatment recommendations were discussed in detail with the patient who verbalized understanding and had no further questions  Given that the patient has signs and symptoms consistent with right trochanteric bursitis, discussed the rationale of undergoing a right trochanteric bursa injection under fluoroscopic guidance since this could be potentially therapeutic  The procedures, its risks, and benefits were explained in detail to the patient  Risks include but are not limited to bleeding, infection, hematoma formation, abscess formation, weakness, headache, failure the pain to improve, nerve irritation or damage, and potential worsening of the pain  The patient verbalized understanding and wished to proceed with the procedure      I also felt a reasonable to have the patient undergo a course of physical therapy for her low back and right hip pain  I gave her a referral to undergo physical therapy 2-3 times per week for 4-6 weeks  I do also feel reasonable to continue the patient on cyclobenzaprine 10 mg 3 times daily as needed for muscle spasms as well as oxycodone/acetaminophen 5/325 mg 0 5 tablets twice daily as needed for pain  I sent E prescriptions to the pharmacy dated November 15, 2019 as well as December 15, 2019  The risks and side effects of chronic opioid treatment were discussed in detail with the patient  Side effects include but are not limited to nausea, vomiting, GI intolerance, sedation, constipation, mental clouding, opioid-induced hyperalgesia, endocrine dysfunction, addiction, dependence, and tolerance  The patient was asked to take his medications only as prescribed and directed, never in excess, and never for any other reason other than for pain control  The patient was also asked to keep his medications out of the reach of others and away from children, preferably in a locked drawer  The patient verbalized understanding and wished to use these opioid medications  A urine drug test was collected at today's office visit as part of our medication management protocol  The point of care testing results were appropriate for what was being prescribed  The specimen will be sent for confirmatory testing  The drug screen is medically necessary because the patient is either dependent on opioid medication or is being considered for opioid medication therapy and the results could impact ongoing or future treatment  The drug screen is to evaluate for the presence or absence of prescribed, non-prescribed, and/or illicit drugs and substances  The [place] prescription monitoring program was reviewed at today's visit  Follow-up is planned in 2 months time or sooner as warranted  Discharge instructions were provided   I personally saw and examined the patient and I agree with the above discussed plan of care  History of Present Illness:    Eliezer Abebe is a 49 y o  female who presents to [facility] for interval re-evaluation of the above stated pain complaints  The patient has a past medical and chronic pain history as outlined in the assessment section  She was last seen on August 13, 2019  At today's office visit, the patient's pain score is 9/10 on the verbal numerical pain rating scale  The patient states that her pain is primarily in her neck with radiation into the right upper extremity  She is also complaining of a new pain in the right side of her low back as well as right hip at today's visit  She is ambulating with the assistance of a cane stating that she is in severe pain involving her right trochanteric bursa region as well as her right buttocks region  She states that her pain is constant in nature and reports the quality of her pain is burning, sharp, throbbing, shooting, and numbness    The patient is reporting that she would like to undergo an interventional pain procedure for her right trochanteric bursitis today  She is also interested in undergoing a course of physical therapy for her low back and lower extremity pain  In addition, the patient is reporting good pain relief with oxycodone/acetaminophen 5/325 mg 0 5 tablets twice daily as needed for pain as well as cyclobenzaprine 10 mg 3 times daily as needed for muscle spasms  Other than as stated above, the patient denies any interval changes in medications, medical condition, mental condition, symptoms, or allergies since the last office visit  Review of Systems:    Review of Systems   Constitutional: Negative for activity change and chills  HENT: Negative for congestion, postnasal drip, sneezing and sore throat  Eyes: Negative for discharge  Respiratory: Negative for choking and shortness of breath      Cardiovascular: Negative for chest pain  Gastrointestinal: Negative for abdominal distention, constipation and vomiting  Endocrine: Negative for cold intolerance and polyphagia  Genitourinary: Negative for difficulty urinating, frequency, menstrual problem, urgency and vaginal bleeding  Musculoskeletal: Positive for gait problem and neck pain  Negative for arthralgias  Decreased ROM  Muscle weakness  Joint stiffness  Pain in rt leg/thigh (burning pain)   Skin: Negative for color change and rash  Allergic/Immunologic: Negative for food allergies  Neurological: Negative for tremors, seizures, speech difficulty and headaches  Hematological: Does not bruise/bleed easily  Psychiatric/Behavioral: Negative for confusion, self-injury and suicidal ideas  The patient is not hyperactive            Patient Active Problem List   Diagnosis    Chronic pain syndrome    Acute muscle stiffness of neck    Cervical disc disorder with radiculopathy of mid-cervical region    Neck pain    Chronic left-sided low back pain with left-sided sciatica    Lumbar radiculopathy    Cervicalgia       Past Medical History:   Diagnosis Date    Anxiety     Asthma     exercise induced asthma    Cervical disc disorder     Chronic pain     Chronic pain disorder     Depression     Dysuria     Low back pain     Lung abnormality     nodules    Lung nodule     Neck pain     Peripheral neuropathy     Pituitary abnormality (HCC)     tumor    Thrombocytopenia (HCC)     Thyroid disease        Past Surgical History:   Procedure Laterality Date    APPENDECTOMY      AUGMENTATION MAMMAPLASTY Bilateral     CHOLECYSTECTOMY      COLECTOMY      COLON SURGERY      EPIDURAL BLOCK INJECTION N/A 6/23/2016    Procedure: BLOCK / INJECTION EPIDURAL STEROID CERVICAL  C7-T1;  Surgeon: Cecilia Kay MD;  Location: [place] [address] MAIN OR;  Service:     EPIDURAL BLOCK INJECTION N/A 3/31/2016    Procedure: BLOCK / INJECTION EPIDURAL STEROID CERVICAL C7-T1  (C-ARM); Surgeon: Gopi Wilson MD;  Location: [facility] MAIN OR;  Service:     EPIDURAL BLOCK INJECTION N/A 8/8/2018    Procedure: C6 C7 Cervical Epidural Steroid Injection (33518); Surgeon: Gopi Wilson MD;  Location: [facility] MAIN OR;  Service: Pain Management     HYSTERECTOMY      PITUITARY SURGERY      MD Hal Oni Mikayla 85 DX/THER SBST EPIDURAL/SUBRACH CERV/THORACIC N/A 1/21/2016    Procedure: BLOCK / INJECTION EPIDURAL STEROID CERVICAL C7-T1 (C-ARM); Surgeon: Gopi Wilson MD;  Location: [facility] MAIN OR;  Service: Pain Management        Family History   Problem Relation Age of Onset    Thyroid disease Mother     Hyperlipidemia Mother     Depression Mother     Thyroid disease Father     Hyperlipidemia Father     Depression Father     No Known Problems Sister     No Known Problems Brother     No Known Problems Maternal Aunt     No Known Problems Maternal Uncle     No Known Problems Paternal Aunt     No Known Problems Paternal Uncle     No Known Problems Maternal Grandmother     No Known Problems Maternal Grandfather     No Known Problems Paternal Grandmother     No Known Problems Paternal Grandfather     No Known Problems Daughter     No Known Problems Son     ADD / ADHD Neg Hx     Anesthesia problems Neg Hx     Cancer Neg Hx     Clotting disorder Neg Hx     Collagen disease Neg Hx     Diabetes Neg Hx     Dislocations Neg Hx     Learning disabilities Neg Hx     Neurological problems Neg Hx     Osteoporosis Neg Hx     Rheumatologic disease Neg Hx     Scoliosis Neg Hx     Vascular Disease Neg Hx        Social History     Occupational History    Not on file   Tobacco Use    Smoking status: Never Smoker    Smokeless tobacco: Never Used   Substance and Sexual Activity    Alcohol use: No    Drug use: No    Sexual activity: Not on file         Current Outpatient Medications:     albuterol (VENTOLIN HFA) 90 mcg/act inhaler, Inhale, Disp: , Rfl:     aspirin 81 MG tablet, Take 81 mg by mouth daily  , Disp: , Rfl:     cholecalciferol (VITAMIN D3) 1,000 units tablet, Take 2 tablets by mouth daily, Disp: , Rfl:     cyclobenzaprine (FLEXERIL) 10 mg tablet, Take 1 tablet (10 mg total) by mouth 3 (three) times a day as needed for muscle spasms, Disp: 90 tablet, Rfl: 0    ibuprofen (MOTRIN) 600 mg tablet, Take 1 tablet (600 mg total) by mouth every 6 (six) hours as needed for mild pain, Disp: 30 tablet, Rfl: 0    levothyroxine 50 mcg tablet, , Disp: , Rfl:     metoprolol succinate (TOPROL-XL) 25 mg 24 hr tablet, , Disp: , Rfl:     [START ON 11/15/2019] oxyCODONE-acetaminophen (PERCOCET) 5-325 mg per tablet, Take 0 5 tablets by mouth 2 (two) times a day as needed for severe painMax Daily Amount: 1 tablet, Disp: 30 tablet, Rfl: 0    traZODone (DESYREL) 50 mg tablet, , Disp: , Rfl: 0    hydrocortisone (ANUSOL-HC) 25 mg suppository, , Disp: , Rfl: 0    mupirocin (BACTROBAN) 2 % ointment, , Disp: , Rfl: 0    [START ON 12/15/2019] oxyCODONE-acetaminophen (PERCOCET) 5-325 mg per tablet, Take 0 5 tablets by mouth 2 (two) times a day as needed for severe painMax Daily Amount: 1 tablet, Disp: 30 tablet, Rfl: 0    Allergies   Allergen Reactions    Levaquin [Levofloxacin In D5w] Shortness Of Breath     Also hives      Amitiza [Lubiprostone] Other (See Comments) and Hives     Reaction Date: 10Aug2011;   Migraines and vomiting    Biaxin [Clarithromycin] Hives    Cephradine     Erythromycin Hives     Reaction Date: 10Aug2011;     Macrobid [Nitrofurantoin Monohyd Macro] Other (See Comments)     C/o passing out    Morphine     Penicillins Hives     Reaction Date: 10Aug2011;     Sulfa Antibiotics     Tetracycline     Tetracyclines & Related Hives    Morphine And Related Rash    Valium [Diazepam] Rash and Vomiting     Reaction Date: 14Jun2012;        Physical Exam:    /80   Pulse 76     Constitutional:obese  Eyes:anicteric  HEENT:grossly intact  Neck:supple, symmetric, trachea midline and no masses Pulmonary:even and unlabored  Cardiovascular:No edema or pitting edema present  Skin:Normal without rashes or lesions and well hydrated  Psychiatric:Mood and affect appropriate  Neurologic:Cranial Nerves II-XII grossly intact  Musculoskeletal:antalgic, the patient is ambulating with the assistance of a cane  Tenderness noted over the right trochanteric bursa  Tenderness noted mildly over the right sacroiliac joint      Orders Placed This Encounter   Procedures    Ambulatory referral to Physical Therapy

## 2019-10-25 NOTE — TELEPHONE ENCOUNTER
Attempted to call the patient to clarify  Looks like last taken on 8/13, 600 mg QD  Encouraged the patient to CB to clarify dosage and pharmacy

## 2019-10-26 LAB
6MAM UR QL CFM: NEGATIVE NG/ML
7-OH-MITRAGYNINE (KRATOM ALKALOID) QUANTIFICATION: NEGATIVE NG/ML
7AMINOCLONAZEPAM UR QL CFM: NEGATIVE NG/ML
A-OH ALPRAZ UR QL CFM: NEGATIVE NG/ML
AMPHET UR QL CFM: NEGATIVE NG/ML
AMPHET UR QL CFM: NEGATIVE NG/ML
B-HCG UR QL: NEGATIVE NG/ML
BUPRENORPHINE UR QL CFM: NEGATIVE NG/ML
BUTALBITAL UR QL CFM: NEGATIVE NG/ML
BZE UR QL CFM: NEGATIVE NG/ML
CODEINE UR QL CFM: NEGATIVE NG/ML
CONFIRM APTT STACLOT: NORMAL
DEPRECATED SCALLOP IGE RAST QL: NEGATIVE NG/ML
DESIPRAMINE UR QL CFM: NEGATIVE NG/ML
DESIPRAMINE UR QL CFM: NEGATIVE NG/ML
EDDP UR QL CFM: NEGATIVE NG/ML
ETHYL GLUCURONIDE UR CFM-MCNC: ABNORMAL NG/ML
ETHYL SULFATE UR QL SCN: NEGATIVE NG/ML
FENTANYL UR QL CFM: NEGATIVE NG/ML
GLUCOSE 30M P 50 G LAC PO SERPL-MCNC: NEGATIVE NG/ML
HYDROCODONE UR QL CFM: NEGATIVE NG/ML
HYDROCODONE UR QL CFM: NEGATIVE NG/ML
HYDROMORPHONE UR QL CFM: NEGATIVE NG/ML
IMIPRAMINE UR QL CFM: NEGATIVE NG/ML
LORAZEPAM UR QL CFM: NEGATIVE NG/ML
M TB TUBERC IGNF/MITOGEN IGNF CONTROL: NEGATIVE NG/ML
MDMA UR QL CFM: NEGATIVE NG/ML
MDPV UR CFM-MCNC: NEGATIVE NG/ML
ME-PHENIDATE UR QL CFM: NEGATIVE NG/ML
MEPERIDINE UR QL CFM: NEGATIVE NG/ML
MEPHEDRONE UR QL CFM: NEGATIVE NG/ML
METHADONE UR QL CFM: NEGATIVE NG/ML
METHAMPHET UR QL CFM: NEGATIVE NG/ML
MITOCHONDRIA AB TITR SER IF: NEGATIVE NG/ML
MORPHINE UR QL CFM: NEGATIVE NG/ML
MORPHINE UR QL CFM: NEGATIVE NG/ML
NORBUPRENORPHINE UR QL CFM: NEGATIVE NG/ML
NORDIAZEPAM UR QL CFM: NEGATIVE NG/ML
NORFENTANYL UR QL CFM: NEGATIVE NG/ML
NORHYDROCODONE UR QL CFM: NEGATIVE NG/ML
NORHYDROCODONE UR QL CFM: NEGATIVE NG/ML
NORMEPERIDINE UR QL CFM: NEGATIVE NG/ML
NOROXYCODONE UR CFM-MCNC: ABNORMAL NG/ML
OPC-3373 QUANTIFICATION: NEGATIVE
OXAZEPAM UR QL CFM: NEGATIVE NG/ML
OXYCODONE UR CFM-MCNC: ABNORMAL NG/ML
OXYMORPHONE UR CFM-MCNC: ABNORMAL NG/ML
OXYMORPHONE UR CFM-MCNC: ABNORMAL NG/ML
PCP UR QL CFM: NEGATIVE NG/ML
PHENOBARB UR QL CFM: NEGATIVE NG/ML
PHENTERMINE UR QL CFM: NEGATIVE NG/ML
SECOBARBITAL UR QL CFM: NEGATIVE NG/ML
SL AMB 3-METHYL-FENTANYL QUANTIFICATION: NORMAL NG/ML
SL AMB 4-ANPP QUANTIFICATION: NORMAL NG/ML
SL AMB 4-FIBF QUANTIFICATION: NORMAL NG/ML
SL AMB 5F-ADB-M7 METABOLITE QUANTIFICATION: NEGATIVE NG/ML
SL AMB AB-FUBINACA-M3 METABOLITE QUANTIFICATION: NEGATIVE NG/ML
SL AMB ACETYL FENTANYL QUANTIFICATION: NORMAL NG/ML
SL AMB ACETYL NORFENTANYL QUANTIFICATION: NORMAL NG/ML
SL AMB ACRYL FENTANYL QUANTIFICATION: NORMAL NG/ML
SL AMB BUTRYL FENTANYL QUANTIFICATION: NORMAL NG/ML
SL AMB CARFENTANIL QUANTIFICATION: NORMAL NG/ML
SL AMB CLOZAPINE QUANTIFICATION: NEGATIVE NG/ML
SL AMB CYCLOPROPYL FENTANYL QUANTIFICATION: NORMAL NG/ML
SL AMB DEXTROMETHORPHAN QUANTIFICATION: NEGATIVE NG/ML
SL AMB DEXTRORPHAN (DEXTROMETHORPHAN METABOLITE) QUANT: NEGATIVE NG/ML
SL AMB DEXTRORPHAN (DEXTROMETHORPHAN METABOLITE) QUANT: NEGATIVE NG/ML
SL AMB FURANYL FENTANYL QUANTIFICATION: NORMAL NG/ML
SL AMB HALOPERIDOL  QUANTIFICATION: NEGATIVE NG/ML
SL AMB HALOPERIDOL METABOLITE QUANTIFICATION: NEGATIVE NG/ML
SL AMB JWH073 METABOLITE QUANTIFICATION: NEGATIVE NG/ML
SL AMB MDMB-FUBINACA-M1 METABOLITE QUANTIFICATION: NEGATIVE NG/ML
SL AMB METHOXYACETYL FENTANYL QUANTIFICATION: NORMAL NG/ML
SL AMB N-DESMETHYL U-47700 QUANTIFICATION: NORMAL NG/ML
SL AMB N-DESMETHYLCLOZAPINE QUANTIFICATION: NEGATIVE NG/ML
SL AMB PHENTERMINE QUANTIFICATION-CREATININE NORMALIZED: NEGATIVE NG/ML
SL AMB PHENTERMINE QUANTIFICATION: NEGATIVE NG/ML
SL AMB RISPERIDONE QUANTIFICATION: NEGATIVE NG/ML
SL AMB U-47700 QUANTIFICATION: NORMAL NG/ML
SPECIMEN DRAWN SERPL: NEGATIVE NG/ML
TAPENTADOL UR QL CFM: NEGATIVE NG/ML
TEMAZEPAM UR QL CFM: NEGATIVE NG/ML
TEMAZEPAM UR QL CFM: NEGATIVE NG/ML
TRAMADOL UR QL CFM: NEGATIVE NG/ML
URATE/CREAT 24H UR: NEGATIVE NG/ML

## 2019-10-28 ENCOUNTER — TELEPHONE (OUTPATIENT)
Dept: PAIN MEDICINE | Facility: CLINIC | Age: 54
End: 2019-10-28

## 2019-10-28 NOTE — TELEPHONE ENCOUNTER
Attempted to reach patient  LVMOM with CB#, OH provided  ----- Message from Ashley Palma MD sent at 10/28/2019  7:13 AM EDT -----  Regarding: UDT Results  The patient's last urine drug testing shows that she was positive for alcohol in her urine  Please make the patient aware that this is an opioid treatment agreement violation  Any further opioid treatment agreement violations will result in myself no longer being able to prescribe her opioid medications

## 2019-10-28 NOTE — TELEPHONE ENCOUNTER
SW patient, states she did drink because she was in the [facility] on her honeymoon  Patient states she normally does not drink  Advised of pain contract agreement  Patient verbalizes understanding

## 2019-10-29 PROBLEM — M70.61 TROCHANTERIC BURSITIS OF RIGHT HIP: Status: ACTIVE | Noted: 2019-10-29

## 2019-10-29 PROBLEM — M25.551 RIGHT HIP PAIN: Status: ACTIVE | Noted: 2019-10-29

## 2019-11-04 RX ORDER — GABAPENTIN 600 MG/1
600 TABLET ORAL 2 TIMES DAILY
Qty: 60 TABLET | Refills: 1 | Status: SHIPPED | OUTPATIENT
Start: 2019-11-04 | End: 2020-01-14 | Stop reason: SDUPTHER

## 2019-11-04 NOTE — TELEPHONE ENCOUNTER
S/w pt, states she is currently taking 600 mg BID for a total of 1200 mg daily  Advised pt to notify office if her doses change, states this was discussed over the phone in the past  Requesting refill, but states that in the past higher doses of gabapentin bothered her while driving   Please advise, thank you

## 2019-11-08 ENCOUNTER — HOSPITAL ENCOUNTER (OUTPATIENT)
Facility: AMBULARY SURGERY CENTER | Age: 54
Setting detail: OUTPATIENT SURGERY
Discharge: HOME | End: 2019-11-08
Attending: ANESTHESIOLOGY | Admitting: ANESTHESIOLOGY
Payer: COMMERCIAL

## 2019-11-08 ENCOUNTER — APPOINTMENT (OUTPATIENT)
Dept: RADIOLOGY | Facility: HOSPITAL | Age: 54
End: 2019-11-08
Payer: COMMERCIAL

## 2019-11-08 VITALS
RESPIRATION RATE: 18 BRPM | DIASTOLIC BLOOD PRESSURE: 56 MMHG | TEMPERATURE: 99 F | OXYGEN SATURATION: 98 % | HEART RATE: 100 BPM | SYSTOLIC BLOOD PRESSURE: 145 MMHG

## 2019-11-08 PROCEDURE — 77002 NEEDLE LOCALIZATION BY XRAY: CPT | Performed by: ANESTHESIOLOGY

## 2019-11-08 PROCEDURE — 77002 NEEDLE LOCALIZATION BY XRAY: CPT

## 2019-11-08 PROCEDURE — 73501 X-RAY EXAM HIP UNI 1 VIEW: CPT

## 2019-11-08 PROCEDURE — 20610 DRAIN/INJ JOINT/BURSA W/O US: CPT | Performed by: ANESTHESIOLOGY

## 2019-11-08 RX ORDER — METHYLPREDNISOLONE ACETATE 40 MG/ML
INJECTION, SUSPENSION INTRA-ARTICULAR; INTRALESIONAL; INTRAMUSCULAR; SOFT TISSUE AS NEEDED
Status: DISCONTINUED | OUTPATIENT
Start: 2019-11-08 | End: 2019-11-08 | Stop reason: HOSPADM

## 2019-11-08 RX ORDER — LIDOCAINE WITH 8.4% SOD BICARB 0.9%(10ML)
SYRINGE (ML) INJECTION AS NEEDED
Status: DISCONTINUED | OUTPATIENT
Start: 2019-11-08 | End: 2019-11-08 | Stop reason: HOSPADM

## 2019-11-08 RX ORDER — BUPIVACAINE HYDROCHLORIDE 2.5 MG/ML
INJECTION, SOLUTION EPIDURAL; INFILTRATION; INTRACAUDAL AS NEEDED
Status: DISCONTINUED | OUTPATIENT
Start: 2019-11-08 | End: 2019-11-08 | Stop reason: HOSPADM

## 2019-11-08 NOTE — OP NOTE
ATTENDING PHYSICIAN:  Maury Rico MD     PROCEDURE: Right trochanteric bursa injection under fluoroscopic guidance  PREPROCEDURE DIAGNOSIS: Trochanteric bursitis  POSTPROCEDURE DIAGNOSIS: Trochanteric bursitis  ANESTHESIA:  Local     ESTIMATED BLOOD LOSS:  Minimal     COMPLICATIONS:  None  LOCATION:  [address]  CONSENT:  Today's procedure, its potential benefits as well as its risks and potential side effects were reviewed  Discussed risks of the procedure including bleeding, infection, nerve irritation or damage, reactions to the medications, failure of the pain to improve, and potential worsening of the pain were explained to the patient who verbalized understanding and who wished to proceed  Written informed consent was thereby obtained  DESCRIPTION OF THE PROCEDURE:  After written informed consent was obtained, the patient was taken to the fluoroscopy suite and placed in the lateral decubitus position  The greater trochanter was then identified with palpation and confirmed with fluoroscopy  The needle entry site was marked  The skin was prepped with antiseptic swabs in the usual sterile fashion  A 22-gauge 3-1/2-inch spinal needle was advanced until os was contacted at the greater trochanter  The needle was withdrawn 1 mm and an injectate consisting of 4 mL of 0 25% bupivacaine mixed with 1 mL of 40 mg/mL Depo-Medrol was slowly injected after negative aspiration  If this were a bilateral trochanteric bursa injection, the procedure was then repeated after asking the patient to move into the opposite lateral decubitus position  The patient tolerated the procedure well and all needles were removed with the tips intact  Hemostasis was maintained  There were no apparent paresthesias or complications  The skin was wiped clean and a Band-Aid was placed as appropriate   The patient was monitored for an appropriate period of time following the procedure and remained hemodynamically stable and neurovascularly intact following the procedure  The patient was ultimately discharged to home with supervision in good condition and instructed to call the office in a few days for an update or sooner as warranted  I was present and participated in all key and critical portions of this procedure      Faith Leary MD  11/8/2019  9:38 AM

## 2019-11-08 NOTE — DISCHARGE INSTRUCTIONS
1  Do not apply heat to any area that is numb  If you have discomfort or soreness at the injection site, you may apply ice today, 20 minutes on and 20 minutes off  Tomorrow you may use ice or warm, moist heat  Do not apply ice or heat directly to the skin  2  If you experience severe shortness of breath, go to the Emergency Room  3  You may have numbness for several hours from the local anesthetic  Please use caution and common sense, especially with weight-bearing activities  4  You may have an increase or change in the discomfort for 36-48 hours after your treatment  Apply ice and continue with any pain medicine you have been prescribed  5  Do not do anything strenuous today  You may shower, but no tub baths or hot tubs today  You may resume your normal activities tomorrow, but do not overdo it  Resume normal activities slowly when you are feeling better  6  If you experience redness, drainage or swelling at the injection site, or if you develop a fever above 100 degrees, please call [facility] at [phone] or go to the Emergency Room  7  Continue to take all routine medicines prescribed by your primary care physician unless otherwise instructed by our staff  Most blood thinners should be started again according to your regularly scheduled dosing  If you have any questions, please give our office a call  If you have a problem specifically related to your procedure, please call our office at [phone]  Problems not related to your procedure should be directed to your primary care physician

## 2019-11-15 ENCOUNTER — TELEPHONE (OUTPATIENT)
Dept: PAIN MEDICINE | Facility: CLINIC | Age: 54
End: 2019-11-15

## 2019-11-15 ENCOUNTER — TELEPHONE (OUTPATIENT)
Dept: PAIN MEDICINE | Facility: MEDICAL CENTER | Age: 54
End: 2019-11-15

## 2019-11-15 NOTE — TELEPHONE ENCOUNTER
Pt called stating she would like to schedule her trigger point injections in her back for the first week of december      Pt can be reached at [phone]

## 2019-12-03 ENCOUNTER — PROCEDURE VISIT (OUTPATIENT)
Dept: PAIN MEDICINE | Facility: CLINIC | Age: 54
End: 2019-12-03
Payer: COMMERCIAL

## 2019-12-03 DIAGNOSIS — G89.4 CHRONIC PAIN SYNDROME: ICD-10-CM

## 2019-12-03 DIAGNOSIS — M54.2 CERVICALGIA: ICD-10-CM

## 2019-12-03 DIAGNOSIS — M79.18 MYOFASCIAL PAIN SYNDROME: Primary | ICD-10-CM

## 2019-12-03 PROCEDURE — 20553 NJX 1/MLT TRIGGER POINTS 3/>: CPT | Performed by: ANESTHESIOLOGY

## 2019-12-03 RX ORDER — LIDOCAINE HYDROCHLORIDE 10 MG/ML
10 INJECTION, SOLUTION EPIDURAL; INFILTRATION; INTRACAUDAL; PERINEURAL ONCE
Status: COMPLETED | OUTPATIENT
Start: 2019-12-03 | End: 2019-12-03

## 2019-12-03 RX ORDER — METHYLPREDNISOLONE ACETATE 40 MG/ML
40 INJECTION, SUSPENSION INTRA-ARTICULAR; INTRALESIONAL; INTRAMUSCULAR; SOFT TISSUE ONCE
Status: COMPLETED | OUTPATIENT
Start: 2019-12-03 | End: 2019-12-03

## 2019-12-03 RX ADMIN — METHYLPREDNISOLONE ACETATE 40 MG: 40 INJECTION, SUSPENSION INTRA-ARTICULAR; INTRALESIONAL; INTRAMUSCULAR; SOFT TISSUE at 13:13

## 2019-12-03 RX ADMIN — LIDOCAINE HYDROCHLORIDE 10 ML: 10 INJECTION, SOLUTION EPIDURAL; INFILTRATION; INTRACAUDAL; PERINEURAL at 13:14

## 2019-12-03 NOTE — PROGRESS NOTES
Inj Trigger Point Single/Multiple     Date/Time 12/3/2019 1:11 PM     Performed by  Tiny Romberg, MD     Authorized by Tiny Romberg, MD      Universal Protocol Consent: Verbal consent obtained  Written consent obtained  Risks and benefits: risks, benefits and alternatives were discussed  Consent given by: patient  Patient understanding: patient states understanding of the procedure being performed  Patient consent: the patient's understanding of the procedure matches consent given  Procedure consent: procedure consent matches procedure scheduled  Relevant documents: relevant documents present and verified  Test results: test results available and properly labeled  Site marked: the operative site was marked  Radiology Images displayed and confirmed  If images not available, report reviewed: imaging studies available  Patient identity confirmed: verbally with patient  Time out: Immediately prior to procedure a "time out" was called to verify the correct patient, procedure, equipment, support staff and site/side marked as required  Preparation: Patient was prepped and draped in the usual sterile fashion  Site preparation: Betadine    Local anesthesia used: yes      Anesthesia: local infiltration     Anesthesia   Local anesthesia used: yes  Local Anesthetic: lidocaine 1% without epinephrine  Anesthetic total: 9 mL     Sedation   Patient sedated: no        Specimen: no    Culture: no   Procedure Details   Procedure Notes: ATTENDING PHYSICIAN:  Tiny Romberg, MD     PROCEDURE:  Trigger point injections x2 to the right cervical paraspinal, x6 to the right upper trapezius, and x2 to the right rhomboid musculature with local anesthetic and steroid  PRE-PROCEDURE DIAGNOSIS:  Myofascial pain syndrome  POST-PROCEDURE DIAGNOSIS:  Myofascial pain syndrome  ESTIMATED BLOOD LOSS:  Minimal     ANESTHESIA:  None  COMPLICATIONS:  None      CONSENT:  Today's procedure, its potential benefits as well as its risks and side effects were reviewed  Discussed risks of the procedure including bleeding, infection, reactions to the medications, failure the pain to improve, and potential worsening of the pain as well as pneumothorax were explained in detail to the patient, who verbalized understanding and wished to proceed  Written informed consent was thereby obtained  DESCRIPTION OF THE PROCEDURE:  After written informed consent was obtained, the patient was placed in the sitting position on the examination table  Anatomical landmarks were identified via palpation  The trigger points were identified and marked after palpation  The skin overlying these 10 marked trigger points was prepared using Betadine swabs x3 in the usual sterile fashion  Strict aseptic technique was utilized throughout the procedure  A 10 mL injectate consisting of 9 mL of 1% lidocaine mixed with 1 mL of Depo-Medrol 40 mg/mL was drawn up sterilely  Using a 25-gauge 1 25 inch needle, 1 mL of the above injectate was administered to each of the marked trigger points following negative aspiration  The patient tolerated the procedure well and all needles were removed with the tips intact  Hemostasis was maintained  There were no apparent complications  The skin was wiped clean and Band-Aids were placed as appropriate  The patient was monitored for an appropriate period of time following the procedure and remained hemodynamically stable and neurovascularly intact following the procedure as she was prior to the procedure  The patient was ultimately discharged to home with supervision in good condition  I was present for and participated in all key and critical portions of this procedure    Patient tolerance: Patient tolerated the procedure well with no immediate complications

## 2019-12-09 ENCOUNTER — TELEPHONE (OUTPATIENT)
Dept: PAIN MEDICINE | Facility: CLINIC | Age: 54
End: 2019-12-09

## 2019-12-09 NOTE — TELEPHONE ENCOUNTER
S/w pt  S/p R sided TPI 12/3  Pt states she is more sore post procedure than she usually is and is having "extreme muscle spasm" to right shoulder blade and back  Pt reports some loss of ROM to right arm due to pain  Denies any fever or s/s of infection at injection sites  Pt reports heavy lifting at work  Advised ice or heat to area and limit lifting today, if possible  Advised muscle relaxer as ordered  Pt is taking flexeril bid and percocet as ordered  Also taking prn ibuprofen  Pt is scheduled 12/17 for another procedure  Please advise on any additional recommendations

## 2019-12-09 NOTE — TELEPHONE ENCOUNTER
S/w pt, s/p TPI, was doing well, now has a lot of pain and thinks she tore a muscle       #  [phone]

## 2019-12-09 NOTE — TELEPHONE ENCOUNTER
She can trial taking 1 5 pills of the cyclobenzaprine temporarily to see if that helps with the muscle spasms  Massage therapy or acupuncture may also help with spasms in that region

## 2019-12-17 ENCOUNTER — PROCEDURE VISIT (OUTPATIENT)
Dept: PAIN MEDICINE | Facility: CLINIC | Age: 54
End: 2019-12-17
Payer: COMMERCIAL

## 2019-12-17 DIAGNOSIS — G89.4 CHRONIC PAIN SYNDROME: ICD-10-CM

## 2019-12-17 DIAGNOSIS — M54.2 CERVICALGIA: ICD-10-CM

## 2019-12-17 DIAGNOSIS — M79.18 MYOFASCIAL PAIN SYNDROME: Primary | ICD-10-CM

## 2019-12-17 PROCEDURE — 20553 NJX 1/MLT TRIGGER POINTS 3/>: CPT | Performed by: ANESTHESIOLOGY

## 2019-12-17 RX ORDER — LIDOCAINE HYDROCHLORIDE 10 MG/ML
10 INJECTION, SOLUTION INFILTRATION; PERINEURAL ONCE
Status: COMPLETED | OUTPATIENT
Start: 2019-12-17 | End: 2019-12-17

## 2019-12-17 RX ORDER — METHYLPREDNISOLONE ACETATE 40 MG/ML
40 INJECTION, SUSPENSION INTRA-ARTICULAR; INTRALESIONAL; INTRAMUSCULAR; SOFT TISSUE ONCE
Status: COMPLETED | OUTPATIENT
Start: 2019-12-17 | End: 2019-12-17

## 2019-12-17 RX ADMIN — LIDOCAINE HYDROCHLORIDE 10 ML: 10 INJECTION, SOLUTION INFILTRATION; PERINEURAL at 13:33

## 2019-12-17 RX ADMIN — METHYLPREDNISOLONE ACETATE 40 MG: 40 INJECTION, SUSPENSION INTRA-ARTICULAR; INTRALESIONAL; INTRAMUSCULAR; SOFT TISSUE at 13:34

## 2019-12-17 NOTE — PROGRESS NOTES
Inj Trigger Point Single/Multiple     Date/Time 12/17/2019 1:29 PM     Performed by  Jayme Burris MD     Authorized by Jayme Burris MD      Universal Protocol Consent: Verbal consent obtained  Written consent obtained  Risks and benefits: risks, benefits and alternatives were discussed  Consent given by: patient  Patient understanding: patient states understanding of the procedure being performed  Patient consent: the patient's understanding of the procedure matches consent given  Procedure consent: procedure consent matches procedure scheduled  Relevant documents: relevant documents present and verified  Test results: test results available and properly labeled  Site marked: the operative site was marked  Radiology Images displayed and confirmed  If images not available, report reviewed: imaging studies available  Patient identity confirmed: verbally with patient  Time out: Immediately prior to procedure a "time out" was called to verify the correct patient, procedure, equipment, support staff and site/side marked as required  Preparation: Patient was prepped and draped in the usual sterile fashion  Site preparation: Betadine    Local anesthesia used: yes      Anesthesia: local infiltration     Anesthesia   Local anesthesia used: yes  Local Anesthetic: lidocaine 1% without epinephrine  Anesthetic total: 9 mL     Sedation   Patient sedated: no        Specimen: no    Culture: no   Procedure Details   Procedure Notes: ATTENDING PHYSICIAN:  Jayme Burris MD     PROCEDURE:  Trigger point injections x1 to the right upper trapezius, x 8 to the left upper and lower trapezius, and x1 to the left rhomboid musculature with local anesthetic and steroid  PRE-PROCEDURE DIAGNOSIS:  Myofascial pain syndrome  POST-PROCEDURE DIAGNOSIS:  Myofascial pain syndrome  ESTIMATED BLOOD LOSS:  Minimal     ANESTHESIA:  None  COMPLICATIONS:  None      CONSENT:  Today's procedure, its potential benefits as well as its risks and side effects were reviewed  Discussed risks of the procedure including bleeding, infection, reactions to the medications, failure the pain to improve, and potential worsening of the pain as well as pneumothorax were explained in detail to the patient, who verbalized understanding and wished to proceed  Written informed consent was thereby obtained  DESCRIPTION OF THE PROCEDURE:  After written informed consent was obtained, the patient was placed in the sitting position on the examination table  Anatomical landmarks were identified via palpation  The trigger points were identified and marked after palpation  The skin overlying these 10 marked trigger points was prepared using Betadine swabs x3 in the usual sterile fashion  Strict aseptic technique was utilized throughout the procedure  A 10 mL injectate consisting of 9 mL of 1% lidocaine mixed with 1 mL of Depo-Medrol 40 mg/mL was drawn up sterilely  Using a 25-gauge 1 25 inch needle, 1 mL of the above injectate was administered to each of the marked trigger points following negative aspiration  The patient tolerated the procedure well and all needles were removed with the tips intact  Hemostasis was maintained  There were no apparent complications  The skin was wiped clean and Band-Aids were placed as appropriate  The patient was monitored for an appropriate period of time following the procedure and remained hemodynamically stable and neurovascularly intact following the procedure as she was prior to the procedure  The patient was ultimately discharged to home with supervision in good condition  I was present for and participated in all key and critical portions of this procedure    Patient tolerance: Patient tolerated the procedure well with no immediate complications

## 2019-12-17 NOTE — LETTER
December 17, 2019     Cyndy Ibrahim, [address] [address]    Patient: Jimbo Gan   YOB: 1965   Date of Visit: 12/17/2019       Dear Dr Chandrakant hCo Recipients: Thank you for referring Mirna Elaine to me for evaluation  Below are my notes for this consultation  If you have questions, please do not hesitate to call me  I look forward to following your patient along with you  Sincerely,        Marijean Lesches, MD        CC: No Recipients  Marijean Lesches, MD  12/17/2019  1:30 PM  Sign at close encounter        [address] Single/Multiple     Date/Time 12/17/2019 1:29 PM     Performed by  Marijean Lesches, MD     Authorized by Marijean Lesches, MD      Universal Protocol Consent: Verbal consent obtained  Written consent obtained  Risks and benefits: risks, benefits and alternatives were discussed  Consent given by: patient  Patient understanding: patient states understanding of the procedure being performed  Patient consent: the patient's understanding of the procedure matches consent given  Procedure consent: procedure consent matches procedure scheduled  Relevant documents: relevant documents present and verified  Test results: test results available and properly labeled  Site marked: the operative site was marked  Radiology Images displayed and confirmed  If images not available, report reviewed: imaging studies available  Patient identity confirmed: verbally with patient  Time out: Immediately prior to procedure a "time out" was called to verify the correct patient, procedure, equipment, support staff and site/side marked as required  Preparation: Patient was prepped and draped in the usual sterile fashion        Site preparation: Betadine    Local anesthesia used: yes      Anesthesia: local infiltration     Anesthesia   Local anesthesia used: yes  Local Anesthetic: lidocaine 1% without epinephrine  Anesthetic total: 9 mL     Sedation   Patient sedated: no        Specimen: no    Culture: no   Procedure Details   Procedure Notes: ATTENDING PHYSICIAN:  Gopi Monday, MD     PROCEDURE:  Trigger point injections x1 to the right upper trapezius, x 8 to the left upper and lower trapezius, and x1 to the left rhomboid musculature with local anesthetic and steroid  PRE-PROCEDURE DIAGNOSIS:  Myofascial pain syndrome  POST-PROCEDURE DIAGNOSIS:  Myofascial pain syndrome  ESTIMATED BLOOD LOSS:  Minimal     ANESTHESIA:  None  COMPLICATIONS:  None  CONSENT:  Today's procedure, its potential benefits as well as its risks and side effects were reviewed  Discussed risks of the procedure including bleeding, infection, reactions to the medications, failure the pain to improve, and potential worsening of the pain as well as pneumothorax were explained in detail to the patient, who verbalized understanding and wished to proceed  Written informed consent was thereby obtained  DESCRIPTION OF THE PROCEDURE:  After written informed consent was obtained, the patient was placed in the sitting position on the examination table  Anatomical landmarks were identified via palpation  The trigger points were identified and marked after palpation  The skin overlying these 10 marked trigger points was prepared using Betadine swabs x3 in the usual sterile fashion  Strict aseptic technique was utilized throughout the procedure  A 10 mL injectate consisting of 9 mL of 1% lidocaine mixed with 1 mL of Depo-Medrol 40 mg/mL was drawn up sterilely  Using a 25-gauge 1 25 inch needle, 1 mL of the above injectate was administered to each of the marked trigger points following negative aspiration  The patient tolerated the procedure well and all needles were removed with the tips intact  Hemostasis was maintained  There were no apparent complications  The skin was wiped clean and Band-Aids were placed as appropriate   The patient was monitored for an appropriate period of time following the procedure and remained hemodynamically stable and neurovascularly intact following the procedure as she was prior to the procedure  The patient was ultimately discharged to home with supervision in good condition  I was present for and participated in all key and critical portions of this procedure    Patient tolerance: Patient tolerated the procedure well with no immediate complications

## 2020-01-14 ENCOUNTER — OFFICE VISIT (OUTPATIENT)
Dept: PAIN MEDICINE | Facility: CLINIC | Age: 55
End: 2020-01-14

## 2020-01-14 ENCOUNTER — APPOINTMENT (OUTPATIENT)
Dept: RADIOLOGY | Facility: CLINIC | Age: 55
End: 2020-01-14
Payer: COMMERCIAL

## 2020-01-14 VITALS — DIASTOLIC BLOOD PRESSURE: 66 MMHG | HEART RATE: 76 BPM | SYSTOLIC BLOOD PRESSURE: 106 MMHG

## 2020-01-14 DIAGNOSIS — M25.551 PAIN IN RIGHT HIP: ICD-10-CM

## 2020-01-14 DIAGNOSIS — M54.2 CERVICALGIA: ICD-10-CM

## 2020-01-14 DIAGNOSIS — G89.4 CHRONIC PAIN SYNDROME: Primary | ICD-10-CM

## 2020-01-14 DIAGNOSIS — M50.120 CERVICAL DISC DISORDER WITH RADICULOPATHY OF MID-CERVICAL REGION: ICD-10-CM

## 2020-01-14 DIAGNOSIS — M54.2 NECK PAIN: ICD-10-CM

## 2020-01-14 PROCEDURE — 73502 X-RAY EXAM HIP UNI 2-3 VIEWS: CPT

## 2020-01-14 PROCEDURE — 99214 OFFICE O/P EST MOD 30 MIN: CPT | Performed by: ANESTHESIOLOGY

## 2020-01-14 RX ORDER — CYCLOBENZAPRINE HCL 10 MG
10 TABLET ORAL 3 TIMES DAILY PRN
Qty: 90 TABLET | Refills: 1 | Status: SHIPPED | OUTPATIENT
Start: 2020-01-14 | End: 2020-03-09 | Stop reason: SDUPTHER

## 2020-01-14 RX ORDER — OXYCODONE HYDROCHLORIDE AND ACETAMINOPHEN 5; 325 MG/1; MG/1
0.5 TABLET ORAL 2 TIMES DAILY PRN
Qty: 30 TABLET | Refills: 0 | Status: SHIPPED | OUTPATIENT
Start: 2020-02-14 | End: 2020-03-09 | Stop reason: SDUPTHER

## 2020-01-14 RX ORDER — GABAPENTIN 600 MG/1
600 TABLET ORAL 2 TIMES DAILY
Qty: 60 TABLET | Refills: 1 | Status: SHIPPED | OUTPATIENT
Start: 2020-01-14 | End: 2020-03-09 | Stop reason: SDUPTHER

## 2020-01-14 RX ORDER — OXYCODONE HYDROCHLORIDE AND ACETAMINOPHEN 5; 325 MG/1; MG/1
0.5 TABLET ORAL 2 TIMES DAILY PRN
Qty: 30 TABLET | Refills: 0 | Status: SHIPPED | OUTPATIENT
Start: 2020-01-15 | End: 2020-03-09 | Stop reason: SDUPTHER

## 2020-01-14 NOTE — H&P (VIEW-ONLY)
Pain Medicine Follow-Up Note    Assessment:  1  Chronic pain syndrome    2  Pain in right hip    3  Neck pain    4  Cervical disc disorder with radiculopathy of mid-cervical region    5  Cervicalgia        Plan:  Orders Placed This Encounter   Procedures    XR hip/pelv 2-3 vws right if performed     Standing Status:   Future     Number of Occurrences:   1     Standing Expiration Date:   1/14/2024     Scheduling Instructions:      Bring along any outside films relating to this procedure  Order Specific Question:   Is the patient pregnant? Answer:   No    Ambulatory referral to Orthopedic Surgery     Standing Status:   Future     Standing Expiration Date:   1/14/2021     Referral Priority:   Routine     Referral Type:   Consult - AMB     Referral Reason:   Specialty Services Required     Referred to Provider:   Chris Johnson DO     Requested Specialty:   Orthopedic Surgery     Number of Visits Requested:   1     Expiration Date:   1/14/2021       New Medications Ordered This Visit   Medications    oxyCODONE-acetaminophen (PERCOCET) 5-325 mg per tablet     Sig: Take 0 5 tablets by mouth 2 (two) times a day as needed for severe painMax Daily Amount: 1 tablet     Dispense:  30 tablet     Refill:  0    oxyCODONE-acetaminophen (PERCOCET) 5-325 mg per tablet     Sig: Take 0 5 tablets by mouth 2 (two) times a day as needed for severe painMax Daily Amount: 1 tablet     Dispense:  30 tablet     Refill:  0    gabapentin (NEURONTIN) 600 MG tablet     Sig: Take 1 tablet (600 mg total) by mouth 2 (two) times a day     Dispense:  60 tablet     Refill:  1    cyclobenzaprine (FLEXERIL) 10 mg tablet     Sig: Take 1 tablet (10 mg total) by mouth 3 (three) times a day as needed for muscle spasms     Dispense:  90 tablet     Refill:  1     My impressions and treatment recommendations were discussed in detail with the patient who verbalized understanding and had no further questions        The patient reports that she had 2 months worth of relief following the right trochanteric bursa injection performed in November 2019 by myself  She states that her pain has returned, so I felt a reasonable to offer her a repeat right trochanteric bursa injection under x-ray guidance since this could be potentially therapeutic  The procedures, its risks, and benefits were explained in detail to the patient  Risks include but are not limited to bleeding, infection, hematoma formation, abscess formation, weakness, headache, failure the pain to improve, nerve irritation or damage, and potential worsening of the pain  The patient verbalized understanding and wished to proceed with the procedure  I also felt it reasonable to have the patient undergo a x-ray of the right hip and an evaluation by Dr Desiree Multani for possible right hip pathology  Given that the patient reports overall reduced pain and improved level of functioning without significant side effects, I felt a reasonable to continue her on gabapentin 600 mg twice daily, oxycodone/acetaminophen 5/325 mg 0 5 tablets twice daily as needed for pain, and cyclobenzaprine 10 mg 3 times daily as needed for muscle spasms  I sent E prescriptions for the oxycodone/acetaminophen dated January 15, 2020 and February 14, 2020  The risks and side effects of chronic opioid treatment were discussed in detail with the patient  Side effects include but are not limited to nausea, vomiting, GI intolerance, sedation, constipation, mental clouding, opioid-induced hyperalgesia, endocrine dysfunction, addiction, dependence, and tolerance  The patient was asked to take his medications only as prescribed and directed, never in excess, and never for any other reason other than for pain control  The patient was also asked to keep his medications out of the reach of others and away from children, preferably in a locked drawer   The patient verbalized understanding and wished to use these opioid medications  [place] Prescription Drug Monitoring Program report was reviewed and was appropriate     Follow-up is planned in 2 months time or sooner as warranted  Discharge instructions were provided  I personally saw and examined the patient and I agree with the above discussed plan of care  History of Present Illness:    Jerona Kehr is a 49 y o  female who presents to [facility] for interval re-evaluation of the above stated pain complaints  The patient has a past medical and chronic pain history as outlined in the assessment section  She was last seen on December 17, 2019 at which time she underwent trigger point injections  At today's office visit, the patient's pain score is 9/10 on the verbal numerical pain rating scale  She is complaining primarily of right hip pain involving the right trochanteric bursa  She did get a right trochanteric bursa injection in November 2019 by myself with excellent pain relief for 2 months duration  She states that her pain has returned and would like to undergo repeat procedure  She currently reports that her pain is constant and describes the quality of her pain as "burning, sharp, throbbing, and shooting    She currently uses gabapentin, oxycodone/acetaminophen, and cyclobenzaprine and states that these medications give her excellent pain relief  She denies opioid induced constipation  Other than as stated above, the patient denies any interval changes in medications, medical condition, mental condition, symptoms, or allergies since the last office visit  Review of Systems:    Review of Systems   Constitutional: Negative for diaphoresis  HENT: Negative for congestion, dental problem and postnasal drip  Eyes: Negative for itching  Respiratory: Negative for shortness of breath  Cardiovascular: Negative for leg swelling     Gastrointestinal: Negative for abdominal distention, abdominal pain, anal bleeding and rectal pain  Endocrine: Negative for polyphagia and polyuria  Genitourinary: Negative for decreased urine volume, vaginal bleeding and vaginal discharge  Musculoskeletal: Positive for neck pain  Skin: Negative for rash  Allergic/Immunologic: Negative for immunocompromised state  Neurological: Negative for dizziness and seizures  Hematological: Does not bruise/bleed easily  Psychiatric/Behavioral: Negative for dysphoric mood and hallucinations  The patient is not hyperactive  Patient Active Problem List   Diagnosis    Chronic pain syndrome    Acute muscle stiffness of neck    Cervical disc disorder with radiculopathy of mid-cervical region    Chronic left-sided low back pain with left-sided sciatica    Lumbar radiculopathy    Cervicalgia    Right hip pain    Trochanteric bursitis of right hip       Past Medical History:   Diagnosis Date    Anxiety     Asthma     exercise induced asthma    Cervical disc disorder     Chronic pain     Chronic pain disorder     Depression     Dysuria     Low back pain     Lung abnormality     nodules    Lung nodule     Neck pain     Peripheral neuropathy     Pituitary abnormality (HCC)     tumor    Right hip pain 10/29/2019    Thrombocytopenia ([address] )     Thyroid disease        Past Surgical History:   Procedure Laterality Date    APPENDECTOMY      AUGMENTATION MAMMAPLASTY Bilateral     CHOLECYSTECTOMY      COLECTOMY      COLON SURGERY      EPIDURAL BLOCK INJECTION N/A 6/23/2016    Procedure: BLOCK / INJECTION EPIDURAL STEROID CERVICAL  C7-T1;  Surgeon: Tiny Romberg, MD;  [facility] MAIN OR;  Service:     EPIDURAL BLOCK INJECTION N/A 3/31/2016    Procedure: BLOCK / INJECTION EPIDURAL STEROID CERVICAL C7-T1  (C-ARM); Surgeon: Tiny Romberg, MD;  Location: [facility] MAIN OR;  Service:     EPIDURAL BLOCK INJECTION N/A 8/8/2018    Procedure: C6 C7 Cervical Epidural Steroid Injection (20620);   Surgeon: Tiny Romberg, MD;  [facility];  Service: Pain Management     HYSTERECTOMY      PITUITARY SURGERY      IL ARTHROCENTESIS ASPIR&/INJ MAJOR JT/BURSA W/O US Right 11/8/2019    Procedure: Trochanteric Bursa Injection (19991); Surgeon: Radha Jaime MD;  Location: [facility] MAIN OR;  Service: Pain Management     IL NJX DX/THER SBST EPIDURAL/SUBRACH CERV/THORACIC N/A 1/21/2016    Procedure: BLOCK / INJECTION EPIDURAL STEROID CERVICAL C7-T1 (C-ARM); Surgeon: Radha Jaime MD;  Location: [facility] MAIN OR;  Service: Pain Management        Family History   Problem Relation Age of Onset    Thyroid disease Mother     Hyperlipidemia Mother     Depression Mother     Thyroid disease Father     Hyperlipidemia Father     Depression Father     No Known Problems Sister     No Known Problems Brother     No Known Problems Maternal Aunt     No Known Problems Maternal Uncle     No Known Problems Paternal Aunt     No Known Problems Paternal Uncle     No Known Problems Maternal Grandmother     No Known Problems Maternal Grandfather     No Known Problems Paternal Grandmother     No Known Problems Paternal Grandfather     No Known Problems Daughter     No Known Problems Son     ADD / ADHD Neg Hx     Anesthesia problems Neg Hx     Cancer Neg Hx     Clotting disorder Neg Hx     Collagen disease Neg Hx     Diabetes Neg Hx     Dislocations Neg Hx     Learning disabilities Neg Hx     Neurological problems Neg Hx     Osteoporosis Neg Hx     Rheumatologic disease Neg Hx     Scoliosis Neg Hx     Vascular Disease Neg Hx        Social History     Occupational History    Not on file   Tobacco Use    Smoking status: Never Smoker    Smokeless tobacco: Never Used   Substance and Sexual Activity    Alcohol use: No    Drug use: No    Sexual activity: Not on file         Current Outpatient Medications:     albuterol (VENTOLIN HFA) 90 mcg/act inhaler, Inhale, Disp: , Rfl:     aspirin 81 MG tablet, Take 81 mg by mouth daily  , Disp: , Rfl:     cholecalciferol (VITAMIN D3) 1,000 units tablet, Take 2 tablets by mouth daily, Disp: , Rfl:     cyclobenzaprine (FLEXERIL) 10 mg tablet, Take 1 tablet (10 mg total) by mouth 3 (three) times a day as needed for muscle spasms, Disp: 90 tablet, Rfl: 1    gabapentin (NEURONTIN) 600 MG tablet, Take 1 tablet (600 mg total) by mouth 2 (two) times a day, Disp: 60 tablet, Rfl: 1    hydrocortisone (ANUSOL-HC) 25 mg suppository, , Disp: , Rfl: 0    ibuprofen (MOTRIN) 600 mg tablet, Take 1 tablet (600 mg total) by mouth every 6 (six) hours as needed for mild pain, Disp: 30 tablet, Rfl: 0    levothyroxine 50 mcg tablet, , Disp: , Rfl:     metoprolol succinate (TOPROL-XL) 25 mg 24 hr tablet, , Disp: , Rfl:     mupirocin (BACTROBAN) 2 % ointment, , Disp: , Rfl: 0    [START ON 2/14/2020] oxyCODONE-acetaminophen (PERCOCET) 5-325 mg per tablet, Take 0 5 tablets by mouth 2 (two) times a day as needed for severe painMax Daily Amount: 1 tablet, Disp: 30 tablet, Rfl: 0    traZODone (DESYREL) 50 mg tablet, , Disp: , Rfl: 0    [START ON 1/15/2020] oxyCODONE-acetaminophen (PERCOCET) 5-325 mg per tablet, Take 0 5 tablets by mouth 2 (two) times a day as needed for severe painMax Daily Amount: 1 tablet, Disp: 30 tablet, Rfl: 0    Allergies   Allergen Reactions    Levaquin [Levofloxacin In D5w] Shortness Of Breath     Also hives      Amitiza [Lubiprostone] Other (See Comments) and Hives     Reaction Date: 10Aug2011;   Migraines and vomiting    Biaxin [Clarithromycin] Hives    Cephradine     Erythromycin Hives     Reaction Date: 10Aug2011;     Macrobid [Nitrofurantoin Monohyd Macro] Other (See Comments)     C/o passing out    Morphine     Penicillins Hives     Reaction Date: 10Aug2011;     Sulfa Antibiotics     Tetracycline     Tetracyclines & Related Hives    Morphine And Related Rash    Valium [Diazepam] Rash and Vomiting     Reaction Date: [id number];        Physical Exam:    /66   Pulse 76 Constitutional:normal, well developed, well nourished, alert, in no distress and non-toxic and no overt pain behavior    Eyes:anicteric  HEENT:grossly intact  Neck:supple, symmetric, trachea midline and no masses   Pulmonary:even and unlabored  Cardiovascular:No edema or pitting edema present  Skin:Normal without rashes or lesions and well hydrated  Psychiatric:Mood and affect appropriate  Neurologic:Cranial Nerves II-XII grossly intact  Musculoskeletal:antalgic and Tenderness noted over the right trochanteric bursa    Orders Placed This Encounter   Procedures    XR hip/pelv 2-3 vws right if performed    Ambulatory referral to Orthopedic Surgery

## 2020-01-14 NOTE — PROGRESS NOTES
Pain Medicine Follow-Up Note    Assessment:  1  Chronic pain syndrome    2  Pain in right hip    3  Neck pain    4  Cervical disc disorder with radiculopathy of mid-cervical region    5  Cervicalgia        Plan:  Orders Placed This Encounter   Procedures    XR hip/pelv 2-3 vws right if performed     Standing Status:   Future     Number of Occurrences:   1     Standing Expiration Date:   1/14/2024     Scheduling Instructions:      Bring along any outside films relating to this procedure  Order Specific Question:   Is the patient pregnant? Answer:   No    Ambulatory referral to Orthopedic Surgery     Standing Status:   Future     Standing Expiration Date:   1/14/2021     Referral Priority:   Routine     Referral Type:   Consult - AMB     Referral Reason:   Specialty Services Required     Referred to Provider:   Lina Yan DO     Requested Specialty:   Orthopedic Surgery     Number of Visits Requested:   1     Expiration Date:   1/14/2021       New Medications Ordered This Visit   Medications    oxyCODONE-acetaminophen (PERCOCET) 5-325 mg per tablet     Sig: Take 0 5 tablets by mouth 2 (two) times a day as needed for severe painMax Daily Amount: 1 tablet     Dispense:  30 tablet     Refill:  0    oxyCODONE-acetaminophen (PERCOCET) 5-325 mg per tablet     Sig: Take 0 5 tablets by mouth 2 (two) times a day as needed for severe painMax Daily Amount: 1 tablet     Dispense:  30 tablet     Refill:  0    gabapentin (NEURONTIN) 600 MG tablet     Sig: Take 1 tablet (600 mg total) by mouth 2 (two) times a day     Dispense:  60 tablet     Refill:  1    cyclobenzaprine (FLEXERIL) 10 mg tablet     Sig: Take 1 tablet (10 mg total) by mouth 3 (three) times a day as needed for muscle spasms     Dispense:  90 tablet     Refill:  1     My impressions and treatment recommendations were discussed in detail with the patient who verbalized understanding and had no further questions        The patient reports that she had 2 months worth of relief following the right trochanteric bursa injection performed in November 2019 by myself  She states that her pain has returned, so I felt a reasonable to offer her a repeat right trochanteric bursa injection under x-ray guidance since this could be potentially therapeutic  The procedures, its risks, and benefits were explained in detail to the patient  Risks include but are not limited to bleeding, infection, hematoma formation, abscess formation, weakness, headache, failure the pain to improve, nerve irritation or damage, and potential worsening of the pain  The patient verbalized understanding and wished to proceed with the procedure  I also felt it reasonable to have the patient undergo a x-ray of the right hip and an evaluation by Dr Rj Guerrero for possible right hip pathology  Given that the patient reports overall reduced pain and improved level of functioning without significant side effects, I felt a reasonable to continue her on gabapentin 600 mg twice daily, oxycodone/acetaminophen 5/325 mg 0 5 tablets twice daily as needed for pain, and cyclobenzaprine 10 mg 3 times daily as needed for muscle spasms  I sent E prescriptions for the oxycodone/acetaminophen dated January 15, 2020 and February 14, 2020  The risks and side effects of chronic opioid treatment were discussed in detail with the patient  Side effects include but are not limited to nausea, vomiting, GI intolerance, sedation, constipation, mental clouding, opioid-induced hyperalgesia, endocrine dysfunction, addiction, dependence, and tolerance  The patient was asked to take his medications only as prescribed and directed, never in excess, and never for any other reason other than for pain control  The patient was also asked to keep his medications out of the reach of others and away from children, preferably in a locked drawer   The patient verbalized understanding and wished to use these opioid medications  [place] Prescription Drug Monitoring Program report was reviewed and was appropriate     Follow-up is planned in 2 months time or sooner as warranted  Discharge instructions were provided  I personally saw and examined the patient and I agree with the above discussed plan of care  History of Present Illness:    Efrain Prado is a 49 y o  female who presents to [address] [place] [address] and [facility] for interval re-evaluation of the above stated pain complaints  The patient has a past medical and chronic pain history as outlined in the assessment section  She was last seen on December 17, 2019 at which time she underwent trigger point injections  At today's office visit, the patient's pain score is 9/10 on the verbal numerical pain rating scale  She is complaining primarily of right hip pain involving the right trochanteric bursa  She did get a right trochanteric bursa injection in November 2019 by myself with excellent pain relief for 2 months duration  She states that her pain has returned and would like to undergo repeat procedure  She currently reports that her pain is constant and describes the quality of her pain as "burning, sharp, throbbing, and shooting    She currently uses gabapentin, oxycodone/acetaminophen, and cyclobenzaprine and states that these medications give her excellent pain relief  She denies opioid induced constipation  Other than as stated above, the patient denies any interval changes in medications, medical condition, mental condition, symptoms, or allergies since the last office visit  Review of Systems:    Review of Systems   Constitutional: Negative for diaphoresis  HENT: Negative for congestion, dental problem and postnasal drip  Eyes: Negative for itching  Respiratory: Negative for shortness of breath  Cardiovascular: Negative for leg swelling     Gastrointestinal: Negative for abdominal distention, abdominal pain, anal bleeding and rectal pain  Endocrine: Negative for polyphagia and polyuria  Genitourinary: Negative for decreased urine volume, vaginal bleeding and vaginal discharge  Musculoskeletal: Positive for neck pain  Skin: Negative for rash  Allergic/Immunologic: Negative for immunocompromised state  Neurological: Negative for dizziness and seizures  Hematological: Does not bruise/bleed easily  Psychiatric/Behavioral: Negative for dysphoric mood and hallucinations  The patient is not hyperactive  Patient Active Problem List   Diagnosis    Chronic pain syndrome    Acute muscle stiffness of neck    Cervical disc disorder with radiculopathy of mid-cervical region    Chronic left-sided low back pain with left-sided sciatica    Lumbar radiculopathy    Cervicalgia    Right hip pain    Trochanteric bursitis of right hip       Past Medical History:   Diagnosis Date    Anxiety     Asthma     exercise induced asthma    Cervical disc disorder     Chronic pain     Chronic pain disorder     Depression     Dysuria     Low back pain     Lung abnormality     nodules    Lung nodule     Neck pain     Peripheral neuropathy     Pituitary abnormality (HCC)     tumor    Right hip pain 10/29/2019    Thrombocytopenia ([address] )     Thyroid disease        Past Surgical History:   Procedure Laterality Date    APPENDECTOMY      AUGMENTATION MAMMAPLASTY Bilateral     CHOLECYSTECTOMY      COLECTOMY      COLON SURGERY      EPIDURAL BLOCK INJECTION N/A 6/23/2016    Procedure: BLOCK / INJECTION EPIDURAL STEROID CERVICAL  C7-T1;  Surgeon: Marilyn Yousif MD;  Location: [place] [address] MAIN OR;  Service:     EPIDURAL BLOCK INJECTION N/A 3/31/2016    Procedure: BLOCK / INJECTION EPIDURAL STEROID CERVICAL C7-T1  (C-ARM); Surgeon: Marilyn Yousif MD;  Location: [facility] MAIN OR;  Service:     EPIDURAL BLOCK INJECTION N/A 8/8/2018    Procedure: C6 C7 Cervical Epidural Steroid Injection (26501);   Surgeon: Marilyn Yousif MD;  Location: [facility] MAIN OR;  Service: Pain Management     HYSTERECTOMY      PITUITARY SURGERY      [place] ARTHROCENTESIS ASPIR&/INJ MAJOR JT/BURSA W/O US Right 11/8/2019    Procedure: Trochanteric Bursa Injection (46442); Surgeon: Home Marshall MD;  Location: [facility] MAIN OR;  Service: Pain Management     [place] NJX DX/THER SBST EPIDURAL/SUBRACH CERV/THORACIC N/A 1/21/2016    Procedure: BLOCK / INJECTION EPIDURAL STEROID CERVICAL C7-T1 (C-ARM); Surgeon: Home Marshall MD;  Location: [facility] MAIN OR;  Service: Pain Management        Family History   Problem Relation Age of Onset    Thyroid disease Mother     Hyperlipidemia Mother     Depression Mother     Thyroid disease Father     Hyperlipidemia Father     Depression Father     No Known Problems Sister     No Known Problems Brother     No Known Problems Maternal Aunt     No Known Problems Maternal Uncle     No Known Problems Paternal Aunt     No Known Problems Paternal Uncle     No Known Problems Maternal Grandmother     No Known Problems Maternal Grandfather     No Known Problems Paternal Grandmother     No Known Problems Paternal Grandfather     No Known Problems Daughter     No Known Problems Son     ADD / ADHD Neg Hx     Anesthesia problems Neg Hx     Cancer Neg Hx     Clotting disorder Neg Hx     Collagen disease Neg Hx     Diabetes Neg Hx     Dislocations Neg Hx     Learning disabilities Neg Hx     Neurological problems Neg Hx     Osteoporosis Neg Hx     Rheumatologic disease Neg Hx     Scoliosis Neg Hx     Vascular Disease Neg Hx        Social History     Occupational History    Not on file   Tobacco Use    Smoking status: Never Smoker    Smokeless tobacco: Never Used   Substance and Sexual Activity    Alcohol use: No    Drug use: No    Sexual activity: Not on file         Current Outpatient Medications:     albuterol (VENTOLIN HFA) 90 mcg/act inhaler, Inhale, Disp: , Rfl:     aspirin 81 MG tablet, Take 81 mg by mouth daily  , Disp: , Rfl:     cholecalciferol (VITAMIN D3) 1,000 units tablet, Take 2 tablets by mouth daily, Disp: , Rfl:     cyclobenzaprine (FLEXERIL) 10 mg tablet, Take 1 tablet (10 mg total) by mouth 3 (three) times a day as needed for muscle spasms, Disp: 90 tablet, Rfl: 1    gabapentin (NEURONTIN) 600 MG tablet, Take 1 tablet (600 mg total) by mouth 2 (two) times a day, Disp: 60 tablet, Rfl: 1    hydrocortisone (ANUSOL-HC) 25 mg suppository, , Disp: , Rfl: 0    ibuprofen (MOTRIN) 600 mg tablet, Take 1 tablet (600 mg total) by mouth every 6 (six) hours as needed for mild pain, Disp: 30 tablet, Rfl: 0    levothyroxine 50 mcg tablet, , Disp: , Rfl:     metoprolol succinate (TOPROL-XL) 25 mg 24 hr tablet, , Disp: , Rfl:     mupirocin (BACTROBAN) 2 % ointment, , Disp: , Rfl: 0    [START ON 2/14/2020] oxyCODONE-acetaminophen (PERCOCET) 5-325 mg per tablet, Take 0 5 tablets by mouth 2 (two) times a day as needed for severe painMax Daily Amount: 1 tablet, Disp: 30 tablet, Rfl: 0    traZODone (DESYREL) 50 mg tablet, , Disp: , Rfl: 0    [START ON 1/15/2020] oxyCODONE-acetaminophen (PERCOCET) 5-325 mg per tablet, Take 0 5 tablets by mouth 2 (two) times a day as needed for severe painMax Daily Amount: 1 tablet, Disp: 30 tablet, Rfl: 0    Allergies   Allergen Reactions    Levaquin [Levofloxacin In D5w] Shortness Of Breath     Also hives      Amitiza [Lubiprostone] Other (See Comments) and Hives     Reaction Date: 10Aug2011;   Migraines and vomiting    Biaxin [Clarithromycin] Hives    Cephradine     Erythromycin Hives     Reaction Date: 10Aug2011;     Macrobid [Nitrofurantoin Monohyd Macro] Other (See Comments)     C/o passing out    Morphine     Penicillins Hives     Reaction Date: 10Aug2011;     Sulfa Antibiotics     Tetracycline     Tetracyclines & Related Hives    Morphine And Related Rash    Valium [Diazepam] Rash and Vomiting     Reaction Date: [id number];        Physical Exam:    /66   Pulse 76 Constitutional:normal, well developed, well nourished, alert, in no distress and non-toxic and no overt pain behavior    Eyes:anicteric  HEENT:grossly intact  Neck:supple, symmetric, trachea midline and no masses   Pulmonary:even and unlabored  Cardiovascular:No edema or pitting edema present  Skin:Normal without rashes or lesions and well hydrated  Psychiatric:Mood and affect appropriate  Neurologic:Cranial Nerves II-XII grossly intact  Musculoskeletal:antalgic and Tenderness noted over the right trochanteric bursa    Orders Placed This Encounter   Procedures    XR hip/pelv 2-3 vws right if performed    Ambulatory referral to Orthopedic Surgery

## 2020-01-23 ENCOUNTER — HOSPITAL ENCOUNTER (OUTPATIENT)
Facility: AMBULARY SURGERY CENTER | Age: 55
Setting detail: OUTPATIENT SURGERY
Discharge: HOME | End: 2020-01-23
Attending: ANESTHESIOLOGY | Admitting: ANESTHESIOLOGY
Payer: COMMERCIAL

## 2020-01-23 ENCOUNTER — APPOINTMENT (OUTPATIENT)
Dept: RADIOLOGY | Facility: HOSPITAL | Age: 55
End: 2020-01-23
Payer: COMMERCIAL

## 2020-01-23 VITALS
RESPIRATION RATE: 18 BRPM | HEART RATE: 88 BPM | WEIGHT: 193 LBS | HEIGHT: 63 IN | TEMPERATURE: 97.8 F | OXYGEN SATURATION: 96 % | DIASTOLIC BLOOD PRESSURE: 60 MMHG | SYSTOLIC BLOOD PRESSURE: 126 MMHG | BODY MASS INDEX: 34.2 KG/M2

## 2020-01-23 PROCEDURE — 77002 NEEDLE LOCALIZATION BY XRAY: CPT

## 2020-01-23 PROCEDURE — 20610 DRAIN/INJ JOINT/BURSA W/O US: CPT | Performed by: ANESTHESIOLOGY

## 2020-01-23 PROCEDURE — 77002 NEEDLE LOCALIZATION BY XRAY: CPT | Performed by: ANESTHESIOLOGY

## 2020-01-23 RX ORDER — BUPIVACAINE HYDROCHLORIDE 2.5 MG/ML
INJECTION, SOLUTION EPIDURAL; INFILTRATION; INTRACAUDAL AS NEEDED
Status: DISCONTINUED | OUTPATIENT
Start: 2020-01-23 | End: 2020-01-23 | Stop reason: HOSPADM

## 2020-01-23 RX ORDER — METHYLPREDNISOLONE ACETATE 40 MG/ML
INJECTION, SUSPENSION INTRA-ARTICULAR; INTRALESIONAL; INTRAMUSCULAR; SOFT TISSUE AS NEEDED
Status: DISCONTINUED | OUTPATIENT
Start: 2020-01-23 | End: 2020-01-23 | Stop reason: HOSPADM

## 2020-01-23 RX ORDER — LIDOCAINE WITH 8.4% SOD BICARB 0.9%(10ML)
SYRINGE (ML) INJECTION AS NEEDED
Status: DISCONTINUED | OUTPATIENT
Start: 2020-01-23 | End: 2020-01-23 | Stop reason: HOSPADM

## 2020-01-23 NOTE — OP NOTE
ATTENDING PHYSICIAN:  Alie Magana MD     PROCEDURE: Right trochanteric bursa injection under fluoroscopic guidance  PREPROCEDURE DIAGNOSIS: Trochanteric bursitis  POSTPROCEDURE DIAGNOSIS: Trochanteric bursitis  ANESTHESIA:  Local     ESTIMATED BLOOD LOSS:  Minimal     COMPLICATIONS:  None  LOCATION:  [address]  CONSENT:  Today's procedure, its potential benefits as well as its risks and potential side effects were reviewed  Discussed risks of the procedure including bleeding, infection, nerve irritation or damage, reactions to the medications, failure of the pain to improve, and potential worsening of the pain were explained to the patient who verbalized understanding and who wished to proceed  Written informed consent was thereby obtained  DESCRIPTION OF THE PROCEDURE:  After written informed consent was obtained, the patient was taken to the fluoroscopy suite and placed in the lateral decubitus position  The greater trochanter was then identified with palpation and confirmed with fluoroscopy  The needle entry site was marked  The skin was prepped with antiseptic swabs in the usual sterile fashion  A 25-gauge 3-1/2-inch spinal needle was advanced until os was contacted at the greater trochanter  The needle was withdrawn 1 mm and an injectate consisting of 4 mL of 0 25% bupivacaine mixed with 1 mL of 40 mg/mL Depo-Medrol was slowly injected after negative aspiration  If this were a bilateral trochanteric bursa injection, the procedure was then repeated after asking the patient to move into the opposite lateral decubitus position  The patient tolerated the procedure well and all needles were removed with the tips intact  Hemostasis was maintained  There were no apparent paresthesias or complications  The skin was wiped clean and a Band-Aid was placed as appropriate   The patient was monitored for an appropriate period of time following the procedure and remained hemodynamically stable and neurovascularly intact following the procedure  The patient was ultimately discharged to home with supervision in good condition and instructed to call the office in a few days for an update or sooner as warranted  I was present and participated in all key and critical portions of this procedure      Madonna Medina MD  1/23/2020  2:45 PM

## 2020-01-24 ENCOUNTER — TRANSCRIBE ORDERS (OUTPATIENT)
Dept: ADMINISTRATIVE | Facility: HOSPITAL | Age: 55
End: 2020-01-24

## 2020-01-24 DIAGNOSIS — Z12.31 VISIT FOR SCREENING MAMMOGRAM: Primary | ICD-10-CM

## 2020-01-30 ENCOUNTER — TELEPHONE (OUTPATIENT)
Dept: PAIN MEDICINE | Facility: CLINIC | Age: 55
End: 2020-01-30

## 2020-03-09 ENCOUNTER — OFFICE VISIT (OUTPATIENT)
Dept: PAIN MEDICINE | Facility: CLINIC | Age: 55
End: 2020-03-09
Payer: COMMERCIAL

## 2020-03-09 ENCOUNTER — HOSPITAL ENCOUNTER (OUTPATIENT)
Dept: RADIOLOGY | Facility: HOSPITAL | Age: 55
Discharge: HOME | End: 2020-03-09
Attending: FAMILY MEDICINE

## 2020-03-09 VITALS
SYSTOLIC BLOOD PRESSURE: 113 MMHG | WEIGHT: 193 LBS | HEIGHT: 63 IN | HEART RATE: 81 BPM | BODY MASS INDEX: 34.2 KG/M2 | DIASTOLIC BLOOD PRESSURE: 79 MMHG

## 2020-03-09 DIAGNOSIS — M54.2 NECK PAIN: ICD-10-CM

## 2020-03-09 DIAGNOSIS — F11.20 UNCOMPLICATED OPIOID DEPENDENCE (HCC): ICD-10-CM

## 2020-03-09 DIAGNOSIS — M50.120 CERVICAL DISC DISORDER WITH RADICULOPATHY OF MID-CERVICAL REGION: ICD-10-CM

## 2020-03-09 DIAGNOSIS — G89.4 CHRONIC PAIN SYNDROME: Primary | ICD-10-CM

## 2020-03-09 DIAGNOSIS — Z79.891 LONG-TERM CURRENT USE OF OPIATE ANALGESIC: ICD-10-CM

## 2020-03-09 DIAGNOSIS — M54.2 CERVICALGIA: ICD-10-CM

## 2020-03-09 PROCEDURE — 80305 DRUG TEST PRSMV DIR OPT OBS: CPT | Performed by: ANESTHESIOLOGY

## 2020-03-09 PROCEDURE — 99214 OFFICE O/P EST MOD 30 MIN: CPT | Performed by: ANESTHESIOLOGY

## 2020-03-09 RX ORDER — CYCLOBENZAPRINE HCL 10 MG
10 TABLET ORAL 3 TIMES DAILY PRN
Qty: 90 TABLET | Refills: 1 | Status: SHIPPED | OUTPATIENT
Start: 2020-03-09 | End: 2020-05-04 | Stop reason: SDUPTHER

## 2020-03-09 RX ORDER — GABAPENTIN 600 MG/1
600 TABLET ORAL 2 TIMES DAILY
Qty: 60 TABLET | Refills: 1 | Status: SHIPPED | OUTPATIENT
Start: 2020-03-09 | End: 2020-05-04 | Stop reason: SDUPTHER

## 2020-03-09 RX ORDER — OXYCODONE HYDROCHLORIDE AND ACETAMINOPHEN 5; 325 MG/1; MG/1
0.5 TABLET ORAL 2 TIMES DAILY PRN
Qty: 30 TABLET | Refills: 0 | Status: SHIPPED | OUTPATIENT
Start: 2020-04-16 | End: 2020-06-18 | Stop reason: SDUPTHER

## 2020-03-09 RX ORDER — OXYCODONE HYDROCHLORIDE AND ACETAMINOPHEN 5; 325 MG/1; MG/1
0.5 TABLET ORAL 2 TIMES DAILY PRN
Qty: 30 TABLET | Refills: 0 | Status: SHIPPED | OUTPATIENT
Start: 2020-03-17 | End: 2020-05-04 | Stop reason: SDUPTHER

## 2020-03-09 NOTE — PROGRESS NOTES
Pain Medicine Follow-Up Note    Assessment:  1  Chronic pain syndrome    2  Neck pain    3  Cervical disc disorder with radiculopathy of mid-cervical region    4  Cervicalgia        Plan:  No orders of the defined types were placed in this encounter  New Medications Ordered This Visit   Medications    oxyCODONE-acetaminophen (PERCOCET) 5-325 mg per tablet     Sig: Take 0 5 tablets by mouth 2 (two) times a day as needed for severe painMax Daily Amount: 1 tablet     Dispense:  30 tablet     Refill:  0    oxyCODONE-acetaminophen (PERCOCET) 5-325 mg per tablet     Sig: Take 0 5 tablets by mouth 2 (two) times a day as needed for severe painMax Daily Amount: 1 tablet     Dispense:  30 tablet     Refill:  0    gabapentin (NEURONTIN) 600 MG tablet     Sig: Take 1 tablet (600 mg total) by mouth 2 (two) times a day     Dispense:  60 tablet     Refill:  1    cyclobenzaprine (FLEXERIL) 10 mg tablet     Sig: Take 1 tablet (10 mg total) by mouth 3 (three) times a day as needed for muscle spasms     Dispense:  90 tablet     Refill:  1    diclofenac sodium (VOLTAREN) 1 %     Sig: Apply 2 g topically 4 (four) times a day as needed (pain)     Dispense:  100 g     Refill:  1     My impressions and treatment recommendations were discussed in detail with the patient who verbalized understanding and had no further questions  Given that the patient reports overall reduced pain and improved level functioning without significant side effects, I felt a reasonable to continue the patient on oxycodone/acetaminophen 5/325 mg 0 5 tablets twice daily as needed for pain, gabapentin 600 mg twice daily, cyclobenzaprine 10 mg up to 3 times daily as needed for muscle spasms  I sent E prescriptions for the oxycodone/acetaminophen dated March 17, 2020 and April 16, 2020       The patient also is requesting something to help with her trochanteric bursitis, so I felt a reasonable to prescribe her diclofenac 1% gel applied to the affected area up to 4 times daily for pain relief  Side effects were reviewed with the patient  The risks and side effects of chronic opioid treatment were discussed in detail with the patient  Side effects include but are not limited to nausea, vomiting, GI intolerance, sedation, constipation, mental clouding, opioid-induced hyperalgesia, endocrine dysfunction, addiction, dependence, and tolerance  The patient was asked to take his medications only as prescribed and directed, never in excess, and never for any other reason other than for pain control  The patient was also asked to keep his medications out of the reach of others and away from children, preferably in a locked drawer  The patient verbalized understanding and wished to use these opioid medications  A urine drug test was collected at today's office visit as part of our medication management protocol  The point of care testing results were appropriate for what was being prescribed  The specimen will be sent for confirmatory testing  The drug screen is medically necessary because the patient is either dependent on opioid medication or is being considered for opioid medication therapy and the results could impact ongoing or future treatment  The drug screen is to evaluate for the presence or absence of prescribed, non-prescribed, and/or illicit drugs and substances  [place] Prescription Drug Monitoring Program report was reviewed and was appropriate     Follow-up is planned in 2 months time or sooner as warranted  Discharge instructions were provided  I personally saw and examined the patient and I agree with the above discussed plan of care  History of Present Illness:    Rosa Hazel is a 49 y o  female who presents to [facility] for interval re-evaluation of the above stated pain complaints  The patient has a past medical and chronic pain history as outlined in the assessment section   She was last seen on January 23, 2020 at which time she underwent a trochanteric bursa injection  At today's office visit, the patient's pain score is 6/10 on the verbal numerical pain rating scale  The patient is complaining of neck pain with radiation into the right upper extremity, low back pain, and right hip pain  She describes her pain as worse in the evening and constant in nature  She reports the quality of her pain as "dull/aching, throbbing, pressure-like, shooting, and numbness    She is reporting 30-40% relief of symptoms with the combination of her medications  She also reports that the trochanteric bursa injection on January 23, 2020 gave her excellent pain relief  She does report occasional pain in her right hip from time to time, however  Other than as stated above, the patient denies any interval changes in medications, medical condition, mental condition, symptoms, or allergies since the last office visit  Review of Systems:    Review of Systems   Respiratory: Negative for shortness of breath  Cardiovascular: Negative for chest pain  Gastrointestinal: Negative for constipation, diarrhea, nausea and vomiting  Musculoskeletal: Negative for arthralgias, gait problem, joint swelling and myalgias  Difficulty Walking  Decreased ROM  Muscle Weakness  Joint Stiffness  Swelling: Shoulder  Pain in Extremity: Rt Thigh   Skin: Negative for rash  Neurological: Negative for dizziness, seizures and weakness  All other systems reviewed and are negative          Patient Active Problem List   Diagnosis    Chronic pain syndrome    Acute muscle stiffness of neck    Cervical disc disorder with radiculopathy of mid-cervical region    Chronic left-sided low back pain with left-sided sciatica    Lumbar radiculopathy    Cervicalgia    Right hip pain    Trochanteric bursitis of right hip    Pain in right hip       Past Medical History:   Diagnosis Date    Anxiety     Asthma     exercise induced asthma    Cervical disc disorder     Chronic pain     Chronic pain disorder     Depression     Dysuria     Low back pain     Lung abnormality     nodules    Lung nodule     Neck pain     Peripheral neuropathy     Pituitary abnormality (HCC)     tumor    Right hip pain 10/29/2019    Thrombocytopenia ([address] )     Thyroid disease        Past Surgical History:   Procedure Laterality Date    APPENDECTOMY      AUGMENTATION MAMMAPLASTY Bilateral     CHOLECYSTECTOMY      COLECTOMY      COLON SURGERY      EPIDURAL BLOCK INJECTION N/A 6/23/2016    Procedure: BLOCK / INJECTION EPIDURAL STEROID CERVICAL  C7-T1;  Surgeon: Hamilton Michael MD;  Location[facility] MAIN OR;  Service:     EPIDURAL BLOCK INJECTION N/A 3/31/2016    Procedure: BLOCK / INJECTION EPIDURAL STEROID CERVICAL C7-T1  (C-ARM); Surgeon: Hamilton Michael MD;  Location: [facility] MAIN OR;  Service:     EPIDURAL BLOCK INJECTION N/A 8/8/2018    Procedure: C6 C7 Cervical Epidural Steroid Injection (11497); Surgeon: Hamilton Michael MD;  Location: [facility] MAIN OR;  Service: Pain Management     HYSTERECTOMY      PITUITARY SURGERY      TN ARTHROCENTESIS ASPIR&/INJ MAJOR JT/BURSA W/O US Right 11/8/2019    Procedure: Trochanteric Bursa Injection (61103); Surgeon: Hamilton Michael MD;  Location: [facility] MAIN OR;  Service: Pain Management     TN ARTHROCENTESIS ASPIR&/INJ MAJOR JT/BURSA W/O US Right 1/23/2020    Procedure: Trochanteric Bursa Injection (56100); Surgeon: Hamilton Michael MD;  Location: [facility] MAIN OR;  Service: Pain Management     TN NJX DX/THER SBST EPIDURAL/SUBRACH CERV/THORACIC N/A 1/21/2016    Procedure: BLOCK / INJECTION EPIDURAL STEROID CERVICAL C7-T1 (C-ARM);   Surgeon: Hamilton Michael MD;  Location: [facility] MAIN OR;  Service: Pain Management        Family History   Problem Relation Age of Onset    Thyroid disease Mother     Hyperlipidemia Mother     Depression Mother     Thyroid disease Father     Hyperlipidemia Father     Depression Father     No Known Problems Sister     No Known Problems Brother     No Known Problems Maternal Aunt     No Known Problems Maternal Uncle     No Known Problems Paternal Aunt     No Known Problems Paternal Uncle     No Known Problems Maternal Grandmother     No Known Problems Maternal Grandfather     No Known Problems Paternal Grandmother     No Known Problems Paternal Grandfather     No Known Problems Daughter     No Known Problems Son     ADD / ADHD Neg Hx     Anesthesia problems Neg Hx     Cancer Neg Hx     Clotting disorder Neg Hx     Collagen disease Neg Hx     Diabetes Neg Hx     Dislocations Neg Hx     Learning disabilities Neg Hx     Neurological problems Neg Hx     Osteoporosis Neg Hx     Rheumatologic disease Neg Hx     Scoliosis Neg Hx     Vascular Disease Neg Hx        Social History     Occupational History    Not on file   Tobacco Use    Smoking status: Never Smoker    Smokeless tobacco: Never Used   Substance and Sexual Activity    Alcohol use: No    Drug use: No    Sexual activity: Not on file         Current Outpatient Medications:     albuterol (VENTOLIN HFA) 90 mcg/act inhaler, Inhale, Disp: , Rfl:     aspirin 81 MG tablet, Take 81 mg by mouth daily  , Disp: , Rfl:     cholecalciferol (VITAMIN D3) 1,000 units tablet, Take 2 tablets by mouth daily, Disp: , Rfl:     cyclobenzaprine (FLEXERIL) 10 mg tablet, Take 1 tablet (10 mg total) by mouth 3 (three) times a day as needed for muscle spasms, Disp: 90 tablet, Rfl: 1    gabapentin (NEURONTIN) 600 MG tablet, Take 1 tablet (600 mg total) by mouth 2 (two) times a day, Disp: 60 tablet, Rfl: 1    hydrocortisone (ANUSOL-HC) 25 mg suppository, , Disp: , Rfl: 0    ibuprofen (MOTRIN) 600 mg tablet, Take 1 tablet (600 mg total) by mouth every 6 (six) hours as needed for mild pain, Disp: 30 tablet, Rfl: 0    levothyroxine 50 mcg tablet, , Disp: , Rfl:     metoprolol succinate (TOPROL-XL) 25 mg 24 hr tablet, , Disp: , Rfl:     mupirocin (BACTROBAN) 2 % ointment, , Disp: , Rfl: 0    [START ON 4/16/2020] oxyCODONE-acetaminophen (PERCOCET) 5-325 mg per tablet, Take 0 5 tablets by mouth 2 (two) times a day as needed for severe painMax Daily Amount: 1 tablet, Disp: 30 tablet, Rfl: 0    traZODone (DESYREL) 50 mg tablet, , Disp: , Rfl: 0    diclofenac sodium (VOLTAREN) 1 %, Apply 2 g topically 4 (four) times a day as needed (pain), Disp: 100 g, Rfl: 1    [START ON 3/17/2020] oxyCODONE-acetaminophen (PERCOCET) 5-325 mg per tablet, Take 0 5 tablets by mouth 2 (two) times a day as needed for severe painMax Daily Amount: 1 tablet, Disp: 30 tablet, Rfl: 0    Allergies   Allergen Reactions    Levaquin [Levofloxacin In D5w] Shortness Of Breath     Also hives      Amitiza [Lubiprostone] Other (See Comments) and Hives     Reaction Date: 10Aug2011;   Migraines and vomiting    Biaxin [Clarithromycin] Hives    Cephradine     Erythromycin Hives     Reaction Date: 10Aug2011;     Macrobid [Nitrofurantoin Monohyd Macro] Other (See Comments)     C/o passing out    Morphine     Penicillins Hives     Reaction Date: 10Aug2011;     Sulfa Antibiotics     Tetracycline     Tetracyclines & Related Hives    Morphine And Related Rash    Valium [Diazepam] Rash and Vomiting     Reaction Date: 14Jun2012;        Physical Exam:    /79   Pulse 81   Ht 5' 3" (1 6 m)   Wt 87 5 kg (193 lb)   BMI 34 19 kg/m²     Constitutional:obese  Eyes:anicteric  HEENT:grossly intact  Neck:supple, symmetric, trachea midline and no masses   Pulmonary:even and unlabored  Cardiovascular:No edema or pitting edema present  Skin:Normal without rashes or lesions and well hydrated  Psychiatric:Mood and affect appropriate  Neurologic:Cranial Nerves II-XII grossly intact  Musculoskeletal:antalgic

## 2020-03-11 LAB
6MAM UR QL CFM: NEGATIVE NG/ML
7AMINOCLONAZEPAM UR QL CFM: NEGATIVE NG/ML
A-OH ALPRAZ UR QL CFM: NEGATIVE NG/ML
ACCEPTABLE CREAT UR QL: NORMAL MG/DL
ACCEPTIBLE SP GR UR QL: NORMAL
AMPHET UR QL CFM: NEGATIVE NG/ML
AMPHET UR QL CFM: NEGATIVE NG/ML
BUPRENORPHINE UR QL CFM: NEGATIVE NG/ML
BUTALBITAL UR QL CFM: NEGATIVE NG/ML
BZE UR QL CFM: NEGATIVE NG/ML
CODEINE UR QL CFM: NEGATIVE NG/ML
DESIPRAMINE UR QL CFM: NEGATIVE NG/ML
DESIPRAMINE UR QL CFM: NEGATIVE NG/ML
EDDP UR QL CFM: NEGATIVE NG/ML
ETHYL GLUCURONIDE UR QL CFM: NEGATIVE NG/ML
ETHYL SULFATE UR QL SCN: NEGATIVE NG/ML
FENTANYL UR QL CFM: NEGATIVE NG/ML
GLIADIN IGG SER IA-ACNC: NEGATIVE NG/ML
GLUCOSE 30M P 50 G LAC PO SERPL-MCNC: NEGATIVE NG/ML
HYDROCODONE UR QL CFM: NEGATIVE NG/ML
HYDROCODONE UR QL CFM: NEGATIVE NG/ML
HYDROMORPHONE UR QL CFM: NEGATIVE NG/ML
IMIPRAMINE UR QL CFM: NEGATIVE NG/ML
LORAZEPAM UR QL CFM: NEGATIVE NG/ML
MDMA UR QL CFM: NEGATIVE NG/ML
ME-PHENIDATE UR QL CFM: NEGATIVE NG/ML
MEPERIDINE UR QL CFM: NEGATIVE NG/ML
MEPHEDRONE UR QL CFM: NEGATIVE NG/ML
METHADONE UR QL CFM: NEGATIVE NG/ML
METHAMPHET UR QL CFM: NEGATIVE NG/ML
MORPHINE UR QL CFM: NEGATIVE NG/ML
MORPHINE UR QL CFM: NEGATIVE NG/ML
NITRITE UR QL: NORMAL UG/ML
NORBUPRENORPHINE UR QL CFM: NEGATIVE NG/ML
NORDIAZEPAM UR QL CFM: NEGATIVE NG/ML
NORFENTANYL UR QL CFM: NEGATIVE NG/ML
NORHYDROCODONE UR QL CFM: NEGATIVE NG/ML
NORHYDROCODONE UR QL CFM: NEGATIVE NG/ML
NORMEPERIDINE UR QL CFM: NEGATIVE NG/ML
NOROXYCODONE UR CFM-MCNC: ABNORMAL NG/ML
OPC-3373 QUANTIFICATION: NEGATIVE
OXAZEPAM UR QL CFM: NEGATIVE NG/ML
OXYCODONE UR CFM-MCNC: ABNORMAL NG/ML
OXYMORPHONE UR QL CFM: NEGATIVE NG/ML
OXYMORPHONE UR QL CFM: NEGATIVE NG/ML
PCP UR QL CFM: NEGATIVE NG/ML
PHENOBARB UR QL CFM: NEGATIVE NG/ML
RESULT ALL_PRESCRIBED MEDS AND SPECIAL INSTRUCTIONS: NORMAL
SECOBARBITAL UR QL CFM: NEGATIVE NG/ML
SL AMB 3-METHYL-FENTANYL QUANTIFICATION: NORMAL NG/ML
SL AMB 4-ANPP QUANTIFICATION: NORMAL NG/ML
SL AMB 4-FIBF QUANTIFICATION: NORMAL NG/ML
SL AMB 5F-ADB-M7 METABOLITE QUANTIFICATION: NEGATIVE NG/ML
SL AMB 7-OH-MITRAGYNINE (KRATOM ALKALOID) QUANTIFICATION: NEGATIVE NG/ML
SL AMB AB-FUBINACA-M3 METABOLITE QUANTIFICATION: NEGATIVE NG/ML
SL AMB ACETYL FENTANYL QUANTIFICATION: NORMAL NG/ML
SL AMB ACETYL NORFENTANYL QUANTIFICATION: NORMAL NG/ML
SL AMB ACRYL FENTANYL QUANTIFICATION: NORMAL NG/ML
SL AMB BATH SALTS: NEGATIVE NG/ML
SL AMB BUTRYL FENTANYL QUANTIFICATION: NORMAL NG/ML
SL AMB CARFENTANIL QUANTIFICATION: NORMAL NG/ML
SL AMB CLOZAPINE QUANTIFICATION: NEGATIVE NG/ML
SL AMB CTHC (MARIJUANA METABOLITE) QUANTIFICATION: NEGATIVE NG/ML
SL AMB CYCLOPROPYL FENTANYL QUANTIFICATION: NORMAL NG/ML
SL AMB DEXTROMETHORPHAN QUANTIFICATION: NEGATIVE NG/ML
SL AMB DEXTRORPHAN (DEXTROMETHORPHAN METABOLITE) QUANT: NEGATIVE NG/ML
SL AMB DEXTRORPHAN (DEXTROMETHORPHAN METABOLITE) QUANT: NEGATIVE NG/ML
SL AMB FURANYL FENTANYL QUANTIFICATION: NORMAL NG/ML
SL AMB JWH018 METABOLITE QUANTIFICATION: NEGATIVE NG/ML
SL AMB JWH073 METABOLITE QUANTIFICATION: NEGATIVE NG/ML
SL AMB MDMB-FUBINACA-M1 METABOLITE QUANTIFICATION: NEGATIVE NG/ML
SL AMB METHOXYACETYL FENTANYL QUANTIFICATION: NORMAL NG/ML
SL AMB METHYLONE QUANTIFICATION: NEGATIVE NG/ML
SL AMB N-DESMETHYL U-47700 QUANTIFICATION: NORMAL NG/ML
SL AMB N-DESMETHYL-TRAMADOL QUANTIFICATION: NEGATIVE NG/ML
SL AMB N-DESMETHYLCLOZAPINE QUANTIFICATION: NEGATIVE NG/ML
SL AMB PHENTERMINE QUANTIFICATION: NEGATIVE NG/ML
SL AMB RCS4 METABOLITE QUANTIFICATION: NEGATIVE NG/ML
SL AMB RITALINIC ACID QUANTIFICATION: NEGATIVE NG/ML
SL AMB U-47700 QUANTIFICATION: NORMAL NG/ML
SPECIMEN DRAWN SERPL: NEGATIVE NG/ML
SPECIMEN PH ACCEPTABLE UR: NORMAL
TAPENTADOL UR QL CFM: NEGATIVE NG/ML
TEMAZEPAM UR QL CFM: NEGATIVE NG/ML
TEMAZEPAM UR QL CFM: NEGATIVE NG/ML
TRAMADOL UR QL CFM: NEGATIVE NG/ML
URATE/CREAT 24H UR: NEGATIVE NG/ML

## 2020-05-04 ENCOUNTER — TELEMEDICINE (OUTPATIENT)
Dept: PAIN MEDICINE | Facility: CLINIC | Age: 55
End: 2020-05-04
Payer: COMMERCIAL

## 2020-05-04 DIAGNOSIS — G89.4 CHRONIC PAIN SYNDROME: ICD-10-CM

## 2020-05-04 DIAGNOSIS — M54.2 NECK PAIN: ICD-10-CM

## 2020-05-04 DIAGNOSIS — M50.120 CERVICAL DISC DISORDER WITH RADICULOPATHY OF MID-CERVICAL REGION: ICD-10-CM

## 2020-05-04 DIAGNOSIS — M54.2 CERVICALGIA: ICD-10-CM

## 2020-05-04 PROCEDURE — 99214 OFFICE O/P EST MOD 30 MIN: CPT | Performed by: ANESTHESIOLOGY

## 2020-05-04 RX ORDER — GABAPENTIN 600 MG/1
900 TABLET ORAL 2 TIMES DAILY
Qty: 90 TABLET | Refills: 0 | Status: SHIPPED | OUTPATIENT
Start: 2020-05-04 | End: 2020-06-18 | Stop reason: SDUPTHER

## 2020-05-04 RX ORDER — OXYCODONE HYDROCHLORIDE AND ACETAMINOPHEN 5; 325 MG/1; MG/1
0.5 TABLET ORAL 2 TIMES DAILY PRN
Qty: 30 TABLET | Refills: 0 | Status: SHIPPED | OUTPATIENT
Start: 2020-05-16 | End: 2020-06-18 | Stop reason: SDUPTHER

## 2020-05-04 RX ORDER — CYCLOBENZAPRINE HCL 10 MG
10 TABLET ORAL 3 TIMES DAILY PRN
Qty: 90 TABLET | Refills: 0 | Status: SHIPPED | OUTPATIENT
Start: 2020-05-04 | End: 2020-06-18 | Stop reason: SDUPTHER

## 2020-06-18 ENCOUNTER — OFFICE VISIT (OUTPATIENT)
Dept: PAIN MEDICINE | Facility: CLINIC | Age: 55
End: 2020-06-18
Payer: COMMERCIAL

## 2020-06-18 VITALS — DIASTOLIC BLOOD PRESSURE: 74 MMHG | SYSTOLIC BLOOD PRESSURE: 118 MMHG | HEART RATE: 96 BPM

## 2020-06-18 DIAGNOSIS — M25.531 RIGHT WRIST PAIN: ICD-10-CM

## 2020-06-18 DIAGNOSIS — M54.2 NECK PAIN: ICD-10-CM

## 2020-06-18 DIAGNOSIS — G89.4 CHRONIC PAIN SYNDROME: Primary | ICD-10-CM

## 2020-06-18 DIAGNOSIS — M54.2 CERVICALGIA: ICD-10-CM

## 2020-06-18 DIAGNOSIS — M50.120 CERVICAL DISC DISORDER WITH RADICULOPATHY OF MID-CERVICAL REGION: ICD-10-CM

## 2020-06-18 PROCEDURE — 99214 OFFICE O/P EST MOD 30 MIN: CPT | Performed by: ANESTHESIOLOGY

## 2020-06-18 RX ORDER — OXYCODONE HYDROCHLORIDE AND ACETAMINOPHEN 5; 325 MG/1; MG/1
0.5 TABLET ORAL 2 TIMES DAILY PRN
Qty: 30 TABLET | Refills: 0 | Status: SHIPPED | OUTPATIENT
Start: 2020-07-18 | End: 2020-09-14 | Stop reason: SDUPTHER

## 2020-06-18 RX ORDER — GABAPENTIN 600 MG/1
900 TABLET ORAL 2 TIMES DAILY
Qty: 90 TABLET | Refills: 0 | Status: SHIPPED | OUTPATIENT
Start: 2020-06-18 | End: 2020-07-06 | Stop reason: SDUPTHER

## 2020-06-18 RX ORDER — OXYCODONE HYDROCHLORIDE AND ACETAMINOPHEN 5; 325 MG/1; MG/1
0.5 TABLET ORAL 2 TIMES DAILY PRN
Qty: 30 TABLET | Refills: 0 | Status: SHIPPED | OUTPATIENT
Start: 2020-06-18 | End: 2020-08-13 | Stop reason: SDUPTHER

## 2020-06-18 RX ORDER — CYCLOBENZAPRINE HCL 10 MG
10 TABLET ORAL 3 TIMES DAILY PRN
Qty: 90 TABLET | Refills: 0 | Status: SHIPPED | OUTPATIENT
Start: 2020-06-18 | End: 2020-07-06 | Stop reason: SDUPTHER

## 2020-07-06 ENCOUNTER — PROCEDURE VISIT (OUTPATIENT)
Dept: PAIN MEDICINE | Facility: CLINIC | Age: 55
End: 2020-07-06
Payer: COMMERCIAL

## 2020-07-06 VITALS — TEMPERATURE: 97.6 F

## 2020-07-06 DIAGNOSIS — M54.2 NECK PAIN: ICD-10-CM

## 2020-07-06 DIAGNOSIS — M50.120 CERVICAL DISC DISORDER WITH RADICULOPATHY OF MID-CERVICAL REGION: ICD-10-CM

## 2020-07-06 DIAGNOSIS — M79.18 MYOFASCIAL PAIN SYNDROME: Primary | ICD-10-CM

## 2020-07-06 DIAGNOSIS — M54.2 CERVICALGIA: ICD-10-CM

## 2020-07-06 DIAGNOSIS — G89.4 CHRONIC PAIN SYNDROME: ICD-10-CM

## 2020-07-06 PROCEDURE — 20553 NJX 1/MLT TRIGGER POINTS 3/>: CPT | Performed by: ANESTHESIOLOGY

## 2020-07-06 RX ORDER — LIDOCAINE HYDROCHLORIDE 10 MG/ML
5 INJECTION, SOLUTION EPIDURAL; INFILTRATION; INTRACAUDAL; PERINEURAL ONCE
Status: COMPLETED | OUTPATIENT
Start: 2020-07-06 | End: 2020-07-06

## 2020-07-06 RX ORDER — METHYLPREDNISOLONE ACETATE 40 MG/ML
40 INJECTION, SUSPENSION INTRA-ARTICULAR; INTRALESIONAL; INTRAMUSCULAR; SOFT TISSUE ONCE
Status: COMPLETED | OUTPATIENT
Start: 2020-07-06 | End: 2020-07-06

## 2020-07-06 RX ORDER — GABAPENTIN 600 MG/1
900 TABLET ORAL 2 TIMES DAILY
Qty: 90 TABLET | Refills: 0 | Status: SHIPPED | OUTPATIENT
Start: 2020-07-06 | End: 2020-08-13 | Stop reason: ALTCHOICE

## 2020-07-06 RX ORDER — CYCLOBENZAPRINE HCL 10 MG
10 TABLET ORAL 3 TIMES DAILY PRN
Qty: 90 TABLET | Refills: 0 | Status: SHIPPED | OUTPATIENT
Start: 2020-07-06 | End: 2020-08-13 | Stop reason: SDUPTHER

## 2020-07-06 RX ADMIN — LIDOCAINE HYDROCHLORIDE 5 ML: 10 INJECTION, SOLUTION EPIDURAL; INFILTRATION; INTRACAUDAL; PERINEURAL at 11:56

## 2020-07-06 RX ADMIN — LIDOCAINE HYDROCHLORIDE 5 ML: 10 INJECTION, SOLUTION EPIDURAL; INFILTRATION; INTRACAUDAL; PERINEURAL at 11:57

## 2020-07-06 RX ADMIN — METHYLPREDNISOLONE ACETATE 40 MG: 40 INJECTION, SUSPENSION INTRA-ARTICULAR; INTRALESIONAL; INTRAMUSCULAR; SOFT TISSUE at 11:56

## 2020-07-06 NOTE — PROGRESS NOTES
Inj Trigger Point Single/Multiple     Date/Time 7/6/2020 11:58 AM     Performed by  Milady Joaquin MD     Authorized by Milady Joaquin MD      Universal Protocol Consent: Verbal consent obtained  Written consent obtained  Risks and benefits: risks, benefits and alternatives were discussed  Consent given by: patient  Patient understanding: patient states understanding of the procedure being performed  Patient consent: the patient's understanding of the procedure matches consent given  Procedure consent: procedure consent matches procedure scheduled  Relevant documents: relevant documents present and verified  Test results: test results available and properly labeled  Site marked: the operative site was marked  Radiology Images displayed and confirmed  If images not available, report reviewed: imaging studies available  Patient identity confirmed: verbally with patient  Time out: Immediately prior to procedure a "time out" was called to verify the correct patient, procedure, equipment, support staff and site/side marked as required  Preparation: Patient was prepped and draped in the usual sterile fashion  Site preparation: Betadine    Local anesthesia used: yes      Anesthesia: local infiltration     Anesthesia   Local anesthesia used: yes  Local Anesthetic: lidocaine 1% without epinephrine  Anesthetic total: 9 mL     Sedation   Patient sedated: no        Specimen: no    Culture: no   Procedure Details   Procedure Notes: ATTENDING PHYSICIAN:  Milady Joaquin MD     PROCEDURE:  Trigger point injections x1 to the right cervical paraspinal, x7 to her right upper trapezius, x1 to the right deltoid, and x1 to the right rhomboid musculature with local anesthetic and steroid  PRE-PROCEDURE DIAGNOSIS:  Myofascial pain syndrome  POST-PROCEDURE DIAGNOSIS:  Myofascial pain syndrome  ESTIMATED BLOOD LOSS:  Minimal     ANESTHESIA:  None  COMPLICATIONS:  None      CONSENT:  Today's procedure, its potential benefits as well as its risks and side effects were reviewed  Discussed risks of the procedure including bleeding, infection, reactions to the medications, failure the pain to improve, and potential worsening of the pain as well as pneumothorax were explained in detail to the patient, who verbalized understanding and wished to proceed  Written informed consent was thereby obtained  DESCRIPTION OF THE PROCEDURE:  After written informed consent was obtained, the patient was placed in the sitting position on the examination table  Anatomical landmarks were identified via palpation  The trigger points were identified and marked after palpation  The skin overlying these 10 marked trigger points was prepared using Betadine swabs x3 in the usual sterile fashion  Strict aseptic technique was utilized throughout the procedure  A 10 mL injectate consisting of 9 mL of 1% lidocaine mixed with 1 mL of Depo-Medrol 40 mg/mL was drawn up sterilely  Using a 25-gauge 1 25 inch needle, 1 mL of the above injectate was administered to each of the marked trigger points following negative aspiration  The patient tolerated the procedure well and all needles were removed with the tips intact  Hemostasis was maintained  There were no apparent complications  The skin was wiped clean and Band-Aids were placed as appropriate  The patient was monitored for an appropriate period of time following the procedure and remained hemodynamically stable and neurovascularly intact following the procedure as she was prior to the procedure  The patient was ultimately discharged to home with supervision in good condition  I was present for and participated in all key and critical portions of this procedure    Patient tolerance: Patient tolerated the procedure well with no immediate complications

## 2020-07-08 ENCOUNTER — HOSPITAL ENCOUNTER (OUTPATIENT)
Dept: RADIOLOGY | Facility: HOSPITAL | Age: 55
Discharge: HOME | End: 2020-07-08
Attending: FAMILY MEDICINE
Payer: COMMERCIAL

## 2020-07-08 VITALS — HEIGHT: 63 IN | BODY MASS INDEX: 34.2 KG/M2 | WEIGHT: 193 LBS

## 2020-07-08 DIAGNOSIS — Z12.31 VISIT FOR SCREENING MAMMOGRAM: ICD-10-CM

## 2020-07-08 PROCEDURE — 77067 SCR MAMMO BI INCL CAD: CPT

## 2020-07-08 PROCEDURE — 77063 BREAST TOMOSYNTHESIS BI: CPT

## 2020-07-13 ENCOUNTER — TELEPHONE (OUTPATIENT)
Dept: PAIN MEDICINE | Facility: CLINIC | Age: 55
End: 2020-07-13

## 2020-08-13 ENCOUNTER — OFFICE VISIT (OUTPATIENT)
Dept: PAIN MEDICINE | Facility: CLINIC | Age: 55
End: 2020-08-13
Payer: COMMERCIAL

## 2020-08-13 VITALS
HEART RATE: 88 BPM | TEMPERATURE: 97.8 F | DIASTOLIC BLOOD PRESSURE: 84 MMHG | BODY MASS INDEX: 34.2 KG/M2 | WEIGHT: 193 LBS | HEIGHT: 63 IN | SYSTOLIC BLOOD PRESSURE: 123 MMHG

## 2020-08-13 DIAGNOSIS — M54.2 NECK PAIN: ICD-10-CM

## 2020-08-13 DIAGNOSIS — M54.2 CERVICALGIA: ICD-10-CM

## 2020-08-13 DIAGNOSIS — G89.4 CHRONIC PAIN SYNDROME: Primary | ICD-10-CM

## 2020-08-13 DIAGNOSIS — Z79.891 LONG-TERM CURRENT USE OF OPIATE ANALGESIC: ICD-10-CM

## 2020-08-13 DIAGNOSIS — M50.120 CERVICAL DISC DISORDER WITH RADICULOPATHY OF MID-CERVICAL REGION: ICD-10-CM

## 2020-08-13 DIAGNOSIS — F11.20 UNCOMPLICATED OPIOID DEPENDENCE (HCC): ICD-10-CM

## 2020-08-13 PROCEDURE — 99214 OFFICE O/P EST MOD 30 MIN: CPT | Performed by: ANESTHESIOLOGY

## 2020-08-13 RX ORDER — PREGABALIN 75 MG/1
75 CAPSULE ORAL 2 TIMES DAILY
Qty: 60 CAPSULE | Refills: 0 | Status: SHIPPED | OUTPATIENT
Start: 2020-08-13 | End: 2020-09-14 | Stop reason: SDUPTHER

## 2020-08-13 RX ORDER — CYCLOBENZAPRINE HCL 10 MG
10 TABLET ORAL 3 TIMES DAILY PRN
Qty: 90 TABLET | Refills: 0 | Status: SHIPPED | OUTPATIENT
Start: 2020-08-13 | End: 2020-09-14 | Stop reason: SDUPTHER

## 2020-08-13 RX ORDER — OXYCODONE HYDROCHLORIDE AND ACETAMINOPHEN 5; 325 MG/1; MG/1
0.5 TABLET ORAL 2 TIMES DAILY PRN
Qty: 30 TABLET | Refills: 0 | Status: SHIPPED | OUTPATIENT
Start: 2020-08-17 | End: 2020-09-14 | Stop reason: SDUPTHER

## 2020-08-13 NOTE — PROGRESS NOTES
Pain Medicine Follow-Up Note    Assessment:  1  Chronic pain syndrome    2  Uncomplicated opioid dependence ([address] )    3  Long-term current use of opiate analgesic    4  Neck pain    5  Cervical disc disorder with radiculopathy of mid-cervical region    6   Cervicalgia        Plan:  Orders Placed This Encounter   Procedures    MM ALL_Prescribed Meds and Special Instructions    MM DT_Alprazolam Definitive Test    MM DT_Amphetamine Definitive Test    MM DT_Aripiprazole Definitive Test    MM DT_Bath Salts Definitive Test    MM DT_Buprenorphine Definitive Test    MM DT_Butalbital Definitive Test    MM DT_Clonazepam Definitive Test    MM DT_Clozapine Definitive Test    MM DT_Cocaine Definitive Test    MM DT_Codeine Definitive Test    MM DT_Desipramine Definitive Test    MM DT_Dextromethorphan Definitive Test    MM Diazepam Definitive Test    MM DT_Ethyl Glucuronide/Ethyl Sulfate Definitive Test    MM DT_Fentanyl Definitive Test    MM DT_Haloperidol Definitive Test    MM DT_Heroin Definitive Test    MM DT_Hydrocodone Definitive Test    MM DT_Hydromorphone Definitive Test    MM DT_Imipramine Definitive Test    MM DT_Kratom Definitive Test    MM DT_Levorphanol Definitive Test    MM DT_MDMA Definitive Test    MM Lorazepam Definitive Test    MM DT_Meperidine Definitive Test    MM DT_Methadone Definitive Test    MM DT_Methamphetamine Definitive Test    MM DT_Methylphenidate Definitive Test    MM DT_Morphine Definitive Test    MM DT_Oxazepam Definitive Test    MM DT_Oxycodone Definitive Test    MM DT_Oxymorphone Definitive Test    MM DT_Phencyclidine Definitive Test    MM DT_Phenobarbital Definitive Test    MM DT_Phentermine Definitive Test    MM DT_Secobarbital Definitive Test    MM DT_Spice Definitive Test    MM DT_Tapentadol Definitive Test    MM DT_Temazapam Definitive Test    MM DT_THC Definitive Test    MM DT_Tramadol Definitive Test       New Medications Ordered This Visit Medications    cyclobenzaprine (FLEXERIL) 10 mg tablet     Sig: Take 1 tablet (10 mg total) by mouth 3 (three) times a day as needed for muscle spasms     Dispense:  90 tablet     Refill:  0    diclofenac sodium (VOLTAREN) 1 %     Sig: Apply 2 g topically 4 (four) times a day as needed (pain)     Dispense:  100 g     Refill:  0    oxyCODONE-acetaminophen (PERCOCET) 5-325 mg per tablet     Sig: Take 0 5 tablets by mouth 2 (two) times a day as needed for severe painMax Daily Amount: 1 tablet     Dispense:  30 tablet     Refill:  0    pregabalin (LYRICA) 75 mg capsule     Sig: Take 1 capsule (75 mg total) by mouth 2 (two) times a day     Dispense:  60 capsule     Refill:  0       My impressions and treatment recommendations were discussed in detail with the patient who verbalized understanding and had no further questions  The patient reports that the higher dose gabapentin is giving her significant sedation and balance issues  She would like to trial a completely different medication to see if it helps better  As such, I felt a reasonable to trial the patient on Lyrica 75 mg twice daily  I also felt a reasonable to continue the patient on cyclobenzaprine 10 mg 3 times daily as needed for muscle spasms, diclofenac 1% gel 2 g applied topically up to 4 times daily as needed for pain, and oxycodone/acetaminophen 5/325 mg 0 5 tablets up to twice daily as needed for pain  The risks and side effects of chronic opioid treatment were discussed in detail with the patient  Side effects include but are not limited to nausea, vomiting, GI intolerance, sedation, constipation, mental clouding, opioid-induced hyperalgesia, endocrine dysfunction, addiction, dependence, and tolerance  The patient was asked to take his medications only as prescribed and directed, never in excess, and never for any other reason other than for pain control   The patient was also asked to keep his medications out of the reach of others and away from children, preferably in a locked drawer  The patient verbalized understanding and wished to use these opioid medications  Follow-up is planned in 4 weeks time or sooner as warranted  Discharge instructions were provided  I personally saw and examined the patient and I agree with the above discussed plan of care  History of Present Illness:    Tremaine Sprague is a 49 y o  female who presents to [facility] for interval re-evaluation of the above stated pain complaints  The patient has a past medical and chronic pain history as outlined in the assessment section  She was last seen on July 6, 2020  At today's office visit, the patient's pain score is 5/10 on the verbal numerical pain rating scale  The patient states that her pain is worse at night and constant in nature  She reports the quality of her pain as burning, dull/aching, pressure like, shooting, numbness, pins and needles, and pain in the base of skull  She is reporting 30% relief of symptoms with the combination of her medications  She does want to try a different medicine other than the gabapentin to help alleviate her pain  She denies opioid induced constipation  Other than as stated above, the patient denies any interval changes in medications, medical condition, mental condition, symptoms, or allergies since the last office visit  Review of Systems:    Review of Systems   Respiratory: Negative for shortness of breath  Cardiovascular: Negative for chest pain  Gastrointestinal: Negative for constipation, diarrhea, nausea and vomiting  Musculoskeletal: Positive for back pain and gait problem  Negative for arthralgias, joint swelling and myalgias  Decreased ROM  Muscle Weakness  Joint stiffness  Swelling and pain in b/l arms   Skin: Negative for rash  Neurological: Negative for dizziness, seizures and weakness  All other systems reviewed and are negative          Patient Active Problem List   Diagnosis    Chronic pain syndrome    Acute muscle stiffness of neck    Cervical disc disorder with radiculopathy of mid-cervical region    Chronic left-sided low back pain with left-sided sciatica    Lumbar radiculopathy    Cervicalgia    Right hip pain    Trochanteric bursitis of right hip    Pain in right hip       Past Medical History:   Diagnosis Date    Anxiety     Asthma     exercise induced asthma    Cervical disc disorder     Chronic pain     Chronic pain disorder     Depression     Dysuria     Low back pain     Lung abnormality     nodules    Lung nodule     Neck pain     Peripheral neuropathy     Pituitary abnormality (HCC)     tumor    Right hip pain 10/29/2019    Thrombocytopenia ([address] )     Thyroid disease        Past Surgical History:   Procedure Laterality Date    APPENDECTOMY      BREAST BIOPSY Right 1995    CHOLECYSTECTOMY      COLECTOMY      COLON SURGERY      EPIDURAL BLOCK INJECTION N/A 6/23/2016    Procedure: BLOCK / INJECTION EPIDURAL STEROID CERVICAL  C7-T1;  Surgeon: Jose Estrella MD;  Location: [place] [address] MAIN OR;  Service:     EPIDURAL BLOCK INJECTION N/A 3/31/2016    Procedure: BLOCK / INJECTION EPIDURAL STEROID CERVICAL C7-T1  (C-ARM); Surgeon: Jose Estrella MD;  Location: [facility] OR;  Service:     EPIDURAL BLOCK INJECTION N/A 8/8/2018    Procedure: C6 C7 Cervical Epidural Steroid Injection (53230); Surgeon: Jose Estrella MD;  Location[facility] MAIN OR;  Service: Pain Management     HYSTERECTOMY  1996    OOPHORECTOMY Bilateral 1996    PITUITARY SURGERY      DC ARTHROCENTESIS ASPIR&/INJ MAJOR JT/BURSA W/O US Right 11/8/2019    Procedure: Trochanteric Bursa Injection (50651); Surgeon: Jose Estrella MD;  Location: [facility] OR;  Service: Pain Management     DC ARTHROCENTESIS ASPIR&/INJ MAJOR JT/BURSA W/O  Right 1/23/2020    Procedure: Trochanteric Bursa Injection (66714);   Surgeon: Jose Estrella MD;  Location: [facility] OR; Service: Pain Management     MN NJX DX/THER SBST EPIDURAL/SUBRACH CERV/THORACIC N/A 1/21/2016    Procedure: BLOCK / INJECTION EPIDURAL STEROID CERVICAL C7-T1 (C-ARM); Surgeon: Leslei Almaraz MD;  Location: [facility] MAIN OR;  Service: Pain Management     REDUCTION MAMMAPLASTY Bilateral 2000       Family History   Problem Relation Age of Onset    Thyroid disease Mother     Hyperlipidemia Mother     Depression Mother     Thyroid disease Father     Hyperlipidemia Father     Depression Father     Ovarian cancer Sister 39    No Known Problems Brother     No Known Problems Maternal Uncle     No Known Problems Paternal Aunt     No Known Problems Paternal Uncle     No Known Problems Maternal Grandmother     No Known Problems Maternal Grandfather     Breast cancer Paternal Grandmother 36    No Known Problems Paternal Grandfather     No Known Problems Daughter     No Known Problems Son     ADD / ADHD Neg Hx     Anesthesia problems Neg Hx     Cancer Neg Hx     Clotting disorder Neg Hx     Collagen disease Neg Hx     Diabetes Neg Hx     Dislocations Neg Hx     Learning disabilities Neg Hx     Neurological problems Neg Hx     Osteoporosis Neg Hx     Rheumatologic disease Neg Hx     Scoliosis Neg Hx     Vascular Disease Neg Hx        Social History     Occupational History    Not on file   Tobacco Use    Smoking status: Never Smoker    Smokeless tobacco: Never Used   Substance and Sexual Activity    Alcohol use: No    Drug use: No    Sexual activity: Not on file         Current Outpatient Medications:     albuterol (VENTOLIN HFA) 90 mcg/act inhaler, Inhale, Disp: , Rfl:     aspirin 81 MG tablet, Take 81 mg by mouth daily  , Disp: , Rfl:     cholecalciferol (VITAMIN D3) 1,000 units tablet, Take 2 tablets by mouth daily, Disp: , Rfl:     cyclobenzaprine (FLEXERIL) 10 mg tablet, Take 1 tablet (10 mg total) by mouth 3 (three) times a day as needed for muscle spasms, Disp: 90 tablet, Rfl: 0   diclofenac sodium (VOLTAREN) 1 %, Apply 2 g topically 4 (four) times a day as needed (pain), Disp: 100 g, Rfl: 0    hydrocortisone (ANUSOL-HC) 25 mg suppository, , Disp: , Rfl: 0    ibuprofen (MOTRIN) 600 mg tablet, Take 1 tablet (600 mg total) by mouth every 6 (six) hours as needed for mild pain, Disp: 30 tablet, Rfl: 0    levothyroxine 50 mcg tablet, , Disp: , Rfl:     metoprolol succinate (TOPROL-XL) 25 mg 24 hr tablet, , Disp: , Rfl:     mupirocin (BACTROBAN) 2 % ointment, , Disp: , Rfl: 0    oxyCODONE-acetaminophen (PERCOCET) 5-325 mg per tablet, Take 0 5 tablets by mouth 2 (two) times a day as needed for severe painMax Daily Amount: 1 tablet, Disp: 30 tablet, Rfl: 0    traZODone (DESYREL) 50 mg tablet, , Disp: , Rfl: 0    [START ON 8/17/2020] oxyCODONE-acetaminophen (PERCOCET) 5-325 mg per tablet, Take 0 5 tablets by mouth 2 (two) times a day as needed for severe painMax Daily Amount: 1 tablet, Disp: 30 tablet, Rfl: 0    pregabalin (LYRICA) 75 mg capsule, Take 1 capsule (75 mg total) by mouth 2 (two) times a day, Disp: 60 capsule, Rfl: 0    Allergies   Allergen Reactions    Levaquin [Levofloxacin In D5w] Shortness Of Breath     Also hives      Amitiza [Lubiprostone] Other (See Comments) and Hives     Reaction Date: 10Aug2011;   Migraines and vomiting    Biaxin [Clarithromycin] Hives    Cephradine     Erythromycin Hives     Reaction Date: 10Aug2011;     Macrobid [Nitrofurantoin Monohyd Macro] Other (See Comments)     C/o passing out    Morphine     Penicillins Hives     Reaction Date: 10Aug2011;     Sulfa Antibiotics     Tetracycline     Tetracyclines & Related Hives    Morphine And Related Rash    Valium [Diazepam] Rash and Vomiting     Reaction Date: 14Jun2012;        Physical Exam:    /84   Pulse 88   Temp 97 8 °F (36 6 °C)   Ht 5' 3" (1 6 m)   Wt 87 5 kg (193 lb)   BMI 34 19 kg/m²     Constitutional:obese  Eyes:anicteric  HEENT:grossly intact  Neck:supple, symmetric, trachea midline and no masses   Pulmonary:even and unlabored  Cardiovascular:No edema or pitting edema present  Skin:Normal without rashes or lesions and well hydrated  Psychiatric:Mood and affect appropriate  Neurologic:Cranial Nerves II-XII grossly intact  Musculoskeletal:normal    Orders Placed This Encounter   Procedures    MM ALL_Prescribed Meds and Special Instructions    MM DT_Alprazolam Definitive Test    MM DT_Amphetamine Definitive Test    MM DT_Aripiprazole Definitive Test    MM DT_Bath Salts Definitive Test    MM DT_Buprenorphine Definitive Test    MM DT_Butalbital Definitive Test    MM DT_Clonazepam Definitive Test    MM DT_Clozapine Definitive Test    MM DT_Cocaine Definitive Test    MM DT_Codeine Definitive Test    MM DT_Desipramine Definitive Test    MM DT_Dextromethorphan Definitive Test    MM Diazepam Definitive Test    MM DT_Ethyl Glucuronide/Ethyl Sulfate Definitive Test    MM DT_Fentanyl Definitive Test    MM DT_Haloperidol Definitive Test    MM DT_Heroin Definitive Test    MM DT_Hydrocodone Definitive Test    MM DT_Hydromorphone Definitive Test    MM DT_Imipramine Definitive Test    MM DT_Kratom Definitive Test    MM DT_Levorphanol Definitive Test    MM DT_MDMA Definitive Test    MM Lorazepam Definitive Test    MM DT_Meperidine Definitive Test    MM DT_Methadone Definitive Test    MM DT_Methamphetamine Definitive Test    MM DT_Methylphenidate Definitive Test    MM DT_Morphine Definitive Test    MM DT_Oxazepam Definitive Test    MM DT_Oxycodone Definitive Test    MM DT_Oxymorphone Definitive Test    MM DT_Phencyclidine Definitive Test    MM DT_Phenobarbital Definitive Test    MM DT_Phentermine Definitive Test    MM DT_Secobarbital Definitive Test    MM DT_Spice Definitive Test    MM DT_Tapentadol Definitive Test    MM DT_Temazapam Definitive Test    MM DT_THC Definitive Test    MM DT_Tramadol Definitive Test

## 2020-08-15 LAB
6MAM UR QL CFM: NEGATIVE NG/ML
7AMINOCLONAZEPAM UR QL CFM: NEGATIVE NG/ML
A-OH ALPRAZ UR QL CFM: NEGATIVE NG/ML
AMPHET UR QL CFM: NEGATIVE NG/ML
AMPHET UR QL CFM: NEGATIVE NG/ML
BUPRENORPHINE UR QL CFM: NEGATIVE NG/ML
BUTALBITAL UR QL CFM: NEGATIVE NG/ML
BZE UR QL CFM: NEGATIVE NG/ML
CODEINE UR QL CFM: NEGATIVE NG/ML
DESIPRAMINE UR QL CFM: NEGATIVE NG/ML
DESIPRAMINE UR QL CFM: NEGATIVE NG/ML
EDDP UR QL CFM: NEGATIVE NG/ML
ETHYL GLUCURONIDE UR QL CFM: NEGATIVE NG/ML
ETHYL SULFATE UR QL SCN: NEGATIVE NG/ML
FENTANYL UR QL CFM: NEGATIVE NG/ML
GLIADIN IGG SER IA-ACNC: NEGATIVE NG/ML
GLUCOSE 30M P 50 G LAC PO SERPL-MCNC: NEGATIVE NG/ML
HYDROCODONE UR QL CFM: NEGATIVE NG/ML
HYDROCODONE UR QL CFM: NEGATIVE NG/ML
HYDROMORPHONE UR QL CFM: NEGATIVE NG/ML
IMIPRAMINE UR QL CFM: NEGATIVE NG/ML
LORAZEPAM UR QL CFM: NEGATIVE NG/ML
MDMA UR QL CFM: NEGATIVE NG/ML
ME-PHENIDATE UR QL CFM: NEGATIVE NG/ML
MEPERIDINE UR QL CFM: NEGATIVE NG/ML
MEPHEDRONE UR QL CFM: NEGATIVE NG/ML
METHADONE UR QL CFM: NEGATIVE NG/ML
METHAMPHET UR QL CFM: NEGATIVE NG/ML
MORPHINE UR QL CFM: NEGATIVE NG/ML
MORPHINE UR QL CFM: NEGATIVE NG/ML
NORBUPRENORPHINE UR QL CFM: NEGATIVE NG/ML
NORDIAZEPAM UR QL CFM: NEGATIVE NG/ML
NORFENTANYL UR QL CFM: NEGATIVE NG/ML
NORHYDROCODONE UR QL CFM: NEGATIVE NG/ML
NORHYDROCODONE UR QL CFM: NEGATIVE NG/ML
NORMEPERIDINE UR QL CFM: NEGATIVE NG/ML
NOROXYCODONE UR CFM-MCNC: 1077.07 NG/ML
OPC-3373 QUANTIFICATION: NEGATIVE
OXAZEPAM UR QL CFM: NEGATIVE NG/ML
OXYCODONE UR CFM-MCNC: 567.05 NG/ML
OXYMORPHONE UR QL CFM: NEGATIVE NG/ML
OXYMORPHONE UR QL CFM: NEGATIVE NG/ML
PCP UR QL CFM: NEGATIVE NG/ML
PHENOBARB UR QL CFM: NEGATIVE NG/ML
RESULT ALL_PRESCRIBED MEDS AND SPECIAL INSTRUCTIONS: NORMAL
SECOBARBITAL UR QL CFM: NEGATIVE NG/ML
SL AMB 3-METHYL-FENTANYL QUANTIFICATION: NORMAL NG/ML
SL AMB 4-ANPP QUANTIFICATION: NORMAL NG/ML
SL AMB 4-FIBF QUANTIFICATION: NORMAL NG/ML
SL AMB 5F-ADB-M7 METABOLITE QUANTIFICATION: NEGATIVE NG/ML
SL AMB 7-OH-MITRAGYNINE (KRATOM ALKALOID) QUANTIFICATION: NEGATIVE NG/ML
SL AMB AB-FUBINACA-M3 METABOLITE QUANTIFICATION: NEGATIVE NG/ML
SL AMB ACETYL FENTANYL QUANTIFICATION: NORMAL NG/ML
SL AMB ACETYL NORFENTANYL QUANTIFICATION: NORMAL NG/ML
SL AMB ACRYL FENTANYL QUANTIFICATION: NORMAL NG/ML
SL AMB BATH SALTS: NEGATIVE NG/ML
SL AMB BUTRYL FENTANYL QUANTIFICATION: NORMAL NG/ML
SL AMB CARFENTANIL QUANTIFICATION: NORMAL NG/ML
SL AMB CLOZAPINE QUANTIFICATION: NEGATIVE NG/ML
SL AMB CTHC (MARIJUANA METABOLITE) QUANTIFICATION: NEGATIVE NG/ML
SL AMB CYCLOPROPYL FENTANYL QUANTIFICATION: NORMAL NG/ML
SL AMB DEXTROMETHORPHAN QUANTIFICATION: NEGATIVE NG/ML
SL AMB DEXTRORPHAN (DEXTROMETHORPHAN METABOLITE) QUANT: NEGATIVE NG/ML
SL AMB DEXTRORPHAN (DEXTROMETHORPHAN METABOLITE) QUANT: NEGATIVE NG/ML
SL AMB FURANYL FENTANYL QUANTIFICATION: NORMAL NG/ML
SL AMB HALOPERIDOL  QUANTIFICATION: NEGATIVE NG/ML
SL AMB HALOPERIDOL METABOLITE QUANTIFICATION: NEGATIVE NG/ML
SL AMB JWH018 METABOLITE QUANTIFICATION: NEGATIVE NG/ML
SL AMB JWH073 METABOLITE QUANTIFICATION: NEGATIVE NG/ML
SL AMB MDMB-FUBINACA-M1 METABOLITE QUANTIFICATION: NEGATIVE NG/ML
SL AMB METHOXYACETYL FENTANYL QUANTIFICATION: NORMAL NG/ML
SL AMB METHYLONE QUANTIFICATION: NEGATIVE NG/ML
SL AMB N-DESMETHYL U-47700 QUANTIFICATION: NORMAL NG/ML
SL AMB N-DESMETHYL-TRAMADOL QUANTIFICATION: NEGATIVE NG/ML
SL AMB N-DESMETHYLCLOZAPINE QUANTIFICATION: NEGATIVE NG/ML
SL AMB PHENTERMINE QUANTIFICATION: NEGATIVE NG/ML
SL AMB RCS4 METABOLITE QUANTIFICATION: NEGATIVE NG/ML
SL AMB RITALINIC ACID QUANTIFICATION: NEGATIVE NG/ML
SL AMB U-47700 QUANTIFICATION: NORMAL NG/ML
SPECIMEN DRAWN SERPL: NEGATIVE NG/ML
TAPENTADOL UR QL CFM: NEGATIVE NG/ML
TEMAZEPAM UR QL CFM: NEGATIVE NG/ML
TEMAZEPAM UR QL CFM: NEGATIVE NG/ML
TRAMADOL UR QL CFM: NEGATIVE NG/ML
URATE/CREAT 24H UR: NEGATIVE NG/ML

## 2020-08-24 ENCOUNTER — PROCEDURE VISIT (OUTPATIENT)
Dept: PAIN MEDICINE | Facility: CLINIC | Age: 55
End: 2020-08-24
Payer: COMMERCIAL

## 2020-08-24 DIAGNOSIS — M79.18 MYOFASCIAL PAIN SYNDROME: Primary | ICD-10-CM

## 2020-08-24 PROCEDURE — 20553 NJX 1/MLT TRIGGER POINTS 3/>: CPT | Performed by: ANESTHESIOLOGY

## 2020-08-24 RX ORDER — LIDOCAINE HYDROCHLORIDE 10 MG/ML
5 INJECTION, SOLUTION EPIDURAL; INFILTRATION; INTRACAUDAL; PERINEURAL ONCE
Status: COMPLETED | OUTPATIENT
Start: 2020-08-24 | End: 2020-08-24

## 2020-08-24 RX ORDER — METHYLPREDNISOLONE ACETATE 40 MG/ML
40 INJECTION, SUSPENSION INTRA-ARTICULAR; INTRALESIONAL; INTRAMUSCULAR; SOFT TISSUE ONCE
Status: COMPLETED | OUTPATIENT
Start: 2020-08-24 | End: 2020-08-24

## 2020-08-24 RX ADMIN — LIDOCAINE HYDROCHLORIDE 5 ML: 10 INJECTION, SOLUTION EPIDURAL; INFILTRATION; INTRACAUDAL; PERINEURAL at 13:26

## 2020-08-24 RX ADMIN — LIDOCAINE HYDROCHLORIDE 5 ML: 10 INJECTION, SOLUTION EPIDURAL; INFILTRATION; INTRACAUDAL; PERINEURAL at 13:27

## 2020-08-24 RX ADMIN — METHYLPREDNISOLONE ACETATE 40 MG: 40 INJECTION, SUSPENSION INTRA-ARTICULAR; INTRALESIONAL; INTRAMUSCULAR; SOFT TISSUE at 13:27

## 2020-08-24 NOTE — PROGRESS NOTES
Inj Trigger Point Single/Multiple     Date/Time 8/24/2020 1:22 PM     Performed by  Gopi Esparza MD     Authorized by Gopi Esparza MD      Universal Protocol Consent: Verbal consent obtained  Written consent obtained  Risks and benefits: risks, benefits and alternatives were discussed  Consent given by: patient  Patient understanding: patient states understanding of the procedure being performed  Patient consent: the patient's understanding of the procedure matches consent given  Procedure consent: procedure consent matches procedure scheduled  Relevant documents: relevant documents present and verified  Test results: test results available and properly labeled  Site marked: the operative site was marked  Radiology Images displayed and confirmed  If images not available, report reviewed: imaging studies available  Patient identity confirmed: verbally with patient  Time out: Immediately prior to procedure a "time out" was called to verify the correct patient, procedure, equipment, support staff and site/side marked as required  Preparation: Patient was prepped and draped in the usual sterile fashion  Site preparation: Betadine    Local anesthesia used: yes      Anesthesia: local infiltration     Anesthesia   Local anesthesia used: yes  Local Anesthetic: lidocaine 1% without epinephrine  Anesthetic total: 9 mL     Sedation   Patient sedated: no        Specimen: no    Culture: no   Procedure Details   Procedure Notes: ATTENDING PHYSICIAN:  Gopi Esparza MD     PROCEDURE:  Trigger point injections x1 to the left upper trapezius, x1 to the left lower trapezius, x1 to the left rhomboid, x1 to the right cervical paraspinal, x5 to the right upper trapezius, and x1 to the right rhomboid musculature with local anesthetic and steroid  PRE-PROCEDURE DIAGNOSIS:  Myofascial pain syndrome  POST-PROCEDURE DIAGNOSIS:  Myofascial pain syndrome      ESTIMATED BLOOD LOSS:  Minimal     ANESTHESIA: None     COMPLICATIONS:  None  CONSENT:  Today's procedure, its potential benefits as well as its risks and side effects were reviewed  Discussed risks of the procedure including bleeding, infection, reactions to the medications, failure the pain to improve, and potential worsening of the pain as well as pneumothorax were explained in detail to the patient, who verbalized understanding and wished to proceed  Written informed consent was thereby obtained  DESCRIPTION OF THE PROCEDURE:  After written informed consent was obtained, the patient was placed in the sitting position on the examination table  Anatomical landmarks were identified via palpation  The trigger points were identified and marked after palpation  The skin overlying these 10 marked trigger points was prepared using Betadine swabs x3 in the usual sterile fashion  Strict aseptic technique was utilized throughout the procedure  A 10 mL injectate consisting of 9 mL of 1% lidocaine mixed with 1 mL of Depo-Medrol 40 mg/mL was drawn up sterilely  Using a 25-gauge 1 25 inch needle, 1 mL of the above injectate was administered to each of the marked trigger points following negative aspiration  The patient tolerated the procedure well and all needles were removed with the tips intact  Hemostasis was maintained  There were no apparent complications  The skin was wiped clean and Band-Aids were placed as appropriate  The patient was monitored for an appropriate period of time following the procedure and remained hemodynamically stable and neurovascularly intact following the procedure as she was prior to the procedure  The patient was ultimately discharged to home with supervision in good condition  I was present for and participated in all key and critical portions of this procedure    Patient tolerance: Patient tolerated the procedure well with no immediate complications

## 2020-09-14 ENCOUNTER — OFFICE VISIT (OUTPATIENT)
Dept: PAIN MEDICINE | Facility: CLINIC | Age: 55
End: 2020-09-14
Payer: COMMERCIAL

## 2020-09-14 VITALS — HEIGHT: 63 IN | WEIGHT: 193 LBS | TEMPERATURE: 97.7 F | BODY MASS INDEX: 34.2 KG/M2

## 2020-09-14 DIAGNOSIS — G89.4 CHRONIC PAIN SYNDROME: ICD-10-CM

## 2020-09-14 DIAGNOSIS — M54.2 NECK PAIN: ICD-10-CM

## 2020-09-14 DIAGNOSIS — M54.2 CERVICALGIA: ICD-10-CM

## 2020-09-14 DIAGNOSIS — M50.120 CERVICAL DISC DISORDER WITH RADICULOPATHY OF MID-CERVICAL REGION: ICD-10-CM

## 2020-09-14 PROCEDURE — 99214 OFFICE O/P EST MOD 30 MIN: CPT | Performed by: ANESTHESIOLOGY

## 2020-09-14 RX ORDER — OXYCODONE HYDROCHLORIDE AND ACETAMINOPHEN 5; 325 MG/1; MG/1
0.5 TABLET ORAL 2 TIMES DAILY PRN
Qty: 30 TABLET | Refills: 0 | Status: SHIPPED | OUTPATIENT
Start: 2020-10-16 | End: 2020-11-19 | Stop reason: SDUPTHER

## 2020-09-14 RX ORDER — CYCLOBENZAPRINE HCL 10 MG
10 TABLET ORAL 3 TIMES DAILY PRN
Qty: 90 TABLET | Refills: 1 | Status: SHIPPED | OUTPATIENT
Start: 2020-09-14 | End: 2020-11-19 | Stop reason: SDUPTHER

## 2020-09-14 RX ORDER — PREGABALIN 75 MG/1
75 CAPSULE ORAL 2 TIMES DAILY
Qty: 60 CAPSULE | Refills: 1 | Status: SHIPPED | OUTPATIENT
Start: 2020-09-14 | End: 2020-11-19 | Stop reason: ALTCHOICE

## 2020-09-14 RX ORDER — OXYCODONE HYDROCHLORIDE AND ACETAMINOPHEN 5; 325 MG/1; MG/1
0.5 TABLET ORAL 2 TIMES DAILY PRN
Qty: 30 TABLET | Refills: 0 | Status: SHIPPED | OUTPATIENT
Start: 2020-09-16 | End: 2020-11-19 | Stop reason: SDUPTHER

## 2020-09-18 NOTE — PROGRESS NOTES
Pain Medicine Follow-Up Note    Assessment:  1  Chronic pain syndrome    2  Neck pain    3  Cervical disc disorder with radiculopathy of mid-cervical region    4  Cervicalgia        Plan:  New Medications Ordered This Visit   Medications    oxyCODONE-acetaminophen (PERCOCET) 5-325 mg per tablet     Sig: Take 0 5 tablets by mouth 2 (two) times a day as needed for severe painMax Daily Amount: 1 tablet     Dispense:  30 tablet     Refill:  0    pregabalin (LYRICA) 75 mg capsule     Sig: Take 1 capsule (75 mg total) by mouth 2 (two) times a day     Dispense:  60 capsule     Refill:  1    cyclobenzaprine (FLEXERIL) 10 mg tablet     Sig: Take 1 tablet (10 mg total) by mouth 3 (three) times a day as needed for muscle spasms     Dispense:  90 tablet     Refill:  1    oxyCODONE-acetaminophen (PERCOCET) 5-325 mg per tablet     Sig: Take 0 5 tablets by mouth 2 (two) times a day as needed for severe painMax Daily Amount: 1 tablet     Dispense:  30 tablet     Refill:  0       My impressions and treatment recommendations were discussed in detail with the patient who verbalized understanding and had no further questions  The patient reports that she is currently doing well on oxycodone/acetaminophen 5/3250 5 tablets twice daily as needed for pain, Lyrica 75 mg twice daily, cyclobenzaprine 10 mg up to 3 times daily as needed for muscle spasms  I sent E prescriptions to the pharmacy dated September 16, 2020 and October 16, 2020  The risks and side effects of chronic opioid treatment were discussed in detail with the patient  Side effects include but are not limited to nausea, vomiting, GI intolerance, sedation, constipation, mental clouding, opioid-induced hyperalgesia, endocrine dysfunction, addiction, dependence, and tolerance  The patient was asked to take his medications only as prescribed and directed, never in excess, and never for any other reason other than for pain control   The patient was also asked to keep his medications out of the reach of others and away from children, preferably in a locked drawer  The patient verbalized understanding and wished to use these opioid medications  [place] Prescription Drug Monitoring Program report was reviewed and was appropriate     Follow-up is planned in 2 months time or sooner as warranted  Discharge instructions were provided  I personally saw and examined the patient and I agree with the above discussed plan of care  History of Present Illness:    Clark Chang is a 49 y o  female who presents to [facility] for interval re-evaluation of the above stated pain complaints  The patient has a past medical and chronic pain history as outlined in the assessment section  She was last seen on August 24, 2020 at which time she underwent trigger point injections  At today's office visit, the patient's pain score is 6/10 on the verbal numerical pain rating scale  The patient states that her pain is primarily in her neck and right shoulder region  She describes her pain as worse at night and constant in nature  She reports the quality of her pain as burning, dull/aching, throbbing, pressure-like, shooting, numbness, and pins and needles  She is reporting excellent pain relief with the combination of her medications as well as the trigger point injections  She denies opioid induced constipation currently  Other than as stated above, the patient denies any interval changes in medications, medical condition, mental condition, symptoms, or allergies since the last office visit  Review of Systems:    Review of Systems   Respiratory: Negative for shortness of breath  Cardiovascular: Negative for chest pain  Gastrointestinal: Negative for constipation, diarrhea, nausea and vomiting  Musculoskeletal: Positive for gait problem, joint swelling, myalgias, neck pain and neck stiffness  Negative for arthralgias     Skin: Negative for rash    Neurological: Negative for dizziness, seizures and weakness  All other systems reviewed and are negative  Patient Active Problem List   Diagnosis    Chronic pain syndrome    Acute muscle stiffness of neck    Cervical disc disorder with radiculopathy of mid-cervical region    Chronic left-sided low back pain with left-sided sciatica    Lumbar radiculopathy    Cervicalgia    Right hip pain    Trochanteric bursitis of right hip    Pain in right hip       Past Medical History:   Diagnosis Date    Anxiety     Asthma     exercise induced asthma    Cervical disc disorder     Chronic pain     Chronic pain disorder     Depression     Dysuria     Low back pain     Lung abnormality     nodules    Lung nodule     Neck pain     Peripheral neuropathy     Pituitary abnormality (HCC)     tumor    Right hip pain 10/29/2019    Thrombocytopenia ([facility] [address] )     Thyroid disease        Past Surgical History:   Procedure Laterality Date    APPENDECTOMY      BREAST BIOPSY Right 1995    CHOLECYSTECTOMY      COLECTOMY      COLON SURGERY      EPIDURAL BLOCK INJECTION N/A 6/23/2016    Procedure: BLOCK / INJECTION EPIDURAL STEROID CERVICAL  C7-T1;  Surgeon: Marissa Carrillo MD;  Location: [facility] MAIN OR;  Service:     EPIDURAL BLOCK INJECTION N/A 3/31/2016    Procedure: BLOCK / INJECTION EPIDURAL STEROID CERVICAL C7-T1  (C-ARM); Surgeon: Marissa Carrillo MD;  Location: [facility] MAIN OR;  Service:     EPIDURAL BLOCK INJECTION N/A 8/8/2018    Procedure: C6 C7 Cervical Epidural Steroid Injection (51539); Surgeon: Marissa Carrillo MD;  Location: [facility] MAIN OR;  Service: Pain Management     HYSTERECTOMY  1996    OOPHORECTOMY Bilateral 1996    PITUITARY SURGERY      SD ARTHROCENTESIS ASPIR&/INJ MAJOR JT/BURSA W/O US Right 11/8/2019    Procedure: Trochanteric Bursa Injection (48576);   Surgeon: Marissa Carrillo MD;  Location: [facility] MAIN OR;  Service: Pain Management     SD ARTHROCENTESIS ASPIR&/INJ MAJOR JT/BURSA W/O US Right 1/23/2020    Procedure: Trochanteric Bursa Injection (35730); Surgeon: Tory Guo MD;  Location: [facility] MAIN OR;  Service: Pain Management     [place] NJX DX/THER SBST EPIDURAL/SUBRACH CERV/THORACIC N/A 1/21/2016    Procedure: BLOCK / INJECTION EPIDURAL STEROID CERVICAL C7-T1 (C-ARM); Surgeon: Tory Guo MD;  Location: [facility] MAIN OR;  Service: Pain Management     REDUCTION MAMMAPLASTY Bilateral 2000       Family History   Problem Relation Age of Onset    Thyroid disease Mother     Hyperlipidemia Mother     Depression Mother     Thyroid disease Father     Hyperlipidemia Father     Depression Father     Ovarian cancer Sister 39    No Known Problems Brother     No Known Problems Maternal Uncle     No Known Problems Paternal Aunt     No Known Problems Paternal Uncle     No Known Problems Maternal Grandmother     No Known Problems Maternal Grandfather     Breast cancer Paternal Grandmother 36    No Known Problems Paternal Grandfather     No Known Problems Daughter     No Known Problems Son     ADD / ADHD Neg Hx     Anesthesia problems Neg Hx     Cancer Neg Hx     Clotting disorder Neg Hx     Collagen disease Neg Hx     Diabetes Neg Hx     Dislocations Neg Hx     Learning disabilities Neg Hx     Neurological problems Neg Hx     Osteoporosis Neg Hx     Rheumatologic disease Neg Hx     Scoliosis Neg Hx     Vascular Disease Neg Hx        Social History     Occupational History    Not on file   Tobacco Use    Smoking status: Never Smoker    Smokeless tobacco: Never Used   Substance and Sexual Activity    Alcohol use: No    Drug use: No    Sexual activity: Not on file         Current Outpatient Medications:     albuterol (VENTOLIN HFA) 90 mcg/act inhaler, Inhale, Disp: , Rfl:     aspirin 81 MG tablet, Take 81 mg by mouth daily  , Disp: , Rfl:     cholecalciferol (VITAMIN D3) 1,000 units tablet, Take 2 tablets by mouth daily, Disp: , Rfl:     cyclobenzaprine (FLEXERIL) 10 mg tablet, Take 1 tablet (10 mg total) by mouth 3 (three) times a day as needed for muscle spasms, Disp: 90 tablet, Rfl: 1    diclofenac sodium (VOLTAREN) 1 %, Apply 2 g topically 4 (four) times a day as needed (pain), Disp: 100 g, Rfl: 0    hydrocortisone (ANUSOL-HC) 25 mg suppository, , Disp: , Rfl: 0    ibuprofen (MOTRIN) 600 mg tablet, Take 1 tablet (600 mg total) by mouth every 6 (six) hours as needed for mild pain, Disp: 30 tablet, Rfl: 0    levothyroxine 50 mcg tablet, , Disp: , Rfl:     metoprolol succinate (TOPROL-XL) 25 mg 24 hr tablet, , Disp: , Rfl:     mupirocin (BACTROBAN) 2 % ointment, , Disp: , Rfl: 0    [START ON 10/16/2020] oxyCODONE-acetaminophen (PERCOCET) 5-325 mg per tablet, Take 0 5 tablets by mouth 2 (two) times a day as needed for severe painMax Daily Amount: 1 tablet, Disp: 30 tablet, Rfl: 0    pregabalin (LYRICA) 75 mg capsule, Take 1 capsule (75 mg total) by mouth 2 (two) times a day, Disp: 60 capsule, Rfl: 1    traZODone (DESYREL) 50 mg tablet, , Disp: , Rfl: 0    oxyCODONE-acetaminophen (PERCOCET) 5-325 mg per tablet, Take 0 5 tablets by mouth 2 (two) times a day as needed for severe painMax Daily Amount: 1 tablet, Disp: 30 tablet, Rfl: 0    Allergies   Allergen Reactions    Levaquin [Levofloxacin In D5w] Shortness Of Breath     Also hives      Amitiza [Lubiprostone] Other (See Comments) and Hives     Reaction Date: 10Aug2011;   Migraines and vomiting    Biaxin [Clarithromycin] Hives    Cephradine     Erythromycin Hives     Reaction Date: 10Aug2011;     Macrobid [Nitrofurantoin Monohyd Macro] Other (See Comments)     C/o passing out    Morphine     Penicillins Hives     Reaction Date: 10Aug2011;     Sulfa Antibiotics     Tetracycline     Tetracyclines & Related Hives    Morphine And Related Rash    Valium [Diazepam] Rash and Vomiting     Reaction Date: 14Jun2012;        Physical Exam:    Temp 97 7 °F (36 5 °C)   Ht 5' 3" (1 6 m)   Wt 87 5 kg (193 lb)   BMI 34 19 kg/m²     Constitutional:obese  Eyes:anicteric  HEENT:grossly intact  Neck:supple, symmetric, trachea midline and no masses   Pulmonary:even and unlabored  Cardiovascular:No edema or pitting edema present  Skin:Normal without rashes or lesions and well hydrated  Psychiatric:Mood and affect appropriate  Neurologic:Cranial Nerves II-XII grossly intact  Musculoskeletal:normal

## 2020-11-19 ENCOUNTER — OFFICE VISIT (OUTPATIENT)
Dept: PAIN MEDICINE | Facility: CLINIC | Age: 55
End: 2020-11-19
Payer: COMMERCIAL

## 2020-11-19 VITALS — TEMPERATURE: 97.5 F | SYSTOLIC BLOOD PRESSURE: 116 MMHG | HEART RATE: 73 BPM | DIASTOLIC BLOOD PRESSURE: 75 MMHG

## 2020-11-19 DIAGNOSIS — M54.2 NECK PAIN: ICD-10-CM

## 2020-11-19 DIAGNOSIS — F11.20 UNCOMPLICATED OPIOID DEPENDENCE (HCC): ICD-10-CM

## 2020-11-19 DIAGNOSIS — M50.120 CERVICAL DISC DISORDER WITH RADICULOPATHY OF MID-CERVICAL REGION: ICD-10-CM

## 2020-11-19 DIAGNOSIS — Z79.891 LONG-TERM CURRENT USE OF OPIATE ANALGESIC: ICD-10-CM

## 2020-11-19 DIAGNOSIS — G89.4 CHRONIC PAIN SYNDROME: Primary | ICD-10-CM

## 2020-11-19 PROCEDURE — 99214 OFFICE O/P EST MOD 30 MIN: CPT | Performed by: ANESTHESIOLOGY

## 2020-11-19 PROCEDURE — 80305 DRUG TEST PRSMV DIR OPT OBS: CPT | Performed by: ANESTHESIOLOGY

## 2020-11-19 RX ORDER — OXYCODONE HYDROCHLORIDE AND ACETAMINOPHEN 5; 325 MG/1; MG/1
0.5 TABLET ORAL 2 TIMES DAILY PRN
Qty: 30 TABLET | Refills: 0 | Status: SHIPPED | OUTPATIENT
Start: 2020-12-19 | End: 2021-01-18 | Stop reason: SDUPTHER

## 2020-11-19 RX ORDER — GABAPENTIN 600 MG/1
600 TABLET ORAL 3 TIMES DAILY
Qty: 90 TABLET | Refills: 1 | Status: SHIPPED | OUTPATIENT
Start: 2020-11-19 | End: 2021-01-18 | Stop reason: SDUPTHER

## 2020-11-19 RX ORDER — OXYCODONE HYDROCHLORIDE AND ACETAMINOPHEN 5; 325 MG/1; MG/1
0.5 TABLET ORAL 2 TIMES DAILY PRN
Qty: 30 TABLET | Refills: 0 | Status: SHIPPED | OUTPATIENT
Start: 2020-11-19 | End: 2021-01-18 | Stop reason: SDUPTHER

## 2020-11-19 RX ORDER — CYCLOBENZAPRINE HCL 10 MG
10 TABLET ORAL 3 TIMES DAILY PRN
Qty: 90 TABLET | Refills: 1 | Status: SHIPPED | OUTPATIENT
Start: 2020-11-19 | End: 2021-01-18 | Stop reason: SDUPTHER

## 2020-11-21 LAB
6MAM UR QL CFM: NEGATIVE NG/ML
7AMINOCLONAZEPAM UR QL CFM: NEGATIVE NG/ML
A-OH ALPRAZ UR QL CFM: NEGATIVE NG/ML
ACCEPTABLE CREAT UR QL: NORMAL MG/DL
ACCEPTIBLE SP GR UR QL: NORMAL
AMPHET UR QL CFM: NEGATIVE NG/ML
AMPHET UR QL CFM: NEGATIVE NG/ML
BUPRENORPHINE UR QL CFM: NEGATIVE NG/ML
BUTALBITAL UR QL CFM: NEGATIVE NG/ML
BZE UR QL CFM: NEGATIVE NG/ML
CODEINE UR QL CFM: NEGATIVE NG/ML
DESIPRAMINE UR QL CFM: NEGATIVE NG/ML
DESIPRAMINE UR QL CFM: NEGATIVE NG/ML
EDDP UR QL CFM: NEGATIVE NG/ML
ETHYL GLUCURONIDE UR QL CFM: NEGATIVE NG/ML
ETHYL SULFATE UR QL SCN: NEGATIVE NG/ML
FENTANYL UR QL CFM: NEGATIVE NG/ML
GLIADIN IGG SER IA-ACNC: NEGATIVE NG/ML
GLUCOSE 30M P 50 G LAC PO SERPL-MCNC: NEGATIVE NG/ML
HYDROCODONE UR QL CFM: NEGATIVE NG/ML
HYDROCODONE UR QL CFM: NEGATIVE NG/ML
HYDROMORPHONE UR QL CFM: NEGATIVE NG/ML
IMIPRAMINE UR QL CFM: NEGATIVE NG/ML
LORAZEPAM UR QL CFM: NEGATIVE NG/ML
MDMA UR QL CFM: NEGATIVE NG/ML
ME-PHENIDATE UR QL CFM: NEGATIVE NG/ML
MEPERIDINE UR QL CFM: NEGATIVE NG/ML
MEPHEDRONE UR QL CFM: NEGATIVE NG/ML
METHADONE UR QL CFM: NEGATIVE NG/ML
METHAMPHET UR QL CFM: NEGATIVE NG/ML
MORPHINE UR QL CFM: NEGATIVE NG/ML
MORPHINE UR QL CFM: NEGATIVE NG/ML
NITRITE UR QL: NORMAL UG/ML
NORBUPRENORPHINE UR QL CFM: NEGATIVE NG/ML
NORDIAZEPAM UR QL CFM: NEGATIVE NG/ML
NORFENTANYL UR QL CFM: NEGATIVE NG/ML
NORHYDROCODONE UR QL CFM: NEGATIVE NG/ML
NORHYDROCODONE UR QL CFM: NEGATIVE NG/ML
NORMEPERIDINE UR QL CFM: NEGATIVE NG/ML
NOROXYCODONE UR QL CFM: NORMAL NG/ML
OPC-3373 QUANTIFICATION: NEGATIVE
OXAZEPAM UR QL CFM: NEGATIVE NG/ML
OXYCODONE UR QL CFM: NORMAL NG/ML
OXYMORPHONE UR QL CFM: NORMAL NG/ML
OXYMORPHONE UR QL CFM: NORMAL NG/ML
PCP UR QL CFM: NEGATIVE NG/ML
PHENOBARB UR QL CFM: NEGATIVE NG/ML
RESULT ALL_PRESCRIBED MEDS AND SPECIAL INSTRUCTIONS: NORMAL
SECOBARBITAL UR QL CFM: NEGATIVE NG/ML
SL AMB 3-METHYL-FENTANYL QUANTIFICATION: NORMAL NG/ML
SL AMB 4-ANPP QUANTIFICATION: NORMAL NG/ML
SL AMB 4-FIBF QUANTIFICATION: NORMAL NG/ML
SL AMB 5F-ADB-M7 METABOLITE QUANTIFICATION: NEGATIVE NG/ML
SL AMB 7-OH-MITRAGYNINE (KRATOM ALKALOID) QUANTIFICATION: NEGATIVE NG/ML
SL AMB AB-FUBINACA-M3 METABOLITE QUANTIFICATION: NEGATIVE NG/ML
SL AMB ACETYL FENTANYL QUANTIFICATION: NORMAL NG/ML
SL AMB ACETYL NORFENTANYL QUANTIFICATION: NORMAL NG/ML
SL AMB ACRYL FENTANYL QUANTIFICATION: NORMAL NG/ML
SL AMB BATH SALTS: NEGATIVE NG/ML
SL AMB BUTRYL FENTANYL QUANTIFICATION: NORMAL NG/ML
SL AMB CARFENTANIL QUANTIFICATION: NORMAL NG/ML
SL AMB CLOZAPINE QUANTIFICATION: NEGATIVE NG/ML
SL AMB CTHC (MARIJUANA METABOLITE) QUANTIFICATION: NEGATIVE NG/ML
SL AMB CYCLOPROPYL FENTANYL QUANTIFICATION: NORMAL NG/ML
SL AMB DEXTROMETHORPHAN QUANTIFICATION: NEGATIVE NG/ML
SL AMB DEXTRORPHAN (DEXTROMETHORPHAN METABOLITE) QUANT: NEGATIVE NG/ML
SL AMB DEXTRORPHAN (DEXTROMETHORPHAN METABOLITE) QUANT: NEGATIVE NG/ML
SL AMB FURANYL FENTANYL QUANTIFICATION: NORMAL NG/ML
SL AMB HALOPERIDOL  QUANTIFICATION: NEGATIVE NG/ML
SL AMB HALOPERIDOL METABOLITE QUANTIFICATION: NEGATIVE NG/ML
SL AMB JWH018 METABOLITE QUANTIFICATION: NEGATIVE NG/ML
SL AMB JWH073 METABOLITE QUANTIFICATION: NEGATIVE NG/ML
SL AMB MDMB-FUBINACA-M1 METABOLITE QUANTIFICATION: NEGATIVE NG/ML
SL AMB METHOXYACETYL FENTANYL QUANTIFICATION: NORMAL NG/ML
SL AMB METHYLONE QUANTIFICATION: NEGATIVE NG/ML
SL AMB N-DESMETHYL U-47700 QUANTIFICATION: NORMAL NG/ML
SL AMB N-DESMETHYL-TRAMADOL QUANTIFICATION: NEGATIVE NG/ML
SL AMB N-DESMETHYLCLOZAPINE QUANTIFICATION: NEGATIVE NG/ML
SL AMB PHENTERMINE QUANTIFICATION: NEGATIVE NG/ML
SL AMB RCS4 METABOLITE QUANTIFICATION: NEGATIVE NG/ML
SL AMB RITALINIC ACID QUANTIFICATION: NEGATIVE NG/ML
SL AMB U-47700 QUANTIFICATION: NORMAL NG/ML
SPECIMEN DRAWN SERPL: NEGATIVE NG/ML
SPECIMEN PH ACCEPTABLE UR: NORMAL
TAPENTADOL UR QL CFM: NEGATIVE NG/ML
TEMAZEPAM UR QL CFM: NEGATIVE NG/ML
TEMAZEPAM UR QL CFM: NEGATIVE NG/ML
TRAMADOL UR QL CFM: NEGATIVE NG/ML
URATE/CREAT 24H UR: NEGATIVE NG/ML

## 2020-12-03 ENCOUNTER — TELEPHONE (OUTPATIENT)
Dept: PAIN MEDICINE | Facility: CLINIC | Age: 55
End: 2020-12-03

## 2020-12-03 NOTE — TELEPHONE ENCOUNTER
Pt called in to schedule her procedure   Please be advise thank you        Please call patient back @ [phone]

## 2020-12-04 NOTE — TELEPHONE ENCOUNTER
Pt called in to schedule her procedure  Please be advise thank you        Please call patient back @ [phone]

## 2020-12-08 NOTE — TELEPHONE ENCOUNTER
Pt called angry stating she has been calling to get a procedure done and no one has called her yet       Pt # [phone]

## 2020-12-18 ENCOUNTER — PROCEDURE VISIT (OUTPATIENT)
Dept: PAIN MEDICINE | Facility: CLINIC | Age: 55
End: 2020-12-18
Payer: COMMERCIAL

## 2020-12-18 DIAGNOSIS — M79.18 MYOFASCIAL PAIN SYNDROME: Primary | ICD-10-CM

## 2020-12-18 PROCEDURE — 20553 NJX 1/MLT TRIGGER POINTS 3/>: CPT | Performed by: ANESTHESIOLOGY

## 2020-12-18 RX ORDER — LIDOCAINE HYDROCHLORIDE 10 MG/ML
5 INJECTION, SOLUTION EPIDURAL; INFILTRATION; INTRACAUDAL; PERINEURAL ONCE
Status: COMPLETED | OUTPATIENT
Start: 2020-12-18 | End: 2020-12-18

## 2020-12-18 RX ORDER — METHYLPREDNISOLONE ACETATE 40 MG/ML
40 INJECTION, SUSPENSION INTRA-ARTICULAR; INTRALESIONAL; INTRAMUSCULAR; SOFT TISSUE ONCE
Status: COMPLETED | OUTPATIENT
Start: 2020-12-18 | End: 2020-12-18

## 2020-12-18 RX ADMIN — METHYLPREDNISOLONE ACETATE 40 MG: 40 INJECTION, SUSPENSION INTRA-ARTICULAR; INTRALESIONAL; INTRAMUSCULAR; SOFT TISSUE at 15:51

## 2020-12-18 RX ADMIN — LIDOCAINE HYDROCHLORIDE 5 ML: 10 INJECTION, SOLUTION EPIDURAL; INFILTRATION; INTRACAUDAL; PERINEURAL at 15:50

## 2020-12-18 RX ADMIN — LIDOCAINE HYDROCHLORIDE 5 ML: 10 INJECTION, SOLUTION EPIDURAL; INFILTRATION; INTRACAUDAL; PERINEURAL at 15:51

## 2021-01-18 ENCOUNTER — OFFICE VISIT (OUTPATIENT)
Dept: PAIN MEDICINE | Facility: CLINIC | Age: 56
End: 2021-01-18
Payer: COMMERCIAL

## 2021-01-18 VITALS
DIASTOLIC BLOOD PRESSURE: 75 MMHG | HEIGHT: 63 IN | HEART RATE: 90 BPM | SYSTOLIC BLOOD PRESSURE: 115 MMHG | BODY MASS INDEX: 34.19 KG/M2

## 2021-01-18 DIAGNOSIS — G89.4 CHRONIC PAIN SYNDROME: Primary | ICD-10-CM

## 2021-01-18 DIAGNOSIS — M54.2 NECK PAIN: ICD-10-CM

## 2021-01-18 DIAGNOSIS — M50.120 CERVICAL DISC DISORDER WITH RADICULOPATHY OF MID-CERVICAL REGION: ICD-10-CM

## 2021-01-18 PROCEDURE — 99214 OFFICE O/P EST MOD 30 MIN: CPT | Performed by: ANESTHESIOLOGY

## 2021-01-18 RX ORDER — OXYCODONE HYDROCHLORIDE AND ACETAMINOPHEN 5; 325 MG/1; MG/1
0.5 TABLET ORAL 2 TIMES DAILY PRN
Qty: 30 TABLET | Refills: 0 | Status: SHIPPED | OUTPATIENT
Start: 2021-02-17 | End: 2021-03-22 | Stop reason: SDUPTHER

## 2021-01-18 RX ORDER — CYCLOBENZAPRINE HCL 10 MG
10 TABLET ORAL 3 TIMES DAILY PRN
Qty: 90 TABLET | Refills: 1 | Status: SHIPPED | OUTPATIENT
Start: 2021-01-18 | End: 2021-03-22 | Stop reason: SDUPTHER

## 2021-01-18 RX ORDER — OXYCODONE HYDROCHLORIDE AND ACETAMINOPHEN 5; 325 MG/1; MG/1
0.5 TABLET ORAL 2 TIMES DAILY PRN
Qty: 30 TABLET | Refills: 0 | Status: SHIPPED | OUTPATIENT
Start: 2021-01-18 | End: 2021-03-22 | Stop reason: SDUPTHER

## 2021-01-18 RX ORDER — GABAPENTIN 600 MG/1
600 TABLET ORAL 3 TIMES DAILY
Qty: 90 TABLET | Refills: 1 | Status: SHIPPED | OUTPATIENT
Start: 2021-01-18 | End: 2021-03-22 | Stop reason: SDUPTHER

## 2021-01-18 NOTE — PROGRESS NOTES
Pain Medicine Follow-Up Note    Assessment:  1  Chronic pain syndrome    2  Neck pain    3  Cervical disc disorder with radiculopathy of mid-cervical region        Plan:  New Medications Ordered This Visit   Medications    oxyCODONE-acetaminophen (PERCOCET) 5-325 mg per tablet     Sig: Take 0 5 tablets by mouth 2 (two) times a day as needed for severe painMax Daily Amount: 1 tablet     Dispense:  30 tablet     Refill:  0    oxyCODONE-acetaminophen (PERCOCET) 5-325 mg per tablet     Sig: Take 0 5 tablets by mouth 2 (two) times a day as needed for severe painMax Daily Amount: 1 tablet     Dispense:  30 tablet     Refill:  0    cyclobenzaprine (FLEXERIL) 10 mg tablet     Sig: Take 1 tablet (10 mg total) by mouth 3 (three) times a day as needed for muscle spasms     Dispense:  90 tablet     Refill:  1    gabapentin (NEURONTIN) 600 MG tablet     Sig: Take 1 tablet (600 mg total) by mouth 3 (three) times a day     Dispense:  90 tablet     Refill:  1     My impressions and treatment recommendations were discussed in detail with the patient who verbalized understanding and had no further questions  Given that the patient reports overall reduced pain and improved level functioning without significant side effects, I felt a reasonable to continue the patient on gabapentin 600 mg 3 times daily, cyclobenzaprine 10 mg up to 3 times daily as needed for muscle spasms, oxycodone/acetaminophen 5/325 mg 0 5 tablets up to twice daily as needed for pain  I sent E prescriptions for the oxycodone/acetaminophen dated January 18, 2021 and February 17, 2021  The patient is reporting that the trigger points that she had most recently did not help her much at all  She is still interested in trigger point injections, so I felt a reasonable to offer her trigger point injections since these could be potentially therapeutic  Follow-up is planned in 4 weeks time or sooner as warranted  Discharge instructions were provided   I personally saw and examined the patient and I agree with the above discussed plan of care  History of Present Illness:    Dwain Looney is a 48 y o  female who presents to [facility] for interval re-evaluation of the above stated pain complaints  The patient has a past medical and chronic pain history as outlined in the assessment section  She was last seen on December 18, 2020  At today's office visit, the patient's pain score is 7/10 on the verbal numerical pain rating scale  The patient states that her pain is primarily in her neck region  She describes her pain as worse in the morning  Her pain is constant in nature  She reports the quality of her pain as burning, dull/aching, sharp all pressure like, shooting, numbness, and pins and needles  She is reporting that the trigger point injection she had performed recently did not give her significant relief of symptoms  She is amenable to undergoing a repeat set of trigger point injections at this time  She also is doing well on her currently prescribed medications and does not wish to undergo any changes  She denies opioid induced constipation  Other than as stated above, the patient denies any interval changes in medications, medical condition, mental condition, symptoms, or allergies since the last office visit  Review of Systems:    Review of Systems   Respiratory: Negative for shortness of breath  Cardiovascular: Negative for chest pain  Gastrointestinal: Negative for constipation, diarrhea, nausea and vomiting  Musculoskeletal: Positive for gait problem and joint swelling (Neck/Shoulder Area)  Negative for arthralgias and myalgias  Decreased ROM, Muscle Weakness, Pain in both arms, sometimes in left leg, and Joint Stiffness  Skin: Negative for rash  Neurological: Negative for dizziness, seizures and weakness  All other systems reviewed and are negative          Patient Active Problem List   Diagnosis    Chronic pain syndrome    Acute muscle stiffness of neck    Cervical disc disorder with radiculopathy of mid-cervical region    Chronic left-sided low back pain with left-sided sciatica    Lumbar radiculopathy    Cervicalgia    Right hip pain    Trochanteric bursitis of right hip    Pain in right hip       Past Medical History:   Diagnosis Date    Anxiety     Asthma     exercise induced asthma    Cervical disc disorder     Chronic pain     Chronic pain disorder     Depression     Dysuria     Low back pain     Lung abnormality     nodules    Lung nodule     Neck pain     Peripheral neuropathy     Pituitary abnormality (HCC)     tumor    Right hip pain 10/29/2019    Thrombocytopenia ([address] )     Thyroid disease        Past Surgical History:   Procedure Laterality Date    APPENDECTOMY      BREAST BIOPSY Right 1995    CHOLECYSTECTOMY      COLECTOMY      COLON SURGERY      EPIDURAL BLOCK INJECTION N/A 6/23/2016    Procedure: BLOCK / INJECTION EPIDURAL STEROID CERVICAL  C7-T1;  Surgeon: Rosana Campos MD;  Location: [facility] MAIN OR;  Service:     EPIDURAL BLOCK INJECTION N/A 3/31/2016    Procedure: BLOCK / INJECTION EPIDURAL STEROID CERVICAL C7-T1  (C-ARM); Surgeon: Rosana Campos MD;  Location: [facility] MAIN OR;  Service:     EPIDURAL BLOCK INJECTION N/A 8/8/2018    Procedure: C6 C7 Cervical Epidural Steroid Injection (18815); Surgeon: Rosana Campos MD;  Location: [facility] MAIN OR;  Service: Pain Management     HYSTERECTOMY  1996    OOPHORECTOMY Bilateral 1996    PITUITARY SURGERY      AR ARTHROCENTESIS ASPIR&/INJ MAJOR JT/BURSA W/O US Right 11/8/2019    Procedure: Trochanteric Bursa Injection ([id number]); Surgeon: Rosana Campos MD;  Location: [facility] MAIN OR;  Service: Pain Management     AR ARTHROCENTESIS ASPIR&/INJ MAJOR JT/BURSA W/O  Right 1/23/2020    Procedure: Trochanteric Bursa Injection ([id number]);   Surgeon: Rosana Campos MD;  Location: [facility] MAIN OR;  Service: Pain Management     [place] NJX DX/THER SBST EPIDURAL/SUBRACH CERV/THORACIC N/A 1/21/2016    Procedure: BLOCK / INJECTION EPIDURAL STEROID CERVICAL C7-T1 (C-ARM); Surgeon: Chapincito Hayes MD;  Location: [facility] MAIN OR;  Service: Pain Management     REDUCTION MAMMAPLASTY Bilateral 2000       Family History   Problem Relation Age of Onset    Thyroid disease Mother     Hyperlipidemia Mother     Depression Mother     Thyroid disease Father     Hyperlipidemia Father     Depression Father     Ovarian cancer Sister 39    No Known Problems Brother     No Known Problems Maternal Uncle     No Known Problems Paternal Aunt     No Known Problems Paternal Uncle     No Known Problems Maternal Grandmother     No Known Problems Maternal Grandfather     Breast cancer Paternal Grandmother 36    No Known Problems Paternal Grandfather     No Known Problems Daughter     No Known Problems Son     ADD / ADHD Neg Hx     Anesthesia problems Neg Hx     Cancer Neg Hx     Clotting disorder Neg Hx     Collagen disease Neg Hx     Diabetes Neg Hx     Dislocations Neg Hx     Learning disabilities Neg Hx     Neurological problems Neg Hx     Osteoporosis Neg Hx     Rheumatologic disease Neg Hx     Scoliosis Neg Hx     Vascular Disease Neg Hx        Social History     Occupational History    Not on file   Tobacco Use    Smoking status: Never Smoker    Smokeless tobacco: Never Used   Substance and Sexual Activity    Alcohol use: No    Drug use: No    Sexual activity: Not on file         Current Outpatient Medications:     albuterol (VENTOLIN HFA) 90 mcg/act inhaler, Inhale, Disp: , Rfl:     aspirin 81 MG tablet, Take 81 mg by mouth daily  , Disp: , Rfl:     cholecalciferol (VITAMIN D3) 1,000 units tablet, Take 2 tablets by mouth daily, Disp: , Rfl:     cyclobenzaprine (FLEXERIL) 10 mg tablet, Take 1 tablet (10 mg total) by mouth 3 (three) times a day as needed for muscle spasms, Disp: 90 tablet, Rfl: 1    diclofenac sodium (VOLTAREN) 1 %, Apply 2 g topically 4 (four) times a day as needed (pain), Disp: 100 g, Rfl: 0    gabapentin (NEURONTIN) 600 MG tablet, Take 1 tablet (600 mg total) by mouth 3 (three) times a day, Disp: 90 tablet, Rfl: 1    hydrocortisone (ANUSOL-HC) 25 mg suppository, , Disp: , Rfl: 0    ibuprofen (MOTRIN) 600 mg tablet, Take 1 tablet (600 mg total) by mouth every 6 (six) hours as needed for mild pain, Disp: 30 tablet, Rfl: 0    levothyroxine 50 mcg tablet, , Disp: , Rfl:     metoprolol succinate (TOPROL-XL) 25 mg 24 hr tablet, , Disp: , Rfl:     mupirocin (BACTROBAN) 2 % ointment, , Disp: , Rfl: 0    [START ON 2/17/2021] oxyCODONE-acetaminophen (PERCOCET) 5-325 mg per tablet, Take 0 5 tablets by mouth 2 (two) times a day as needed for severe painMax Daily Amount: 1 tablet, Disp: 30 tablet, Rfl: 0    traZODone (DESYREL) 50 mg tablet, , Disp: , Rfl: 0    oxyCODONE-acetaminophen (PERCOCET) 5-325 mg per tablet, Take 0 5 tablets by mouth 2 (two) times a day as needed for severe painMax Daily Amount: 1 tablet, Disp: 30 tablet, Rfl: 0    Allergies   Allergen Reactions    Levaquin [Levofloxacin In D5w] Shortness Of Breath     Also hives      Amitiza [Lubiprostone] Other (See Comments) and Hives     Reaction Date: 10Aug2011;   Migraines and vomiting    Biaxin [Clarithromycin] Hives    Cephradine     Erythromycin Hives     Reaction Date: 10Aug2011;     Macrobid [Nitrofurantoin Monohyd Macro] Other (See Comments)     C/o passing out    Morphine     Penicillins Hives     Reaction Date: 10Aug2011;     Sulfa Antibiotics     Tetracycline     Tetracyclines & Related Hives    Morphine And Related Rash    Valium [Diazepam] Rash and Vomiting     Reaction Date: 14Jun2012;        Physical Exam:    /75   Pulse 90   Ht 5' 3" (1 6 m)   BMI 34 19 kg/m²     Constitutional:obese  Eyes:anicteric  HEENT:grossly intact  Neck:supple, symmetric, trachea midline and no masses   Pulmonary:even and unlabored  Cardiovascular:No edema or pitting edema present  Skin:Normal without rashes or lesions and well hydrated  Psychiatric:Mood and affect appropriate  Neurologic:Cranial Nerves II-XII grossly intact  Musculoskeletal:normal

## 2021-01-25 ENCOUNTER — PROCEDURE VISIT (OUTPATIENT)
Dept: PAIN MEDICINE | Facility: CLINIC | Age: 56
End: 2021-01-25
Payer: COMMERCIAL

## 2021-01-25 DIAGNOSIS — M54.2 CERVICALGIA: ICD-10-CM

## 2021-01-25 DIAGNOSIS — M79.18 MYOFASCIAL PAIN SYNDROME: ICD-10-CM

## 2021-01-25 DIAGNOSIS — G89.4 CHRONIC PAIN SYNDROME: Primary | ICD-10-CM

## 2021-01-25 PROCEDURE — 20553 NJX 1/MLT TRIGGER POINTS 3/>: CPT | Performed by: ANESTHESIOLOGY

## 2021-01-25 NOTE — PROGRESS NOTES
Inj Trigger Point Single/Multiple     Date/Time 1/25/2021 3:15 PM     Performed by  Mo Chen MD     Authorized by Mo Chen MD      Wells Protocol   Consent: Verbal consent obtained  Written consent obtained  Risks and benefits: risks, benefits and alternatives were discussed  Consent given by: patient  Time out: Immediately prior to procedure a "time out" was called to verify the correct patient, procedure, equipment, support staff and site/side marked as required  Patient understanding: patient states understanding of the procedure being performed  Patient consent: the patient's understanding of the procedure matches consent given  Procedure consent: procedure consent matches procedure scheduled  Relevant documents: relevant documents present and verified  Test results: test results available and properly labeled  Site marked: the operative site was marked  Radiology Images displayed and confirmed  If images not available, report reviewed: imaging studies available  Patient identity confirmed: verbally with patient        Local anesthesia used: yes      Anesthesia: local infiltration     Anesthesia   Local anesthesia used: yes  Local Anesthetic: lidocaine 1% without epinephrine  Anesthetic total: 9 mL     Sedation   Patient sedated: no        Specimen: no    Culture: no   Procedure Details   Procedure Notes: ATTENDING PHYSICIAN:  Mo Chen MD     PROCEDURE:  Trigger point injections x1 to the left cervical paraspinal, x1 to the right cervical paraspinal, x4 to the left upper trapezius, and x4 to the right upper trapezius musculature with local anesthetic and steroid  PRE-PROCEDURE DIAGNOSIS:  Myofascial pain syndrome  POST-PROCEDURE DIAGNOSIS:  Myofascial pain syndrome  ESTIMATED BLOOD LOSS:  Minimal     ANESTHESIA:  None  COMPLICATIONS:  None  CONSENT:  Today's procedure, its potential benefits as well as its risks and side effects were reviewed   Discussed risks of the procedure including bleeding, infection, reactions to the medications, failure the pain to improve, and potential worsening of the pain as well as pneumothorax were explained in detail to the patient, who verbalized understanding and wished to proceed  Written informed consent was thereby obtained  DESCRIPTION OF THE PROCEDURE:  After written informed consent was obtained, the patient was placed in the sitting position on the examination table  Anatomical landmarks were identified via palpation  The trigger points were identified and marked after palpation  The skin overlying these 10 marked trigger points was prepared using Betadine swabs x3 in the usual sterile fashion  Strict aseptic technique was utilized throughout the procedure  A 10 mL injectate consisting of 9 mL of 1% lidocaine mixed with 1 mL of Depo-Medrol 40 mg/mL was drawn up sterilely  Using a 25-gauge 1 25 inch needle, 1 mL of the above injectate was administered to each of the marked trigger points following negative aspiration  The patient tolerated the procedure well and all needles were removed with the tips intact  Hemostasis was maintained  There were no apparent complications  The skin was wiped clean and Band-Aids were placed as appropriate  The patient was monitored for an appropriate period of time following the procedure and remained hemodynamically stable and neurovascularly intact following the procedure as she was prior to the procedure  The patient was ultimately discharged to home with supervision in good condition  I was present for and participated in all key and critical portions of this procedure    Patient tolerance: Patient tolerated the procedure well with no immediate complications

## 2021-01-28 RX ORDER — LIDOCAINE HYDROCHLORIDE 10 MG/ML
5 INJECTION, SOLUTION EPIDURAL; INFILTRATION; INTRACAUDAL; PERINEURAL ONCE
Status: COMPLETED | OUTPATIENT
Start: 2021-01-28 | End: 2021-01-28

## 2021-01-28 RX ORDER — METHYLPREDNISOLONE ACETATE 40 MG/ML
40 INJECTION, SUSPENSION INTRA-ARTICULAR; INTRALESIONAL; INTRAMUSCULAR; SOFT TISSUE ONCE
Status: COMPLETED | OUTPATIENT
Start: 2021-01-28 | End: 2021-01-28

## 2021-01-28 RX ADMIN — LIDOCAINE HYDROCHLORIDE 5 ML: 10 INJECTION, SOLUTION EPIDURAL; INFILTRATION; INTRACAUDAL; PERINEURAL at 11:46

## 2021-01-28 RX ADMIN — METHYLPREDNISOLONE ACETATE 40 MG: 40 INJECTION, SUSPENSION INTRA-ARTICULAR; INTRALESIONAL; INTRAMUSCULAR; SOFT TISSUE at 11:47

## 2021-01-28 RX ADMIN — LIDOCAINE HYDROCHLORIDE 5 ML: 10 INJECTION, SOLUTION EPIDURAL; INFILTRATION; INTRACAUDAL; PERINEURAL at 11:47

## 2021-03-12 ENCOUNTER — TRANSCRIBE ORDERS (OUTPATIENT)
Dept: LAB | Facility: CLINIC | Age: 56
End: 2021-03-12

## 2021-03-12 ENCOUNTER — APPOINTMENT (OUTPATIENT)
Dept: LAB | Facility: CLINIC | Age: 56
End: 2021-03-12
Payer: COMMERCIAL

## 2021-03-12 ENCOUNTER — APPOINTMENT (OUTPATIENT)
Dept: RADIOLOGY | Facility: CLINIC | Age: 56
End: 2021-03-12
Payer: COMMERCIAL

## 2021-03-12 DIAGNOSIS — Z11.59 SCREENING FOR VIRAL DISEASE: ICD-10-CM

## 2021-03-12 DIAGNOSIS — R73.01 IMPAIRED FASTING GLUCOSE: ICD-10-CM

## 2021-03-12 DIAGNOSIS — M25.50 PAIN IN JOINT, MULTIPLE SITES: ICD-10-CM

## 2021-03-12 DIAGNOSIS — Z13.9 SCREENING FOR UNSPECIFIED CONDITION: ICD-10-CM

## 2021-03-12 DIAGNOSIS — E04.8 MEDIASTINAL GOITER: Primary | ICD-10-CM

## 2021-03-12 DIAGNOSIS — E04.8 MEDIASTINAL GOITER: ICD-10-CM

## 2021-03-12 LAB
ALBUMIN SERPL BCP-MCNC: 3.9 G/DL (ref 3.5–5)
ALP SERPL-CCNC: 96 U/L (ref 46–116)
ALT SERPL W P-5'-P-CCNC: 35 U/L (ref 12–78)
ANION GAP SERPL CALCULATED.3IONS-SCNC: 5 MMOL/L (ref 4–13)
AST SERPL W P-5'-P-CCNC: 15 U/L (ref 5–45)
BILIRUB SERPL-MCNC: 0.45 MG/DL (ref 0.2–1)
BUN SERPL-MCNC: 21 MG/DL (ref 5–25)
CALCIUM SERPL-MCNC: 9.2 MG/DL (ref 8.3–10.1)
CHLORIDE SERPL-SCNC: 109 MMOL/L (ref 100–108)
CHOLEST SERPL-MCNC: 239 MG/DL (ref 50–200)
CO2 SERPL-SCNC: 28 MMOL/L (ref 21–32)
CREAT SERPL-MCNC: 0.8 MG/DL (ref 0.6–1.3)
ERYTHROCYTE [DISTWIDTH] IN BLOOD BY AUTOMATED COUNT: 14.3 % (ref 11.6–15.1)
EST. AVERAGE GLUCOSE BLD GHB EST-MCNC: 114 MG/DL
GFR SERPL CREATININE-BSD FRML MDRD: 84 ML/MIN/1.73SQ M
GLUCOSE P FAST SERPL-MCNC: 92 MG/DL (ref 65–99)
HBA1C MFR BLD: 5.6 %
HCT VFR BLD AUTO: 40.3 % (ref 34.8–46.1)
HCV AB SER QL: NORMAL
HDLC SERPL-MCNC: 62 MG/DL
HGB BLD-MCNC: 12.7 G/DL (ref 11.5–15.4)
LDLC SERPL CALC-MCNC: 157 MG/DL (ref 0–100)
MCH RBC QN AUTO: 27.9 PG (ref 26.8–34.3)
MCHC RBC AUTO-ENTMCNC: 31.5 G/DL (ref 31.4–37.4)
MCV RBC AUTO: 89 FL (ref 82–98)
NONHDLC SERPL-MCNC: 177 MG/DL
PLATELET # BLD AUTO: 408 THOUSANDS/UL (ref 149–390)
PMV BLD AUTO: 9.6 FL (ref 8.9–12.7)
POTASSIUM SERPL-SCNC: 4.3 MMOL/L (ref 3.5–5.3)
PROT SERPL-MCNC: 7.2 G/DL (ref 6.4–8.2)
RBC # BLD AUTO: 4.55 MILLION/UL (ref 3.81–5.12)
SODIUM SERPL-SCNC: 142 MMOL/L (ref 136–145)
TRIGL SERPL-MCNC: 102 MG/DL
TSH SERPL DL<=0.05 MIU/L-ACNC: 3.53 UIU/ML (ref 0.36–3.74)
WBC # BLD AUTO: 6.08 THOUSAND/UL (ref 4.31–10.16)

## 2021-03-12 PROCEDURE — 86769 SARS-COV-2 COVID-19 ANTIBODY: CPT

## 2021-03-12 PROCEDURE — 80053 COMPREHEN METABOLIC PANEL: CPT

## 2021-03-12 PROCEDURE — 86803 HEPATITIS C AB TEST: CPT

## 2021-03-12 PROCEDURE — 36415 COLL VENOUS BLD VENIPUNCTURE: CPT

## 2021-03-12 PROCEDURE — 84443 ASSAY THYROID STIM HORMONE: CPT

## 2021-03-12 PROCEDURE — 85027 COMPLETE CBC AUTOMATED: CPT

## 2021-03-12 PROCEDURE — 80061 LIPID PANEL: CPT

## 2021-03-12 PROCEDURE — 83036 HEMOGLOBIN GLYCOSYLATED A1C: CPT

## 2021-03-12 PROCEDURE — 73564 X-RAY EXAM KNEE 4 OR MORE: CPT

## 2021-03-13 LAB — SARS-COV-2 IGG+IGM SERPL QL IA: NORMAL

## 2021-03-22 ENCOUNTER — OFFICE VISIT (OUTPATIENT)
Dept: PAIN MEDICINE | Facility: CLINIC | Age: 56
End: 2021-03-22
Payer: COMMERCIAL

## 2021-03-22 VITALS
BODY MASS INDEX: 34.2 KG/M2 | HEIGHT: 63 IN | DIASTOLIC BLOOD PRESSURE: 75 MMHG | SYSTOLIC BLOOD PRESSURE: 109 MMHG | WEIGHT: 193 LBS | HEART RATE: 71 BPM

## 2021-03-22 DIAGNOSIS — G89.4 CHRONIC PAIN SYNDROME: Primary | ICD-10-CM

## 2021-03-22 DIAGNOSIS — F11.20 UNCOMPLICATED OPIOID DEPENDENCE (HCC): ICD-10-CM

## 2021-03-22 DIAGNOSIS — M50.120 CERVICAL DISC DISORDER WITH RADICULOPATHY OF MID-CERVICAL REGION: ICD-10-CM

## 2021-03-22 DIAGNOSIS — M54.2 NECK PAIN: ICD-10-CM

## 2021-03-22 DIAGNOSIS — Z79.891 LONG-TERM CURRENT USE OF OPIATE ANALGESIC: ICD-10-CM

## 2021-03-22 PROCEDURE — 99214 OFFICE O/P EST MOD 30 MIN: CPT | Performed by: ANESTHESIOLOGY

## 2021-03-22 RX ORDER — OXYCODONE HYDROCHLORIDE AND ACETAMINOPHEN 5; 325 MG/1; MG/1
0.5 TABLET ORAL 2 TIMES DAILY PRN
Qty: 30 TABLET | Refills: 0 | Status: SHIPPED | OUTPATIENT
Start: 2021-04-23 | End: 2021-05-24 | Stop reason: SDUPTHER

## 2021-03-22 RX ORDER — GABAPENTIN 600 MG/1
600 TABLET ORAL 3 TIMES DAILY
Qty: 90 TABLET | Refills: 1 | Status: SHIPPED | OUTPATIENT
Start: 2021-03-22 | End: 2021-05-24 | Stop reason: SDUPTHER

## 2021-03-22 RX ORDER — OXYCODONE HYDROCHLORIDE AND ACETAMINOPHEN 5; 325 MG/1; MG/1
0.5 TABLET ORAL 2 TIMES DAILY PRN
Qty: 30 TABLET | Refills: 0 | Status: SHIPPED | OUTPATIENT
Start: 2021-03-24 | End: 2021-05-24 | Stop reason: SDUPTHER

## 2021-03-22 RX ORDER — CYCLOBENZAPRINE HCL 10 MG
10 TABLET ORAL 3 TIMES DAILY PRN
Qty: 90 TABLET | Refills: 1 | Status: SHIPPED | OUTPATIENT
Start: 2021-03-22 | End: 2021-05-24 | Stop reason: SDUPTHER

## 2021-03-22 NOTE — PROGRESS NOTES
Pain Medicine Follow-Up Note  Scribe Attestation    I,:  PAULINA Cantu am acting as a scribe while in the presence of the attending physician :       I,:  Tiny Romberg, MD personally performed the services described in this documentation    as scribed in my presence :           Assessment:  1  Chronic pain syndrome    2  Neck pain    3  Cervical disc disorder with radiculopathy of mid-cervical region    4  Uncomplicated opioid dependence ([address] )    5   Long-term current use of opiate analgesic        Plan:  Orders Placed This Encounter   Procedures    MM ALL_Prescribed Meds and Special Instructions    MM DT_Alprazolam Definitive Test    MM DT_Amphetamine Definitive Test    MM DT_Aripiprazole Definitive Test    MM DT_Bath Salts Definitive Test    MM DT_Buprenorphine Definitive Test    MM DT_Butalbital Definitive Test    MM DT_Clonazepam Definitive Test    MM DT_Clozapine Definitive Test    MM DT_Cocaine Definitive Test    MM DT_Codeine Definitive Test    MM DT_Desipramine Definitive Test    MM DT_Dextromethorphan Definitive Test    MM Diazepam Definitive Test    MM DT_Ethyl Glucuronide/Ethyl Sulfate Definitive Test    MM DT_Fentanyl Definitive Test    MM DT_Heroin Definitive Test    MM DT_Hydrocodone Definitive Test    MM DT_Hydromorphone Definitive Test    MM DT_Imipramine Definitive Test    MM DT_Kratom Definitive Test    MM DT_Levorphanol Definitive Test    MM Lorazepam Definitive Test    MM DT_MDMA Definitive Test    MM DT_Meperidine Definitive Test    MM DT_Methadone Definitive Test    MM DT_Methamphetamine Definitive Test    MM DT_Methylphenidate Definitive Test    MM DT_Morphine Definitive Test    MM DT_Oxazepam Definitive Test    MM DT_Oxycodone Definitive Test    MM DT_Oxymorphone Definitive Test    MM DT_Phencyclidine Definitive Test    MM DT_Phenobarbital Definitive Test    MM DT_Phentermine Definitive Test    MM DT_Secobarbital Definitive Test    MM DT_Spice Definitive Test    MM DT_Tapentadol Definitive Test    MM DT_Temazapam Definitive Test    MM DT_THC Definitive Test    MM DT_Tramadol Definitive Test       New Medications Ordered This Visit   Medications    oxyCODONE-acetaminophen (PERCOCET) 5-325 mg per tablet     Sig: Take 0 5 tablets by mouth 2 (two) times a day as needed for severe painMax Daily Amount: 1 tablet     Dispense:  30 tablet     Refill:  0    oxyCODONE-acetaminophen (PERCOCET) 5-325 mg per tablet     Sig: Take 0 5 tablets by mouth 2 (two) times a day as needed for severe painMax Daily Amount: 1 tablet     Dispense:  30 tablet     Refill:  0    cyclobenzaprine (FLEXERIL) 10 mg tablet     Sig: Take 1 tablet (10 mg total) by mouth 3 (three) times a day as needed for muscle spasms     Dispense:  90 tablet     Refill:  1    gabapentin (NEURONTIN) 600 MG tablet     Sig: Take 1 tablet (600 mg total) by mouth 3 (three) times a day     Dispense:  90 tablet     Refill:  1       My impressions and treatment recommendations were discussed in detail with the patient who verbalized understanding and had no further questions  I had a detailed discussion with the patient today regarding her chronic pain and level of functioning  She states that her medications only provided 25% relief of her symptoms; however, she does not want to increase her Percocet  She does feel like the medications take  The edge off of her pain  Therefore I feel it is reasonable to continue her on 600 mg of gabapentin 3 times a day, cyclobenzaprine 10 mg up to 3 times a day as needed for muscle spasms and oxycodone/ acetaminophen 5/325 mg half a tablet up to twice daily as needed for pain  E prescriptions were sent to the pharmacy on file  Refills for the Percocet are dated 03/24/2021 and 04/23/2021      I also feel given the patient's chronic pain and the little relief her medications are given her she would be an excellent candidate for a spinal stimulator trial   She was given information on the procedure and I will notify the rep to have them contact her  [place] Prescription Drug Monitoring Program report was reviewed and was appropriate       There are risks associated with opioid medications, including dependence, addiction and tolerance  The patient understands and agrees to use these medications only as prescribed  Potential side effects of the medications include, but are not limited to, constipation, drowsiness, addiction, impaired judgment and risk of fatal overdose if not taken as prescribed  The patient was warned against driving while taking sedation medications  Sharing medications is a felony  At this point in time, the patient is showing no signs of addiction, abuse, diversion or suicidal ideation  Follow-up is planned in 2 months time or sooner as warranted  Discharge instructions were provided  I personally saw and examined the patient and I agree with the above discussed plan of care  History of Present Illness:    Linda Gil is a 48 y o  female who presents to [facility] for interval re-evaluation of the above stated pain complaints  The patient has a past medical and chronic pain history as outlined in the assessment section  She was last seen on 01/25/2021 for trigger point injections  At today's office visit patient's pain a 7/10  She states that the pain is constant and worse in the morning  The quality of her pain is dull aching, throbbing, pressure-like, shooting, numbness, and pins and needles  She currently takes gabapentin 600 mg 3 times a day, cyclobenzaprine 10 mg 3 times a day as needed for muscle spasms and oxycodone/ acetaminophen 5/325 mg 0 5 tablet twice a day as needed  The patient states she feels like her current medication plan only provides 25% relief    She states she was spring cleaning her house over the weekend because she felt pretty good and then woke up today and could barely walk this morning  She is on permanent disability  Pain Contract Signed:  3/22/2021  Last Urine Drug Screen:  3/22/2021    Other than as stated above, the patient denies any interval changes in medications, medical condition, mental condition, symptoms, or allergies since the last office visit  Review of Systems:    Review of Systems   Respiratory: Negative for shortness of breath  Cardiovascular: Negative for chest pain  Gastrointestinal: Negative for constipation, diarrhea, nausea and vomiting  Musculoskeletal: Negative for arthralgias, gait problem, joint swelling and myalgias  Difficulty Walking  Decreased ROM   Muscle Weakness  Joint Stiffness  Swelling: Shoulders and Neck  Pain Extremity: legs and arms   Skin: Negative for rash  Neurological: Negative for dizziness, seizures and weakness  All other systems reviewed and are negative          Patient Active Problem List   Diagnosis    Chronic pain syndrome    Acute muscle stiffness of neck    Cervical disc disorder with radiculopathy of mid-cervical region    Chronic left-sided low back pain with left-sided sciatica    Lumbar radiculopathy    Cervicalgia    Right hip pain    Trochanteric bursitis of right hip    Pain in right hip       Past Medical History:   Diagnosis Date    Anxiety     Asthma     exercise induced asthma    Cervical disc disorder     Chronic pain     Chronic pain disorder     Depression     Dysuria     Low back pain     Lung abnormality     nodules    Lung nodule     Neck pain     Peripheral neuropathy     Pituitary abnormality (HCC)     tumor    Right hip pain 10/29/2019    Thrombocytopenia ([address] )     Thyroid disease        Past Surgical History:   Procedure Laterality Date    APPENDECTOMY      BREAST BIOPSY Right 1995    CHOLECYSTECTOMY      COLECTOMY      COLON SURGERY      EPIDURAL BLOCK INJECTION N/A 6/23/2016    Procedure: BLOCK / INJECTION EPIDURAL STEROID CERVICAL  C7-T1; Surgeon: Julio Cesar Huizar MD;  Location: [facility] MAIN OR;  Service:     EPIDURAL BLOCK INJECTION N/A 3/31/2016    Procedure: BLOCK / INJECTION EPIDURAL STEROID CERVICAL C7-T1  (C-ARM); Surgeon: Julio Cesar Huizar MD;  Location: [facility] OR;  Service:     EPIDURAL BLOCK INJECTION N/A 8/8/2018    Procedure: C6 C7 Cervical Epidural Steroid Injection (23202); Surgeon: Julio Cesar Huizar MD;  Location: [facility] OR;  Service: Pain Management     HYSTERECTOMY  1996    OOPHORECTOMY Bilateral 1996    PITUITARY SURGERY      TX ARTHROCENTESIS ASPIR&/INJ MAJOR JT/BURSA W/O US Right 11/8/2019    Procedure: Trochanteric Bursa Injection (37718); Surgeon: Julio Cesar Huizar MD;  Location: [facility] OR;  Service: Pain Management     TX ARTHROCENTESIS ASPIR&/INJ MAJOR JT/BURSA W/O US Right 1/23/2020    Procedure: Trochanteric Bursa Injection (38573); Surgeon: Julio Cesar Huizar MD;  Location: [facility] OR;  Service: Pain Management     TX NJX DX/THER SBST EPIDURAL/SUBRACH CERV/THORACIC N/A 1/21/2016    Procedure: BLOCK / INJECTION EPIDURAL STEROID CERVICAL C7-T1 (C-ARM);   Surgeon: Julio Cesar Huizar MD;  Location: [facility] OR;  Service: Pain Management     REDUCTION MAMMAPLASTY Bilateral 2000       Family History   Problem Relation Age of Onset    Thyroid disease Mother     Hyperlipidemia Mother     Depression Mother     Thyroid disease Father     Hyperlipidemia Father     Depression Father     Ovarian cancer Sister 39    No Known Problems Brother     No Known Problems Maternal Uncle     No Known Problems Paternal Aunt     No Known Problems Paternal Uncle     No Known Problems Maternal Grandmother     No Known Problems Maternal Grandfather     Breast cancer Paternal Grandmother 36    No Known Problems Paternal Grandfather     No Known Problems Daughter     No Known Problems Son     ADD / ADHD Neg Hx     Anesthesia problems Neg Hx     Cancer Neg Hx     Clotting disorder Neg Hx     Collagen disease Neg Hx     Diabetes Neg Hx     Dislocations Neg Hx     Learning disabilities Neg Hx     Neurological problems Neg Hx     Osteoporosis Neg Hx     Rheumatologic disease Neg Hx     Scoliosis Neg Hx     Vascular Disease Neg Hx        Social History     Occupational History    Not on file   Tobacco Use    Smoking status: Never Smoker    Smokeless tobacco: Never Used   Substance and Sexual Activity    Alcohol use: No    Drug use: No    Sexual activity: Not on file         Current Outpatient Medications:     albuterol (VENTOLIN HFA) 90 mcg/act inhaler, Inhale, Disp: , Rfl:     aspirin 81 MG tablet, Take 81 mg by mouth daily  , Disp: , Rfl:     cholecalciferol (VITAMIN D3) 1,000 units tablet, Take 2 tablets by mouth daily, Disp: , Rfl:     cyclobenzaprine (FLEXERIL) 10 mg tablet, Take 1 tablet (10 mg total) by mouth 3 (three) times a day as needed for muscle spasms, Disp: 90 tablet, Rfl: 1    diclofenac sodium (VOLTAREN) 1 %, Apply 2 g topically 4 (four) times a day as needed (pain), Disp: 100 g, Rfl: 0    gabapentin (NEURONTIN) 600 MG tablet, Take 1 tablet (600 mg total) by mouth 3 (three) times a day, Disp: 90 tablet, Rfl: 1    hydrocortisone (ANUSOL-HC) 25 mg suppository, , Disp: , Rfl: 0    ibuprofen (MOTRIN) 600 mg tablet, Take 1 tablet (600 mg total) by mouth every 6 (six) hours as needed for mild pain, Disp: 30 tablet, Rfl: 0    levothyroxine 50 mcg tablet, , Disp: , Rfl:     metoprolol succinate (TOPROL-XL) 25 mg 24 hr tablet, , Disp: , Rfl:     mupirocin (BACTROBAN) 2 % ointment, , Disp: , Rfl: 0    traZODone (DESYREL) 50 mg tablet, , Disp: , Rfl: 0    [START ON 3/24/2021] oxyCODONE-acetaminophen (PERCOCET) 5-325 mg per tablet, Take 0 5 tablets by mouth 2 (two) times a day as needed for severe painMax Daily Amount: 1 tablet, Disp: 30 tablet, Rfl: 0    [START ON 4/23/2021] oxyCODONE-acetaminophen (PERCOCET) 5-325 mg per tablet, Take 0 5 tablets by mouth 2 (two) times a day as needed for severe painMax Daily Amount: 1 tablet, Disp: 30 tablet, Rfl: 0    Allergies   Allergen Reactions    Levaquin [Levofloxacin In D5w] Shortness Of Breath     Also hives      Amitiza [Lubiprostone] Other (See Comments) and Hives     Reaction Date: 10Aug2011;   Migraines and vomiting    Biaxin [Clarithromycin] Hives    Cephradine     Erythromycin Hives     Reaction Date: 10Aug2011;    Helon Apo [Nitrofurantoin Monohyd Macro] Other (See Comments)     C/o passing out    Morphine     Penicillins Hives     Reaction Date: 10Aug2011;     Sulfa Antibiotics     Tetracycline     Tetracyclines & Related Hives    Morphine And Related Rash    Valium [Diazepam] Rash and Vomiting     Reaction Date: 14Jun2012;        Physical Exam:    /75   Pulse 71   Ht 5' 3" (1 6 m)   Wt 87 5 kg (193 lb)   BMI 34 19 kg/m²     Constitutional:obese  Eyes:anicteric  HEENT:grossly intact  Neck: symmetric, no visible masses  Pulmonary:even and unlabored  Cardiovascular: no visible edema  Skin:Normal without rashes or lesions and well hydrated  Psychiatric:Mood and affect appropriate  Neurologic:Cranial Nerves II-XII grossly intact  Musculoskeletal:antalgic    Orders Placed This Encounter   Procedures    MM ALL_Prescribed Meds and Special Instructions    MM DT_Alprazolam Definitive Test    MM DT_Amphetamine Definitive Test    MM DT_Aripiprazole Definitive Test    MM DT_Bath Salts Definitive Test    MM DT_Buprenorphine Definitive Test    MM DT_Butalbital Definitive Test    MM DT_Clonazepam Definitive Test    MM DT_Clozapine Definitive Test    MM DT_Cocaine Definitive Test    MM DT_Codeine Definitive Test    MM DT_Desipramine Definitive Test    MM DT_Dextromethorphan Definitive Test    MM Diazepam Definitive Test    MM DT_Ethyl Glucuronide/Ethyl Sulfate Definitive Test    MM DT_Fentanyl Definitive Test    MM DT_Heroin Definitive Test    MM DT_Hydrocodone Definitive Test    MM DT_Hydromorphone Definitive Test    MM DT_Imipramine Definitive Test    MM DT_Kratom Definitive Test    MM DT_Levorphanol Definitive Test    MM Lorazepam Definitive Test    MM DT_MDMA Definitive Test    MM DT_Meperidine Definitive Test    MM DT_Methadone Definitive Test    MM DT_Methamphetamine Definitive Test    MM DT_Methylphenidate Definitive Test    MM DT_Morphine Definitive Test    MM DT_Oxazepam Definitive Test    MM DT_Oxycodone Definitive Test    MM DT_Oxymorphone Definitive Test    MM DT_Phencyclidine Definitive Test    MM DT_Phenobarbital Definitive Test    MM DT_Phentermine Definitive Test    MM DT_Secobarbital Definitive Test    MM DT_Spice Definitive Test    MM DT_Tapentadol Definitive Test    MM DT_Temazapam Definitive Test    MM DT_THC Definitive Test    MM DT_Tramadol Definitive Test

## 2021-03-23 ENCOUNTER — TELEPHONE (OUTPATIENT)
Dept: RADIOLOGY | Facility: CLINIC | Age: 56
End: 2021-03-23

## 2021-03-23 NOTE — TELEPHONE ENCOUNTER
Received email from [facility] asking for pts phone number bc Dr Dede Sharma asked him to call pt for education   [facility] the number and asked him to let me know the outcome and if she is interested I will ask Dr Dede Sharma to order the MRI and psych eval

## 2021-03-25 LAB
6MAM UR QL CFM: NEGATIVE NG/ML
7AMINOCLONAZEPAM UR QL CFM: NEGATIVE NG/ML
A-OH ALPRAZ UR QL CFM: NEGATIVE NG/ML
AMPHET UR QL CFM: NEGATIVE NG/ML
AMPHET UR QL CFM: NEGATIVE NG/ML
BUPRENORPHINE UR QL CFM: NEGATIVE NG/ML
BUTALBITAL UR QL CFM: NEGATIVE NG/ML
BZE UR QL CFM: NEGATIVE NG/ML
CODEINE UR QL CFM: NEGATIVE NG/ML
DESIPRAMINE UR QL CFM: NEGATIVE NG/ML
DESIPRAMINE UR QL CFM: NEGATIVE NG/ML
EDDP UR QL CFM: NEGATIVE NG/ML
ETHYL GLUCURONIDE UR QL CFM: NEGATIVE NG/ML
ETHYL SULFATE UR QL SCN: NEGATIVE NG/ML
FENTANYL UR QL CFM: NEGATIVE NG/ML
GLIADIN IGG SER IA-ACNC: NEGATIVE NG/ML
GLUCOSE 30M P 50 G LAC PO SERPL-MCNC: NEGATIVE NG/ML
HYDROCODONE UR QL CFM: NEGATIVE NG/ML
HYDROCODONE UR QL CFM: NEGATIVE NG/ML
HYDROMORPHONE UR QL CFM: NEGATIVE NG/ML
IMIPRAMINE UR QL CFM: NEGATIVE NG/ML
LORAZEPAM UR QL CFM: NEGATIVE NG/ML
MDMA UR QL CFM: NEGATIVE NG/ML
ME-PHENIDATE UR QL CFM: NEGATIVE NG/ML
MEPERIDINE UR QL CFM: NEGATIVE NG/ML
MEPHEDRONE UR QL CFM: NEGATIVE NG/ML
METHADONE UR QL CFM: NEGATIVE NG/ML
METHAMPHET UR QL CFM: NEGATIVE NG/ML
MORPHINE UR QL CFM: NEGATIVE NG/ML
MORPHINE UR QL CFM: NEGATIVE NG/ML
NORBUPRENORPHINE UR QL CFM: NEGATIVE NG/ML
NORDIAZEPAM UR QL CFM: NEGATIVE NG/ML
NORFENTANYL UR QL CFM: NEGATIVE NG/ML
NORHYDROCODONE UR QL CFM: NEGATIVE NG/ML
NORHYDROCODONE UR QL CFM: NEGATIVE NG/ML
NORMEPERIDINE UR QL CFM: NEGATIVE NG/ML
NOROXYCODONE UR QL CFM: ABNORMAL NG/ML
OPC-3373 QUANTIFICATION: NEGATIVE
OXAZEPAM UR QL CFM: NEGATIVE NG/ML
OXYCODONE UR QL CFM: ABNORMAL NG/ML
OXYMORPHONE UR QL CFM: NEGATIVE NG/ML
OXYMORPHONE UR QL CFM: NEGATIVE NG/ML
PCP UR QL CFM: NEGATIVE NG/ML
PHENOBARB UR QL CFM: NEGATIVE NG/ML
RESULT ALL_PRESCRIBED MEDS AND SPECIAL INSTRUCTIONS: NORMAL
SECOBARBITAL UR QL CFM: NEGATIVE NG/ML
SL AMB 3-METHYL-FENTANYL QUANTIFICATION: NORMAL NG/ML
SL AMB 4-ANPP QUANTIFICATION: NORMAL NG/ML
SL AMB 4-FIBF QUANTIFICATION: NORMAL NG/ML
SL AMB 5F-ADB-M7 METABOLITE QUANTIFICATION: NEGATIVE NG/ML
SL AMB 7-OH-MITRAGYNINE (KRATOM ALKALOID) QUANTIFICATION: NEGATIVE NG/ML
SL AMB AB-FUBINACA-M3 METABOLITE QUANTIFICATION: NEGATIVE NG/ML
SL AMB ACETYL FENTANYL QUANTIFICATION: NORMAL NG/ML
SL AMB ACETYL NORFENTANYL QUANTIFICATION: NORMAL NG/ML
SL AMB ACRYL FENTANYL QUANTIFICATION: NORMAL NG/ML
SL AMB BATH SALTS: NEGATIVE NG/ML
SL AMB BUTRYL FENTANYL QUANTIFICATION: NORMAL NG/ML
SL AMB CARFENTANIL QUANTIFICATION: NORMAL NG/ML
SL AMB CLOZAPINE QUANTIFICATION: NEGATIVE NG/ML
SL AMB CTHC (MARIJUANA METABOLITE) QUANTIFICATION: NEGATIVE NG/ML
SL AMB CYCLOPROPYL FENTANYL QUANTIFICATION: NORMAL NG/ML
SL AMB DEXTROMETHORPHAN QUANTIFICATION: NEGATIVE NG/ML
SL AMB DEXTRORPHAN (DEXTROMETHORPHAN METABOLITE) QUANT: NEGATIVE NG/ML
SL AMB DEXTRORPHAN (DEXTROMETHORPHAN METABOLITE) QUANT: NEGATIVE NG/ML
SL AMB FURANYL FENTANYL QUANTIFICATION: NORMAL NG/ML
SL AMB JWH018 METABOLITE QUANTIFICATION: NEGATIVE NG/ML
SL AMB JWH073 METABOLITE QUANTIFICATION: NEGATIVE NG/ML
SL AMB MDMB-FUBINACA-M1 METABOLITE QUANTIFICATION: NEGATIVE NG/ML
SL AMB METHOXYACETYL FENTANYL QUANTIFICATION: NORMAL NG/ML
SL AMB METHYLONE QUANTIFICATION: NEGATIVE NG/ML
SL AMB N-DESMETHYL U-47700 QUANTIFICATION: NORMAL NG/ML
SL AMB N-DESMETHYL-TRAMADOL QUANTIFICATION: NEGATIVE NG/ML
SL AMB N-DESMETHYLCLOZAPINE QUANTIFICATION: NEGATIVE NG/ML
SL AMB PHENTERMINE QUANTIFICATION: NEGATIVE NG/ML
SL AMB RCS4 METABOLITE QUANTIFICATION: NEGATIVE NG/ML
SL AMB RITALINIC ACID QUANTIFICATION: NEGATIVE NG/ML
SL AMB U-47700 QUANTIFICATION: NORMAL NG/ML
SPECIMEN DRAWN SERPL: NEGATIVE NG/ML
TAPENTADOL UR QL CFM: NEGATIVE NG/ML
TEMAZEPAM UR QL CFM: NEGATIVE NG/ML
TEMAZEPAM UR QL CFM: NEGATIVE NG/ML
TRAMADOL UR QL CFM: NEGATIVE NG/ML
URATE/CREAT 24H UR: NEGATIVE NG/ML

## 2021-04-12 ENCOUNTER — TELEPHONE (OUTPATIENT)
Dept: PAIN MEDICINE | Facility: CLINIC | Age: 56
End: 2021-04-12

## 2021-04-12 NOTE — TELEPHONE ENCOUNTER
S/w pt, she would like to be scheduled for TPI prior to her vacation on 5/4  Pt said she got TPI's in Jan targeted to the L side, but this time would like them on the R side  OK to schedule?     Procedure completed on 1/25: x1 to the left cervical paraspinal, x1 to the right cervical paraspinal, x4 to the left upper trapezius, and x4 to the right upper trapezius

## 2021-04-12 NOTE — TELEPHONE ENCOUNTER
Pt called in to see if she can scheduled an TPI before her vacation on 05/04/2021  Please be advise thank you        Please call patient back @ [phone]

## 2021-04-13 NOTE — TELEPHONE ENCOUNTER
Left message for the patient to call to schedule her injection    Gave my direct phone number [phone]

## 2021-04-15 NOTE — TELEPHONE ENCOUNTER
Received email from Mariana- he's left pt several voicemails in the past three weeks and she hasn't called back, he will update me if he hears from her  I also called pt and LM to give her my info to call if shes interested in moving forward  *pt scheduled for TPI with you, per message in chart, pt will be away in May

## 2021-04-30 ENCOUNTER — PROCEDURE VISIT (OUTPATIENT)
Dept: PAIN MEDICINE | Facility: CLINIC | Age: 56
End: 2021-04-30
Payer: COMMERCIAL

## 2021-04-30 DIAGNOSIS — M79.18 MYOFASCIAL PAIN SYNDROME: ICD-10-CM

## 2021-04-30 DIAGNOSIS — M54.2 NECK PAIN: ICD-10-CM

## 2021-04-30 DIAGNOSIS — G89.4 CHRONIC PAIN SYNDROME: Primary | ICD-10-CM

## 2021-04-30 PROCEDURE — 20553 NJX 1/MLT TRIGGER POINTS 3/>: CPT | Performed by: ANESTHESIOLOGY

## 2021-04-30 RX ORDER — METHYLPREDNISOLONE ACETATE 40 MG/ML
40 INJECTION, SUSPENSION INTRA-ARTICULAR; INTRALESIONAL; INTRAMUSCULAR; SOFT TISSUE ONCE
Status: COMPLETED | OUTPATIENT
Start: 2021-04-30 | End: 2021-04-30

## 2021-04-30 RX ORDER — LIDOCAINE HYDROCHLORIDE 10 MG/ML
50 INJECTION, SOLUTION EPIDURAL; INFILTRATION; INTRACAUDAL; PERINEURAL ONCE
Status: COMPLETED | OUTPATIENT
Start: 2021-04-30 | End: 2021-04-30

## 2021-04-30 RX ADMIN — LIDOCAINE HYDROCHLORIDE 50 ML: 10 INJECTION, SOLUTION EPIDURAL; INFILTRATION; INTRACAUDAL; PERINEURAL at 15:46

## 2021-04-30 RX ADMIN — METHYLPREDNISOLONE ACETATE 40 MG: 40 INJECTION, SUSPENSION INTRA-ARTICULAR; INTRALESIONAL; INTRAMUSCULAR; SOFT TISSUE at 15:46

## 2021-04-30 NOTE — PROGRESS NOTES
Inj Trigger Point Single/Multiple     Date/Time 4/30/2021 3:34 PM     Performed by  Debra Retana MD     Authorized by Debra Retana MD      Universal Protocol   Consent: Verbal consent obtained  Written consent obtained  Risks and benefits: risks, benefits and alternatives were discussed  Consent given by: patient  Time out: Immediately prior to procedure a "time out" was called to verify the correct patient, procedure, equipment, support staff and site/side marked as required  Patient understanding: patient states understanding of the procedure being performed  Patient consent: the patient's understanding of the procedure matches consent given  Procedure consent: procedure consent matches procedure scheduled  Relevant documents: relevant documents present and verified  Test results: test results available and properly labeled  Site marked: the operative site was marked  Radiology Images displayed and confirmed  If images not available, report reviewed: imaging studies available  Patient identity confirmed: verbally with patient        Local anesthesia used: yes      Anesthesia: local infiltration     Anesthesia   Local anesthesia used: yes  Local Anesthetic: lidocaine 1% without epinephrine  Anesthetic total: 9 mL     Sedation   Patient sedated: no        Specimen: no    Culture: no   Procedure Details   Procedure Notes:  ATTENDING PHYSICIAN:  Debra Retana MD     PROCEDURE:  Trigger point injections  x1 to the left cervical paraspinal, x1 to the left upper trapezius, x1 to the right cervical paraspinal, x4 to the right upper trapezius, and x3 to the right rhomboid musculature with local anesthetic and steroid  PRE-PROCEDURE DIAGNOSIS:  Myofascial pain syndrome  POST-PROCEDURE DIAGNOSIS:  Myofascial pain syndrome  ESTIMATED BLOOD LOSS:  Minimal     ANESTHESIA:  None  COMPLICATIONS:  None      CONSENT:  Today's procedure, its potential benefits as well as its risks and side effects were reviewed  Discussed risks of the procedure including bleeding, infection, reactions to the medications, failure the pain to improve, and potential worsening of the pain as well as pneumothorax were explained in detail to the patient, who verbalized understanding and wished to proceed  Written informed consent was thereby obtained  DESCRIPTION OF THE PROCEDURE:  After written informed consent was obtained, the patient was placed in the sitting position on the examination table  Anatomical landmarks were identified via palpation  The trigger points were identified and marked after palpation  The skin overlying these 10 marked trigger points was prepared using Betadine swabs x3 in the usual sterile fashion  Strict aseptic technique was utilized throughout the procedure  A 10 mL injectate consisting of 9 mL of 1% lidocaine mixed with 1 mL of Depo-Medrol 40 mg/mL was drawn up sterilely  Using a 25-gauge 1 25 inch needle, 1 mL of the above injectate was administered to each of the marked trigger points following negative aspiration  The patient tolerated the procedure well and all needles were removed with the tips intact  Hemostasis was maintained  There were no apparent complications  The skin was wiped clean and Band-Aids were placed as appropriate  The patient was monitored for an appropriate period of time following the procedure and remained hemodynamically stable and neurovascularly intact following the procedure as she was prior to the procedure  The patient was ultimately discharged to home with supervision in good condition  I was present for and participated in all key and critical portions of this procedure    Patient tolerance: Patient tolerated the procedure well with no immediate complications

## 2021-05-07 ENCOUNTER — TELEPHONE (OUTPATIENT)
Dept: PAIN MEDICINE | Facility: CLINIC | Age: 56
End: 2021-05-07

## 2021-05-12 NOTE — TELEPHONE ENCOUNTER
PROCEDURE NOTE: Avastin () OD. Diagnosis: Cystoid Macular Edema. Anesthesia: Topical/Subconjunctival. Prep: Betadine Drops. Prior to injection, risks/benefits/alternatives discussed including infection, loss of vision, hemorrhage, cataract, glaucoma, retinal tears or detachment. The off-label status of Intravitreal Avastin also was reviewed. The patient wished to proceed with treatment. Topical anesthesia was induced with Alcaine. Additional anesthesia was achieved using drop(s) or injection checked above. a drop of Povidone-iodine 5% ophthalmic solution was instilled over the injection site and in the inferior fornix. Betadine prep was performed. Using the syringe provided, Avastin 1.25 mg in 0.05 cc was injected into the vitreous cavity. The needle was passed 3.0 mm posterior to the limbus in pseudophakic patients, and 3.5 mm posterior to the lumbus in phakic patients. The remainder of the Avastin in the single-use vial was then discarded in a medical waste disposal container. Unused medication was discarded. Patient tolerated procedure well. There were no complications. Injection time: 510pm. Post-op instructions given. Expiration Date: Tue Jun 11 2019 00:00:00 [facility] Daylight Time). The patient was instructed to return for re-evaluation in approximately 4-12 weeks depending on his/her condition and was told to call immediately if vision decreases and/or if his/her eye becomes red, painful, and/or light sensitive. The patient was instructed to go to the emergency room or call 911 if unable to reach the doctor within an hour or two of trying or calling. The patient was instructed to use Artificial Tears q.i.d. p.r.n for comfort. Honey Beverly Pt has a follow up on 5/24

## 2021-05-17 DIAGNOSIS — G89.4 CHRONIC PAIN SYNDROME: ICD-10-CM

## 2021-05-17 DIAGNOSIS — M50.120 CERVICAL DISC DISORDER WITH RADICULOPATHY OF MID-CERVICAL REGION: ICD-10-CM

## 2021-05-17 DIAGNOSIS — M54.2 NECK PAIN: ICD-10-CM

## 2021-05-17 RX ORDER — GABAPENTIN 600 MG/1
TABLET ORAL
Qty: 90 TABLET | Refills: 1 | OUTPATIENT
Start: 2021-05-17

## 2021-05-21 ENCOUNTER — TELEPHONE (OUTPATIENT)
Dept: NEUROLOGY | Facility: CLINIC | Age: 56
End: 2021-05-21

## 2021-05-21 NOTE — TELEPHONE ENCOUNTER
Best contact number for patient:[phone]    Emergency Contact name and number:None     Referring provider and telephone number:Kenneth MADRIGAL  [facility]     Primary Care Provider Name and if affiliated with [facility]: Kate Vogel     Reason for Appointment/Dx:Neuropathy       Neurology Location patient would like to be seen:[place]     Order received? No                                                  Records Received? No     Have you ever seen another Neurologist?       No     Insurance [place] [employer] Lisa Soup     ID/Policy #:    Secondary Insurance:    ID/Policy#: Workman's Comp/ Accident/ School  Information      Workman's Comp/Accident/School related?        None     If yes name of Insurance company:    Claim #:    Date of Injury:    Type of Injury:     Name and Telephone Number:    Notes:Appointment schedule with patient self referral per patient will get order from Dr CAPTAIN JENNY A  [facility]                    Appointment date: 09- 2:00pm with Dr Audra Penn

## 2021-05-24 ENCOUNTER — OFFICE VISIT (OUTPATIENT)
Dept: PAIN MEDICINE | Facility: CLINIC | Age: 56
End: 2021-05-24
Payer: COMMERCIAL

## 2021-05-24 VITALS
HEIGHT: 63 IN | HEART RATE: 89 BPM | DIASTOLIC BLOOD PRESSURE: 74 MMHG | WEIGHT: 193 LBS | BODY MASS INDEX: 34.2 KG/M2 | SYSTOLIC BLOOD PRESSURE: 108 MMHG

## 2021-05-24 DIAGNOSIS — M50.120 CERVICAL DISC DISORDER WITH RADICULOPATHY OF MID-CERVICAL REGION: ICD-10-CM

## 2021-05-24 DIAGNOSIS — M54.2 NECK PAIN: ICD-10-CM

## 2021-05-24 DIAGNOSIS — G89.4 CHRONIC PAIN SYNDROME: ICD-10-CM

## 2021-05-24 PROCEDURE — 99214 OFFICE O/P EST MOD 30 MIN: CPT | Performed by: ANESTHESIOLOGY

## 2021-05-24 RX ORDER — GABAPENTIN 600 MG/1
600 TABLET ORAL 3 TIMES DAILY
Qty: 90 TABLET | Refills: 1 | Status: SHIPPED | OUTPATIENT
Start: 2021-05-24 | End: 2021-07-22 | Stop reason: SDUPTHER

## 2021-05-24 RX ORDER — OXYCODONE HYDROCHLORIDE AND ACETAMINOPHEN 5; 325 MG/1; MG/1
1 TABLET ORAL 2 TIMES DAILY PRN
Qty: 60 TABLET | Refills: 0 | Status: SHIPPED | OUTPATIENT
Start: 2021-05-24 | End: 2021-07-22 | Stop reason: SDUPTHER

## 2021-05-24 RX ORDER — OXYCODONE HYDROCHLORIDE AND ACETAMINOPHEN 5; 325 MG/1; MG/1
1 TABLET ORAL 2 TIMES DAILY PRN
Qty: 60 TABLET | Refills: 0 | Status: SHIPPED | OUTPATIENT
Start: 2021-06-23 | End: 2021-07-22 | Stop reason: SDUPTHER

## 2021-05-24 RX ORDER — CYCLOBENZAPRINE HCL 10 MG
10 TABLET ORAL 3 TIMES DAILY PRN
Qty: 90 TABLET | Refills: 1 | Status: SHIPPED | OUTPATIENT
Start: 2021-05-24 | End: 2021-07-22 | Stop reason: SDUPTHER

## 2021-06-30 ENCOUNTER — TELEPHONE (OUTPATIENT)
Dept: NEUROLOGY | Facility: CLINIC | Age: 56
End: 2021-06-30

## 2021-06-30 NOTE — TELEPHONE ENCOUNTER
Pt is scheduled for EMG on 8/9/2021 at 1:30PM   Provider will not be in the office this day  Called and left message informing the pt that the EMG appt needs to be rescheduled  There is availability on 7/2, 7/9, 7/16, 7/23, and 7/30    Left message informing pt to call back to reschedule

## 2021-07-22 ENCOUNTER — OFFICE VISIT (OUTPATIENT)
Dept: PAIN MEDICINE | Facility: CLINIC | Age: 56
End: 2021-07-22
Payer: COMMERCIAL

## 2021-07-22 VITALS
DIASTOLIC BLOOD PRESSURE: 64 MMHG | SYSTOLIC BLOOD PRESSURE: 100 MMHG | HEART RATE: 77 BPM | HEIGHT: 63 IN | BODY MASS INDEX: 34.2 KG/M2 | WEIGHT: 193 LBS

## 2021-07-22 DIAGNOSIS — M50.120 CERVICAL DISC DISORDER WITH RADICULOPATHY OF MID-CERVICAL REGION: ICD-10-CM

## 2021-07-22 DIAGNOSIS — G89.4 CHRONIC PAIN SYNDROME: Primary | ICD-10-CM

## 2021-07-22 DIAGNOSIS — F11.20 UNCOMPLICATED OPIOID DEPENDENCE (HCC): ICD-10-CM

## 2021-07-22 DIAGNOSIS — M54.2 NECK PAIN: ICD-10-CM

## 2021-07-22 DIAGNOSIS — Z79.891 LONG-TERM CURRENT USE OF OPIATE ANALGESIC: ICD-10-CM

## 2021-07-22 PROCEDURE — 80305 DRUG TEST PRSMV DIR OPT OBS: CPT | Performed by: ANESTHESIOLOGY

## 2021-07-22 PROCEDURE — 99214 OFFICE O/P EST MOD 30 MIN: CPT | Performed by: ANESTHESIOLOGY

## 2021-07-22 RX ORDER — OXYCODONE HYDROCHLORIDE AND ACETAMINOPHEN 5; 325 MG/1; MG/1
1 TABLET ORAL 2 TIMES DAILY PRN
Qty: 60 TABLET | Refills: 0 | Status: SHIPPED | OUTPATIENT
Start: 2021-07-24 | End: 2021-09-20 | Stop reason: SDUPTHER

## 2021-07-22 RX ORDER — OXYCODONE HYDROCHLORIDE AND ACETAMINOPHEN 5; 325 MG/1; MG/1
1 TABLET ORAL 2 TIMES DAILY PRN
Qty: 60 TABLET | Refills: 0 | Status: SHIPPED | OUTPATIENT
Start: 2021-08-23 | End: 2021-09-20 | Stop reason: SDUPTHER

## 2021-07-22 RX ORDER — GABAPENTIN 600 MG/1
600 TABLET ORAL 3 TIMES DAILY
Qty: 90 TABLET | Refills: 1 | Status: SHIPPED | OUTPATIENT
Start: 2021-07-22 | End: 2021-09-20 | Stop reason: SDUPTHER

## 2021-07-22 RX ORDER — CYCLOBENZAPRINE HCL 10 MG
10 TABLET ORAL 3 TIMES DAILY PRN
Qty: 90 TABLET | Refills: 1 | Status: SHIPPED | OUTPATIENT
Start: 2021-07-22 | End: 2021-09-20 | Stop reason: SDUPTHER

## 2021-07-22 NOTE — PROGRESS NOTES
Pain Medicine Follow-Up Note    Assessment:  1  Chronic pain syndrome    2  Long-term current use of opiate analgesic    3  Uncomplicated opioid dependence ([address] )    4  Neck pain    5   Cervical disc disorder with radiculopathy of mid-cervical region        Plan:  Orders Placed This Encounter   Procedures    MM ALL_Prescribed Meds and Special Instructions    MM DT_Alprazolam Definitive Test    MM DT_Amphetamine Definitive Test    MM DT_Aripiprazole Definitive Test    MM DT_Bath Salts Definitive Test    MM DT_Buprenorphine Definitive Test    MM DT_Butalbital Definitive Test    MM DT_Clonazepam Definitive Test    MM DT_Clozapine Definitive Test    MM DT_Cocaine Definitive Test    MM DT_Codeine Definitive Test    MM DT_Desipramine Definitive Test    MM DT_Dextromethorphan Definitive Test    MM Diazepam Definitive Test    MM DT_Ethyl Glucuronide/Ethyl Sulfate Definitive Test    MM DT_Fentanyl Definitive Test    MM DT_Haloperidol Definitive Test    MM DT_Heroin Definitive Test    MM DT_Hydrocodone Definitive Test    MM DT_Hydromorphone Definitive Test    MM DT_Imipramine Definitive Test    MM DT_Kratom Definitive Test    MM DT_Levorphanol Definitive Test    MM DT_MDMA Definitive Test    MM Lorazepam Definitive Test    MM DT_Meperidine Definitive Test    MM DT_Methadone Definitive Test    MM DT_Methamphetamine Definitive Test    MM DT_Methylphenidate Definitive Test    MM DT_Morphine Definitive Test    MM DT_Oxazepam Definitive Test    MM DT_Oxycodone Definitive Test    MM DT_Oxymorphone Definitive Test    MM DT_Phencyclidine Definitive Test    MM DT_Phenobarbital Definitive Test    MM DT_Phentermine Definitive Test    MM DT_Secobarbital Definitive Test    MM DT_Spice Definitive Test    MM DT_Tapentadol Definitive Test    MM DT_Temazapam Definitive Test    MM DT_THC Definitive Test    MM DT_Tramadol Definitive Test    MM DT_Validity Creatinine    MM DT_Validity pH    MM DT_Validity Specific    MM DT_Validity Oxidant       New Medications Ordered This Visit   Medications    oxyCODONE-acetaminophen (PERCOCET) 5-325 mg per tablet     Sig: Take 1 tablet by mouth 2 (two) times a day as needed for severe painMax Daily Amount: 2 tablets     Dispense:  60 tablet     Refill:  0    gabapentin (NEURONTIN) 600 MG tablet     Sig: Take 1 tablet (600 mg total) by mouth 3 (three) times a day     Dispense:  90 tablet     Refill:  1    cyclobenzaprine (FLEXERIL) 10 mg tablet     Sig: Take 1 tablet (10 mg total) by mouth 3 (three) times a day as needed for muscle spasms     Dispense:  90 tablet     Refill:  1    oxyCODONE-acetaminophen (PERCOCET) 5-325 mg per tablet     Sig: Take 1 tablet by mouth 2 (two) times a day as needed for severe painMax Daily Amount: 2 tablets     Dispense:  60 tablet     Refill:  0     My impressions and treatment recommendations were discussed in detail with the patient who verbalized understanding and had no further questions  Given that the patient reports overall reduced pain and improved level functioning without significant side effects, I felt a reasonable to continue the patient on gabapentin 600 mg 3 times daily, cyclobenzaprine 10 mg up to 3 times daily as needed for muscle spasms, and oxycodone/acetaminophen 5/325 mg 1 tablet up to twice daily as needed for pain  I sent E prescriptions to the pharmacy dated July 24, 2021 and August 23, 2021 for the patient  The risks and side effects of chronic opioid treatment were discussed in detail with the patient  Side effects include but are not limited to nausea, vomiting, GI intolerance, sedation, constipation, mental clouding, opioid-induced hyperalgesia, endocrine dysfunction, addiction, dependence, and tolerance  The patient was asked to take his medications only as prescribed and directed, never in excess, and never for any other reason other than for pain control   The patient was also asked to keep his medications out of the reach of others and away from children, preferably in a locked drawer  The patient verbalized understanding and wished to use these opioid medications  A urine drug test was collected at today's office visit as part of our medication management protocol  The point of care testing results were appropriate for what was being prescribed  The specimen will be sent for confirmatory testing  The drug screen is medically necessary because the patient is either dependent on opioid medication or is being considered for opioid medication therapy and the results could impact ongoing or future treatment  The drug screen is to evaluate for the presence or absence of prescribed, non-prescribed, and/or illicit drugs and substances  [place] Prescription Drug Monitoring Program report was reviewed and was appropriate     Follow-up is planned in 2 months time or sooner as warranted  Discharge instructions were provided  I personally saw and examined the patient and I agree with the above discussed plan of care  History of Present Illness:    Nakia Gerard is a 48 y o  female who presents to [facility] for interval re-evaluation of the above stated pain complaints  The patient has a past medical and chronic pain history as outlined in the assessment section  She was last seen on May 24, 2021  At today's office visit, the patient's pain score is 6/10 on the verbal numerical pain rating scale  The patient states that her pain is primarily in her neck, bilateral shoulders, right upper extremity, right hand, midback, and low back  She states that her symptoms are constant in nature and reports the quality of her pain as dull/aching, throbbing, pressure-like, shooting, numbness, and pins and needles  She is reporting about 50% relief of symptoms with the combination of her medications      Last Urine Drug Screen:  July 22, 2021    Other than as stated above, the patient denies any interval changes in medications, medical condition, mental condition, symptoms, or allergies since the last office visit  Review of Systems:    Review of Systems   Respiratory: Negative for shortness of breath  Cardiovascular: Negative for chest pain  Gastrointestinal: Negative for constipation, diarrhea, nausea and vomiting  Musculoskeletal: Negative for arthralgias, gait problem, joint swelling and myalgias  Skin: Negative for rash  Neurological: Negative for dizziness, seizures and weakness  All other systems reviewed and are negative  Patient Active Problem List   Diagnosis    Chronic pain syndrome    Acute muscle stiffness of neck    Cervical disc disorder with radiculopathy of mid-cervical region    Neck pain    Chronic left-sided low back pain with left-sided sciatica    Lumbar radiculopathy    Cervicalgia    Right hip pain    Trochanteric bursitis of right hip    Pain in right hip    Myofascial pain syndrome       Past Medical History:   Diagnosis Date    Anxiety     Asthma     exercise induced asthma    Cervical disc disorder     Chronic pain     Chronic pain disorder     Depression     Dysuria     Low back pain     Lung abnormality     nodules    Lung nodule     Neck pain     Peripheral neuropathy     Pituitary abnormality (HCC)     tumor    Right hip pain 10/29/2019    Thrombocytopenia ([address] )     Thyroid disease        Past Surgical History:   Procedure Laterality Date    APPENDECTOMY      BREAST BIOPSY Right 1995    CHOLECYSTECTOMY      COLECTOMY      COLON SURGERY      EPIDURAL BLOCK INJECTION N/A 6/23/2016    Procedure: BLOCK / INJECTION EPIDURAL STEROID CERVICAL  C7-T1;  Surgeon: Shola Lizama MD;  Location: [place] [address] MAIN OR;  Service:     EPIDURAL BLOCK INJECTION N/A 3/31/2016    Procedure: BLOCK / INJECTION EPIDURAL STEROID CERVICAL C7-T1  (C-ARM);   Surgeon: Shola Lizama MD;  Location: [facility] MAIN OR;  Service:    [place]erv[place] BLOCK INJECTION N/A 8/8/2018    Procedure: C6 C7 Cervical Epidural Steroid Injection (90095); Surgeon: Eunice Baumann MD;  Location: [facility] MAIN OR;  Service: Pain Management     HYSTERECTOMY  1996    OOPHORECTOMY Bilateral 1996    PITUITARY SURGERY      ND ARTHROCENTESIS ASPIR&/INJ MAJOR JT/BURSA W/O US Right 11/8/2019    Procedure: Trochanteric Bursa Injection (69945); Surgeon: Eunice Baumann MD;  Location: [facility] MAIN OR;  Service: Pain Management     ND ARTHROCENTESIS ASPIR&/INJ MAJOR JT/BURSA W/O US Right 1/23/2020    Procedure: Trochanteric Bursa Injection (17907); Surgeon: Eunice Baumann MD;  Location: [facility] MAIN OR;  Service: Pain Management     ND NJX DX/THER SBST EPIDURAL/SUBRACH CERV/THORACIC N/A 1/21/2016    Procedure: BLOCK / INJECTION EPIDURAL STEROID CERVICAL C7-T1 (C-ARM);   Surgeon: Eunice Baumann MD;  Location: [facility] MAIN OR;  Service: Pain Management     REDUCTION MAMMAPLASTY Bilateral 2000       Family History   Problem Relation Age of Onset    Thyroid disease Mother     Hyperlipidemia Mother     Depression Mother     Thyroid disease Father     Hyperlipidemia Father     Depression Father     Ovarian cancer Sister 39    No Known Problems Brother     No Known Problems Maternal Uncle     No Known Problems Paternal Aunt     No Known Problems Paternal Uncle     No Known Problems Maternal Grandmother     No Known Problems Maternal Grandfather     Breast cancer Paternal Grandmother 36    No Known Problems Paternal Grandfather     No Known Problems Daughter     No Known Problems Son     ADD / ADHD Neg Hx     Anesthesia problems Neg Hx     Cancer Neg Hx     Clotting disorder Neg Hx     Collagen disease Neg Hx     Diabetes Neg Hx     Dislocations Neg Hx     Learning disabilities Neg Hx     Neurological problems Neg Hx     Osteoporosis Neg Hx     Rheumatologic disease Neg Hx     Scoliosis Neg Hx     Vascular Disease Neg Hx        Social History     Occupational History    Not on file   Tobacco Use    Smoking status: Never Smoker    Smokeless tobacco: Never Used   Vaping Use    Vaping Use: Never used   Substance and Sexual Activity    Alcohol use: No    Drug use: No    Sexual activity: Not on file         Current Outpatient Medications:     albuterol (VENTOLIN HFA) 90 mcg/act inhaler, Inhale, Disp: , Rfl:     aspirin 81 MG tablet, Take 81 mg by mouth daily  , Disp: , Rfl:     cholecalciferol (VITAMIN D3) 1,000 units tablet, Take 2 tablets by mouth daily, Disp: , Rfl:     cyclobenzaprine (FLEXERIL) 10 mg tablet, Take 1 tablet (10 mg total) by mouth 3 (three) times a day as needed for muscle spasms, Disp: 90 tablet, Rfl: 1    diclofenac sodium (VOLTAREN) 1 %, Apply 2 g topically 4 (four) times a day as needed (pain), Disp: 100 g, Rfl: 0    gabapentin (NEURONTIN) 600 MG tablet, Take 1 tablet (600 mg total) by mouth 3 (three) times a day, Disp: 90 tablet, Rfl: 1    hydrocortisone (ANUSOL-HC) 25 mg suppository, , Disp: , Rfl: 0    ibuprofen (MOTRIN) 600 mg tablet, Take 1 tablet (600 mg total) by mouth every 6 (six) hours as needed for mild pain, Disp: 30 tablet, Rfl: 0    levothyroxine 50 mcg tablet, , Disp: , Rfl:     metoprolol succinate (TOPROL-XL) 25 mg 24 hr tablet, , Disp: , Rfl:     mupirocin (BACTROBAN) 2 % ointment, , Disp: , Rfl: 0    [START ON 7/24/2021] oxyCODONE-acetaminophen (PERCOCET) 5-325 mg per tablet, Take 1 tablet by mouth 2 (two) times a day as needed for severe painMax Daily Amount: 2 tablets, Disp: 60 tablet, Rfl: 0    [START ON 8/23/2021] oxyCODONE-acetaminophen (PERCOCET) 5-325 mg per tablet, Take 1 tablet by mouth 2 (two) times a day as needed for severe painMax Daily Amount: 2 tablets, Disp: 60 tablet, Rfl: 0    traZODone (DESYREL) 50 mg tablet, , Disp: , Rfl: 0    Allergies   Allergen Reactions    Levaquin [Levofloxacin In D5w] Shortness Of Breath     Also hives      Amitiza [Lubiprostone] Other (See Comments) and Hives Reaction Date: 10Aug2011;   Migraines and vomiting    Biaxin [Clarithromycin] Hives    Cephradine     Erythromycin Hives     Reaction Date: 10Aug2011;    Louvenia Champagne [Nitrofurantoin Monohyd Macro] Other (See Comments)     C/o passing out    Morphine     Penicillins Hives     Reaction Date: 10Aug2011;     Sulfa Antibiotics     Tetracycline     Tetracyclines & Related Hives    Morphine And Related Rash    Valium [Diazepam] Rash and Vomiting     Reaction Date: 14Jun2012;        Physical Exam:    /64   Pulse 77   Ht 5' 3" (1 6 m)   Wt 87 5 kg (193 lb)   BMI 34 19 kg/m²     Constitutional:obese  Eyes:anicteric  HEENT:grossly intact  Neck:supple, symmetric, trachea midline and no masses   Pulmonary:even and unlabored  Cardiovascular:No edema or pitting edema present  Skin:Normal without rashes or lesions and well hydrated  Psychiatric:Mood and affect appropriate  Neurologic:Cranial Nerves II-XII grossly intact  Musculoskeletal:normal    Orders Placed This Encounter   Procedures    MM ALL_Prescribed Meds and Special Instructions    MM DT_Alprazolam Definitive Test    MM DT_Amphetamine Definitive Test    MM DT_Aripiprazole Definitive Test    MM DT_Bath Salts Definitive Test    MM DT_Buprenorphine Definitive Test    MM DT_Butalbital Definitive Test    MM DT_Clonazepam Definitive Test    MM DT_Clozapine Definitive Test    MM DT_Cocaine Definitive Test    MM DT_Codeine Definitive Test    MM DT_Desipramine Definitive Test    MM DT_Dextromethorphan Definitive Test    MM Diazepam Definitive Test    MM DT_Ethyl Glucuronide/Ethyl Sulfate Definitive Test    MM DT_Fentanyl Definitive Test    MM DT_Haloperidol Definitive Test    MM DT_Heroin Definitive Test    MM DT_Hydrocodone Definitive Test    MM DT_Hydromorphone Definitive Test    MM DT_Imipramine Definitive Test    MM DT_Kratom Definitive Test    MM DT_Levorphanol Definitive Test    MM DT_MDMA Definitive Test    MM Lorazepam Definitive Test    MM DT_Meperidine Definitive Test    MM DT_Methadone Definitive Test    MM DT_Methamphetamine Definitive Test    MM DT_Methylphenidate Definitive Test    MM DT_Morphine Definitive Test    MM DT_Oxazepam Definitive Test    MM DT_Oxycodone Definitive Test    MM DT_Oxymorphone Definitive Test    MM DT_Phencyclidine Definitive Test    MM DT_Phenobarbital Definitive Test    MM DT_Phentermine Definitive Test    MM DT_Secobarbital Definitive Test    MM DT_Spice Definitive Test    MM DT_Tapentadol Definitive Test    MM DT_Temazapam Definitive Test    MM DT_THC Definitive Test    MM DT_Tramadol Definitive Test    MM DT_Validity Creatinine    MM DT_Validity pH    MM DT_Validity Specific    MM DT_Validity Oxidant

## 2021-07-23 ENCOUNTER — PROCEDURE VISIT (OUTPATIENT)
Dept: NEUROLOGY | Facility: CLINIC | Age: 56
End: 2021-07-23
Payer: COMMERCIAL

## 2021-07-23 ENCOUNTER — TELEPHONE (OUTPATIENT)
Dept: PAIN MEDICINE | Facility: CLINIC | Age: 56
End: 2021-07-23

## 2021-07-23 DIAGNOSIS — M54.2 NECK PAIN: ICD-10-CM

## 2021-07-23 DIAGNOSIS — M50.120 CERVICAL DISC DISORDER WITH RADICULOPATHY OF MID-CERVICAL REGION: ICD-10-CM

## 2021-07-23 DIAGNOSIS — M50.120 CERVICAL DISC DISORDER WITH RADICULOPATHY OF MID-CERVICAL REGION: Primary | ICD-10-CM

## 2021-07-23 PROCEDURE — 95886 MUSC TEST DONE W/N TEST COMP: CPT | Performed by: PHYSICAL MEDICINE & REHABILITATION

## 2021-07-23 PROCEDURE — 95911 NRV CNDJ TEST 9-10 STUDIES: CPT | Performed by: PHYSICAL MEDICINE & REHABILITATION

## 2021-07-23 NOTE — TELEPHONE ENCOUNTER
----- Message from Celso Palumbo MD sent at 7/23/2021  1:35 PM EDT -----  Regarding: EMG/NCV results  EMG/NCV results show that the patient has a chronic right C6-C7 cervical radiculopathy  This means that her symptoms are most likely occurring from her cervical spine  We previously discussed the possibility of undergoing a spinal cord stimulator trial with [employer]  Since her EMG/NCV study shows that her symptoms are occurring primarily from her cervical spine, I think she would benefit from undergoing a spinal cord stimulator trial with [employer]

## 2021-07-23 NOTE — PROGRESS NOTES
EMG 2 Limb Upper Extremity     Date/Time 7/23/2021 1:09 PM     Performed by  Patricia Gaming MD     Authorized by Natalie Bashir MD      Universal Protocol   Consent: Verbal consent obtained  Risks and benefits: risks, benefits and alternatives were discussed  Consent given by: patient  Patient understanding: patient states understanding of the procedure being performed  Patient consent: the patient's understanding of the procedure matches consent given               EMG REPORT  60-year-old right-handed female with past medical history of chronic neck pain presents with complaints of worsening neck pain associated with tingling and numbness in the right arm for the last several months  Patient is being evaluated for a cervical radiculopathy  On physical examination, motor strength is 5/5 throughout  Sensations are diminished to light touch and pinprick in the right C6-7 dermatome  The left and right median and ulnar motor conduction velocities and compound muscle action potentials were normal with normal distal latencies across the wrists  The left and right median and ulnar sensory conduction velocity and sensory action potentials were also normal with normal distal latencies across the wrists  The left and right median and ulnar F wave latencies were within normal limits  Concentric needle EMG   Was performed on various proximal and distal muscles of the bilateral upper extremities including deltoid, biceps, triceps, pronator teres, abductor pollicis brevis, FDI and low cervical paraspinals  There was no evidence of active denervation in any of the muscles tested  Moderate decreased recruitment of giant motor units was noted in the right biceps,pronator teres and triceps  The compound motor unit action potentials were of normal configuration with interference patterns being full or full for effort in the remaining muscles tested      INTERPRETATION: There  is electrophysiologic evidence of a:    1  C6-7 radiculopathy on the right as evidenced by the decreased recruitment and chronic denervation changes in the above-mentioned muscles  2  There is no evidence of a median or ulnar neuropathy bilaterally  Clinical and imaging correlation of the cervical spine is suggested        BERNADETTE Avina

## 2021-07-23 NOTE — TELEPHONE ENCOUNTER
S/W pt  Advised pt of the same  Pt stated the pain down her right arm is crazy and she lost mobility in her hand  She drops things and her quality of life has decreased the last 6 months  Pt asking if the end result is going to see a surgeon any ways, should she just see the surgeon first?  Pt stated okay to leave a detailed message  Please advise

## 2021-07-24 LAB
6MAM UR QL CFM: NEGATIVE NG/ML
7AMINOCLONAZEPAM UR QL CFM: NEGATIVE NG/ML
A-OH ALPRAZ UR QL CFM: NEGATIVE NG/ML
ACCEPTABLE CREAT UR QL: NORMAL MG/DL
ACCEPTIBLE SP GR UR QL: NORMAL
AMPHET UR QL CFM: NEGATIVE NG/ML
AMPHET UR QL CFM: NEGATIVE NG/ML
BUPRENORPHINE UR QL CFM: NEGATIVE NG/ML
BUTALBITAL UR QL CFM: NEGATIVE NG/ML
BZE UR QL CFM: NEGATIVE NG/ML
CODEINE UR QL CFM: NEGATIVE NG/ML
DESIPRAMINE UR QL CFM: NEGATIVE NG/ML
DESIPRAMINE UR QL CFM: NEGATIVE NG/ML
EDDP UR QL CFM: NEGATIVE NG/ML
ETHYL GLUCURONIDE UR QL CFM: ABNORMAL NG/ML
ETHYL SULFATE UR QL SCN: NEGATIVE NG/ML
FENTANYL UR QL CFM: NEGATIVE NG/ML
GLIADIN IGG SER IA-ACNC: NEGATIVE NG/ML
GLUCOSE 30M P 50 G LAC PO SERPL-MCNC: NEGATIVE NG/ML
HYDROCODONE UR QL CFM: NEGATIVE NG/ML
HYDROCODONE UR QL CFM: NEGATIVE NG/ML
HYDROMORPHONE UR QL CFM: NEGATIVE NG/ML
IMIPRAMINE UR QL CFM: NEGATIVE NG/ML
LORAZEPAM UR QL CFM: NEGATIVE NG/ML
MDMA UR QL CFM: NEGATIVE NG/ML
ME-PHENIDATE UR QL CFM: NEGATIVE NG/ML
MEPERIDINE UR QL CFM: NEGATIVE NG/ML
MEPHEDRONE UR QL CFM: NEGATIVE NG/ML
METHADONE UR QL CFM: NEGATIVE NG/ML
METHAMPHET UR QL CFM: NEGATIVE NG/ML
MORPHINE UR QL CFM: NEGATIVE NG/ML
MORPHINE UR QL CFM: NEGATIVE NG/ML
NITRITE UR QL: NORMAL UG/ML
NORBUPRENORPHINE UR QL CFM: NEGATIVE NG/ML
NORDIAZEPAM UR QL CFM: NEGATIVE NG/ML
NORFENTANYL UR QL CFM: NEGATIVE NG/ML
NORHYDROCODONE UR QL CFM: NEGATIVE NG/ML
NORHYDROCODONE UR QL CFM: NEGATIVE NG/ML
NORMEPERIDINE UR QL CFM: NEGATIVE NG/ML
NOROXYCODONE UR QL CFM: ABNORMAL NG/ML
OPC-3373 QUANTIFICATION: NEGATIVE
OXAZEPAM UR QL CFM: NEGATIVE NG/ML
OXYCODONE UR QL CFM: ABNORMAL NG/ML
OXYMORPHONE UR QL CFM: ABNORMAL NG/ML
OXYMORPHONE UR QL CFM: ABNORMAL NG/ML
PCP UR QL CFM: NEGATIVE NG/ML
PHENOBARB UR QL CFM: NEGATIVE NG/ML
RESULT ALL_PRESCRIBED MEDS AND SPECIAL INSTRUCTIONS: NORMAL
SECOBARBITAL UR QL CFM: NEGATIVE NG/ML
SL AMB 3-METHYL-FENTANYL QUANTIFICATION: NORMAL NG/ML
SL AMB 4-ANPP QUANTIFICATION: NORMAL NG/ML
SL AMB 4-FIBF QUANTIFICATION: NORMAL NG/ML
SL AMB 5F-ADB-M7 METABOLITE QUANTIFICATION: NEGATIVE NG/ML
SL AMB 7-OH-MITRAGYNINE (KRATOM ALKALOID) QUANTIFICATION: NEGATIVE NG/ML
SL AMB AB-FUBINACA-M3 METABOLITE QUANTIFICATION: NEGATIVE NG/ML
SL AMB ACETYL FENTANYL QUANTIFICATION: NORMAL NG/ML
SL AMB ACETYL NORFENTANYL QUANTIFICATION: NORMAL NG/ML
SL AMB ACRYL FENTANYL QUANTIFICATION: NORMAL NG/ML
SL AMB BATH SALTS: NEGATIVE NG/ML
SL AMB BUTRYL FENTANYL QUANTIFICATION: NORMAL NG/ML
SL AMB CARFENTANIL QUANTIFICATION: NORMAL NG/ML
SL AMB CLOZAPINE QUANTIFICATION: NEGATIVE NG/ML
SL AMB CTHC (MARIJUANA METABOLITE) QUANTIFICATION: NEGATIVE NG/ML
SL AMB CYCLOPROPYL FENTANYL QUANTIFICATION: NORMAL NG/ML
SL AMB DEXTROMETHORPHAN QUANTIFICATION: NEGATIVE NG/ML
SL AMB DEXTRORPHAN (DEXTROMETHORPHAN METABOLITE) QUANT: NEGATIVE NG/ML
SL AMB DEXTRORPHAN (DEXTROMETHORPHAN METABOLITE) QUANT: NEGATIVE NG/ML
SL AMB FURANYL FENTANYL QUANTIFICATION: NORMAL NG/ML
SL AMB HALOPERIDOL  QUANTIFICATION: NEGATIVE NG/ML
SL AMB HALOPERIDOL METABOLITE QUANTIFICATION: NEGATIVE NG/ML
SL AMB JWH018 METABOLITE QUANTIFICATION: NEGATIVE NG/ML
SL AMB JWH073 METABOLITE QUANTIFICATION: NEGATIVE NG/ML
SL AMB MDMB-FUBINACA-M1 METABOLITE QUANTIFICATION: NEGATIVE NG/ML
SL AMB METHOXYACETYL FENTANYL QUANTIFICATION: NORMAL NG/ML
SL AMB METHYLONE QUANTIFICATION: NEGATIVE NG/ML
SL AMB N-DESMETHYL U-47700 QUANTIFICATION: NORMAL NG/ML
SL AMB N-DESMETHYL-TRAMADOL QUANTIFICATION: NEGATIVE NG/ML
SL AMB N-DESMETHYLCLOZAPINE QUANTIFICATION: NEGATIVE NG/ML
SL AMB PHENTERMINE QUANTIFICATION: NEGATIVE NG/ML
SL AMB RCS4 METABOLITE QUANTIFICATION: NEGATIVE NG/ML
SL AMB RITALINIC ACID QUANTIFICATION: NEGATIVE NG/ML
SL AMB U-47700 QUANTIFICATION: NORMAL NG/ML
SPECIMEN DRAWN SERPL: NEGATIVE NG/ML
SPECIMEN PH ACCEPTABLE UR: NORMAL
TAPENTADOL UR QL CFM: NEGATIVE NG/ML
TEMAZEPAM UR QL CFM: NEGATIVE NG/ML
TEMAZEPAM UR QL CFM: NEGATIVE NG/ML
TRAMADOL UR QL CFM: NEGATIVE NG/ML
URATE/CREAT 24H UR: NEGATIVE NG/ML

## 2021-07-26 ENCOUNTER — TELEPHONE (OUTPATIENT)
Dept: PAIN MEDICINE | Facility: MEDICAL CENTER | Age: 56
End: 2021-07-26

## 2021-07-26 NOTE — TELEPHONE ENCOUNTER
Since the epidural steroid injection never worked for her, I think it is reasonable for her to speak to a spinal surgeon or neurosurgeon  Has she already seen a surgeon in the past? If so, that would be the person to speak to again  If not, I could recommend one

## 2021-07-26 NOTE — TELEPHONE ENCOUNTER
RN s/w pt regarding previous  Pt would like a referral to a surgeon  Per pt she has not seen a surgeon for this issue before        --please provide recommendation for same

## 2021-07-26 NOTE — TELEPHONE ENCOUNTER
RN s/w pt regarding previous  Per pt ,"I do not drink, this cannot be positive "  Per pt a few years ago she was positive and that was from a glass of champagne at her own wedding  Since then she even went on  a family vacation and said ,"no way," to having any alcohol due to this contract with [employer]  RN asked if pt was ill recently and took cough medicine or desserts with alcohol in them  Per pt she has not been ill and cannot remember anything with alcohol in it  Per pt since the last positive result she has not had anything with alcohol      ---please see previous

## 2021-07-26 NOTE — TELEPHONE ENCOUNTER
UDT Results  Received: Today  Yue Lozano MD  [facility]  The patient's most recent urine drug screen shows that she is positive for ethanol   Please remind the patient not to drink any alcohol while taking opioid medications   This is just a warning -- she did have positivity for ethanol several years ago as well  Rachelle Soliman further opioid treatment agreement violations will result in myself no longer being able to prescribe her opioid medications

## 2021-08-11 ENCOUNTER — OFFICE VISIT (OUTPATIENT)
Dept: OBGYN CLINIC | Facility: CLINIC | Age: 56
End: 2021-08-11
Payer: COMMERCIAL

## 2021-08-11 ENCOUNTER — APPOINTMENT (OUTPATIENT)
Dept: RADIOLOGY | Facility: AMBULARY SURGERY CENTER | Age: 56
End: 2021-08-11
Attending: ORTHOPAEDIC SURGERY
Payer: COMMERCIAL

## 2021-08-11 VITALS
HEIGHT: 63 IN | BODY MASS INDEX: 34.2 KG/M2 | SYSTOLIC BLOOD PRESSURE: 120 MMHG | HEART RATE: 69 BPM | WEIGHT: 193 LBS | DIASTOLIC BLOOD PRESSURE: 75 MMHG

## 2021-08-11 DIAGNOSIS — M62.81 MUSCLE WEAKNESS OF RIGHT UPPER EXTREMITY: ICD-10-CM

## 2021-08-11 DIAGNOSIS — M50.120 CERVICAL DISC DISORDER WITH RADICULOPATHY OF MID-CERVICAL REGION: ICD-10-CM

## 2021-08-11 DIAGNOSIS — M50.30 DDD (DEGENERATIVE DISC DISEASE), CERVICAL: Primary | ICD-10-CM

## 2021-08-11 DIAGNOSIS — M54.12 CERVICAL RADICULOPATHY: ICD-10-CM

## 2021-08-11 DIAGNOSIS — M48.02 CERVICAL SPINAL STENOSIS: ICD-10-CM

## 2021-08-11 PROCEDURE — 99203 OFFICE O/P NEW LOW 30 MIN: CPT | Performed by: ORTHOPAEDIC SURGERY

## 2021-08-11 PROCEDURE — 72050 X-RAY EXAM NECK SPINE 4/5VWS: CPT

## 2021-08-11 NOTE — LETTER
August 11, 2021     Tracy Cason, [address] [address]    Patient: Gabriele Sher   YOB: 1965   Date of Visit: 8/11/2021       Dear Dr Delaney Cap:    Thank you for referring Anca Harper to me for evaluation  Below are my notes for this consultation  If you have questions, please do not hesitate to call me  I look forward to following your patient along with you           Sincerely,        Tisa Galeazzi, MD        CC: No Recipients

## 2021-08-11 NOTE — PROGRESS NOTES
Assessment:   Diagnosis ICD-10-CM Associated Orders   1  DDD (degenerative disc disease), cervical  M50 30    2  Cervical radiculopathy  M54 12 Ambulatory referral to Orthopedic Surgery     XR spine cervical complete 4 or 5 vw non injury   3  Cervical spinal stenosis  M48 02    4  Muscle weakness of right upper extremity  M62 81        Plan:    Patient with multilevel ddd more pronounced C4/5, C5/6, C6/7 with right sided cervical radiculopathy, stenosis   and wrist extension weakness on right 4+/5, positive madsen's bilateral, remains neurologically stable  She has failed fluoro guided and trigger point injections, various opiod and neurogenic medications and will get updated MR to further evaluate and assess future treatment  Patient is to continue with medications as prescribed by pain management, see back for MR review  To do next visit:  Return for after MR cervical      The above stated was discussed in layman's terms and the patient expressed understanding  All questions were answered to the patient's satisfaction  Scribe Attestation    I,:  Vickey Mason am acting as a scribe while in the presence of the attending physician :       I,:  Heriberto Cannon MD personally performed the services described in this documentation    as scribed in my presence :             Subjective:   Joan Jones is a 48 y o  female who presents for evaluation of cervical spine  Referred by Dr Marisela Michael pain management  History chronic pain, opiod dependence  Dr Bill Person has done multiple trigger point injections throughout the past few years which offers short term relief, years ago she had epidural injections under fluoro  Pain neck, radiating into bilateral shoulders down right arm to her hand, also go into midback region  Pain can be dull, achy to shooting with associated numbness and tingling   She is noticing the past few weeks she is having more trouble gripping, grasping items due to weakness in right hand, dropping items  She did have recent EMG showing C6/7 radiculopathy, old MR 2018 ddd, radicupathy  She is taking percocet 5/325 on daily basis BID, neurontin 600mg TID, cyclobenzaprine 10 mg TID  Review of systems negative unless otherwise specified in HPI  Review of Systems   Constitutional: Negative for chills and fever  HENT: Negative for ear pain and sore throat  Eyes: Negative for pain and visual disturbance  Respiratory: Negative for cough and shortness of breath  Cardiovascular: Negative for chest pain and palpitations  Gastrointestinal: Negative for abdominal pain and vomiting  Genitourinary: Negative for dysuria and hematuria  Musculoskeletal: Negative for arthralgias and back pain  Skin: Negative for color change and rash  Neurological: Negative for seizures and syncope  All other systems reviewed and are negative  Past Medical History:   Diagnosis Date    Anxiety     Asthma     exercise induced asthma    Cervical disc disorder     Chronic pain     Chronic pain disorder     Depression     Dysuria     Low back pain     Lung abnormality     nodules    Lung nodule     Neck pain     Peripheral neuropathy     Pituitary abnormality (HCC)     tumor    Right hip pain 10/29/2019    Thrombocytopenia ([address] )     Thyroid disease        Past Surgical History:   Procedure Laterality Date    APPENDECTOMY      BREAST BIOPSY Right 1995    CHOLECYSTECTOMY      COLECTOMY      COLON SURGERY      EPIDURAL BLOCK INJECTION N/A 6/23/2016    Procedure: BLOCK / INJECTION EPIDURAL STEROID CERVICAL  C7-T1;  Surgeon: Ke Lara MD;  Location: [place] [address] MAIN OR;  Service:     EPIDURAL BLOCK INJECTION N/A 3/31/2016    Procedure: BLOCK / INJECTION EPIDURAL STEROID CERVICAL C7-T1  (C-ARM); Surgeon: Ke Lara MD;  Location: [facility] MAIN OR;  Service:     EPIDURAL BLOCK INJECTION N/A 8/8/2018    Procedure: C6 C7 Cervical Epidural Steroid Injection (45327);   Surgeon: Rupa Hogan Gerianne Goltz, MD;  Location[facility] MA[facility] OR;  Service: Pain Management     HYSTERECTOMY  1996    OOPHORECTOMY Bilateral 1996    PITUITARY SURGERY      [place] ARTHROCENTESIS ASPIR&/INJ MAJOR JT/BURSA W/O US Right 11/8/2019    Procedure: Trochanteric Bursa Injection (38484); Surgeon: Tip Rose MD;  Location: [facility] MAIN OR;  Service: Pain Management     TN ARTHROCENTESIS ASPIR&/INJ MAJOR JT/BURSA W/O US Right 1/23/2020    Procedure: Trochanteric Bursa Injection ([id number]); Surgeon: Tip Rose MD;  Location: [facility] MAIN OR;  Service: Pain Management     [place] NJX DX/THER SBST EPIDURAL/SUBRACH CERV/THORACIC N/A 1/21/2016    Procedure: BLOCK / INJECTION EPIDURAL STEROID CERVICAL C7-T1 (C-ARM);   Surgeon: Tip Rose MD;  Location: [facility] MAIN OR;  Service: Pain Management     REDUCTION MAMMAPLASTY Bilateral 2000       Family History   Problem Relation Age of Onset    Thyroid disease Mother     Hyperlipidemia Mother     Depression Mother     Thyroid disease Father     Hyperlipidemia Father     Depression Father     Ovarian cancer Sister 39    No Known Problems Brother     No Known Problems Maternal Uncle     No Known Problems Paternal Aunt     No Known Problems Paternal Uncle     No Known Problems Maternal Grandmother     No Known Problems Maternal Grandfather     Breast cancer Paternal Grandmother 36    No Known Problems Paternal Grandfather     No Known Problems Daughter     No Known Problems Son     ADD / ADHD Neg Hx     Anesthesia problems Neg Hx     Cancer Neg Hx     Clotting disorder Neg Hx     Collagen disease Neg Hx     Diabetes Neg Hx     Dislocations Neg Hx     Learning disabilities Neg Hx     Neurological problems Neg Hx     Osteoporosis Neg Hx     Rheumatologic disease Neg Hx     Scoliosis Neg Hx     Vascular Disease Neg Hx        Social History     Occupational History    Not on file   Tobacco Use    Smoking status: Never Smoker    Smokeless tobacco: Never Used   Vaping Use    Vaping Use: Never used   Substance and Sexual Activity    Alcohol use: No    Drug use: No    Sexual activity: Not on file         Current Outpatient Medications:     albuterol (VENTOLIN HFA) 90 mcg/act inhaler, Inhale, Disp: , Rfl:     aspirin 81 MG tablet, Take 81 mg by mouth daily  , Disp: , Rfl:     cholecalciferol (VITAMIN D3) 1,000 units tablet, Take 2 tablets by mouth daily, Disp: , Rfl:     cyclobenzaprine (FLEXERIL) 10 mg tablet, Take 1 tablet (10 mg total) by mouth 3 (three) times a day as needed for muscle spasms, Disp: 90 tablet, Rfl: 1    diclofenac sodium (VOLTAREN) 1 %, Apply 2 g topically 4 (four) times a day as needed (pain), Disp: 100 g, Rfl: 0    gabapentin (NEURONTIN) 600 MG tablet, Take 1 tablet (600 mg total) by mouth 3 (three) times a day, Disp: 90 tablet, Rfl: 1    hydrocortisone (ANUSOL-HC) 25 mg suppository, , Disp: , Rfl: 0    ibuprofen (MOTRIN) 600 mg tablet, Take 1 tablet (600 mg total) by mouth every 6 (six) hours as needed for mild pain, Disp: 30 tablet, Rfl: 0    levothyroxine 50 mcg tablet, , Disp: , Rfl:     metoprolol succinate (TOPROL-XL) 25 mg 24 hr tablet, , Disp: , Rfl:     mupirocin (BACTROBAN) 2 % ointment, , Disp: , Rfl: 0    oxyCODONE-acetaminophen (PERCOCET) 5-325 mg per tablet, Take 1 tablet by mouth 2 (two) times a day as needed for severe painMax Daily Amount: 2 tablets, Disp: 60 tablet, Rfl: 0    [START ON 8/23/2021] oxyCODONE-acetaminophen (PERCOCET) 5-325 mg per tablet, Take 1 tablet by mouth 2 (two) times a day as needed for severe painMax Daily Amount: 2 tablets, Disp: 60 tablet, Rfl: 0    traZODone (DESYREL) 50 mg tablet, , Disp: , Rfl: 0    Allergies   Allergen Reactions    Levaquin [Levofloxacin In D5w] Shortness Of Breath     Also hives      Amitiza [Lubiprostone] Other (See Comments) and Hives     Reaction Date: [id number];   Migraines and vomiting    Biaxin [Clarithromycin] Hives    Cephradine     Erythromycin Hives     Reaction Date: 10Aug2011;    Tanda Cull [Nitrofurantoin Monohyd Macro] Other (See Comments)     C/o passing out    Morphine     Penicillins Hives     Reaction Date: 10Aug2011;     Sulfa Antibiotics     Tetracycline     Tetracyclines & Related Hives    Morphine And Related Rash    Valium [Diazepam] Rash and Vomiting     Reaction Date: 14Jun2012;             Vitals:    08/11/21 1335   BP: 120/75   Pulse: 69       Objective:                    Back Exam     Comments:  Cervical spine  No acute distress  TTP cervical musculature, right upper trapezius   C2-T1 5/5 strength except  4+/5 right, wrist extension 4+/5 right  C2-T1 sensation intact and symmetric  Positive hoffmans bilateral   Reflexes 2+ bilateral upper extremities             Diagnostics, reviewed and taken today if performed as documented: The attending physician has personally reviewed the pertinent films in PACS and interpretation is as follows:xr cervical spine demonstrates multilevel ddd more pronounced lower segments, anterior spurring, foraminal narrowing  Cassia Piles EMG C6/7 radiculopathy       Procedures, if performed today:    Procedures    None performed      Portions of the record may have been created with voice recognition software  Occasional wrong word or "sound a like" substitutions may have occurred due to the inherent limitations of voice recognition software  Read the chart carefully and recognize, using context, where substitutions have occurred

## 2021-08-25 ENCOUNTER — HOSPITAL ENCOUNTER (OUTPATIENT)
Dept: MRI IMAGING | Facility: CLINIC | Age: 56
Discharge: HOME | End: 2021-08-25
Payer: COMMERCIAL

## 2021-08-25 DIAGNOSIS — M50.30 DDD (DEGENERATIVE DISC DISEASE), CERVICAL: ICD-10-CM

## 2021-08-25 DIAGNOSIS — M62.81 MUSCLE WEAKNESS OF RIGHT UPPER EXTREMITY: ICD-10-CM

## 2021-08-25 DIAGNOSIS — M54.12 CERVICAL RADICULOPATHY: ICD-10-CM

## 2021-08-25 PROCEDURE — 72141 MRI NECK SPINE W/O DYE: CPT

## 2021-09-03 ENCOUNTER — TELEPHONE (OUTPATIENT)
Dept: NEUROLOGY | Facility: CLINIC | Age: 56
End: 2021-09-03

## 2021-09-03 NOTE — TELEPHONE ENCOUNTER
THE [facility] for patient to [facility] and confirm appointment with Dr Renata Barriga  Gave direct numbers in [place]

## 2021-09-08 ENCOUNTER — OFFICE VISIT (OUTPATIENT)
Dept: OBGYN CLINIC | Facility: CLINIC | Age: 56
End: 2021-09-08
Payer: COMMERCIAL

## 2021-09-08 VITALS
DIASTOLIC BLOOD PRESSURE: 65 MMHG | HEART RATE: 76 BPM | HEIGHT: 65 IN | BODY MASS INDEX: 32.12 KG/M2 | SYSTOLIC BLOOD PRESSURE: 99 MMHG

## 2021-09-08 DIAGNOSIS — M54.12 CERVICAL RADICULOPATHY: Primary | ICD-10-CM

## 2021-09-08 DIAGNOSIS — M50.30 DDD (DEGENERATIVE DISC DISEASE), CERVICAL: ICD-10-CM

## 2021-09-08 DIAGNOSIS — M48.02 CERVICAL SPINAL STENOSIS: ICD-10-CM

## 2021-09-08 PROCEDURE — 99213 OFFICE O/P EST LOW 20 MIN: CPT | Performed by: ORTHOPAEDIC SURGERY

## 2021-09-08 NOTE — PROGRESS NOTES
Orthopaedic Surgery - Office Note  Bronwyn Wilson (39 y o  female)   : 1965   MRN: [id number]  Encounter Date: 2021    Chief Complaint   Patient presents with    Neck - Follow-up       Assessment / Plan    81-year-old female with cervical DDD, worse at the level of C5-6, with radiculopathy     ·  thorough discussion had with patient regarding the nature of her cervical DDD with radiculopathy  Is explained that the MRI shows mild-to-moderate stenosis which may be not be improved by surgical intervention  That being said, patient has not responded to nonsurgical management including multiple injections, physical therapy and lifestyle modification  ·   Would recommend referral to Neurosurgery for 2nd opinion and further workup  ·   Continue Flexeril, gabapentin and diclofenac as directed    History of Present Illness  Bronwyn Wilson is a 48 y o  female who presents  For continued evaluation of neck pain with radiculopathy  Patient has struggled with neck pain with radicular symptoms for several years  She has in the past received epidural steroid injections as well as trigger point injections  She does complain of radiating pain down the right arm which is accompanied by weakness and occasional clumsiness  After her last visit, she was recommended for a cervical MRI which was obtained  She denies any bowel/ bladder dysfunction and does not walk with an assistive device  Review of Systems  Pertinent items are noted in HPI  All other systems were reviewed and are negative  Physical Exam  BP 99/65   Pulse 76   Ht 5' 5" (1 651 m)   BMI 32 12 kg/m²   Cons: Appears well  No apparent distress  Psych: Alert  Oriented x3  Mood and affect normal   Eyes: PERRLA, EOMI  Resp: Normal effort  No audible wheezing or stridor  CV: Palpable pulse  No discernable arrhythmia  No LE edema  Lymph:  No palpable cervical, axillary, or inguinal lymphadenopathy  Skin: Warm  No palpable masses    No visible lesions  Neuro: Normal muscle tone  Normal and symmetric DTR's       cervical spine exam  ·  Skin intact  · Tenderness to the paraspinal musculature   · Limited range of motion of the cervical spine   ·  sensation intact C5-T1  · 4/5 elbow extension and  strength on the right,  5/5 strength otherwise from C5-T1  ·  positive Antoine's bilaterally    Studies Reviewed    Cervical spine MRI with axial, sagittal and coronal views reveals mild-to-moderate cervical DDD which is worst at the level of C5-6 with mild to moderate canal stenosis    Procedures  No procedures today  Medical, Surgical, Family, and Social History  The patient's medical history, family history, and social history, were reviewed and updated as appropriate  Past Medical History:   Diagnosis Date    Anxiety     Asthma     exercise induced asthma    Cervical disc disorder     Chronic pain     Chronic pain disorder     Depression     Dysuria     Low back pain     Lung abnormality     nodules    Lung nodule     Neck pain     Peripheral neuropathy     Pituitary abnormality (HCC)     tumor    Right hip pain 10/29/2019    Thrombocytopenia ([facility] [address]     Thyroid disease        Past Surgical History:   Procedure Laterality Date    APPENDECTOMY      BREAST BIOPSY Right 1995    CHOLECYSTECTOMY      COLECTOMY      COLON SURGERY      EPIDURAL BLOCK INJECTION N/A 6/23/2016    Procedure: BLOCK / INJECTION EPIDURAL STEROID CERVICAL  C7-T1;  Surgeon: Prakash Irving MD;  Location: [place] [address] MAIN OR;  Service:     EPIDURAL BLOCK INJECTION N/A 3/31/2016    Procedure: BLOCK / INJECTION EPIDURAL STEROID CERVICAL C7-T1  (C-ARM); Surgeon: Prakash Irving MD;  Location: [facility] MAIN OR;  Service:     EPIDURAL BLOCK INJECTION N/A 8/8/2018    Procedure: C6 C7 Cervical Epidural Steroid Injection (42774);   Surgeon: Prakash Irving MD;  Location: [facility] MAIN OR;  Service: Pain Management     HYSTERECTOMY  1996    OOPHORECTOMY Bilateral 1996    PITUITARY SURGERY      [place] ARTHROCENTESIS ASPIR&/INJ MAJOR JT/BURSA W/O US Right 11/8/2019    Procedure: Trochanteric Bursa Injection (34502); Surgeon: Dequan Fragoso MD;  Location: [facility] MAIN OR;  Service: Pain Management     [place] ARTHROCENTESIS ASPIR&/INJ MAJOR JT/BURSA W/O US Right 1/23/2020    Procedure: Trochanteric Bursa Injection ([id number]); Surgeon: Dequan Fragoso MD;  Location: [facility] MAIN OR;  Service: Pain Management     [place] NJX DX/THER SBST EPIDURAL/SUBRACH CERV/THORACIC N/A 1/21/2016    Procedure: BLOCK / INJECTION EPIDURAL STEROID CERVICAL C7-T1 (C-ARM);   Surgeon: Dequan Fragoso MD;  Location: [facility] MAIN OR;  Service: Pain Management     REDUCTION MAMMAPLASTY Bilateral 2000       Family History   Problem Relation Age of Onset    Thyroid disease Mother     Hyperlipidemia Mother     Depression Mother     Thyroid disease Father     Hyperlipidemia Father     Depression Father     Ovarian cancer Sister 39    No Known Problems Brother     No Known Problems Maternal Uncle     No Known Problems Paternal Aunt     No Known Problems Paternal Uncle     No Known Problems Maternal Grandmother     No Known Problems Maternal Grandfather     Breast cancer Paternal Grandmother 36    No Known Problems Paternal Grandfather     No Known Problems Daughter     No Known Problems Son     ADD / ADHD Neg Hx     Anesthesia problems Neg Hx     Cancer Neg Hx     Clotting disorder Neg Hx     Collagen disease Neg Hx     Diabetes Neg Hx     Dislocations Neg Hx     Learning disabilities Neg Hx     Neurological problems Neg Hx     Osteoporosis Neg Hx     Rheumatologic disease Neg Hx     Scoliosis Neg Hx     Vascular Disease Neg Hx        Social History     Occupational History    Not on file   Tobacco Use    Smoking status: Never Smoker    Smokeless tobacco: Never Used   Vaping Use    Vaping Use: Never used   Substance and Sexual Activity    Alcohol use: No    Drug use: No    Sexual activity: Not on file       Allergies   Allergen Reactions    Levaquin [Levofloxacin In D5w] Shortness Of Breath     Also hives      Amitiza [Lubiprostone] Other (See Comments) and Hives     Reaction Date: 10Aug2011;   Migraines and vomiting    Biaxin [Clarithromycin] Hives    Cephradine     Erythromycin Hives     Reaction Date: 10Aug2011;     Macrobid [Nitrofurantoin Monohyd Macro] Other (See Comments)     C/o passing out    Morphine     Penicillins Hives     Reaction Date: 10Aug2011;     Sulfa Antibiotics     Tetracycline     Tetracyclines & Related Hives    Morphine And Related Rash    Valium [Diazepam] Rash and Vomiting     Reaction Date: 14Jun2012;          Current Outpatient Medications:     albuterol (VENTOLIN HFA) 90 mcg/act inhaler, Inhale, Disp: , Rfl:     aspirin 81 MG tablet, Take 81 mg by mouth daily  , Disp: , Rfl:     cholecalciferol (VITAMIN D3) 1,000 units tablet, Take 2 tablets by mouth daily, Disp: , Rfl:     cyclobenzaprine (FLEXERIL) 10 mg tablet, Take 1 tablet (10 mg total) by mouth 3 (three) times a day as needed for muscle spasms, Disp: 90 tablet, Rfl: 1    diclofenac sodium (VOLTAREN) 1 %, Apply 2 g topically 4 (four) times a day as needed (pain), Disp: 100 g, Rfl: 0    gabapentin (NEURONTIN) 600 MG tablet, Take 1 tablet (600 mg total) by mouth 3 (three) times a day, Disp: 90 tablet, Rfl: 1    ibuprofen (MOTRIN) 600 mg tablet, Take 1 tablet (600 mg total) by mouth every 6 (six) hours as needed for mild pain, Disp: 30 tablet, Rfl: 0    levothyroxine 50 mcg tablet, , Disp: , Rfl:     metoprolol succinate (TOPROL-XL) 25 mg 24 hr tablet, , Disp: , Rfl:     mupirocin (BACTROBAN) 2 % ointment, , Disp: , Rfl: 0    oxyCODONE-acetaminophen (PERCOCET) 5-325 mg per tablet, Take 1 tablet by mouth 2 (two) times a day as needed for severe painMax Daily Amount: 2 tablets, Disp: 60 tablet, Rfl: 0    oxyCODONE-acetaminophen (PERCOCET) 5-325 mg per tablet, Take 1 tablet by mouth 2 (two) times a day as needed for severe painMax Daily Amount: 2 tablets, Disp: 60 tablet, Rfl: 0    traZODone (DESYREL) 50 mg tablet, , Disp: , Rfl: 0    hydrocortisone (ANUSOL-HC) 25 mg suppository, , Disp: , Rfl: 0      Mary Dumont MD    Scribe Attestation    I,:   am acting as a scribe while in the presence of the attending physician :       I,:   personally performed the services described in this documentation    as scribed in my presence :

## 2021-09-20 ENCOUNTER — OFFICE VISIT (OUTPATIENT)
Dept: PAIN MEDICINE | Facility: CLINIC | Age: 56
End: 2021-09-20
Payer: COMMERCIAL

## 2021-09-20 VITALS
HEART RATE: 80 BPM | WEIGHT: 193 LBS | BODY MASS INDEX: 32.15 KG/M2 | HEIGHT: 65 IN | SYSTOLIC BLOOD PRESSURE: 114 MMHG | DIASTOLIC BLOOD PRESSURE: 74 MMHG

## 2021-09-20 DIAGNOSIS — M54.2 NECK PAIN: ICD-10-CM

## 2021-09-20 DIAGNOSIS — M50.120 CERVICAL DISC DISORDER WITH RADICULOPATHY OF MID-CERVICAL REGION: ICD-10-CM

## 2021-09-20 DIAGNOSIS — G89.4 CHRONIC PAIN SYNDROME: Primary | ICD-10-CM

## 2021-09-20 PROCEDURE — 99214 OFFICE O/P EST MOD 30 MIN: CPT | Performed by: NURSE PRACTITIONER

## 2021-09-20 RX ORDER — GABAPENTIN 600 MG/1
600 TABLET ORAL 3 TIMES DAILY
Qty: 90 TABLET | Refills: 1 | Status: SHIPPED | OUTPATIENT
Start: 2021-09-20 | End: 2021-11-15 | Stop reason: SDUPTHER

## 2021-09-20 RX ORDER — OXYCODONE HYDROCHLORIDE AND ACETAMINOPHEN 5; 325 MG/1; MG/1
1 TABLET ORAL 2 TIMES DAILY PRN
Qty: 60 TABLET | Refills: 0 | Status: SHIPPED | OUTPATIENT
Start: 2021-10-22 | End: 2021-11-15 | Stop reason: SDUPTHER

## 2021-09-20 RX ORDER — OXYCODONE HYDROCHLORIDE AND ACETAMINOPHEN 5; 325 MG/1; MG/1
1 TABLET ORAL 2 TIMES DAILY PRN
Qty: 60 TABLET | Refills: 0 | Status: SHIPPED | OUTPATIENT
Start: 2021-09-22 | End: 2021-11-15 | Stop reason: SDUPTHER

## 2021-09-20 RX ORDER — CYCLOBENZAPRINE HCL 10 MG
10 TABLET ORAL 3 TIMES DAILY PRN
Qty: 90 TABLET | Refills: 1 | Status: SHIPPED | OUTPATIENT
Start: 2021-09-20 | End: 2021-11-15 | Stop reason: SDUPTHER

## 2021-09-20 NOTE — PROGRESS NOTES
Pain Medicine Follow-Up Note    Assessment:  1  Chronic pain syndrome    2  Neck pain    3  Cervical disc disorder with radiculopathy of mid-cervical region        Plan:  No orders of the defined types were placed in this encounter  New Medications Ordered This Visit   Medications    oxyCODONE-acetaminophen (PERCOCET) 5-325 mg per tablet     Sig: Take 1 tablet by mouth 2 (two) times a day as needed for severe painMax Daily Amount: 2 tablets     Dispense:  60 tablet     Refill:  0    oxyCODONE-acetaminophen (PERCOCET) 5-325 mg per tablet     Sig: Take 1 tablet by mouth 2 (two) times a day as needed for severe painMax Daily Amount: 2 tablets     Dispense:  60 tablet     Refill:  0    cyclobenzaprine (FLEXERIL) 10 mg tablet     Sig: Take 1 tablet (10 mg total) by mouth 3 (three) times a day as needed for muscle spasms     Dispense:  90 tablet     Refill:  1    gabapentin (NEURONTIN) 600 MG tablet     Sig: Take 1 tablet (600 mg total) by mouth 3 (three) times a day     Dispense:  90 tablet     Refill:  1       My impressions and treatment recommendations were discussed in detail with the patient who verbalized understanding and had no further questions  Given that the patient reports overall reduced pain and improved level of functioning without significant side effects, I felt that it was reasonable to continue her on gabapentin 600 mg 3 times a day, cyclobenzaprine 10 mg up to 3 times a day as needed for muscle spasms and oxycodone/acetaminophen 5/325 mg 1 tablet up to twice daily as needed for pain  I sent the prescriptions to the pharmacy with fill dates of 09/22/2021 and 10/22/2021  Patient will also schedule trigger point injections of the bilateral upper trapezius muscles  [place] Prescription Drug Monitoring Program report was reviewed and was appropriate       There are risks associated with opioid medications, including dependence, addiction and tolerance   The patient understands and agrees to use these medications only as prescribed  Potential side effects of the medications include, but are not limited to, constipation, drowsiness, addiction, impaired judgment and risk of fatal overdose if not taken as prescribed  The patient was warned against driving while taking sedation medications  Sharing medications is a felony  At this point in time, the patient is showing no signs of addiction, abuse, diversion or suicidal ideation  Follow-up is planned in   Eight weeks time or sooner as warranted  Discharge instructions were provided  I personally saw and examined the patient and I agree with the above discussed plan of care  History of Present Illness:    Jarocho Garcia is a 48 y o  female who presents to [facility] for interval re-evaluation of the above stated pain complaints  The patient has a past medical and chronic pain history as outlined in the assessment section  She was last seen on  07/22/2021  she reports a pain score today of 8/10 that she states is constant to some degree and worse in the morning  She describes the quality as sharp, throbbing, pressure-like, shooting with numbness and loss of feeling in her right arm and hand  She states that she has right hand weakness and numbness with burning and pins and needles  She says that she sleeps in a recliner because she cannot lay flat and that when she sleeps at night both her arms and hands go numb  She states that during the day the left-hand stays numb with burning pain but the right hand does not     She states that her current medication regimen helps 30-40% on most days  She recently had an accident where she fell over the gait that was keeping puppies contained  She states that her dog has recently had 11 puppies and they were all at her feet 1 day and she tripped and fell and hurt herself to the point where she has been experiencing increased pain ever since      Pain Contract Signed: 09/20/2021  Last Urine Drug Screen:   07/22/2021    Other than as stated above, the patient denies any interval changes in medications, medical condition, mental condition, symptoms, or allergies since the last office visit  Review of Systems:    Review of Systems   Respiratory: Negative for shortness of breath  Cardiovascular: Negative for chest pain  Gastrointestinal: Negative for constipation, diarrhea, nausea and vomiting  Musculoskeletal: Positive for gait problem  Negative for arthralgias, joint swelling and myalgias  Decreased ROM  Muscle weakness  Joint stiffness  Pain in right arm and hand   Skin: Negative for rash  Neurological: Negative for dizziness, seizures and weakness  All other systems reviewed and are negative          Patient Active Problem List   Diagnosis    Chronic pain syndrome    Acute muscle stiffness of neck    Cervical disc disorder with radiculopathy of mid-cervical region    Neck pain    Chronic left-sided low back pain with left-sided sciatica    Lumbar radiculopathy    Cervicalgia    Right hip pain    Trochanteric bursitis of right hip    Pain in right hip    Myofascial pain syndrome       Past Medical History:   Diagnosis Date    Anxiety     Asthma     exercise induced asthma    Cervical disc disorder     Chronic pain     Chronic pain disorder     Depression     Dysuria     Low back pain     Lung abnormality     nodules    Lung nodule     Neck pain     Peripheral neuropathy     Pituitary abnormality (HCC)     tumor    Right hip pain 10/29/2019    Thrombocytopenia ([facility] [address] )     Thyroid disease        Past Surgical History:   Procedure Laterality Date    APPENDECTOMY      BREAST BIOPSY Right 1995    CHOLECYSTECTOMY      COLECTOMY      COLON SURGERY      EPIDURAL BLOCK INJECTION N/A 6/23/2016    Procedure: BLOCK / INJECTION EPIDURAL STEROID CERVICAL  C7-T1;  Surgeon: Stacia Owusu MD;  Location: [place] [address] MAIN OR;  Service:    Nuzhat Cortez EPIDURAL BLOCK INJECTION N/A 3/31/2016    Procedure: BLOCK / INJECTION EPIDURAL STEROID CERVICAL C7-T1  (C-ARM); Surgeon: Prakash Irving MD;  Location: [facility] MAIN OR;  Service:     EPIDURAL BLOCK INJECTION N/A 8/8/2018    Procedure: C6 C7 Cervical Epidural Steroid Injection (02944); Surgeon: Prakash Irving MD;  Location: [facility] OR;  Service: Pain Management     HYSTERECTOMY  1996    OOPHORECTOMY Bilateral 1996    PITUITARY SURGERY      OH ARTHROCENTESIS ASPIR&/INJ MAJOR JT/BURSA W/O US Right 11/8/2019    Procedure: Trochanteric Bursa Injection (14714); Surgeon: Prakash Irving MD;  Location: [facility] MAIN OR;  Service: Pain Management     OH ARTHROCENTESIS ASPIR&/INJ MAJOR JT/BURSA W/O US Right 1/23/2020    Procedure: Trochanteric Bursa Injection (86035); Surgeon: Prakash Irving MD;  Location: [facility] MAIN OR;  Service: Pain Management     OH NJX DX/THER SBST EPIDURAL/SUBRACH CERV/THORACIC N/A 1/21/2016    Procedure: BLOCK / INJECTION EPIDURAL STEROID CERVICAL C7-T1 (C-ARM);   Surgeon: Prakash Irving MD;  Location: [facility] OR;  Service: Pain Management     REDUCTION MAMMAPLASTY Bilateral 2000       Family History   Problem Relation Age of Onset    Thyroid disease Mother     Hyperlipidemia Mother     Depression Mother     Thyroid disease Father     Hyperlipidemia Father     Depression Father     Ovarian cancer Sister 39    No Known Problems Brother     No Known Problems Maternal Uncle     No Known Problems Paternal Aunt     No Known Problems Paternal Uncle     No Known Problems Maternal Grandmother     No Known Problems Maternal Grandfather     Breast cancer Paternal Grandmother 36    No Known Problems Paternal Grandfather     No Known Problems Daughter     No Known Problems Son     ADD / ADHD Neg Hx     Anesthesia problems Neg Hx     Cancer Neg Hx     Clotting disorder Neg Hx     Collagen disease Neg Hx     Diabetes Neg Hx     Dislocations Neg Hx     Learning disabilities Neg Hx     Neurological problems Neg Hx     Osteoporosis Neg Hx     Rheumatologic disease Neg Hx     Scoliosis Neg Hx     Vascular Disease Neg Hx        Social History     Occupational History    Not on file   Tobacco Use    Smoking status: Never Smoker    Smokeless tobacco: Never Used   Vaping Use    Vaping Use: Never used   Substance and Sexual Activity    Alcohol use: No    Drug use: No    Sexual activity: Not on file         Current Outpatient Medications:     albuterol (VENTOLIN HFA) 90 mcg/act inhaler, Inhale, Disp: , Rfl:     aspirin 81 MG tablet, Take 81 mg by mouth daily  , Disp: , Rfl:     cholecalciferol (VITAMIN D3) 1,000 units tablet, Take 2 tablets by mouth daily, Disp: , Rfl:     cyclobenzaprine (FLEXERIL) 10 mg tablet, Take 1 tablet (10 mg total) by mouth 3 (three) times a day as needed for muscle spasms, Disp: 90 tablet, Rfl: 1    diclofenac sodium (VOLTAREN) 1 %, Apply 2 g topically 4 (four) times a day as needed (pain), Disp: 100 g, Rfl: 0    gabapentin (NEURONTIN) 600 MG tablet, Take 1 tablet (600 mg total) by mouth 3 (three) times a day, Disp: 90 tablet, Rfl: 1    hydrocortisone (ANUSOL-HC) 25 mg suppository, , Disp: , Rfl: 0    ibuprofen (MOTRIN) 600 mg tablet, Take 1 tablet (600 mg total) by mouth every 6 (six) hours as needed for mild pain, Disp: 30 tablet, Rfl: 0    levothyroxine 50 mcg tablet, , Disp: , Rfl:     metoprolol succinate (TOPROL-XL) 25 mg 24 hr tablet, , Disp: , Rfl:     mupirocin (BACTROBAN) 2 % ointment, , Disp: , Rfl: 0    [START ON 10/22/2021] oxyCODONE-acetaminophen (PERCOCET) 5-325 mg per tablet, Take 1 tablet by mouth 2 (two) times a day as needed for severe painMax Daily Amount: 2 tablets, Disp: 60 tablet, Rfl: 0    [START ON 9/22/2021] oxyCODONE-acetaminophen (PERCOCET) 5-325 mg per tablet, Take 1 tablet by mouth 2 (two) times a day as needed for severe painMax Daily Amount: 2 tablets, Disp: 60 tablet, Rfl: 0    traZODone (DESYREL) 50 mg tablet, , Disp: , Rfl: 0    Allergies   Allergen Reactions    Levaquin [Levofloxacin In D5w] Shortness Of Breath     Also hives      Amitiza [Lubiprostone] Other (See Comments) and Hives     Reaction Date: 10Aug2011;   Migraines and vomiting    Biaxin [Clarithromycin] Hives    Cephradine     Erythromycin Hives     Reaction Date: 10Aug2011;    Brendan Puente [Nitrofurantoin Monohyd Macro] Other (See Comments)     C/o passing out    Morphine     Penicillins Hives     Reaction Date: 10Aug2011;     Sulfa Antibiotics     Tetracycline     Tetracyclines & Related Hives    Morphine And Related Rash    Valium [Diazepam] Rash and Vomiting     Reaction Date: 14Jun2012;        Physical Exam:    /74   Pulse 80   Ht 5' 5" (1 651 m)   Wt 87 5 kg (193 lb)   BMI 32 12 kg/m²     Constitutional:overweight  Eyes:anicteric  HEENT:grossly intact  Neck:supple, symmetric, trachea midline and no masses   Pulmonary:even and unlabored  Cardiovascular:No edema or pitting edema present  Skin:Normal without rashes or lesions and well hydrated  Psychiatric:Mood and affect appropriate  Neurologic:Cranial Nerves II-XII grossly intact  Musculoskeletal:antalgic      Imaging  No orders to display         No orders of the defined types were placed in this encounter

## 2021-09-20 NOTE — PATIENT INSTRUCTIONS
Opioid Safety   AMBULATORY CARE:   Opioid safety  includes the correct use, storage, and disposal of opioids  Examples of opioid medicines to treat pain include oxycodone, morphine, fentanyl, and codeine  Call your local emergency number (911 in the [address]), or have someone else call if:   · You have a seizure  · You cannot be woken  · You have trouble staying awake and your breathing is slow or shallow  · Your speech is slurred, or you are confused  · You are dizzy or stumble when you walk  Call your doctor, or have someone close to you call if:   · You are extremely drowsy, or you have trouble staying awake or speaking  · You have pale or clammy skin  · You have blue fingernails or lips  · Your heartbeat is slower than normal     · You cannot stop vomiting  · You have questions or concerns about your condition or care  Use opioids safely:   · Take prescribed opioids exactly as directed  Opioids come with directions based on the kind of opioid and how it is given  Do not take more than the recommended amount, or for longer than needed  · Do not give opioids to others or take opioids that belong to someone else  Misuse of opioids can lead to an addiction or overdose  · Do not mix opioids with other medicines or alcohol  The combination can cause an overdose, or lead to a coma  · Do not drive or operate heavy machinery after you take the opioid  Your provider or pharmacist can tell you how long to wait after a dose before you do these activities  · Talk to your healthcare provider if you have any side effects  He or she can help you prevent or relieve side effects  Side effects include nausea, sleepiness, itching, and trouble thinking clearly  Manage constipation:  Constipation is the most common side effect of opioid medicine  Constipation is when you have hard, dry bowel movements, or you go longer than usual between bowel movements   Tell your healthcare provider about all changes in your bowel movements while you are taking opioids  He or she may recommend laxative medicine to help you have a bowel movement  He or she may also change the kind of opioid you are taking, or change when you take it  The following are more ways you can prevent or relieve constipation:  · Drink liquids as directed  You may need to drink extra liquids to help soften and move your bowels  Ask how much liquid to drink each day and which liquids are best for you  · Eat high-fiber foods  This may help decrease constipation by adding bulk to your bowel movements  High-fiber foods include fruits, vegetables, whole-grain breads and cereals, and beans  Your healthcare provider or dietitian can help you create a high-fiber meal plan  Your provider may also recommend a fiber supplement if you cannot get enough fiber from food  · Exercise regularly  Regular physical activity can help stimulate your intestines  Walking is a good exercise to prevent or relieve constipation  Ask which exercises are best for you  · Schedule a time each day to have a bowel movement  This may help train your body to have regular bowel movements  Bend forward while you are on the toilet to help move the bowel movement out  Sit on the toilet for at least 10 minutes, even if you do not have a bowel movement  Store opioids safely:   · Store opioids where others cannot easily get them  Keep them in a locked cabinet or secure area  Do not  keep them in a purse or other bag you carry with you  A person may be looking for something else and find the opioids  · Make sure opioids are stored out of the reach of children  A child can easily overdose on opioids  Opioids may look like candy to a small child  The best way to dispose of opioids: The laws vary by country and area   In the [place], the best way is to return the opioids through a take-back program  This program is offered by the [place] [employer] Agency ([address])  The following are options for using the program:  · Take the opioids to a NIDA collection site  The site is often a law enforcement center  Call your local law enforcement center for scheduled take-back days in your area  You will be given information on where to go if the collection site is in a different location  · Take the opioids to an approved pharmacy or hospital   A pharmacy or hospital may be set up as a collection site  You will need to ask if it is a NIDA collection site if you were not directed there  A pharmacy or doctor's office may not be able to take back opioids unless it is a NIDA site  · Use a mail-back system  This means you are given containers to put the opioids into  You will then mail them in the containers  · Use a take-back drop box  This is a place to leave the opioids at any time  People and animals will not be able to get into the box  Your local law enforcement agency can tell you where to find a drop box in your area  Other safe ways to dispose of opioids: The medicine may come with disposal instructions  The instructions may vary depending on the brand of medicine you are using  Instructions may come in a Medication Guide, but not every medicine has one  You may instead get instructions from your pharmacy or doctor  Follow instructions carefully  The following are general guidelines to follow:  · Find out if you can flush the opioid  Some opioids can be flushed down the toilet or poured into the sink  You will need to contact authorities in your area to see if this is an option for you  The FDA also offers a list of medicines that are safe to flush down the toilet  You can check the list if you cannot get the information for your local area  · Ask your waste management company about rules for putting opioids in the trash  The company will be able to give you specific directions   Scratch out personal information on the original medicine label so it cannot be read  Then put it in the trash  Do not label the trash or put any information on it about the opioids  It should look like regular household trash so no one is tempted to look for the opioids  Keep the trash out of the reach of children and animals  Always make sure trash is secure  · Talk to officials if you live in a facility  If you live in a nursing home or assisted living center, talk to an official  The person will know the rules for your area  Other ways to manage pain:   · Ask your healthcare provider about non-opioid medicines to control pain  Nonprescription medicines include NSAIDs (such as ibuprofen) and acetaminophen  Prescription medicines include muscle relaxers, antidepressants, and steroids  · Pain may be managed without any medicines  Some ways to relieve pain include massage, aromatherapy, or meditation  Physical or occupational therapy may also help  For more information:   · Drug Enforcement Administration  [facility] [facility] [address]  Phone: 4- 557 - [phone]  Web Address: [facility]/drug_disposal/    · [address]  and Drug Administration  [facility]  [place] , [address] [place] [address]  Phone: 2- 954 - 224-4326  Web Address: http://[employer]/  Follow up with your doctor or pain specialist as directed: You may need to have your dose adjusted  Your doctor or pain specialist can also help you find ways to manage pain without opioids  Write down your questions so you remember to ask them during your visits  © Copyright [employer] 2021 Information is for End User's use only and may not be sold, redistributed or otherwise used for commercial purposes  All illustrations and images included in CareNotes® are the copyrighted property of A D A M , Inc  or Aki Espinoza   The above information is an  only  It is not intended as medical advice for individual conditions or treatments   Talk to your doctor, nurse or pharmacist before following any medical regimen to see if it is safe and effective for you

## 2021-09-21 ENCOUNTER — TELEPHONE (OUTPATIENT)
Dept: PAIN MEDICINE | Facility: CLINIC | Age: 56
End: 2021-09-21

## 2021-09-21 NOTE — TELEPHONE ENCOUNTER
----- Message from Kirk Subramanian sent at 9/20/2021  2:54 PM EDT -----  Regarding: TPI  Please schedule this patient for upper trapezius trigger point injections   If you're not the person I should be asking, please let me know who I should send this task to  :-)    Thanks,  Racheal Chapa

## 2021-09-21 NOTE — TELEPHONE ENCOUNTER
Left message for patient to call me back directly to schedule procedure upper trapezius trigger point injections  at [phone]

## 2021-09-23 NOTE — TELEPHONE ENCOUNTER
Scheduled pt for upper trapezius trigger point injections for 10/4/21 with Sera   Please obtain auth for [address]

## 2021-10-04 ENCOUNTER — PROCEDURE VISIT (OUTPATIENT)
Dept: PAIN MEDICINE | Facility: CLINIC | Age: 56
End: 2021-10-04
Payer: COMMERCIAL

## 2021-10-04 DIAGNOSIS — M62.830 SPASM OF BACK MUSCLES: ICD-10-CM

## 2021-10-04 DIAGNOSIS — M54.2 NECK PAIN: ICD-10-CM

## 2021-10-04 DIAGNOSIS — M79.18 MYOFASCIAL PAIN SYNDROME: Primary | ICD-10-CM

## 2021-10-04 PROCEDURE — 20552 NJX 1/MLT TRIGGER POINT 1/2: CPT | Performed by: NURSE PRACTITIONER

## 2021-10-04 RX ORDER — LIDOCAINE HYDROCHLORIDE 10 MG/ML
5 INJECTION, SOLUTION EPIDURAL; INFILTRATION; INTRACAUDAL; PERINEURAL ONCE
Status: COMPLETED | OUTPATIENT
Start: 2021-10-04 | End: 2021-10-04

## 2021-10-04 RX ORDER — METHYLPREDNISOLONE ACETATE 40 MG/ML
40 INJECTION, SUSPENSION INTRA-ARTICULAR; INTRALESIONAL; INTRAMUSCULAR; SOFT TISSUE ONCE
Status: COMPLETED | OUTPATIENT
Start: 2021-10-04 | End: 2021-10-04

## 2021-10-04 RX ADMIN — LIDOCAINE HYDROCHLORIDE 5 ML: 10 INJECTION, SOLUTION EPIDURAL; INFILTRATION; INTRACAUDAL; PERINEURAL at 15:49

## 2021-10-04 RX ADMIN — METHYLPREDNISOLONE ACETATE 40 MG: 40 INJECTION, SUSPENSION INTRA-ARTICULAR; INTRALESIONAL; INTRAMUSCULAR; SOFT TISSUE at 15:49

## 2021-10-04 RX ADMIN — LIDOCAINE HYDROCHLORIDE 5 ML: 10 INJECTION, SOLUTION EPIDURAL; INFILTRATION; INTRACAUDAL; PERINEURAL at 15:48

## 2021-10-11 ENCOUNTER — CONSULT (OUTPATIENT)
Dept: NEUROSURGERY | Facility: CLINIC | Age: 56
End: 2021-10-11
Payer: COMMERCIAL

## 2021-10-11 VITALS
HEIGHT: 65 IN | TEMPERATURE: 98.3 F | DIASTOLIC BLOOD PRESSURE: 80 MMHG | SYSTOLIC BLOOD PRESSURE: 120 MMHG | BODY MASS INDEX: 31.82 KG/M2 | WEIGHT: 191 LBS

## 2021-10-11 DIAGNOSIS — M79.7 FIBROMYALGIA AFFECTING MULTIPLE SITES: ICD-10-CM

## 2021-10-11 DIAGNOSIS — M48.02 CERVICAL SPINAL STENOSIS: ICD-10-CM

## 2021-10-11 DIAGNOSIS — M54.12 CERVICAL RADICULOPATHY: ICD-10-CM

## 2021-10-11 DIAGNOSIS — M50.30 DDD (DEGENERATIVE DISC DISEASE), CERVICAL: ICD-10-CM

## 2021-10-11 DIAGNOSIS — M79.18 MYOFASCIAL PAIN DYSFUNCTION SYNDROME: Primary | ICD-10-CM

## 2021-10-11 PROCEDURE — 99203 OFFICE O/P NEW LOW 30 MIN: CPT | Performed by: STUDENT IN AN ORGANIZED HEALTH CARE EDUCATION/TRAINING PROGRAM

## 2021-11-15 ENCOUNTER — OFFICE VISIT (OUTPATIENT)
Dept: PAIN MEDICINE | Facility: CLINIC | Age: 56
End: 2021-11-15
Payer: COMMERCIAL

## 2021-11-15 VITALS — SYSTOLIC BLOOD PRESSURE: 108 MMHG | HEART RATE: 80 BPM | DIASTOLIC BLOOD PRESSURE: 74 MMHG

## 2021-11-15 DIAGNOSIS — M51.24 THORACIC DISC HERNIATION: ICD-10-CM

## 2021-11-15 DIAGNOSIS — M54.2 CERVICALGIA: ICD-10-CM

## 2021-11-15 DIAGNOSIS — M50.120 CERVICAL DISC DISORDER WITH RADICULOPATHY OF MID-CERVICAL REGION: ICD-10-CM

## 2021-11-15 DIAGNOSIS — G89.29 CHRONIC BILATERAL THORACIC BACK PAIN: Primary | ICD-10-CM

## 2021-11-15 DIAGNOSIS — M79.18 MYOFASCIAL PAIN SYNDROME: ICD-10-CM

## 2021-11-15 DIAGNOSIS — Z79.891 LONG-TERM CURRENT USE OF OPIATE ANALGESIC: ICD-10-CM

## 2021-11-15 DIAGNOSIS — G89.4 CHRONIC PAIN SYNDROME: ICD-10-CM

## 2021-11-15 DIAGNOSIS — F11.20 UNCOMPLICATED OPIOID DEPENDENCE (HCC): ICD-10-CM

## 2021-11-15 DIAGNOSIS — M54.2 NECK PAIN: ICD-10-CM

## 2021-11-15 DIAGNOSIS — M54.6 CHRONIC BILATERAL THORACIC BACK PAIN: Primary | ICD-10-CM

## 2021-11-15 PROCEDURE — 99214 OFFICE O/P EST MOD 30 MIN: CPT | Performed by: NURSE PRACTITIONER

## 2021-11-15 PROCEDURE — 80305 DRUG TEST PRSMV DIR OPT OBS: CPT | Performed by: NURSE PRACTITIONER

## 2021-11-15 RX ORDER — GABAPENTIN 600 MG/1
600 TABLET ORAL 3 TIMES DAILY
Qty: 90 TABLET | Refills: 1 | Status: SHIPPED | OUTPATIENT
Start: 2021-11-15 | End: 2022-01-17 | Stop reason: SDUPTHER

## 2021-11-15 RX ORDER — OXYCODONE HYDROCHLORIDE AND ACETAMINOPHEN 5; 325 MG/1; MG/1
1 TABLET ORAL 2 TIMES DAILY PRN
Qty: 60 TABLET | Refills: 0 | Status: SHIPPED | OUTPATIENT
Start: 2021-11-21 | End: 2022-01-17 | Stop reason: ALTCHOICE

## 2021-11-15 RX ORDER — CYCLOBENZAPRINE HCL 10 MG
10 TABLET ORAL 3 TIMES DAILY PRN
Qty: 90 TABLET | Refills: 1 | Status: SHIPPED | OUTPATIENT
Start: 2021-11-15 | End: 2022-01-17 | Stop reason: SDUPTHER

## 2021-11-15 RX ORDER — OXYCODONE HYDROCHLORIDE AND ACETAMINOPHEN 5; 325 MG/1; MG/1
1 TABLET ORAL 2 TIMES DAILY PRN
Qty: 60 TABLET | Refills: 0 | Status: SHIPPED | OUTPATIENT
Start: 2021-12-21 | End: 2022-01-17 | Stop reason: ALTCHOICE

## 2021-11-16 ENCOUNTER — TELEPHONE (OUTPATIENT)
Dept: PAIN MEDICINE | Facility: CLINIC | Age: 56
End: 2021-11-16

## 2021-11-17 LAB
6MAM UR QL CFM: NEGATIVE NG/ML
7AMINOCLONAZEPAM UR QL CFM: NEGATIVE NG/ML
A-OH ALPRAZ UR QL CFM: NEGATIVE NG/ML
AMPHET UR QL CFM: NEGATIVE NG/ML
AMPHET UR QL CFM: NEGATIVE NG/ML
BUPRENORPHINE UR QL CFM: NEGATIVE NG/ML
BUTALBITAL UR QL CFM: NEGATIVE NG/ML
BZE UR QL CFM: NEGATIVE NG/ML
CODEINE UR QL CFM: NEGATIVE NG/ML
DESIPRAMINE UR QL CFM: NEGATIVE NG/ML
DESIPRAMINE UR QL CFM: NEGATIVE NG/ML
EDDP UR QL CFM: NEGATIVE NG/ML
ETHYL GLUCURONIDE UR QL CFM: ABNORMAL NG/ML
ETHYL SULFATE UR QL SCN: NEGATIVE NG/ML
FENTANYL UR QL CFM: NEGATIVE NG/ML
GLIADIN IGG SER IA-ACNC: NEGATIVE NG/ML
GLUCOSE 30M P 50 G LAC PO SERPL-MCNC: NEGATIVE NG/ML
HYDROCODONE UR QL CFM: NEGATIVE NG/ML
HYDROCODONE UR QL CFM: NEGATIVE NG/ML
HYDROMORPHONE UR QL CFM: NEGATIVE NG/ML
IMIPRAMINE UR QL CFM: NEGATIVE NG/ML
LORAZEPAM UR QL CFM: NEGATIVE NG/ML
MDMA UR QL CFM: NEGATIVE NG/ML
ME-PHENIDATE UR QL CFM: NEGATIVE NG/ML
MEPERIDINE UR QL CFM: NEGATIVE NG/ML
MEPHEDRONE UR QL CFM: NEGATIVE NG/ML
METHADONE UR QL CFM: NEGATIVE NG/ML
METHAMPHET UR QL CFM: NEGATIVE NG/ML
MORPHINE UR QL CFM: NEGATIVE NG/ML
MORPHINE UR QL CFM: NEGATIVE NG/ML
NORBUPRENORPHINE UR QL CFM: NEGATIVE NG/ML
NORDIAZEPAM UR QL CFM: NEGATIVE NG/ML
NORFENTANYL UR QL CFM: NEGATIVE NG/ML
NORHYDROCODONE UR QL CFM: NEGATIVE NG/ML
NORHYDROCODONE UR QL CFM: NEGATIVE NG/ML
NORMEPERIDINE UR QL CFM: NEGATIVE NG/ML
NOROXYCODONE UR QL CFM: ABNORMAL NG/ML
OPC-3373 QUANTIFICATION: NEGATIVE
OXAZEPAM UR QL CFM: NEGATIVE NG/ML
OXYCODONE UR QL CFM: ABNORMAL NG/ML
OXYMORPHONE UR QL CFM: ABNORMAL NG/ML
OXYMORPHONE UR QL CFM: ABNORMAL NG/ML
PCP UR QL CFM: NEGATIVE NG/ML
PHENOBARB UR QL CFM: NEGATIVE NG/ML
SECOBARBITAL UR QL CFM: NEGATIVE NG/ML
SL AMB 3-METHYL-FENTANYL QUANTIFICATION: NORMAL NG/ML
SL AMB 4-ANPP QUANTIFICATION: NORMAL NG/ML
SL AMB 4-FIBF QUANTIFICATION: NORMAL NG/ML
SL AMB 5F-ADB-M7 METABOLITE QUANTIFICATION: NEGATIVE NG/ML
SL AMB 7-OH-MITRAGYNINE (KRATOM ALKALOID) QUANTIFICATION: NEGATIVE NG/ML
SL AMB AB-FUBINACA-M3 METABOLITE QUANTIFICATION: NEGATIVE NG/ML
SL AMB ACETYL FENTANYL QUANTIFICATION: NORMAL NG/ML
SL AMB ACETYL NORFENTANYL QUANTIFICATION: NORMAL NG/ML
SL AMB ACRYL FENTANYL QUANTIFICATION: NORMAL NG/ML
SL AMB BATH SALTS: NEGATIVE NG/ML
SL AMB BUTRYL FENTANYL QUANTIFICATION: NORMAL NG/ML
SL AMB CARFENTANIL QUANTIFICATION: NORMAL NG/ML
SL AMB CLOZAPINE QUANTIFICATION: NEGATIVE NG/ML
SL AMB CTHC (MARIJUANA METABOLITE) QUANTIFICATION: NEGATIVE NG/ML
SL AMB CYCLOPROPYL FENTANYL QUANTIFICATION: NORMAL NG/ML
SL AMB DEXTROMETHORPHAN QUANTIFICATION: NEGATIVE NG/ML
SL AMB DEXTRORPHAN (DEXTROMETHORPHAN METABOLITE) QUANT: NEGATIVE NG/ML
SL AMB DEXTRORPHAN (DEXTROMETHORPHAN METABOLITE) QUANT: NEGATIVE NG/ML
SL AMB FURANYL FENTANYL QUANTIFICATION: NORMAL NG/ML
SL AMB HALOPERIDOL  QUANTIFICATION: NEGATIVE NG/ML
SL AMB HALOPERIDOL METABOLITE QUANTIFICATION: NEGATIVE NG/ML
SL AMB JWH018 METABOLITE QUANTIFICATION: NEGATIVE NG/ML
SL AMB JWH073 METABOLITE QUANTIFICATION: NEGATIVE NG/ML
SL AMB MDMB-FUBINACA-M1 METABOLITE QUANTIFICATION: NEGATIVE NG/ML
SL AMB METHOXYACETYL FENTANYL QUANTIFICATION: NORMAL NG/ML
SL AMB METHYLONE QUANTIFICATION: NEGATIVE NG/ML
SL AMB N-DESMETHYL U-47700 QUANTIFICATION: NORMAL NG/ML
SL AMB N-DESMETHYL-TRAMADOL QUANTIFICATION: NEGATIVE NG/ML
SL AMB N-DESMETHYLCLOZAPINE QUANTIFICATION: NEGATIVE NG/ML
SL AMB PHENTERMINE QUANTIFICATION: NEGATIVE NG/ML
SL AMB RCS4 METABOLITE QUANTIFICATION: NEGATIVE NG/ML
SL AMB RITALINIC ACID QUANTIFICATION: NEGATIVE NG/ML
SL AMB U-47700 QUANTIFICATION: NORMAL NG/ML
SPECIMEN DRAWN SERPL: NEGATIVE NG/ML
TAPENTADOL UR QL CFM: NEGATIVE NG/ML
TEMAZEPAM UR QL CFM: NEGATIVE NG/ML
TEMAZEPAM UR QL CFM: NEGATIVE NG/ML
TRAMADOL UR QL CFM: NEGATIVE NG/ML
URATE/CREAT 24H UR: NEGATIVE NG/ML

## 2021-11-18 ENCOUNTER — TELEPHONE (OUTPATIENT)
Dept: RADIOLOGY | Facility: CLINIC | Age: 56
End: 2021-11-18

## 2021-12-09 ENCOUNTER — HOSPITAL ENCOUNTER (OUTPATIENT)
Dept: MRI IMAGING | Facility: HOSPITAL | Age: 56
Discharge: HOME | End: 2021-12-09
Payer: COMMERCIAL

## 2021-12-09 DIAGNOSIS — M54.6 CHRONIC BILATERAL THORACIC BACK PAIN: ICD-10-CM

## 2021-12-09 DIAGNOSIS — G89.29 CHRONIC BILATERAL THORACIC BACK PAIN: ICD-10-CM

## 2021-12-09 PROCEDURE — 72146 MRI CHEST SPINE W/O DYE: CPT

## 2021-12-09 PROCEDURE — G1004 CDSM NDSC: HCPCS

## 2021-12-10 ENCOUNTER — TELEPHONE (OUTPATIENT)
Dept: PAIN MEDICINE | Facility: CLINIC | Age: 56
End: 2021-12-10

## 2021-12-16 ENCOUNTER — HOSPITAL ENCOUNTER (OUTPATIENT)
Facility: AMBULARY SURGERY CENTER | Age: 56
Setting detail: OUTPATIENT SURGERY
Discharge: HOME | End: 2021-12-16
Attending: ANESTHESIOLOGY | Admitting: ANESTHESIOLOGY
Payer: COMMERCIAL

## 2021-12-16 ENCOUNTER — APPOINTMENT (OUTPATIENT)
Dept: RADIOLOGY | Facility: HOSPITAL | Age: 56
End: 2021-12-16
Payer: COMMERCIAL

## 2021-12-16 VITALS — SYSTOLIC BLOOD PRESSURE: 119 MMHG | RESPIRATION RATE: 18 BRPM | DIASTOLIC BLOOD PRESSURE: 79 MMHG | HEART RATE: 69 BPM

## 2021-12-16 PROCEDURE — 72070 X-RAY EXAM THORAC SPINE 2VWS: CPT

## 2021-12-16 PROCEDURE — 62321 NJX INTERLAMINAR CRV/THRC: CPT | Performed by: ANESTHESIOLOGY

## 2021-12-16 RX ORDER — LIDOCAINE WITH 8.4% SOD BICARB 0.9%(10ML)
SYRINGE (ML) INJECTION AS NEEDED
Status: DISCONTINUED | OUTPATIENT
Start: 2021-12-16 | End: 2021-12-16 | Stop reason: HOSPADM

## 2021-12-16 RX ORDER — METHYLPREDNISOLONE ACETATE 80 MG/ML
INJECTION, SUSPENSION INTRA-ARTICULAR; INTRALESIONAL; INTRAMUSCULAR; SOFT TISSUE AS NEEDED
Status: DISCONTINUED | OUTPATIENT
Start: 2021-12-16 | End: 2021-12-16 | Stop reason: HOSPADM

## 2021-12-23 ENCOUNTER — TELEPHONE (OUTPATIENT)
Dept: PAIN MEDICINE | Facility: CLINIC | Age: 56
End: 2021-12-23

## 2022-01-10 NOTE — TELEPHONE ENCOUNTER
Pt called stating that her  is COVID positive and is asking for a virtual appmt for today- thank you      [phone]

## 2022-01-17 ENCOUNTER — OFFICE VISIT (OUTPATIENT)
Dept: PAIN MEDICINE | Facility: CLINIC | Age: 57
End: 2022-01-17
Payer: COMMERCIAL

## 2022-01-17 VITALS
DIASTOLIC BLOOD PRESSURE: 67 MMHG | HEART RATE: 80 BPM | SYSTOLIC BLOOD PRESSURE: 103 MMHG | BODY MASS INDEX: 31.82 KG/M2 | WEIGHT: 191 LBS | HEIGHT: 65 IN

## 2022-01-17 DIAGNOSIS — G89.4 CHRONIC PAIN SYNDROME: Primary | ICD-10-CM

## 2022-01-17 DIAGNOSIS — M54.2 NECK PAIN: ICD-10-CM

## 2022-01-17 DIAGNOSIS — M50.120 CERVICAL DISC DISORDER WITH RADICULOPATHY OF MID-CERVICAL REGION: ICD-10-CM

## 2022-01-17 DIAGNOSIS — M79.18 MYOFASCIAL PAIN SYNDROME: ICD-10-CM

## 2022-01-17 PROCEDURE — 99214 OFFICE O/P EST MOD 30 MIN: CPT | Performed by: NURSE PRACTITIONER

## 2022-01-17 RX ORDER — OXYCODONE HYDROCHLORIDE AND ACETAMINOPHEN 5; 325 MG/1; MG/1
TABLET ORAL
Qty: 60 TABLET | Refills: 0 | Status: SHIPPED | OUTPATIENT
Start: 2022-02-19 | End: 2022-03-21 | Stop reason: SDUPTHER

## 2022-01-17 RX ORDER — OXYCODONE HYDROCHLORIDE AND ACETAMINOPHEN 5; 325 MG/1; MG/1
TABLET ORAL
Qty: 60 TABLET | Refills: 0 | Status: SHIPPED | OUTPATIENT
Start: 2022-01-20 | End: 2022-03-21 | Stop reason: SDUPTHER

## 2022-01-17 RX ORDER — GABAPENTIN 600 MG/1
600 TABLET ORAL 3 TIMES DAILY
Qty: 90 TABLET | Refills: 1 | Status: SHIPPED | OUTPATIENT
Start: 2022-01-17 | End: 2022-03-21 | Stop reason: SDUPTHER

## 2022-01-17 RX ORDER — CYCLOBENZAPRINE HCL 10 MG
10 TABLET ORAL 3 TIMES DAILY PRN
Qty: 90 TABLET | Refills: 1 | Status: SHIPPED | OUTPATIENT
Start: 2022-01-17 | End: 2022-03-21 | Stop reason: SDUPTHER

## 2022-01-17 NOTE — PATIENT INSTRUCTIONS
Opioid Safety   WHAT YOU NEED TO KNOW:   What do I need to know about opioid safety? Safety includes the correct use, storage, and disposal of opioids  Examples of opioid pain medicines are oxycodone, morphine, fentanyl, and codeine  How are opioids given? Opioids can be given as a pill, patch, or suppository  They can also be given as an injection into a vein, near a nerve, or into a joint  Your prescription may include one or both of the following:  · Short-acting opioids  work fast and relieve pain for about 3 to 6 hours  They are often used for acute or breakthrough pain  · Long-acting opioids  usually last at least 8 hours  You can take them less often and they may be used for chronic pain  How do I use opioids safely? · Take prescribed opioids exactly as directed  Opioids come with directions based on the kind and how it is given  Talk to your healthcare provider or a pharmacist if you have any questions  Do not take more than the recommended amount  Too much can cause a life-threatening overdose  Do not continue to take it after your pain stops  You may develop tolerance  This means you keep needing higher doses to get the same effect  You may also develop opioid use disorder  This means you are not able to control your opioid use  · Do not give opioids to others or take opioids that belong to someone else  The kind or amount one person takes may not be right for another  The person you share them with may also be taking medicines that do not mix with opioids  He or she may drink alcohol or use other drugs that can cause life-threatening problems when mixed with opioids  · Do not mix opioids with other medicines or alcohol  The combination can cause an overdose, or cause you to stop breathing  Alcohol, sleeping pills, and medicines such as antihistamines can make you sleepy  A combination with opioids can lead to a coma      · Do not drive or operate heavy machinery after you use an opioid  You may feel drowsy or have trouble concentrating  You can injure yourself or others if you drive or use heavy machinery when you are not alert  Your provider or pharmacist can tell you how long to wait after a dose before you do these activities  · Talk to your healthcare provider if you have any side effects  Side effects include nausea, sleepiness, itching, and trouble thinking clearly  Your provider may need to make changes to the kind or amount of opioid you are taking  He or she can also help you find ways to prevent or relieve side effects  What can I do to manage constipation? Constipation is the most common side effect of opioid medicine  Constipation is when you have hard, dry bowel movements, or you go longer than usual between bowel movements  Tell your healthcare provider about all changes in your bowel movements while you are taking opioids  He or she may recommend laxative medicine to help you have a bowel movement  He or she may also change the kind of opioid you are taking, or change when you take it  The following are more ways you can prevent or relieve constipation:  · Drink liquids as directed  You may need to drink extra liquids to help soften and move your bowels  Ask how much liquid to drink each day and which liquids are best for you  · Eat high-fiber foods  This may help decrease constipation by adding bulk to your bowel movements  High-fiber foods include fruits, vegetables, whole-grain breads and cereals, and beans  Your healthcare provider or dietitian can help you create a high-fiber meal plan  Your provider may also recommend a fiber supplement if you cannot get enough fiber from food  · Exercise regularly  Regular physical activity can help stimulate your intestines  Walking is a good exercise to prevent or relieve constipation  Ask which exercises are best for you  · Schedule a time each day to have a bowel movement    This may help train your body to have regular bowel movements  Bend forward while you are on the toilet to help move the bowel movement out  Sit on the toilet for at least 10 minutes, even if you do not have a bowel movement  How do I store opioids safely? · Store opioids where others cannot easily get them  Keep them in a locked cabinet or secure area  Do not  keep them in a purse or other bag you carry with you  A person may be looking for something else and find the opioids  · Make sure opioids are stored out of the reach of children  A child can easily overdose on opioids  Opioids may look like candy to a small child  What is the best way to dispose of opioids? The laws vary by country and area  In the [place], the best way is to return the opioids through a take-back program  This program is offered by the [facility]  The following are options for using the program:  · Take the opioids to a NIDA collection site  The site is often a law enforcement center  Call your local law enforcement center for scheduled take-back days in your area  You will be given information on where to go if the collection site is in a different location  · Take the opioids to an approved pharmacy or hospital   A pharmacy or hospital may be set up as a collection site  You will need to ask if it is a NIDA collection site if you were not directed there  A pharmacy or doctor's office may not be able to take back opioids unless it is a NIDA site  · Use a mail-back system  This means you are given containers to put the opioids into  You will then mail them in the containers  · Use a take-back drop box  This is a place to leave the opioids at any time  People and animals will not be able to get into the box  Your local law enforcement agency can tell you where to find a drop box in your area  What are some other safe ways to dispose of opioids? The medicine may come with disposal instructions   The instructions may vary depending on the brand of medicine you are using  Instructions may come in a Medication Guide, but not every medicine has one  You may instead get instructions from your pharmacy or doctor  Follow instructions carefully  The following are general guidelines to follow:  · Find out if you can flush the opioid  Some opioids can be flushed down the toilet or poured into the sink  You will need to contact authorities in your area to see if this is an option for you  The FDA also offers a list of medicines that are safe to flush down the toilet  You can check the list if you cannot get the information for your local area  · Ask your waste management company about rules for putting opioids in the trash  The company will be able to give you specific directions  Scratch out personal information on the original medicine label so it cannot be read  Then put it in the trash  Do not label the trash or put any information on it about the opioids  It should look like regular household trash so no one is tempted to look for the opioids  Keep the trash out of the reach of children and animals  Always make sure trash is secure  · Talk to officials if you live in a facility  If you live in a nursing home or assisted living center, talk to an official  The person will know the rules for your area  What are some other ways to manage pain? · Ask your healthcare provider about non-opioid medicines to control pain  Some medicines may even work better than opioids, depending on the cause of your pain  Nonprescription medicines include NSAIDs (such as ibuprofen) and acetaminophen  Prescription medicines include muscle relaxers, antidepressants, and steroids  · Pain may be managed without any medicines  Some ways to relieve pain include massage, aromatherapy, or meditation  Physical or occupational therapy may also help  Where can I find more information?    · Drug Enforcement Administration  [address]  Phone: 4- [phone] - [phone]  Web Address: [facility] gov/drug_disposal/    · [address]  and Drug Administration  [address] , [address]  Phone: 4- 235 - [phone]  Web Address: http://[employer]/    Call your local emergency number (911 in the [address]), or have someone else call if:   · You have a seizure  · You cannot be woken  · You have trouble staying awake and your breathing is slow or shallow  · Your speech is slurred, or you are confused  · You are dizzy or stumble when you walk  When should I or someone close to me call my doctor? · You are extremely drowsy, or you have trouble staying awake or speaking  · You have pale or clammy skin  · You have blue fingernails or lips  · Your heartbeat is slower than normal     · You cannot stop vomiting  · You have questions or concerns about your condition or care  CARE AGREEMENT:   You have the right to help plan your care  Learn about your health condition and how it may be treated  Discuss treatment options with your healthcare providers to decide what care you want to receive  You always have the right to refuse treatment  The above information is an  only  It is not intended as medical advice for individual conditions or treatments  Talk to your doctor, nurse or pharmacist before following any medical regimen to see if it is safe and effective for you  © Copyright [employer] 2021 Information is for End User's use only and may not be sold, redistributed or otherwise used for commercial purposes   All illustrations and images included in CareNotes® are the copyrighted property of A D A M , Inc  or [facility] [employer]

## 2022-01-24 ENCOUNTER — PROCEDURE VISIT (OUTPATIENT)
Dept: PAIN MEDICINE | Facility: CLINIC | Age: 57
End: 2022-01-24
Payer: COMMERCIAL

## 2022-01-24 DIAGNOSIS — M79.18 MYOFASCIAL PAIN SYNDROME: Primary | ICD-10-CM

## 2022-01-24 PROCEDURE — 99214 OFFICE O/P EST MOD 30 MIN: CPT | Performed by: NURSE PRACTITIONER

## 2022-01-24 PROCEDURE — 20552 NJX 1/MLT TRIGGER POINT 1/2: CPT | Performed by: NURSE PRACTITIONER

## 2022-01-24 RX ORDER — LIDOCAINE HYDROCHLORIDE 10 MG/ML
5 INJECTION, SOLUTION EPIDURAL; INFILTRATION; INTRACAUDAL; PERINEURAL ONCE
Status: COMPLETED | OUTPATIENT
Start: 2022-01-24 | End: 2022-01-24

## 2022-01-24 RX ORDER — METHYLPREDNISOLONE ACETATE 80 MG/ML
80 INJECTION, SUSPENSION INTRA-ARTICULAR; INTRALESIONAL; INTRAMUSCULAR; SOFT TISSUE ONCE
Status: COMPLETED | OUTPATIENT
Start: 2022-01-24 | End: 2022-01-24

## 2022-01-24 RX ADMIN — LIDOCAINE HYDROCHLORIDE 5 ML: 10 INJECTION, SOLUTION EPIDURAL; INFILTRATION; INTRACAUDAL; PERINEURAL at 16:25

## 2022-01-24 RX ADMIN — METHYLPREDNISOLONE ACETATE 80 MG: 80 INJECTION, SUSPENSION INTRA-ARTICULAR; INTRALESIONAL; INTRAMUSCULAR; SOFT TISSUE at 16:26

## 2022-01-24 RX ADMIN — LIDOCAINE HYDROCHLORIDE 5 ML: 10 INJECTION, SOLUTION EPIDURAL; INFILTRATION; INTRACAUDAL; PERINEURAL at 16:26

## 2022-01-24 NOTE — PROGRESS NOTES
Universal Protocol:  Consent: Verbal consent obtained  Written consent obtained  Risks and benefits: risks, benefits and alternatives were discussed  Consent given by: patient  Time out: Immediately prior to procedure a "time out" was called to verify the correct patient, procedure, equipment, support staff and site/side marked as required  Timeout called at: 1/24/2022 4:00 PM   Patient understanding: patient states understanding of the procedure being performed  Patient consent: the patient's understanding of the procedure matches consent given  Procedure consent: procedure consent matches procedure scheduled  Site marked: the operative site was marked  Patient identity confirmed: verbally with patient and provided demographic data    Supporting Documentation  Indications: pain   Trigger Point Injections: single/multiple trigger point(s): 1-2 muscle groups    Injection site identified by: palpation    Procedure Details  Location(s):    Neck/Upper Back: L upper trapezius and R upper trapezius     Prep: patient was prepped and draped in usual sterile fashion  Needle size: 32 G          After discussing the risks, benefits, and alternatives to the procedure, the patient expressed understanding and wished to proceed  The patient was placed in the seated position  A procedural pause was conducted to verify:  correct patient identity and procedure to be performed  Prior to the procedure, the bilateral trapezius muscles were examined and marked  Following this, the area was prepared with Betadine then re-examined  Thereafter, a 1 5 inch 27 gauge needle was inserted, negative aspiration for blood, and then a mixture of 9 mL of 1% lidocaine and 0 5 mL of 80 milligrams/milliliter Depo-Medrol was injected into the targeted and marked  trigger points  Following the injection, the needle was withdrawn  The patient tolerated the procedure well and there were no apparent complications    No significant bleeding post-injection, bandaids applied  After an appropriate amount of observation, the patient was dismissed from the clinic in good condition under their own power

## 2022-02-10 ENCOUNTER — HOSPITAL ENCOUNTER (OUTPATIENT)
Dept: CT IMAGING | Facility: HOSPITAL | Age: 57
Discharge: HOME | End: 2022-02-10
Payer: COMMERCIAL

## 2022-02-10 DIAGNOSIS — R10.9 ABDOMINAL PAIN: ICD-10-CM

## 2022-02-10 DIAGNOSIS — R61 EXCESSIVE SWEATING: ICD-10-CM

## 2022-02-10 PROCEDURE — 74177 CT ABD & PELVIS W/CONTRAST: CPT

## 2022-02-10 PROCEDURE — G1004 CDSM NDSC: HCPCS

## 2022-02-10 PROCEDURE — 71260 CT THORAX DX C+: CPT

## 2022-02-10 RX ADMIN — IOHEXOL 100 ML: 350 INJECTION, SOLUTION INTRAVENOUS at 10:41

## 2022-02-16 ENCOUNTER — APPOINTMENT (OUTPATIENT)
Dept: LAB | Facility: CLINIC | Age: 57
End: 2022-02-16
Payer: COMMERCIAL

## 2022-02-16 DIAGNOSIS — Z13.9 SCREENING FOR UNSPECIFIED CONDITION: ICD-10-CM

## 2022-02-16 DIAGNOSIS — E78.00 PURE HYPERCHOLESTEROLEMIA: ICD-10-CM

## 2022-02-16 DIAGNOSIS — B82.9 INTESTINAL PARASITISM, UNSPECIFIED: ICD-10-CM

## 2022-02-16 LAB
ALBUMIN SERPL BCP-MCNC: 3.8 G/DL (ref 3.5–5)
ALP SERPL-CCNC: 93 U/L (ref 46–116)
ALT SERPL W P-5'-P-CCNC: 37 U/L (ref 12–78)
ANION GAP SERPL CALCULATED.3IONS-SCNC: 3 MMOL/L (ref 4–13)
AST SERPL W P-5'-P-CCNC: 23 U/L (ref 5–45)
BILIRUB SERPL-MCNC: 0.35 MG/DL (ref 0.2–1)
BUN SERPL-MCNC: 27 MG/DL (ref 5–25)
CALCIUM SERPL-MCNC: 9.7 MG/DL (ref 8.3–10.1)
CHLORIDE SERPL-SCNC: 106 MMOL/L (ref 100–108)
CHOLEST SERPL-MCNC: 286 MG/DL
CO2 SERPL-SCNC: 28 MMOL/L (ref 21–32)
CREAT SERPL-MCNC: 0.85 MG/DL (ref 0.6–1.3)
ERYTHROCYTE [DISTWIDTH] IN BLOOD BY AUTOMATED COUNT: 13.2 % (ref 11.6–15.1)
EST. AVERAGE GLUCOSE BLD GHB EST-MCNC: 123 MG/DL
GFR SERPL CREATININE-BSD FRML MDRD: 77 ML/MIN/1.73SQ M
GLUCOSE P FAST SERPL-MCNC: 104 MG/DL (ref 65–99)
HBA1C MFR BLD: 5.9 %
HCT VFR BLD AUTO: 42.2 % (ref 34.8–46.1)
HDLC SERPL-MCNC: 63 MG/DL
HGB BLD-MCNC: 13.2 G/DL (ref 11.5–15.4)
LDLC SERPL CALC-MCNC: 202 MG/DL (ref 0–100)
MCH RBC QN AUTO: 27.9 PG (ref 26.8–34.3)
MCHC RBC AUTO-ENTMCNC: 31.3 G/DL (ref 31.4–37.4)
MCV RBC AUTO: 89 FL (ref 82–98)
NONHDLC SERPL-MCNC: 223 MG/DL
PLATELET # BLD AUTO: 385 THOUSANDS/UL (ref 149–390)
PMV BLD AUTO: 9.7 FL (ref 8.9–12.7)
POTASSIUM SERPL-SCNC: 4.6 MMOL/L (ref 3.5–5.3)
PROT SERPL-MCNC: 7.4 G/DL (ref 6.4–8.2)
RBC # BLD AUTO: 4.73 MILLION/UL (ref 3.81–5.12)
SODIUM SERPL-SCNC: 137 MMOL/L (ref 136–145)
TRIGL SERPL-MCNC: 103 MG/DL
TSH SERPL DL<=0.05 MIU/L-ACNC: 2.05 UIU/ML (ref 0.36–3.74)
VIT B12 SERPL-MCNC: 1642 PG/ML (ref 100–900)
WBC # BLD AUTO: 6 THOUSAND/UL (ref 4.31–10.16)

## 2022-02-16 PROCEDURE — 85027 COMPLETE CBC AUTOMATED: CPT

## 2022-02-16 PROCEDURE — 80053 COMPREHEN METABOLIC PANEL: CPT

## 2022-02-16 PROCEDURE — 84443 ASSAY THYROID STIM HORMONE: CPT

## 2022-02-16 PROCEDURE — 83036 HEMOGLOBIN GLYCOSYLATED A1C: CPT

## 2022-02-16 PROCEDURE — 80061 LIPID PANEL: CPT

## 2022-02-16 PROCEDURE — 36415 COLL VENOUS BLD VENIPUNCTURE: CPT

## 2022-02-16 PROCEDURE — 82607 VITAMIN B-12: CPT

## 2022-03-08 ENCOUNTER — APPOINTMENT (OUTPATIENT)
Dept: LAB | Facility: CLINIC | Age: 57
End: 2022-03-08
Payer: COMMERCIAL

## 2022-03-08 PROCEDURE — 87209 SMEAR COMPLEX STAIN: CPT

## 2022-03-08 PROCEDURE — 87177 OVA AND PARASITES SMEARS: CPT

## 2022-03-21 ENCOUNTER — OFFICE VISIT (OUTPATIENT)
Dept: PAIN MEDICINE | Facility: CLINIC | Age: 57
End: 2022-03-21
Payer: COMMERCIAL

## 2022-03-21 VITALS
SYSTOLIC BLOOD PRESSURE: 103 MMHG | HEART RATE: 75 BPM | DIASTOLIC BLOOD PRESSURE: 70 MMHG | BODY MASS INDEX: 31.78 KG/M2 | HEIGHT: 65 IN

## 2022-03-21 DIAGNOSIS — M54.2 NECK PAIN: ICD-10-CM

## 2022-03-21 DIAGNOSIS — M50.120 CERVICAL DISC DISORDER WITH RADICULOPATHY OF MID-CERVICAL REGION: ICD-10-CM

## 2022-03-21 DIAGNOSIS — G89.4 CHRONIC PAIN SYNDROME: Primary | ICD-10-CM

## 2022-03-21 PROCEDURE — 99214 OFFICE O/P EST MOD 30 MIN: CPT | Performed by: ANESTHESIOLOGY

## 2022-03-21 RX ORDER — CYCLOBENZAPRINE HCL 10 MG
10 TABLET ORAL 3 TIMES DAILY PRN
Qty: 90 TABLET | Refills: 1 | Status: SHIPPED | OUTPATIENT
Start: 2022-03-21 | End: 2022-05-18 | Stop reason: SDUPTHER

## 2022-03-21 RX ORDER — OXYCODONE HYDROCHLORIDE AND ACETAMINOPHEN 5; 325 MG/1; MG/1
TABLET ORAL
Qty: 60 TABLET | Refills: 0 | Status: SHIPPED | OUTPATIENT
Start: 2022-03-21 | End: 2022-05-18 | Stop reason: SDUPTHER

## 2022-03-21 RX ORDER — OXYCODONE HYDROCHLORIDE AND ACETAMINOPHEN 5; 325 MG/1; MG/1
TABLET ORAL
Qty: 60 TABLET | Refills: 0 | Status: SHIPPED | OUTPATIENT
Start: 2022-04-20 | End: 2022-05-18 | Stop reason: SDUPTHER

## 2022-03-21 RX ORDER — GABAPENTIN 600 MG/1
600 TABLET ORAL 3 TIMES DAILY
Qty: 90 TABLET | Refills: 1 | Status: SHIPPED | OUTPATIENT
Start: 2022-03-21 | End: 2022-05-18 | Stop reason: SDUPTHER

## 2022-03-21 NOTE — PROGRESS NOTES
Pain Medicine Follow-Up Note    Assessment:  1  Chronic pain syndrome    2  Neck pain    3  Cervical disc disorder with radiculopathy of mid-cervical region        Plan:  New Medications Ordered This Visit   Medications    metFORMIN (GLUCOPHAGE) 1000 MG tablet     Sig: Take 1,000 mg by mouth 2 (two) times a day with meals    oxyCODONE-acetaminophen (PERCOCET) 5-325 mg per tablet     Sig: Take 1 tablet by mouth up to twice daily as needed for pain  Dispense:  60 tablet     Refill:  0    oxyCODONE-acetaminophen (PERCOCET) 5-325 mg per tablet     Sig: Take 1 tablet by mouth up to twice daily as needed for pain  Dispense:  60 tablet     Refill:  0    gabapentin (NEURONTIN) 600 MG tablet     Sig: Take 1 tablet (600 mg total) by mouth 3 (three) times a day     Dispense:  90 tablet     Refill:  1    cyclobenzaprine (FLEXERIL) 10 mg tablet     Sig: Take 1 tablet (10 mg total) by mouth 3 (three) times a day as needed for muscle spasms     Dispense:  90 tablet     Refill:  1     My impressions and treatment recommendations were discussed in detail with the patient who verbalized understanding and had no further questions  Given that the patient reports overall reduced pain and improved level functioning without significant side effects, I felt a reasonable to continue her on cyclobenzaprine 10 mg 3 times daily as needed for muscle spasms, gabapentin 600 mg 3 times daily, and oxycodone/acetaminophen 5/325 mg 1 tablet up to twice daily as needed for pain  I sent E prescriptions to the pharmacy dated March 21, 2022 and April 20, 2022  The risks and side effects of chronic opioid treatment were discussed in detail with the patient  Side effects include but are not limited to nausea, vomiting, GI intolerance, sedation, constipation, mental clouding, opioid-induced hyperalgesia, endocrine dysfunction, addiction, dependence, and tolerance   The patient was asked to take his medications only as prescribed and directed, never in excess, and never for any other reason other than for pain control  The patient was also asked to keep his medications out of the reach of others and away from children, preferably in a locked drawer  The patient verbalized understanding and wished to use these opioid medications  [place] Prescription Drug Monitoring Program report was reviewed and was appropriate     Follow-up is planned in 2 months time or sooner as warranted  Discharge instructions were provided  I personally saw and examined the patient and I agree with the above discussed plan of care  History of Present Illness:    Arabella Menon is a 55 y o  female who presents to [facility] for interval re-evaluation of the above stated pain complaints  The patient has a past medical and chronic pain history as outlined in the assessment section  She was last seen on January 24, 2022  At today's office visit, the patient's pain score is 6/10 on the verbal numerical pain rating scale  The patient states that her symptoms are primarily in her neck and bilateral upper extremities  She describes her pain as worse at night  Her pain is intermittent in nature  She reports the quality of her pain as burning, dull/aching, throbbing, pressure-like, numbness, and pins and needles  She is reporting 40% relief of symptoms with the combination of her medications  She is very happy with her pain relief at this point in time  She continues to use the oxycodone/acetaminophen twice daily as needed for pain  She also is continuing the gabapentin cyclobenzaprine as prescribed  She denies any side effects of these medications  Other than as stated above, the patient denies any interval changes in medications, medical condition, mental condition, symptoms, or allergies since the last office visit  Review of Systems:    Review of Systems   Respiratory: Negative for shortness of breath  Cardiovascular: Negative for chest pain  Gastrointestinal: Negative for constipation, diarrhea, nausea and vomiting  Musculoskeletal: Positive for gait problem and joint swelling (Shoulder Bilateral)  Negative for arthralgias and myalgias  Muscle Weakness, Decreased ROM, Joint Stiffness  Swelling in neck, Pain in left leg  Skin: Negative for rash  Neurological: Negative for dizziness, seizures and weakness  All other systems reviewed and are negative  Patient Active Problem List   Diagnosis    Chronic pain syndrome    Acute muscle stiffness of neck    Cervical disc disorder with radiculopathy of mid-cervical region    Neck pain    Chronic left-sided low back pain with left-sided sciatica    Lumbar radiculopathy    Cervicalgia    Right hip pain    Trochanteric bursitis of right hip    Pain in right hip    Myofascial pain syndrome       Past Medical History:   Diagnosis Date    Anxiety     Asthma     exercise induced asthma    Cervical disc disorder     Chronic pain     Chronic pain disorder     Depression     Dysuria     GERD (gastroesophageal reflux disease) 2004    Headache(784 0) As a child    Low back pain     Lung abnormality     nodules    Lung nodule     Neck pain     Peripheral neuropathy     Pituitary abnormality (HCC)     tumor    Right hip pain 10/29/2019    Thrombocytopenia ([address] )     Thyroid disease        Past Surgical History:   Procedure Laterality Date    APPENDECTOMY      BREAST BIOPSY Right 1995    CHOLECYSTECTOMY      COLECTOMY      COLON SURGERY      EPIDURAL BLOCK INJECTION N/A 6/23/2016    Procedure: BLOCK / INJECTION EPIDURAL STEROID CERVICAL  C7-T1;  Surgeon: Moody Noel MD;  Location: [facility] MAIN OR;  Service:     EPIDURAL BLOCK INJECTION N/A 3/31/2016    Procedure: BLOCK / INJECTION EPIDURAL STEROID CERVICAL C7-T1  (C-ARM);   Surgeon: Moody Noel MD;  Location[facility] MAIN OR;  Service:     EPIDURAL BLOCK INJECTION N/A 8/8/2018    Procedure: C6 C7 Cervical Epidural Steroid Injection (92264); Surgeon: Elizabeth Quinonez MD;  Location: [facility] MAIN OR;  Service: Pain Management     EPIDURAL BLOCK INJECTION N/A 12/16/2021    Procedure: T4 T5 thoracic epidural steroid injection (72438); Surgeon: Elizabeth Quinonez MD;  Location: [facility] OR;  Service: Pain Management     HYSTERECTOMY  1996    OOPHORECTOMY Bilateral 1996    PITUITARY SURGERY      OH ARTHROCENTESIS ASPIR&/INJ MAJOR JT/BURSA W/O US Right 11/8/2019    Procedure: Trochanteric Bursa Injection (96698); Surgeon: Elizabeth Quinonez MD;  Location: [facility] MAIN OR;  Service: Pain Management     OH ARTHROCENTESIS ASPIR&/INJ MAJOR JT/BURSA W/O US Right 1/23/2020    Procedure: Trochanteric Bursa Injection (62793); Surgeon: Elizabeth Quinonez MD;  Location: [facility] MAIN OR;  Service: Pain Management     OH NJX DX/THER SBST EPIDURAL/SUBRACH CERV/THORACIC N/A 1/21/2016    Procedure: BLOCK / INJECTION EPIDURAL STEROID CERVICAL C7-T1 (C-ARM);   Surgeon: Elizabeth Quinonez MD;  Location: [facility] MAIN OR;  Service: Pain Management     REDUCTION MAMMAPLASTY Bilateral 2000       Family History   Problem Relation Age of Onset    Thyroid disease Mother     Hyperlipidemia Mother    Mavis Cousin Depression Mother     Stroke Mother         TIA    Thyroid disease Father     Hyperlipidemia Father     Depression Father     Arthritis Father         Spine and knees    Ovarian cancer Sister 39    No Known Problems Brother     No Known Problems Maternal Uncle     No Known Problems Paternal Aunt     No Known Problems Paternal Uncle     Cancer Maternal Grandmother         Kidney cancer    No Known Problems Maternal Grandfather     Breast cancer Paternal Grandmother 36    Cancer Paternal Grandmother         Breast, Lung, Liver and skin cancer    No Known Problems Paternal Grandfather     No Known Problems Daughter     No Known Problems Son     Cancer Paternal Aunt         Thyroid and colon cancer    ADD / ADHD Neg Hx     Anesthesia problems Neg Hx     Cancer Neg Hx     Clotting disorder Neg Hx     Collagen disease Neg Hx     Diabetes Neg Hx     Dislocations Neg Hx     Learning disabilities Neg Hx     Neurological problems Neg Hx     Osteoporosis Neg Hx     Rheumatologic disease Neg Hx     Scoliosis Neg Hx     Vascular Disease Neg Hx        Social History     Occupational History    Not on file   Tobacco Use    Smoking status: Never Smoker    Smokeless tobacco: Never Used   Vaping Use    Vaping Use: Never used   Substance and Sexual Activity    Alcohol use: No    Drug use: No    Sexual activity: Not Currently     Partners: Male     Birth control/protection: Post-menopausal         Current Outpatient Medications:     albuterol (VENTOLIN HFA) 90 mcg/act inhaler, Inhale, Disp: , Rfl:     aspirin 81 MG tablet, Take 81 mg by mouth daily  , Disp: , Rfl:     cholecalciferol (VITAMIN D3) 1,000 units tablet, Take 2 tablets by mouth daily, Disp: , Rfl:     cyclobenzaprine (FLEXERIL) 10 mg tablet, Take 1 tablet (10 mg total) by mouth 3 (three) times a day as needed for muscle spasms, Disp: 90 tablet, Rfl: 1    diclofenac sodium (VOLTAREN) 1 %, Apply 2 g topically 4 (four) times a day as needed (pain), Disp: 100 g, Rfl: 0    gabapentin (NEURONTIN) 600 MG tablet, Take 1 tablet (600 mg total) by mouth 3 (three) times a day, Disp: 90 tablet, Rfl: 1    ibuprofen (MOTRIN) 600 mg tablet, Take 1 tablet (600 mg total) by mouth every 6 (six) hours as needed for mild pain, Disp: 30 tablet, Rfl: 0    levothyroxine 50 mcg tablet, , Disp: , Rfl:     metFORMIN (GLUCOPHAGE) 1000 MG tablet, Take 1,000 mg by mouth 2 (two) times a day with meals, Disp: , Rfl:     metoprolol succinate (TOPROL-XL) 25 mg 24 hr tablet, , Disp: , Rfl:     [START ON 4/20/2022] oxyCODONE-acetaminophen (PERCOCET) 5-325 mg per tablet, Take 1 tablet by mouth up to twice daily as needed for pain , Disp: 60 tablet, Rfl: 0    traZODone (DESYREL) 50 mg tablet, , Disp: , Rfl: 0    hydrocortisone (ANUSOL-HC) 25 mg suppository, , Disp: , Rfl: 0    mupirocin (BACTROBAN) 2 % ointment, , Disp: , Rfl: 0    oxyCODONE-acetaminophen (PERCOCET) 5-325 mg per tablet, Take 1 tablet by mouth up to twice daily as needed for pain , Disp: 60 tablet, Rfl: 0    Allergies   Allergen Reactions    Levaquin [Levofloxacin In D5w] Shortness Of Breath     Also hives      Amitiza [Lubiprostone] Other (See Comments) and Hives     Reaction Date: 10Aug2011;   Migraines and vomiting    Biaxin [Clarithromycin] Hives    Cephradine     Erythromycin Hives     Reaction Date: 10Aug2011;     Macrobid [Nitrofurantoin Monohyd Macro] Other (See Comments)     C/o passing out    Morphine     Penicillins Hives     Reaction Date: 10Aug2011;     Sulfa Antibiotics     Tetracycline     Tetracyclines & Related Hives    Morphine And Related Rash    Valium [Diazepam] Rash and Vomiting     Reaction Date: 14Jun2012;        Physical Exam:    /70   Pulse 75   Ht 5' 5" (1 651 m)   BMI 31 78 kg/m²     Constitutional:obese  Eyes:anicteric  HEENT:grossly intact  Neck:supple, symmetric, trachea midline and no masses   Pulmonary:even and unlabored  Cardiovascular:No edema or pitting edema present  Skin:Normal without rashes or lesions and well hydrated  Psychiatric:Mood and affect appropriate  Neurologic:Cranial Nerves II-XII grossly intact  Musculoskeletal:normal

## 2022-05-17 NOTE — H&P (VIEW-ONLY)
Pain Medicine Follow-Up Note    Assessment:  1  Chronic pain syndrome    2  Neck pain    3  Cervical disc disorder with radiculopathy of mid-cervical region        Plan:  Orders Placed This Encounter   Procedures    MM ALL_Prescribed Meds and Special Instructions     Order Specific Question:   [facility] Is ALBUTEROL prescribed? Answer:   Yes     Order Specific Question:   [facility] Is CYCLOBENZAPRINE Prescribed? Answer:   Yes     Order Specific Question:   [facility] Is GABAPENTIN prescribed? Answer:   Yes     Order Specific Question:   [facility] Is METFORMIN prescribed? Answer:   Yes     Order Specific Question:   [facility] Is METOPROLOL prescribed? Answer:   Yes     Order Specific Question:   [facility] Is TRAZODONE prescribed? Answer:    Yes    MM DT_Alprazolam Definitive Test    MM DT_Amitriptyline Definitive Test    MM DT_Amphetamine Definitive Test    MM DT_Aripiprazole Definitive Test    MM DT_Bath Salts Definitive Test    MM DT_Buprenorphine Definitive Test    MM DT_Bupropion Definitive Test    MM DT_Butalbital Definitive Test    MM DT_Carisoprodol Definitive Test    MM DT_Clonazepam Definitive Test    MM DT_Clozapine Definitive Test    MM DT_Cocaine Definitive Test    MM DT_Codeine Definitive Test    MM DT_Desipramine Definitive Test    MM DT_Dextromethorphan Definitive Test    MM DT_Duloxetine Definitive Test    MM Diazepam Definitive Test    MM DT_Fentanyl Definitive Test    MM DT_Ethyl Glucuronide/Ethyl Sulfate Definitive Test    MM DT_Fluoxetine Definitive Test    MM DT_Haloperidol Definitive Test    MM DT_Heroin Definitive Test    MM DT_Hydrocodone Definitive Test    MM DT_Hydromorphone Definitive Test    MM DT_Imipramine Definitive Test    MM DT_Levorphanol Definitive Test    MM DT_Kratom Definitive Test    MM Lorazepam Definitive Test    MM DT_MDMA Definitive Test    MM DT_Meperidine Definitive Test    MM DT_Methadone Definitive Test    MM DT_Methamphetamine Definitive Test    MM DT_Methylphenidate Definitive Test    MM DT_Morphine Definitive Test    MM DT_Naltrexone Definitive Test    MM DT_Nortriptyline Definitive Test    MM DT_Olanzapine Definitive Test    MM DT_Oxazepam Definitive Test       New Medications Ordered This Visit   Medications    oxyCODONE-acetaminophen (PERCOCET) 5-325 mg per tablet     Sig: Take 1 tablet by mouth as needed in the morning and 1 tablet as needed in the evening for severe pain  Take 1 tablet by mouth up to twice daily as needed for pain    Max Daily Amount: 2 tablets  Dispense:  60 tablet     Refill:  0    gabapentin (NEURONTIN) 600 MG tablet     Sig: Take 1 tablet (600 mg total) by mouth in the morning and 1 tablet (600 mg total) in the evening and 1 tablet (600 mg total) before bedtime  Dispense:  90 tablet     Refill:  1    cyclobenzaprine (FLEXERIL) 10 mg tablet     Sig: Take 1 tablet (10 mg total) by mouth as needed in the morning and 1 tablet (10 mg total) as needed at noon and 1 tablet (10 mg total) as needed in the evening for muscle spasms  Dispense:  90 tablet     Refill:  1    oxyCODONE-acetaminophen (PERCOCET) 5-325 mg per tablet     Sig: Take 1 tablet by mouth as needed in the morning and 1 tablet as needed in the evening for severe pain  Take 1 tablet by mouth up to twice daily as needed for pain    Max Daily Amount: 2 tablets  Dispense:  60 tablet     Refill:  0     My impressions and treatment recommendations were discussed in detail with the patient who verbalized understanding and had no further questions  Given that the patient reports overall reduced pain and improved level functioning without significant side effects, I felt a reasonable to continue the patient on oxycodone/acetaminophen 5/325 mg 1 tablet up to twice daily as needed for pain, cyclobenzaprine 10 mg 1 tablet 3 times daily as needed for muscle spasms, and gabapentin 600 mg 3 times daily    I sent E prescriptions to the pharmacy dated May 20, 2022 and June 19, 2022  The risks and side effects of chronic opioid treatment were discussed in detail with the patient  Side effects include but are not limited to nausea, vomiting, GI intolerance, sedation, constipation, mental clouding, opioid-induced hyperalgesia, endocrine dysfunction, addiction, dependence, and tolerance  The patient was asked to take his medications only as prescribed and directed, never in excess, and never for any other reason other than for pain control  The patient was also asked to keep his medications out of the reach of others and away from children, preferably in a locked drawer  The patient verbalized understanding and wished to use these opioid medications  A urine drug test was collected at today's office visit as part of our medication management protocol  The point of care testing results were appropriate for what was being prescribed  The specimen will be sent for confirmatory testing  The drug screen is medically necessary because the patient is either dependent on opioid medication or is being considered for opioid medication therapy and the results could impact ongoing or future treatment  The drug screen is to evaluate for the presence or absence of prescribed, non-prescribed, and/or illicit drugs and substances  [place] Prescription Drug Monitoring Program report was reviewed and was appropriate     In addition, the patient reports that the effects of the thoracic epidural steroid injection that she had performed in the past are wearing off and she would like to undergo repeat thoracic epidural steroid injection  I did feel reasonable to have her undergo a repeat thoracic epidural steroid injection at this time  She also reports that the effects of the trigger point injections are wearing off, so 2 weeks after the thoracic epidural steroid injection, we will proceed with repeat trigger point injections      The procedures, its risks, and benefits were explained in detail to the patient  Risks include but are not limited to bleeding, infection, hematoma formation, abscess formation, weakness, headache, failure the pain to improve, nerve irritation or damage, and potential worsening of the pain  The patient verbalized understanding and wished to proceed with the procedure  Follow-up is planned in 2 months time or sooner as warranted  Discharge instructions were provided  I personally saw and examined the patient and I agree with the above discussed plan of care  History of Present Illness:    Trinity Strickland is a 55 y o  female who presents to [facility] for interval re-evaluation of the above stated pain complaints  The patient has a past medical and chronic pain history as outlined in the assessment section  She was last seen on May 20, 2022 and June 19, 2022  At today's office visit, the patient's pain score is 7/10 on the verbal numerical pain rating scale  The patient states that her symptoms are primarily in her neck and bilateral upper extremities, thoracic spine, and low back and left lower extremity  She describes her pain as worse in the evening  Her pain is constant in nature  She reports the quality of her pain as burning, dull/aching, sharp, throbbing, pressure-like, and numbness  She is reporting excellent pain relief with the combination of her medications  She is requesting a repeat thoracic epidural steroid injection as well as trigger point injections at today's visit  Other than as stated above, the patient denies any interval changes in medications, medical condition, mental condition, symptoms, or allergies since the last office visit  Review of Systems:    Review of Systems   Respiratory: Negative for shortness of breath  Cardiovascular: Negative for chest pain  Gastrointestinal: Negative for constipation, diarrhea, nausea and vomiting  Musculoskeletal: Negative for arthralgias, gait problem, joint swelling and myalgias  Skin: Negative for rash  Neurological: Negative for dizziness, seizures and weakness  All other systems reviewed and are negative  Respiratory: Negative for shortness of breath  Cardiovascular: Negative for chest pain  Gastrointestinal: Negative for constipation, diarrhea, nausea and vomiting  Musculoskeletal: Positive for gait problem and joint swelling (Shoulder Bilateral)  Negative for arthralgias and myalgias  Muscle Weakness, Decreased ROM, Joint Stiffness  Swelling in neck, Pain in left leg  Skin: Negative for rash  Neurological: Negative for dizziness, seizures and weakness  All other systems reviewed and are negative        Patient Active Problem List   Diagnosis    Chronic pain syndrome    Acute muscle stiffness of neck    Cervical disc disorder with radiculopathy of mid-cervical region    Neck pain    Chronic left-sided low back pain with left-sided sciatica    Lumbar radiculopathy    Cervicalgia    Right hip pain    Trochanteric bursitis of right hip    Pain in right hip    Myofascial pain syndrome       Past Medical History:   Diagnosis Date    Anxiety     Asthma     exercise induced asthma    Cervical disc disorder     Chronic pain     Chronic pain disorder     Depression     Dysuria     GERD (gastroesophageal reflux disease) 2004    Headache(784 0) As a child    Low back pain     Lung abnormality     nodules    Lung nodule     Neck pain     Peripheral neuropathy     Pituitary abnormality (HCC)     tumor    Right hip pain 10/29/2019    Thrombocytopenia ([address] )     Thyroid disease        Past Surgical History:   Procedure Laterality Date    APPENDECTOMY      BREAST BIOPSY Right 1995    CHOLECYSTECTOMY      COLECTOMY      COLON SURGERY      EPIDURAL BLOCK INJECTION N/A 6/23/2016    Procedure: BLOCK / INJECTION EPIDURAL STEROID CERVICAL  C7-T1;  Surgeon: Giuseppe Amezcua Joseph Garsia MD;  Location: [place] [address] MAIN OR;  Service:     EPIDURAL BLOCK INJECTION N/A 3/31/2016    Procedure: BLOCK / INJECTION EPIDURAL STEROID CERVICAL C7-T1  (C-ARM); Surgeon: Efren Arias MD;  Location: [facility] MAIN OR;  Service:     EPIDURAL BLOCK INJECTION N/A 8/8/2018    Procedure: C6 C7 Cervical Epidural Steroid Injection (37777); Surgeon: Efren Arias MD;  Location: [facility] MAIN OR;  Service: Pain Management     EPIDURAL BLOCK INJECTION N/A 12/16/2021    Procedure: T4 T5 thoracic epidural steroid injection (27793); Surgeon: Efren Arias MD;  [place] [facility] [place];  Service: Pain Management     HYSTERECTOMY  1996    OOPHORECTOMY Bilateral 1996    PITUITARY SURGERY      OR ARTHROCENTESIS ASPIR&/INJ MAJOR JT/BURSA W/O US Right 11/8/2019    Procedure: Trochanteric Bursa Injection (87733); Surgeon: Efren Arias MD;  [place] [facility] [place];  Service: Pain Management     OR ARTHROCENTESIS ASPIR&/INJ MAJOR JT/BURSA W/O US Right 1/23/2020    Procedure: Trochanteric Bursa Injection (21472); Surgeon: Efren Arias MD;  [place] [facility] [place];  Service: Pain Management     OR NJX DX/THER SBST EPIDURAL/SUBRACH CERV/THORACIC N/A 1/21/2016    Procedure: BLOCK / INJECTION EPIDURAL STEROID CERVICAL C7-T1 (C-ARM);   Surgeon: Efren Arias MD;  [place] [facility] [place];  Service: Pain Management     REDUCTION MAMMAPLASTY Bilateral 2000       Family History   Problem Relation Age of Onset    Thyroid disease Mother     Hyperlipidemia Mother    Ann Knights Depression Mother     Stroke Mother         TIA    Thyroid disease Father     Hyperlipidemia Father     Depression Father     Arthritis Father         Spine and knees    Ovarian cancer Sister 39    No Known Problems Brother     No Known Problems Maternal Uncle     No Known Problems Paternal Aunt     No Known Problems Paternal Uncle     Cancer Maternal Grandmother         Kidney cancer    No Known Problems Maternal Grandfather     Breast cancer Paternal Grandmother 36    Cancer Paternal Grandmother         Breast, Lung, Liver and skin cancer    No Known Problems Paternal Grandfather     No Known Problems Daughter     No Known Problems Son     Cancer Paternal Aunt         Thyroid and colon cancer    ADD / ADHD Neg Hx     Anesthesia problems Neg Hx     Cancer Neg Hx     Clotting disorder Neg Hx     Collagen disease Neg Hx     Diabetes Neg Hx     Dislocations Neg Hx     Learning disabilities Neg Hx     Neurological problems Neg Hx     Osteoporosis Neg Hx     Rheumatologic disease Neg Hx     Scoliosis Neg Hx     Vascular Disease Neg Hx        Social History     Occupational History    Not on file   Tobacco Use    Smoking status: Never Smoker    Smokeless tobacco: Never Used   Vaping Use    Vaping Use: Never used   Substance and Sexual Activity    Alcohol use: No    Drug use: No    Sexual activity: Not Currently     Partners: Male     Birth control/protection: Post-menopausal         Current Outpatient Medications:     albuterol (PROVENTIL HFA,VENTOLIN HFA) 90 mcg/act inhaler, Inhale, Disp: , Rfl:     aspirin 81 MG tablet, Take 81 mg by mouth daily  , Disp: , Rfl:     cholecalciferol (VITAMIN D3) 1,000 units tablet, Take 2 tablets by mouth daily, Disp: , Rfl:     cyclobenzaprine (FLEXERIL) 10 mg tablet, Take 1 tablet (10 mg total) by mouth as needed in the morning and 1 tablet (10 mg total) as needed at noon and 1 tablet (10 mg total) as needed in the evening for muscle spasms  , Disp: 90 tablet, Rfl: 1    diclofenac sodium (VOLTAREN) 1 %, Apply 2 g topically 4 (four) times a day as needed (pain), Disp: 100 g, Rfl: 0    gabapentin (NEURONTIN) 600 MG tablet, Take 1 tablet (600 mg total) by mouth in the morning and 1 tablet (600 mg total) in the evening and 1 tablet (600 mg total) before bedtime  , Disp: 90 tablet, Rfl: 1    ibuprofen (MOTRIN) 600 mg tablet, Take 1 tablet (600 mg total) by mouth every 6 (six) hours as needed for mild pain, Disp: 30 tablet, Rfl: 0    levothyroxine 50 mcg tablet, , Disp: , Rfl:     metFORMIN (GLUCOPHAGE) 1000 MG tablet, Take 1,000 mg by mouth 2 (two) times a day with meals, Disp: , Rfl:     metoprolol succinate (TOPROL-XL) 25 mg 24 hr tablet, , Disp: , Rfl:     mupirocin (BACTROBAN) 2 % ointment, , Disp: , Rfl: 0    [START ON 5/20/2022] oxyCODONE-acetaminophen (PERCOCET) 5-325 mg per tablet, Take 1 tablet by mouth as needed in the morning and 1 tablet as needed in the evening for severe pain  Take 1 tablet by mouth up to twice daily as needed for pain    Max Daily Amount: 2 tablets  , Disp: 60 tablet, Rfl: 0    [START ON 6/19/2022] oxyCODONE-acetaminophen (PERCOCET) 5-325 mg per tablet, Take 1 tablet by mouth as needed in the morning and 1 tablet as needed in the evening for severe pain  Take 1 tablet by mouth up to twice daily as needed for pain    Max Daily Amount: 2 tablets  , Disp: 60 tablet, Rfl: 0    traZODone (DESYREL) 50 mg tablet, , Disp: , Rfl: 0    hydrocortisone (ANUSOL-HC) 25 mg suppository, , Disp: , Rfl: 0    Allergies   Allergen Reactions    Levaquin [Levofloxacin In D5w] Shortness Of Breath     Also hives      Amitiza [Lubiprostone] Other (See Comments) and Hives     Reaction Date: 10Aug2011;   Migraines and vomiting    Biaxin [Clarithromycin] Hives    Cephradine     Erythromycin Hives     Reaction Date: 10Aug2011;     Macrobid [Nitrofurantoin Monohyd Macro] Other (See Comments)     C/o passing out    Morphine     Penicillins Hives     Reaction Date: 10Aug2011;     Sulfa Antibiotics     Tetracycline     Tetracyclines & Related Hives    Morphine And Related Rash    Valium [Diazepam] Rash and Vomiting     Reaction Date: 14Jun2012;        Physical Exam:    BP 98/67   Pulse 87   Ht 5' 5" (1 651 m)   Wt 86 2 kg (190 lb)   BMI 31 62 kg/m²     Constitutional:normal, well developed, well nourished, alert, in no distress and non-toxic and no overt pain behavior    Eyes:anicteric  HEENT:grossly intact  Neck:supple, symmetric, trachea midline and no masses   Pulmonary:even and unlabored  Cardiovascular:No edema or pitting edema present  Skin:Normal without rashes or lesions and well hydrated  Psychiatric:Mood and affect appropriate  Neurologic:Cranial Nerves II-XII grossly intact  Musculoskeletal:normal    Orders Placed This Encounter   Procedures    MM ALL_Prescribed Meds and Special Instructions    MM DT_Alprazolam Definitive Test    MM DT_Amitriptyline Definitive Test    MM DT_Amphetamine Definitive Test    MM DT_Aripiprazole Definitive Test    MM DT_Bath Salts Definitive Test    MM DT_Buprenorphine Definitive Test    MM DT_Bupropion Definitive Test    MM DT_Butalbital Definitive Test    MM DT_Carisoprodol Definitive Test    MM DT_Clonazepam Definitive Test    MM DT_Clozapine Definitive Test    MM DT_Cocaine Definitive Test    MM DT_Codeine Definitive Test    MM DT_Desipramine Definitive Test    MM DT_Dextromethorphan Definitive Test    MM DT_Duloxetine Definitive Test    MM Diazepam Definitive Test    MM DT_Fentanyl Definitive Test    MM DT_Ethyl Glucuronide/Ethyl Sulfate Definitive Test    MM DT_Fluoxetine Definitive Test    MM DT_Haloperidol Definitive Test    MM DT_Heroin Definitive Test    MM DT_Hydrocodone Definitive Test    MM DT_Hydromorphone Definitive Test    MM DT_Imipramine Definitive Test    MM DT_Levorphanol Definitive Test    MM DT_Kratom Definitive Test    MM Lorazepam Definitive Test    MM DT_MDMA Definitive Test    MM DT_Meperidine Definitive Test    MM DT_Methadone Definitive Test    MM DT_Methamphetamine Definitive Test    MM DT_Methylphenidate Definitive Test    MM DT_Morphine Definitive Test    MM DT_Naltrexone Definitive Test    MM DT_Nortriptyline Definitive Test    MM DT_Olanzapine Definitive Test    MM DT_Oxazepam Definitive Test

## 2022-05-17 NOTE — PROGRESS NOTES
Pain Medicine Follow-Up Note    Assessment:  1  Chronic pain syndrome    2  Neck pain    3  Cervical disc disorder with radiculopathy of mid-cervical region        Plan:  Orders Placed This Encounter   Procedures    MM ALL_Prescribed Meds and Special Instructions     Order Specific Question:   [facility] Is ALBUTEROL prescribed? Answer:   Yes     Order Specific Question:   [facility] Is CYCLOBENZAPRINE Prescribed? Answer:   Yes     Order Specific Question:   [facility] Is GABAPENTIN prescribed? Answer:   Yes     Order Specific Question:   [facility] Is METFORMIN prescribed? Answer:   Yes     Order Specific Question:   [facility] Is METOPROLOL prescribed? Answer:   Yes     Order Specific Question:   [facility] Is TRAZODONE prescribed? Answer:    Yes    MM DT_Alprazolam Definitive Test    MM DT_Amitriptyline Definitive Test    MM DT_Amphetamine Definitive Test    MM DT_Aripiprazole Definitive Test    MM DT_Bath Salts Definitive Test    MM DT_Buprenorphine Definitive Test    MM DT_Bupropion Definitive Test    MM DT_Butalbital Definitive Test    MM DT_Carisoprodol Definitive Test    MM DT_Clonazepam Definitive Test    MM DT_Clozapine Definitive Test    MM DT_Cocaine Definitive Test    MM DT_Codeine Definitive Test    MM DT_Desipramine Definitive Test    MM DT_Dextromethorphan Definitive Test    MM DT_Duloxetine Definitive Test    MM Diazepam Definitive Test    MM DT_Fentanyl Definitive Test    MM DT_Ethyl Glucuronide/Ethyl Sulfate Definitive Test    MM DT_Fluoxetine Definitive Test    MM DT_Haloperidol Definitive Test    MM DT_Heroin Definitive Test    MM DT_Hydrocodone Definitive Test    MM DT_Hydromorphone Definitive Test    MM DT_Imipramine Definitive Test    MM DT_Levorphanol Definitive Test    MM DT_Kratom Definitive Test    MM Lorazepam Definitive Test    MM DT_MDMA Definitive Test    MM DT_Meperidine Definitive Test    MM DT_Methadone Definitive Test    MM DT_Methamphetamine Definitive Test    MM DT_Methylphenidate Definitive Test    MM DT_Morphine Definitive Test    MM DT_Naltrexone Definitive Test    MM DT_Nortriptyline Definitive Test    MM DT_Olanzapine Definitive Test    MM DT_Oxazepam Definitive Test       New Medications Ordered This Visit   Medications    oxyCODONE-acetaminophen (PERCOCET) 5-325 mg per tablet     Sig: Take 1 tablet by mouth as needed in the morning and 1 tablet as needed in the evening for severe pain  Take 1 tablet by mouth up to twice daily as needed for pain    Max Daily Amount: 2 tablets  Dispense:  60 tablet     Refill:  0    gabapentin (NEURONTIN) 600 MG tablet     Sig: Take 1 tablet (600 mg total) by mouth in the morning and 1 tablet (600 mg total) in the evening and 1 tablet (600 mg total) before bedtime  Dispense:  90 tablet     Refill:  1    cyclobenzaprine (FLEXERIL) 10 mg tablet     Sig: Take 1 tablet (10 mg total) by mouth as needed in the morning and 1 tablet (10 mg total) as needed at noon and 1 tablet (10 mg total) as needed in the evening for muscle spasms  Dispense:  90 tablet     Refill:  1    oxyCODONE-acetaminophen (PERCOCET) 5-325 mg per tablet     Sig: Take 1 tablet by mouth as needed in the morning and 1 tablet as needed in the evening for severe pain  Take 1 tablet by mouth up to twice daily as needed for pain    Max Daily Amount: 2 tablets  Dispense:  60 tablet     Refill:  0     My impressions and treatment recommendations were discussed in detail with the patient who verbalized understanding and had no further questions  Given that the patient reports overall reduced pain and improved level functioning without significant side effects, I felt a reasonable to continue the patient on oxycodone/acetaminophen 5/325 mg 1 tablet up to twice daily as needed for pain, cyclobenzaprine 10 mg 1 tablet 3 times daily as needed for muscle spasms, and gabapentin 600 mg 3 times daily    I sent E prescriptions to the pharmacy dated May 20, 2022 and June 19, 2022  The risks and side effects of chronic opioid treatment were discussed in detail with the patient  Side effects include but are not limited to nausea, vomiting, GI intolerance, sedation, constipation, mental clouding, opioid-induced hyperalgesia, endocrine dysfunction, addiction, dependence, and tolerance  The patient was asked to take his medications only as prescribed and directed, never in excess, and never for any other reason other than for pain control  The patient was also asked to keep his medications out of the reach of others and away from children, preferably in a locked drawer  The patient verbalized understanding and wished to use these opioid medications  A urine drug test was collected at today's office visit as part of our medication management protocol  The point of care testing results were appropriate for what was being prescribed  The specimen will be sent for confirmatory testing  The drug screen is medically necessary because the patient is either dependent on opioid medication or is being considered for opioid medication therapy and the results could impact ongoing or future treatment  The drug screen is to evaluate for the presence or absence of prescribed, non-prescribed, and/or illicit drugs and substances  [place] Prescription Drug Monitoring Program report was reviewed and was appropriate     In addition, the patient reports that the effects of the thoracic epidural steroid injection that she had performed in the past are wearing off and she would like to undergo repeat thoracic epidural steroid injection  I did feel reasonable to have her undergo a repeat thoracic epidural steroid injection at this time  She also reports that the effects of the trigger point injections are wearing off, so 2 weeks after the thoracic epidural steroid injection, we will proceed with repeat trigger point injections      The procedures, its risks, and benefits were explained in detail to the patient  Risks include but are not limited to bleeding, infection, hematoma formation, abscess formation, weakness, headache, failure the pain to improve, nerve irritation or damage, and potential worsening of the pain  The patient verbalized understanding and wished to proceed with the procedure  Follow-up is planned in 2 months time or sooner as warranted  Discharge instructions were provided  I personally saw and examined the patient and I agree with the above discussed plan of care  History of Present Illness:    Trinity Strickland is a 55 y o  female who presents to [facility] for interval re-evaluation of the above stated pain complaints  The patient has a past medical and chronic pain history as outlined in the assessment section  She was last seen on May 20, 2022 and June 19, 2022  At today's office visit, the patient's pain score is 7/10 on the verbal numerical pain rating scale  The patient states that her symptoms are primarily in her neck and bilateral upper extremities, thoracic spine, and low back and left lower extremity  She describes her pain as worse in the evening  Her pain is constant in nature  She reports the quality of her pain as burning, dull/aching, sharp, throbbing, pressure-like, and numbness  She is reporting excellent pain relief with the combination of her medications  She is requesting a repeat thoracic epidural steroid injection as well as trigger point injections at today's visit  Other than as stated above, the patient denies any interval changes in medications, medical condition, mental condition, symptoms, or allergies since the last office visit  Review of Systems:    Review of Systems   Respiratory: Negative for shortness of breath  Cardiovascular: Negative for chest pain  Gastrointestinal: Negative for constipation, diarrhea, nausea and vomiting  Musculoskeletal: Negative for arthralgias, gait problem, joint swelling and myalgias  Skin: Negative for rash  Neurological: Negative for dizziness, seizures and weakness  All other systems reviewed and are negative  Respiratory: Negative for shortness of breath  Cardiovascular: Negative for chest pain  Gastrointestinal: Negative for constipation, diarrhea, nausea and vomiting  Musculoskeletal: Positive for gait problem and joint swelling (Shoulder Bilateral)  Negative for arthralgias and myalgias  Muscle Weakness, Decreased ROM, Joint Stiffness  Swelling in neck, Pain in left leg  Skin: Negative for rash  Neurological: Negative for dizziness, seizures and weakness  All other systems reviewed and are negative        Patient Active Problem List   Diagnosis    Chronic pain syndrome    Acute muscle stiffness of neck    Cervical disc disorder with radiculopathy of mid-cervical region    Neck pain    Chronic left-sided low back pain with left-sided sciatica    Lumbar radiculopathy    Cervicalgia    Right hip pain    Trochanteric bursitis of right hip    Pain in right hip    Myofascial pain syndrome       Past Medical History:   Diagnosis Date    Anxiety     Asthma     exercise induced asthma    Cervical disc disorder     Chronic pain     Chronic pain disorder     Depression     Dysuria     GERD (gastroesophageal reflux disease) 2004    Headache(784 0) As a child    Low back pain     Lung abnormality     nodules    Lung nodule     Neck pain     Peripheral neuropathy     Pituitary abnormality (HCC)     tumor    Right hip pain 10/29/2019    Thrombocytopenia ([address] )     Thyroid disease        Past Surgical History:   Procedure Laterality Date    APPENDECTOMY      BREAST BIOPSY Right 1995    CHOLECYSTECTOMY      COLECTOMY      COLON SURGERY      EPIDURAL BLOCK INJECTION N/A 6/23/2016    Procedure: BLOCK / INJECTION EPIDURAL STEROID CERVICAL  C7-T1;  Surgeon: Ignacio Joe Belle Matos MD;  Location: [place] [address] MAIN OR;  Service:     EPIDURAL BLOCK INJECTION N/A 3/31/2016    Procedure: BLOCK / INJECTION EPIDURAL STEROID CERVICAL C7-T1  (C-ARM); Surgeon: Candice Falcon MD;  Location: [facility] MAIN OR;  Service:     EPIDURAL BLOCK INJECTION N/A 8/8/2018    Procedure: C6 C7 Cervical Epidural Steroid Injection (19938); Surgeon: Candice Falcon MD;  Location: [facility] MAIN OR;  Service: Pain Management     EPIDURAL BLOCK INJECTION N/A 12/16/2021    Procedure: T4 T5 thoracic epidural steroid injection (29971); Surgeon: Candice Falcon MD;  Location: [facility] MAIN OR;  Service: Pain Management     HYSTERECTOMY  1996    OOPHORECTOMY Bilateral 1996    PITUITARY SURGERY      SC ARTHROCENTESIS ASPIR&/INJ MAJOR JT/BURSA W/O US Right 11/8/2019    Procedure: Trochanteric Bursa Injection (08742); Surgeon: Candice Falcon MD;  Location: [facility] MAIN OR;  Service: Pain Management     SC ARTHROCENTESIS ASPIR&/INJ MAJOR JT/BURSA W/O US Right 1/23/2020    Procedure: Trochanteric Bursa Injection (54311); Surgeon: Candice Falcon MD;  Location: [facility] OR;  Service: Pain Management     SC NJX DX/THER SBST EPIDURAL/SUBRACH CERV/THORACIC N/A 1/21/2016    Procedure: BLOCK / INJECTION EPIDURAL STEROID CERVICAL C7-T1 (C-ARM);   Surgeon: Candice Falcon MD;  Location: [facility] OR;  Service: Pain Management     REDUCTION MAMMAPLASTY Bilateral 2000       Family History   Problem Relation Age of Onset    Thyroid disease Mother     Hyperlipidemia Mother    Estrellita Slipper Depression Mother     Stroke Mother         TIA    Thyroid disease Father     Hyperlipidemia Father     Depression Father     Arthritis Father         Spine and knees    Ovarian cancer Sister 39    No Known Problems Brother     No Known Problems Maternal Uncle     No Known Problems Paternal Aunt     No Known Problems Paternal Uncle     Cancer Maternal Grandmother         Kidney cancer    No Known Problems Maternal Grandfather     Breast cancer Paternal Grandmother 36    Cancer Paternal Grandmother         Breast, Lung, Liver and skin cancer    No Known Problems Paternal Grandfather     No Known Problems Daughter     No Known Problems Son     Cancer Paternal Aunt         Thyroid and colon cancer    ADD / ADHD Neg Hx     Anesthesia problems Neg Hx     Cancer Neg Hx     Clotting disorder Neg Hx     Collagen disease Neg Hx     Diabetes Neg Hx     Dislocations Neg Hx     Learning disabilities Neg Hx     Neurological problems Neg Hx     Osteoporosis Neg Hx     Rheumatologic disease Neg Hx     Scoliosis Neg Hx     Vascular Disease Neg Hx        Social History     Occupational History    Not on file   Tobacco Use    Smoking status: Never Smoker    Smokeless tobacco: Never Used   Vaping Use    Vaping Use: Never used   Substance and Sexual Activity    Alcohol use: No    Drug use: No    Sexual activity: Not Currently     Partners: Male     Birth control/protection: Post-menopausal         Current Outpatient Medications:     albuterol (PROVENTIL HFA,VENTOLIN HFA) 90 mcg/act inhaler, Inhale, Disp: , Rfl:     aspirin 81 MG tablet, Take 81 mg by mouth daily  , Disp: , Rfl:     cholecalciferol (VITAMIN D3) 1,000 units tablet, Take 2 tablets by mouth daily, Disp: , Rfl:     cyclobenzaprine (FLEXERIL) 10 mg tablet, Take 1 tablet (10 mg total) by mouth as needed in the morning and 1 tablet (10 mg total) as needed at noon and 1 tablet (10 mg total) as needed in the evening for muscle spasms  , Disp: 90 tablet, Rfl: 1    diclofenac sodium (VOLTAREN) 1 %, Apply 2 g topically 4 (four) times a day as needed (pain), Disp: 100 g, Rfl: 0    gabapentin (NEURONTIN) 600 MG tablet, Take 1 tablet (600 mg total) by mouth in the morning and 1 tablet (600 mg total) in the evening and 1 tablet (600 mg total) before bedtime  , Disp: 90 tablet, Rfl: 1    ibuprofen (MOTRIN) 600 mg tablet, Take 1 tablet (600 mg total) by mouth every 6 (six) hours as needed for mild pain, Disp: 30 tablet, Rfl: 0    levothyroxine 50 mcg tablet, , Disp: , Rfl:     metFORMIN (GLUCOPHAGE) 1000 MG tablet, Take 1,000 mg by mouth 2 (two) times a day with meals, Disp: , Rfl:     metoprolol succinate (TOPROL-XL) 25 mg 24 hr tablet, , Disp: , Rfl:     mupirocin (BACTROBAN) 2 % ointment, , Disp: , Rfl: 0    [START ON 5/20/2022] oxyCODONE-acetaminophen (PERCOCET) 5-325 mg per tablet, Take 1 tablet by mouth as needed in the morning and 1 tablet as needed in the evening for severe pain  Take 1 tablet by mouth up to twice daily as needed for pain    Max Daily Amount: 2 tablets  , Disp: 60 tablet, Rfl: 0    [START ON 6/19/2022] oxyCODONE-acetaminophen (PERCOCET) 5-325 mg per tablet, Take 1 tablet by mouth as needed in the morning and 1 tablet as needed in the evening for severe pain  Take 1 tablet by mouth up to twice daily as needed for pain    Max Daily Amount: 2 tablets  , Disp: 60 tablet, Rfl: 0    traZODone (DESYREL) 50 mg tablet, , Disp: , Rfl: 0    hydrocortisone (ANUSOL-HC) 25 mg suppository, , Disp: , Rfl: 0    Allergies   Allergen Reactions    Levaquin [Levofloxacin In D5w] Shortness Of Breath     Also hives      Amitiza [Lubiprostone] Other (See Comments) and Hives     Reaction Date: 10Aug2011;   Migraines and vomiting    Biaxin [Clarithromycin] Hives    Cephradine     Erythromycin Hives     Reaction Date: 10Aug2011;     Macrobid [Nitrofurantoin Monohyd Macro] Other (See Comments)     C/o passing out    Morphine     Penicillins Hives     Reaction Date: 10Aug2011;     Sulfa Antibiotics     Tetracycline     Tetracyclines & Related Hives    Morphine And Related Rash    Valium [Diazepam] Rash and Vomiting     Reaction Date: 14Jun2012;        Physical Exam:    BP 98/67   Pulse 87   Ht 5' 5" (1 651 m)   Wt 86 2 kg (190 lb)   BMI 31 62 kg/m²     Constitutional:normal, well developed, well nourished, alert, in no distress and non-toxic and no overt pain behavior    Eyes:anicteric  HEENT:grossly intact  Neck:supple, symmetric, trachea midline and no masses   Pulmonary:even and unlabored  Cardiovascular:No edema or pitting edema present  Skin:Normal without rashes or lesions and well hydrated  Psychiatric:Mood and affect appropriate  Neurologic:Cranial Nerves II-XII grossly intact  Musculoskeletal:normal    Orders Placed This Encounter   Procedures    MM ALL_Prescribed Meds and Special Instructions    MM DT_Alprazolam Definitive Test    MM DT_Amitriptyline Definitive Test    MM DT_Amphetamine Definitive Test    MM DT_Aripiprazole Definitive Test    MM DT_Bath Salts Definitive Test    MM DT_Buprenorphine Definitive Test    MM DT_Bupropion Definitive Test    MM DT_Butalbital Definitive Test    MM DT_Carisoprodol Definitive Test    MM DT_Clonazepam Definitive Test    MM DT_Clozapine Definitive Test    MM DT_Cocaine Definitive Test    MM DT_Codeine Definitive Test    MM DT_Desipramine Definitive Test    MM DT_Dextromethorphan Definitive Test    MM DT_Duloxetine Definitive Test    MM Diazepam Definitive Test    MM DT_Fentanyl Definitive Test    MM DT_Ethyl Glucuronide/Ethyl Sulfate Definitive Test    MM DT_Fluoxetine Definitive Test    MM DT_Haloperidol Definitive Test    MM DT_Heroin Definitive Test    MM DT_Hydrocodone Definitive Test    MM DT_Hydromorphone Definitive Test    MM DT_Imipramine Definitive Test    MM DT_Levorphanol Definitive Test    MM DT_Kratom Definitive Test    MM Lorazepam Definitive Test    MM DT_MDMA Definitive Test    MM DT_Meperidine Definitive Test    MM DT_Methadone Definitive Test    MM DT_Methamphetamine Definitive Test    MM DT_Methylphenidate Definitive Test    MM DT_Morphine Definitive Test    MM DT_Naltrexone Definitive Test    MM DT_Nortriptyline Definitive Test    MM DT_Olanzapine Definitive Test    MM DT_Oxazepam Definitive Test

## 2022-05-18 ENCOUNTER — OFFICE VISIT (OUTPATIENT)
Dept: PAIN MEDICINE | Facility: CLINIC | Age: 57
End: 2022-05-18
Payer: COMMERCIAL

## 2022-05-18 VITALS
DIASTOLIC BLOOD PRESSURE: 67 MMHG | SYSTOLIC BLOOD PRESSURE: 98 MMHG | WEIGHT: 190 LBS | HEIGHT: 65 IN | BODY MASS INDEX: 31.65 KG/M2 | HEART RATE: 87 BPM

## 2022-05-18 DIAGNOSIS — G89.4 CHRONIC PAIN SYNDROME: Primary | ICD-10-CM

## 2022-05-18 DIAGNOSIS — M54.2 NECK PAIN: ICD-10-CM

## 2022-05-18 DIAGNOSIS — M50.120 CERVICAL DISC DISORDER WITH RADICULOPATHY OF MID-CERVICAL REGION: ICD-10-CM

## 2022-05-18 PROCEDURE — 80305 DRUG TEST PRSMV DIR OPT OBS: CPT | Performed by: ANESTHESIOLOGY

## 2022-05-18 PROCEDURE — 99214 OFFICE O/P EST MOD 30 MIN: CPT | Performed by: ANESTHESIOLOGY

## 2022-05-18 RX ORDER — OXYCODONE HYDROCHLORIDE AND ACETAMINOPHEN 5; 325 MG/1; MG/1
1 TABLET ORAL 2 TIMES DAILY PRN
Qty: 60 TABLET | Refills: 0 | Status: SHIPPED | OUTPATIENT
Start: 2022-05-20 | End: 2022-07-13 | Stop reason: SDUPTHER

## 2022-05-18 RX ORDER — GABAPENTIN 600 MG/1
600 TABLET ORAL 3 TIMES DAILY
Qty: 90 TABLET | Refills: 1 | Status: SHIPPED | OUTPATIENT
Start: 2022-05-18 | End: 2022-07-13 | Stop reason: SDUPTHER

## 2022-05-18 RX ORDER — CYCLOBENZAPRINE HCL 10 MG
10 TABLET ORAL 3 TIMES DAILY PRN
Qty: 90 TABLET | Refills: 1 | Status: SHIPPED | OUTPATIENT
Start: 2022-05-18 | End: 2022-07-13 | Stop reason: SDUPTHER

## 2022-05-18 RX ORDER — OXYCODONE HYDROCHLORIDE AND ACETAMINOPHEN 5; 325 MG/1; MG/1
1 TABLET ORAL 2 TIMES DAILY PRN
Qty: 60 TABLET | Refills: 0 | Status: SHIPPED | OUTPATIENT
Start: 2022-06-19 | End: 2022-07-13 | Stop reason: SDUPTHER

## 2022-05-25 ENCOUNTER — TELEPHONE (OUTPATIENT)
Dept: PAIN MEDICINE | Facility: CLINIC | Age: 57
End: 2022-05-25

## 2022-05-25 NOTE — TELEPHONE ENCOUNTER
Scheduled patient for 6/10/22  Patient denies RX blood thinners/ NSAIDS  Nothing to eat or drink 1 hour prior to procedure  Needs to arrange transportation  Proper clothing for procedure  No vaccines 2 weeks prior or after procedure  If ill or place on antibiotics, please call to reschedule

## 2022-05-25 NOTE — TELEPHONE ENCOUNTER
----- Message from Pradeep Kolb MD sent at 5/18/2022  9:07 AM EDT -----  Please schedule repeat thoracic T4-T5 LEONEL   2 weeks later please schedule TPI

## 2022-06-10 ENCOUNTER — HOSPITAL ENCOUNTER (OUTPATIENT)
Facility: AMBULARY SURGERY CENTER | Age: 57
Setting detail: OUTPATIENT SURGERY
Discharge: HOME | End: 2022-06-10
Attending: ANESTHESIOLOGY | Admitting: ANESTHESIOLOGY
Payer: COMMERCIAL

## 2022-06-10 ENCOUNTER — APPOINTMENT (OUTPATIENT)
Dept: RADIOLOGY | Facility: HOSPITAL | Age: 57
End: 2022-06-10
Payer: COMMERCIAL

## 2022-06-10 VITALS
SYSTOLIC BLOOD PRESSURE: 130 MMHG | TEMPERATURE: 97.4 F | HEART RATE: 74 BPM | RESPIRATION RATE: 18 BRPM | OXYGEN SATURATION: 98 % | DIASTOLIC BLOOD PRESSURE: 87 MMHG

## 2022-06-10 DIAGNOSIS — M54.6 THORACIC SPINE PAIN: ICD-10-CM

## 2022-06-10 LAB — GLUCOSE SERPL-MCNC: 101 MG/DL (ref 65–140)

## 2022-06-10 PROCEDURE — 82948 REAGENT STRIP/BLOOD GLUCOSE: CPT

## 2022-06-10 PROCEDURE — 62321 NJX INTERLAMINAR CRV/THRC: CPT | Performed by: ANESTHESIOLOGY

## 2022-06-10 RX ORDER — METHYLPREDNISOLONE ACETATE 80 MG/ML
INJECTION, SUSPENSION INTRA-ARTICULAR; INTRALESIONAL; INTRAMUSCULAR; SOFT TISSUE AS NEEDED
Status: DISCONTINUED | OUTPATIENT
Start: 2022-06-10 | End: 2022-06-10 | Stop reason: HOSPADM

## 2022-06-10 RX ORDER — LIDOCAINE WITH 8.4% SOD BICARB 0.9%(10ML)
SYRINGE (ML) INJECTION AS NEEDED
Status: DISCONTINUED | OUTPATIENT
Start: 2022-06-10 | End: 2022-06-10 | Stop reason: HOSPADM

## 2022-06-10 NOTE — INTERVAL H&P NOTE
H&P reviewed  After examining the patient I find no changes in the patients condition since the H&P had been written      Vitals:    06/10/22 1423   BP: 119/77   Pulse: 90   Resp: 18   Temp: (!) 97 4 °F (36 3 °C)   SpO2: 99%

## 2022-06-10 NOTE — OP NOTE
ATTENDING PHYSICIAN:  Efren Airas MD     PROCEDURE:  Thoracic epidural steroid injection with steroid and local anesthetic under fluoroscopy at the T4-T5 level  PREPROCEDURE DIAGNOSIS:  Thoracic radiculopathy  POSTPROCEDURE DIAGNOSIS:  Thoracic radiculopathy  ANESTHESIA:  Local     ESTIMATED BLOOD LOSS:  Minimal     COMPLICATIONS:  None  LOCATION:  [address]  CONSENT:  Today's procedure, its potential benefits as well as its risks and potential side effects were reviewed  Discussed risks of the procedure including bleeding, infection, nerve irritation or damage, reactions to the medications, headache, failure of the pain to improve, and potential worsening of the pain were explained to the patient who verbalized understanding and who wished to proceed  Written informed consent is thereby obtained  DESCRIPTION OF THE PROCEDURE:  After written informed consent was obtained, the patient was taken to the fluoroscopy suite and placed in the prone position  Anatomical landmarks were identified by way of fluoroscopy in multiple views  The skin of the thoracic region was prepped and draped in the usual sterile fashion  Strict aseptic technique was utilized  The skin and subcutaneous tissues at the needle entry site were infiltrated with 3 mL of 1% preservative-free lidocaine using a 25-gauge 1-1/2-inch needle  A 20-gauge Tuohy needle was then incrementally advanced under fluoroscopy using a loss of resistance technique  Upon entering into the epidural space, a positive loss of resistance to air was noted and a characteristic "pop" was felt  Proper placement into the epidural space was confirmed with a hanging column technique where preservative-free normal saline was noted to flow freely to gravity in to the epidural space as well as by the administration of contrast to delineate the epidural space  There were no paresthesias reported   After negative aspiration for CSF or heme, a 6 mL injectate consisting of 1 mL of Depo-Medrol 80 mg/mL mixed with 5 mL of preservative-free normal saline was slowly injected  The patient tolerated the procedure well and all needles were removed with the tips intact  Hemostasis was maintained  There were no apparent paresthesias or complications  The skin was wiped clean and a Band-Aid was placed as appropriate  The patient was monitored for an appropriate period of time following the procedure and remained hemodynamically stable and neurovascularly intact following the procedure  The patient was ultimately discharged to home with supervision in good condition and instructed to call the office in a few days for an update or sooner as warranted  I was present for and participated in all key and critical portions of this procedure      Dee Ruvalcaba MD  6/10/2022  3:05 PM

## 2022-06-10 NOTE — DISCHARGE INSTRUCTIONS
Epidural Steroid Injection   WHAT YOU NEED TO KNOW:   An epidural steroid injection (LEONEL) is a procedure to inject steroid medicine into the epidural space  The epidural space is between your spinal cord and vertebrae  Steroids reduce inflammation and fluid buildup in your spine that may be causing pain  You may be given pain medicine along with the steroids  ACTIVITY  Do not drive or operate machinery today  No strenuous activity today - bending, lifting, etc   You may resume normal activites starting tomorrow - start slowly and as tolerated  You may shower today, but no tub baths or hot tubs  You may have numbness for several hours from the local anesthetic  Please use caution and common sense, especially with weight-bearing activities  CARE OF THE INJECTION SITE  If you have soreness or pain, apply ice to the area today (20 minutes on/20 minutes off)  Starting tomorrow, you may use warm, moist heat or ice if needed  You may have an increase or change in your discomfort for 36-48 hours after your treatment  Apply ice and continue with any pain medication you have been prescribed  Notify the [facility] if you have any of the following: redness, drainage, swelling, headache, stiff neck or fever above 100°F     SPECIAL INSTRUCTIONS  Our office will contact you in approximately 7 days for a progress report  MEDICATIONS  Continue to take all routine medications  Our office may have instructed you to hold some medications  As no general anesthesia was used in today's procedure, you should not experience any side effects related to anesthesia  If you have a problem specifically related to your procedure, please call our office at [phone]  Problems not related to your procedure should be directed to your primary care physician

## 2022-06-17 ENCOUNTER — TELEPHONE (OUTPATIENT)
Dept: PAIN MEDICINE | Facility: CLINIC | Age: 57
End: 2022-06-17

## 2022-06-21 NOTE — TELEPHONE ENCOUNTER
2nd attempt call, unable to leave  msg to call back with % of improvement and pain level   Phone rings then hangs up

## 2022-06-28 ENCOUNTER — PROCEDURE VISIT (OUTPATIENT)
Dept: PAIN MEDICINE | Facility: CLINIC | Age: 57
End: 2022-06-28
Payer: COMMERCIAL

## 2022-06-28 VITALS
BODY MASS INDEX: 33.66 KG/M2 | DIASTOLIC BLOOD PRESSURE: 76 MMHG | HEIGHT: 63 IN | SYSTOLIC BLOOD PRESSURE: 109 MMHG | HEART RATE: 79 BPM

## 2022-06-28 DIAGNOSIS — M79.18 MYOFASCIAL PAIN SYNDROME: Primary | ICD-10-CM

## 2022-06-28 PROCEDURE — 20553 NJX 1/MLT TRIGGER POINTS 3/>: CPT | Performed by: ANESTHESIOLOGY

## 2022-06-28 RX ORDER — BUPIVACAINE HYDROCHLORIDE 2.5 MG/ML
9 INJECTION, SOLUTION EPIDURAL; INFILTRATION; INTRACAUDAL ONCE
Status: COMPLETED | OUTPATIENT
Start: 2022-06-28 | End: 2022-06-28

## 2022-06-28 RX ORDER — METHYLPREDNISOLONE ACETATE 40 MG/ML
40 INJECTION, SUSPENSION INTRA-ARTICULAR; INTRALESIONAL; INTRAMUSCULAR; SOFT TISSUE ONCE
Status: COMPLETED | OUTPATIENT
Start: 2022-06-28 | End: 2022-06-28

## 2022-06-28 RX ADMIN — METHYLPREDNISOLONE ACETATE 40 MG: 40 INJECTION, SUSPENSION INTRA-ARTICULAR; INTRALESIONAL; INTRAMUSCULAR; SOFT TISSUE at 13:45

## 2022-06-28 RX ADMIN — BUPIVACAINE HYDROCHLORIDE 9 ML: 2.5 INJECTION, SOLUTION EPIDURAL; INFILTRATION; INTRACAUDAL at 13:44

## 2022-06-28 NOTE — PROGRESS NOTES
Universal Protocol:  Consent: Written consent obtained  Risks and benefits: risks, benefits and alternatives were discussed  Consent given by: patient  Patient understanding: patient states understanding of the procedure being performed  Patient consent: the patient's understanding of the procedure matches consent given  Procedure consent: procedure consent matches procedure scheduled  Relevant documents: relevant documents present and verified  Site marked: the operative site was marked  Patient identity confirmed: verbally with patient    Supporting Documentation  Indications: pain    Injection site identified by: palpation    Procedure Details  Location(s):  Needle size: 25 G  Patient tolerance: patient tolerated the procedure well with no immediate complications  Additional procedure details: ATTENDING PHYSICIAN:  Ashley Palma MD     PROCEDURE:  Trigger point injections x1 to the left cervical paraspinal, x1 to the left upper trapezius, x2 to the right cervical paraspinal, x4 to the right upper trapezius, and x2 to the right rhomboid musculature with local anesthetic and steroid  PRE-PROCEDURE DIAGNOSIS:  Myofascial pain syndrome  POST-PROCEDURE DIAGNOSIS:  Myofascial pain syndrome  ESTIMATED BLOOD LOSS:  Minimal     ANESTHESIA:  None  COMPLICATIONS:  None  CONSENT:  Today's procedure, its potential benefits as well as its risks and side effects were reviewed  Discussed risks of the procedure including bleeding, infection, reactions to the medications, failure the pain to improve, and potential worsening of the pain as well as pneumothorax were explained in detail to the patient, who verbalized understanding and wished to proceed  Written informed consent was thereby obtained  DESCRIPTION OF THE PROCEDURE:  After written informed consent was obtained, the patient was placed in the sitting position on the examination table  Anatomical landmarks were identified via palpation   The trigger points were identified and marked after palpation  The skin overlying these 10 marked trigger points was prepared using Betadine swabs x3 in the usual sterile fashion  Strict aseptic technique was utilized throughout the procedure  A 10 mL injectate consisting of 9 mL of 1% lidocaine mixed with 1 mL of Depo-Medrol 40 mg/mL was drawn up sterilely  Using a 25-gauge 1 25 inch needle, 1 mL of the above injectate was administered to each of the marked trigger points following negative aspiration  The patient tolerated the procedure well and all needles were removed with the tips intact  Hemostasis was maintained  There were no apparent complications  The skin was wiped clean and Band-Aids were placed as appropriate  The patient was monitored for an appropriate period of time following the procedure and remained hemodynamically stable and neurovascularly intact following the procedure as she was prior to the procedure  The patient was ultimately discharged to home with supervision in good condition  I was present for and participated in all key and critical portions of this procedure

## 2022-07-13 ENCOUNTER — OFFICE VISIT (OUTPATIENT)
Dept: PAIN MEDICINE | Facility: CLINIC | Age: 57
End: 2022-07-13
Payer: COMMERCIAL

## 2022-07-13 VITALS — HEART RATE: 77 BPM | SYSTOLIC BLOOD PRESSURE: 107 MMHG | DIASTOLIC BLOOD PRESSURE: 71 MMHG

## 2022-07-13 DIAGNOSIS — G89.4 CHRONIC PAIN SYNDROME: Primary | ICD-10-CM

## 2022-07-13 DIAGNOSIS — M54.2 NECK PAIN: ICD-10-CM

## 2022-07-13 DIAGNOSIS — M50.120 CERVICAL DISC DISORDER WITH RADICULOPATHY OF MID-CERVICAL REGION: ICD-10-CM

## 2022-07-13 PROCEDURE — 99214 OFFICE O/P EST MOD 30 MIN: CPT | Performed by: ANESTHESIOLOGY

## 2022-07-13 RX ORDER — OXYCODONE HYDROCHLORIDE AND ACETAMINOPHEN 5; 325 MG/1; MG/1
1 TABLET ORAL 2 TIMES DAILY PRN
Qty: 60 TABLET | Refills: 0 | Status: SHIPPED | OUTPATIENT
Start: 2022-07-20 | End: 2022-09-09 | Stop reason: SDUPTHER

## 2022-07-13 RX ORDER — CYCLOBENZAPRINE HCL 10 MG
10 TABLET ORAL 3 TIMES DAILY PRN
Qty: 90 TABLET | Refills: 1 | Status: SHIPPED | OUTPATIENT
Start: 2022-07-13 | End: 2022-09-09 | Stop reason: SDUPTHER

## 2022-07-13 RX ORDER — GABAPENTIN 600 MG/1
600 TABLET ORAL 3 TIMES DAILY
Qty: 90 TABLET | Refills: 1 | Status: SHIPPED | OUTPATIENT
Start: 2022-07-13 | End: 2022-09-09 | Stop reason: SDUPTHER

## 2022-07-13 RX ORDER — OXYCODONE HYDROCHLORIDE AND ACETAMINOPHEN 5; 325 MG/1; MG/1
1 TABLET ORAL 2 TIMES DAILY PRN
Qty: 60 TABLET | Refills: 0 | Status: SHIPPED | OUTPATIENT
Start: 2022-08-19 | End: 2022-09-09 | Stop reason: SDUPTHER

## 2022-07-13 NOTE — PROGRESS NOTES
Pain Medicine Follow-Up Note    Assessment:  1  Chronic pain syndrome    2  Neck pain    3  Cervical disc disorder with radiculopathy of mid-cervical region        Plan:  New Medications Ordered This Visit   Medications    oxyCODONE-acetaminophen (PERCOCET) 5-325 mg per tablet     Sig: Take 1 tablet by mouth 2 (two) times a day as needed for severe pain Take 1 tablet by mouth up to twice daily as needed for pain  Max Daily Amount: 2 tablets     Dispense:  60 tablet     Refill:  0    oxyCODONE-acetaminophen (PERCOCET) 5-325 mg per tablet     Sig: Take 1 tablet by mouth 2 (two) times a day as needed for severe pain Take 1 tablet by mouth up to twice daily as needed for pain  Max Daily Amount: 2 tablets     Dispense:  60 tablet     Refill:  0    gabapentin (NEURONTIN) 600 MG tablet     Sig: Take 1 tablet (600 mg total) by mouth 3 (three) times a day     Dispense:  90 tablet     Refill:  1    cyclobenzaprine (FLEXERIL) 10 mg tablet     Sig: Take 1 tablet (10 mg total) by mouth 3 (three) times a day as needed for muscle spasms     Dispense:  90 tablet     Refill:  1     My impressions and treatment recommendations were discussed in detail with the patient who verbalized understanding and had no further questions  Given that the patient has signs and symptoms of neck pain and bilateral upper extremity radiculopathy as well as low back pain and left lower extremity radiculopathy and considering that she has been doing very well on her current medications, I felt a reasonable to continue the patient on gabapentin 600 mg 3 times daily, cyclobenzaprine 10 mg 3 times daily as needed for muscle spasms, and oxycodone/acetaminophen 5/325 mg 1 tablet up to twice daily as needed for pain  I sent E prescriptions to the pharmacy for the oxycodone/acetaminophen dated July 20, 2022 and August 19, 2022  The risks and side effects of chronic opioid treatment were discussed in detail with the patient   Side effects include but are not limited to nausea, vomiting, GI intolerance, sedation, constipation, mental clouding, opioid-induced hyperalgesia, endocrine dysfunction, addiction, dependence, and tolerance  The patient was asked to take his medications only as prescribed and directed, never in excess, and never for any other reason other than for pain control  The patient was also asked to keep his medications out of the reach of others and away from children, preferably in a locked drawer  The patient verbalized understanding and wished to use these opioid medications  [place] Prescription Drug Monitoring Program report was reviewed and was appropriate     Follow-up is planned in 2 months time or sooner as warranted  Discharge instructions were provided  I personally saw and examined the patient and I agree with the above discussed plan of care  History of Present Illness:    Parris Vogel is a 55 y o  female who presents to [facility] for interval re-evaluation of the above stated pain complaints  The patient has a past medical and chronic pain history as outlined in the assessment section  She was last seen on Valerie 10, 2022  At today's office visit, the patient's pain score is 6/10 on the verbal numerical pain rating scale  The patient states that her pain is primarily in her neck and bilateral upper extremities as well as her low back and left lower extremity  She describes her pain as worse at night and constant in nature  She reports the quality of her pain as dull/aching, throbbing, pressure-like, numbness, and pins and needles  She states that her symptoms are primarily in the neck and bilateral upper extremities as well as the low back and left lower extremity  She is doing very well after the trigger point injections that she had performed recently  She does continue to report excellent pain relief with her current medications and denies any side effects      Other than as stated above, the patient denies any interval changes in medications, medical condition, mental condition, symptoms, or allergies since the last office visit  Review of Systems:    Review of Systems   Respiratory: Negative for shortness of breath  Cardiovascular: Negative for chest pain  Gastrointestinal: Negative for constipation, diarrhea, nausea and vomiting  Musculoskeletal: Positive for arthralgias, joint swelling and myalgias  Negative for gait problem  Decreased ROM   Pain in right arm   Skin: Negative for rash  Neurological: Negative for dizziness, seizures and weakness  All other systems reviewed and are negative          Patient Active Problem List   Diagnosis    Chronic pain syndrome    Acute muscle stiffness of neck    Cervical disc disorder with radiculopathy of mid-cervical region    Neck pain    Chronic left-sided low back pain with left-sided sciatica    Lumbar radiculopathy    Cervicalgia    Right hip pain    Trochanteric bursitis of right hip    Pain in right hip    Myofascial pain syndrome       Past Medical History:   Diagnosis Date    Anxiety     Asthma     exercise induced asthma    Cervical disc disorder     Chronic pain     Chronic pain disorder     Depression     Dysuria     GERD (gastroesophageal reflux disease) 2004    Headache(784 0) As a child    Low back pain     Lung abnormality     nodules    Lung nodule     Neck pain     Peripheral neuropathy     Pituitary abnormality (HCC)     tumor    Right hip pain 10/29/2019    Thrombocytopenia ([address] )     Thyroid disease        Past Surgical History:   Procedure Laterality Date    APPENDECTOMY      BREAST BIOPSY Right 1995    CHOLECYSTECTOMY      COLECTOMY      COLON SURGERY      EPIDURAL BLOCK INJECTION N/A 6/23/2016    Procedure: BLOCK / INJECTION EPIDURAL STEROID CERVICAL  C7-T1;  Surgeon: Tiffany Crowley MD;  Location: [place] [address] MAIN OR;  Service:     EPIDURAL BLOCK INJECTION N/A 3/31/2016 Procedure: BLOCK / INJECTION EPIDURAL STEROID CERVICAL C7-T1  (C-ARM); Surgeon: Debra Retana MD;  Location: [facility] OR;  Service:     EPIDURAL BLOCK INJECTION N/A 8/8/2018    Procedure: C6 C7 Cervical Epidural Steroid Injection (62217); Surgeon: Debra Retana MD;  Location: [facility] OR;  Service: Pain Management     EPIDURAL BLOCK INJECTION N/A 12/16/2021    Procedure: T4 T5 thoracic epidural steroid injection (47740); Surgeon: Debar Retana MD;  Location: [facility] OR;  Service: Pain Management     EPIDURAL BLOCK INJECTION N/A 6/10/2022    Procedure: T4 T5 THORACIC EPIDURAL STEROID INJECTION (64705); Surgeon: Debra Retana MD;  Location: [facility] OR;  Service: Pain Management     HYSTERECTOMY  1996    OOPHORECTOMY Bilateral 1996    PITUITARY SURGERY      VA ARTHROCENTESIS ASPIR&/INJ MAJOR JT/BURSA W/O US Right 11/8/2019    Procedure: Trochanteric Bursa Injection (04047); Surgeon: Debra Retana MD;  Location: [facility] OR;  Service: Pain Management     VA ARTHROCENTESIS ASPIR&/INJ MAJOR JT/BURSA W/O US Right 1/23/2020    Procedure: Trochanteric Bursa Injection (07570); Surgeon: Debra Retana MD;  Location: [facility] OR;  Service: Pain Management     VA NJX DX/THER SBST EPIDURAL/SUBRACH CERV/THORACIC N/A 1/21/2016    Procedure: BLOCK / INJECTION EPIDURAL STEROID CERVICAL C7-T1 (C-ARM);   Surgeon: Debra Retana MD;  Location: [facility] OR;  Service: Pain Management     REDUCTION MAMMAPLASTY Bilateral 2000       Family History   Problem Relation Age of Onset    Thyroid disease Mother     Hyperlipidemia Mother     Depression Mother     Stroke Mother         TIA    Thyroid disease Father     Hyperlipidemia Father     Depression Father     Arthritis Father         Spine and knees    Ovarian cancer Sister 39    No Known Problems Brother     No Known Problems Maternal Uncle     No Known Problems Paternal Aunt     No Known Problems Paternal Uncle     Cancer Maternal Grandmother         Kidney cancer    No Known Problems Maternal Grandfather     Breast cancer Paternal Grandmother 36    Cancer Paternal Grandmother         Breast, Lung, Liver and skin cancer    No Known Problems Paternal Grandfather     No Known Problems Daughter     No Known Problems Son     Cancer Paternal Aunt         Thyroid and colon cancer    ADD / ADHD Neg Hx     Anesthesia problems Neg Hx     Cancer Neg Hx     Clotting disorder Neg Hx     Collagen disease Neg Hx     Diabetes Neg Hx     Dislocations Neg Hx     Learning disabilities Neg Hx     Neurological problems Neg Hx     Osteoporosis Neg Hx     Rheumatologic disease Neg Hx     Scoliosis Neg Hx     Vascular Disease Neg Hx        Social History     Occupational History    Not on file   Tobacco Use    Smoking status: Never Smoker    Smokeless tobacco: Never Used   Vaping Use    Vaping Use: Never used   Substance and Sexual Activity    Alcohol use: No    Drug use: No    Sexual activity: Not Currently     Partners: Male     Birth control/protection: Post-menopausal         Current Outpatient Medications:     albuterol (PROVENTIL HFA,VENTOLIN HFA) 90 mcg/act inhaler, Inhale, Disp: , Rfl:     aspirin 81 MG tablet, Take 81 mg by mouth daily  , Disp: , Rfl:     cholecalciferol (VITAMIN D3) 1,000 units tablet, Take 2 tablets by mouth daily, Disp: , Rfl:     cyclobenzaprine (FLEXERIL) 10 mg tablet, Take 1 tablet (10 mg total) by mouth 3 (three) times a day as needed for muscle spasms, Disp: 90 tablet, Rfl: 1    Diclofenac Sodium (VOLTAREN) 1 %, Apply 2 g topically 4 (four) times a day as needed (pain), Disp: 100 g, Rfl: 0    gabapentin (NEURONTIN) 600 MG tablet, Take 1 tablet (600 mg total) by mouth 3 (three) times a day, Disp: 90 tablet, Rfl: 1    ibuprofen (MOTRIN) 600 mg tablet, Take 1 tablet (600 mg total) by mouth every 6 (six) hours as needed for mild pain, Disp: 30 tablet, Rfl: 0    levothyroxine 50 mcg tablet, , Disp: , Rfl:     metFORMIN (GLUCOPHAGE) 1000 MG tablet, Take 1,000 mg by mouth 2 (two) times a day with meals, Disp: , Rfl:     metoprolol succinate (TOPROL-XL) 25 mg 24 hr tablet, , Disp: , Rfl:     [START ON 7/20/2022] oxyCODONE-acetaminophen (PERCOCET) 5-325 mg per tablet, Take 1 tablet by mouth 2 (two) times a day as needed for severe pain Take 1 tablet by mouth up to twice daily as needed for pain  Max Daily Amount: 2 tablets, Disp: 60 tablet, Rfl: 0    [START ON 8/19/2022] oxyCODONE-acetaminophen (PERCOCET) 5-325 mg per tablet, Take 1 tablet by mouth 2 (two) times a day as needed for severe pain Take 1 tablet by mouth up to twice daily as needed for pain  Max Daily Amount: 2 tablets, Disp: 60 tablet, Rfl: 0    traZODone (DESYREL) 50 mg tablet, , Disp: , Rfl: 0    hydrocortisone (ANUSOL-HC) 25 mg suppository, , Disp: , Rfl: 0    Allergies   Allergen Reactions    Levaquin [Levofloxacin In D5w] Shortness Of Breath     Also hives      Amitiza [Lubiprostone] Other (See Comments) and Hives     Reaction Date: 10Aug2011;   Migraines and vomiting    Biaxin [Clarithromycin] Hives    Cephradine     Erythromycin Hives     Reaction Date: 10Aug2011;    Evlyn Goodie [Nitrofurantoin Monohyd Macro] Other (See Comments)     C/o passing out    Morphine     Penicillins Hives     Reaction Date: 10Aug2011;     Sulfa Antibiotics     Tetracycline     Tetracyclines & Related Hives    Morphine And Related Rash    Valium [Diazepam] Rash and Vomiting     Reaction Date: 14Jun2012;        Physical Exam:    /71 (BP Location: Left arm, Patient Position: Sitting, Cuff Size: Standard)   Pulse 77     Constitutional:normal, well developed, well nourished, alert, in no distress and non-toxic and no overt pain behavior    Eyes:anicteric  HEENT:grossly intact  Neck:supple, symmetric, trachea midline and no masses   Pulmonary:even and unlabored  Cardiovascular:No edema or pitting edema present  Skin:Normal without rashes or lesions and well hydrated  Psychiatric:Mood and affect appropriate  Neurologic:Cranial Nerves II-XII grossly intact  Musculoskeletal:normal

## 2022-07-14 ENCOUNTER — OFFICE VISIT (OUTPATIENT)
Dept: URGENT CARE | Facility: CLINIC | Age: 57
End: 2022-07-14
Payer: COMMERCIAL

## 2022-07-14 VITALS
HEART RATE: 82 BPM | OXYGEN SATURATION: 100 % | BODY MASS INDEX: 33.66 KG/M2 | SYSTOLIC BLOOD PRESSURE: 111 MMHG | DIASTOLIC BLOOD PRESSURE: 78 MMHG | TEMPERATURE: 99 F | WEIGHT: 190 LBS

## 2022-07-14 DIAGNOSIS — J20.9 ACUTE BRONCHITIS, UNSPECIFIED ORGANISM: Primary | ICD-10-CM

## 2022-07-14 PROCEDURE — 99213 OFFICE O/P EST LOW 20 MIN: CPT

## 2022-07-14 RX ORDER — ALBUTEROL SULFATE 90 UG/1
2 AEROSOL, METERED RESPIRATORY (INHALATION) EVERY 6 HOURS PRN
Qty: 8.5 G | Refills: 0 | Status: SHIPPED | OUTPATIENT
Start: 2022-07-14

## 2022-07-14 RX ORDER — CEFDINIR 300 MG/1
300 CAPSULE ORAL EVERY 12 HOURS SCHEDULED
Qty: 14 CAPSULE | Refills: 0 | Status: SHIPPED | OUTPATIENT
Start: 2022-07-14 | End: 2022-07-21

## 2022-07-14 NOTE — PROGRESS NOTES
[address] [facility]        NAME: Bi Lopez is a 55 y o  female  : 1965    MRN: [id number]  DATE: 2022  TIME: 7:59 PM    Assessment and Plan   Acute bronchitis, unspecified organism [J20 9]  1  Acute bronchitis, unspecified organism  albuterol (ProAir HFA) 90 mcg/act inhaler    cefdinir (OMNICEF) 300 mg capsule         Patient Instructions       Follow up with PCP in 3-5 days  Proceed to  ER if symptoms worsen  Chief Complaint     Chief Complaint   Patient presents with    Cough     Pt c/o cough, with chest tightness, sob for the last 3 weeks, the last couple of days it has gotten worse  Covid test have been negative  History of Present Illness       The patient presents today with complaints of wet np cough x 3 weeks  For the past two days she has noticed chest tightness/discomfort when coughing, SOB, and headache  She has taken 3 COVID tests at home which have been negative  She reports that she gets bronchitis frequently and has a history of sports induced asthma which she has an albuterol inhaler for  She is requesting a refill for her inhaler today  She denied fevers/chills, body aches, n/v/d  Review of Systems   Review of Systems   Constitutional: Negative for activity change, appetite change, chills, fatigue and fever  HENT: Positive for congestion and postnasal drip  Negative for ear discharge, ear pain, rhinorrhea, sinus pressure, sinus pain, sneezing, sore throat and trouble swallowing  Eyes: Negative for pain, discharge, redness and itching  Respiratory: Positive for cough (wet np), chest tightness and shortness of breath  Negative for wheezing  Cardiovascular: Negative for chest pain and palpitations  Gastrointestinal: Negative for abdominal pain, diarrhea, nausea and vomiting  Genitourinary: Negative for difficulty urinating, frequency and urgency  Musculoskeletal: Negative for myalgias  Skin: Negative for rash     Allergic/Immunologic: Negative for environmental allergies  Neurological: Positive for headaches  Negative for dizziness and light-headedness  Psychiatric/Behavioral: Negative  Current Medications       Current Outpatient Medications:     albuterol (ProAir HFA) 90 mcg/act inhaler, Inhale 2 puffs every 6 (six) hours as needed for wheezing, Disp: 8 5 g, Rfl: 0    cefdinir (OMNICEF) 300 mg capsule, Take 1 capsule (300 mg total) by mouth every 12 (twelve) hours for 7 days, Disp: 14 capsule, Rfl: 0    albuterol (PROVENTIL HFA,VENTOLIN HFA) 90 mcg/act inhaler, Inhale, Disp: , Rfl:     aspirin 81 MG tablet, Take 81 mg by mouth daily  , Disp: , Rfl:     cholecalciferol (VITAMIN D3) 1,000 units tablet, Take 2 tablets by mouth daily, Disp: , Rfl:     cyclobenzaprine (FLEXERIL) 10 mg tablet, Take 1 tablet (10 mg total) by mouth 3 (three) times a day as needed for muscle spasms, Disp: 90 tablet, Rfl: 1    Diclofenac Sodium (VOLTAREN) 1 %, Apply 2 g topically 4 (four) times a day as needed (pain), Disp: 100 g, Rfl: 0    gabapentin (NEURONTIN) 600 MG tablet, Take 1 tablet (600 mg total) by mouth 3 (three) times a day, Disp: 90 tablet, Rfl: 1    hydrocortisone (ANUSOL-HC) 25 mg suppository, , Disp: , Rfl: 0    ibuprofen (MOTRIN) 600 mg tablet, Take 1 tablet (600 mg total) by mouth every 6 (six) hours as needed for mild pain, Disp: 30 tablet, Rfl: 0    levothyroxine 50 mcg tablet, , Disp: , Rfl:     metFORMIN (GLUCOPHAGE) 1000 MG tablet, Take 1,000 mg by mouth 2 (two) times a day with meals, Disp: , Rfl:     metoprolol succinate (TOPROL-XL) 25 mg 24 hr tablet, , Disp: , Rfl:     [START ON 7/20/2022] oxyCODONE-acetaminophen (PERCOCET) 5-325 mg per tablet, Take 1 tablet by mouth 2 (two) times a day as needed for severe pain Take 1 tablet by mouth up to twice daily as needed for pain   Max Daily Amount: 2 tablets, Disp: 60 tablet, Rfl: 0    [START ON 8/19/2022] oxyCODONE-acetaminophen (PERCOCET) 5-325 mg per tablet, Take 1 tablet by mouth 2 (two) times a day as needed for severe pain Take 1 tablet by mouth up to twice daily as needed for pain   Max Daily Amount: 2 tablets, Disp: 60 tablet, Rfl: 0    traZODone (DESYREL) 50 mg tablet, , Disp: , Rfl: 0    Current Allergies     Allergies as of 07/14/2022 - Reviewed 07/14/2022   Allergen Reaction Noted    Levaquin [levofloxacin in d5w] Shortness Of Breath 01/21/2016    Amitiza [lubiprostone] Other (See Comments) and Hives 08/10/2011    Biaxin [clarithromycin] Hives 01/21/2016    Cephradine      Erythromycin Hives 08/10/2011    Macrobid [nitrofurantoin monohyd macro] Other (See Comments) 01/21/2016    Morphine      Penicillins Hives 08/10/2011    Sulfa antibiotics      Tetracycline      Tetracyclines & related Hives 01/21/2016    Morphine and related Rash 01/21/2016    Valium [diazepam] Rash and Vomiting 06/14/2012            The following portions of the patient's history were reviewed and updated as appropriate: allergies, current medications, past family history, past medical history, past social history, past surgical history and problem list      Past Medical History:   Diagnosis Date    Anxiety     Asthma     exercise induced asthma    Cervical disc disorder     Chronic pain     Chronic pain disorder     Depression     Dysuria     GERD (gastroesophageal reflux disease) 2004    Headache(784 0) As a child    Low back pain     Lung abnormality     nodules    Lung nodule     Neck pain     Peripheral neuropathy     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    Right hip pain 10/29/2019    Thrombocytopenia ([facility] [address] )     Thyroid disease        Past Surgical History:   Procedure Laterality Date    APPENDECTOMY      BREAST BIOPSY Right 1995    CHOLECYSTECTOMY      COLECTOMY      COLON SURGERY      EPIDURAL BLOCK INJECTION N/A 6/23/2016    Procedure: BLOCK / INJECTION EPIDURAL STEROID CERVICAL  C7-T1;  Surgeon: Raven Simpson MD;  Location: [place] [address] MAIN OR;  Service:    Cynthia Rosenberg EPIDURAL BLOCK INJECTION N/A 3/31/2016    Procedure: BLOCK / INJECTION EPIDURAL STEROID CERVICAL C7-T1  (C-ARM); Surgeon: Natividad Pham MD;  Location: [facility] OR;  Service:     EPIDURAL BLOCK INJECTION N/A 8/8/2018    Procedure: C6 C7 Cervical Epidural Steroid Injection (92197); Surgeon: Natividad Pham MD;  Location: [facility] OR;  Service: Pain Management     EPIDURAL BLOCK INJECTION N/A 12/16/2021    Procedure: T4 T5 thoracic epidural steroid injection (66121); Surgeon: Natividad Pham MD;  Location: [facility] OR;  Service: Pain Management     EPIDURAL BLOCK INJECTION N/A 6/10/2022    Procedure: T4 T5 THORACIC EPIDURAL STEROID INJECTION (30542); Surgeon: Natividad Pham MD;  Location: [facility] OR;  Service: Pain Management     HYSTERECTOMY  1996    OOPHORECTOMY Bilateral 1996    PITUITARY SURGERY      OK ARTHROCENTESIS ASPIR&/INJ MAJOR JT/BURSA W/O US Right 11/8/2019    Procedure: Trochanteric Bursa Injection (13052); Surgeon: Natividad Pham MD;  Location: [facility] OR;  Service: Pain Management     OK ARTHROCENTESIS ASPIR&/INJ MAJOR JT/BURSA W/O US Right 1/23/2020    Procedure: Trochanteric Bursa Injection (74497); Surgeon: Natividad Pham MD;  Location: [facility] OR;  Service: Pain Management     OK NJX DX/THER SBST EPIDURAL/SUBRACH CERV/THORACIC N/A 1/21/2016    Procedure: BLOCK / INJECTION EPIDURAL STEROID CERVICAL C7-T1 (C-ARM);   Surgeon: Natividad Pham MD;  Location: [facility] OR;  Service: Pain Management     REDUCTION MAMMAPLASTY Bilateral 2000       Family History   Problem Relation Age of Onset    Thyroid disease Mother     Hyperlipidemia Mother     Depression Mother     Stroke Mother         TIA    Thyroid disease Father     Hyperlipidemia Father     Depression Father     Arthritis Father         Spine and knees    Ovarian cancer Sister 39    No Known Problems Brother     No Known Problems Maternal Uncle     No Known Problems Paternal Aunt     No Known Problems Paternal Uncle     Cancer Maternal Grandmother         Kidney cancer    No Known Problems Maternal Grandfather     Breast cancer Paternal Grandmother 36    Cancer Paternal Grandmother         Breast, Lung, Liver and skin cancer    No Known Problems Paternal Grandfather     No Known Problems Daughter     No Known Problems Son     Cancer Paternal Aunt         Thyroid and colon cancer    ADD / ADHD Neg Hx     Anesthesia problems Neg Hx     Cancer Neg Hx     Clotting disorder Neg Hx     Collagen disease Neg Hx     Diabetes Neg Hx     Dislocations Neg Hx     Learning disabilities Neg Hx     Neurological problems Neg Hx     Osteoporosis Neg Hx     Rheumatologic disease Neg Hx     Scoliosis Neg Hx     Vascular Disease Neg Hx          Medications have been verified  Objective   /78   Pulse 82   Temp 99 °F (37 2 °C)   Wt 86 2 kg (190 lb)   SpO2 100%   BMI 33 66 kg/m²        Physical Exam     Physical Exam  Vitals and nursing note reviewed  Constitutional:       General: She is not in acute distress  Appearance: Normal appearance  She is not ill-appearing  HENT:      Head: Normocephalic and atraumatic  Right Ear: Tympanic membrane, ear canal and external ear normal  No drainage or tenderness  There is no impacted cerumen  No foreign body  Tympanic membrane is not erythematous  Left Ear: Tympanic membrane, ear canal and external ear normal  No drainage or tenderness  There is no impacted cerumen  No foreign body  Tympanic membrane is not erythematous  Nose: Congestion present  No rhinorrhea  Right Sinus: No maxillary sinus tenderness or frontal sinus tenderness  Left Sinus: No maxillary sinus tenderness or frontal sinus tenderness  Mouth/Throat:      Lips: Pink  Mouth: Mucous membranes are moist       Pharynx: Oropharynx is clear  No oropharyngeal exudate or posterior oropharyngeal erythema  Tonsils: No tonsillar exudate     Eyes: General: Vision grossly intact  Right eye: No discharge  Left eye: No discharge  Extraocular Movements: Extraocular movements intact  Conjunctiva/sclera:      Right eye: Right conjunctiva is not injected  Left eye: Left conjunctiva is not injected  Pupils: Pupils are equal, round, and reactive to light  Cardiovascular:      Rate and Rhythm: Normal rate and regular rhythm  Heart sounds: Normal heart sounds  No murmur heard  Pulmonary:      Effort: Pulmonary effort is normal  No respiratory distress  Breath sounds: Normal breath sounds  No decreased air movement  No decreased breath sounds, wheezing, rhonchi or rales  Abdominal:      General: Abdomen is flat  Bowel sounds are normal       Palpations: Abdomen is soft  Musculoskeletal:         General: Normal range of motion  Cervical back: Normal range of motion  Lymphadenopathy:      Cervical: No cervical adenopathy  Skin:     General: Skin is warm  Findings: No rash  Neurological:      Mental Status: She is alert and oriented to person, place, and time  Psychiatric:         Attention and Perception: Attention normal          Mood and Affect: Mood normal          Speech: Speech normal          Behavior: Behavior normal  Behavior is cooperative

## 2022-07-14 NOTE — PATIENT INSTRUCTIONS
Hydration and rest   Start cefdinir (omnicef)  Start albuterol  Mucinex over-the-counter for cough and congestion  Tylenol and Motrin over-the-counter for pain and fever  Follow up with PCP  Go to the nearest emergency department if you have difficulty breathing, worsening symptoms  Acute Bronchitis   WHAT YOU NEED TO KNOW:   Acute bronchitis is swelling and irritation in your lungs  It is usually caused by a virus and most often happens in the winter  Bronchitis may also be caused by bacteria or by a chemical irritant, such as smoke  DISCHARGE INSTRUCTIONS:   Return to the emergency department if:   You cough up blood  Your lips or fingernails turn blue  You feel like you are not getting enough air when you breathe  Call your doctor if:   Your symptoms do not go away or get worse, even after treatment  Your cough does not get better within 4 weeks  You have questions or concerns about your condition or care  Medicines: You may  need any of the following:  Cough suppressants  decrease your urge to cough  Decongestants  help loosen mucus in your lungs and make it easier to cough up  This can help you breathe easier  Inhalers  may be given  Your healthcare provider may give you one or more inhalers to help you breathe easier and cough less  An inhaler gives your medicine to open your airways  Ask your healthcare provider to show you how to use your inhaler correctly  Antibiotics  may be given for up to 5 days if your bronchitis is caused by bacteria  Acetaminophen  decreases pain and fever  It is available without a doctor's order  Ask how much to take and how often to take it  Follow directions  Read the labels of all other medicines you are using to see if they also contain acetaminophen, or ask your doctor or pharmacist  Acetaminophen can cause liver damage if not taken correctly   Do not use more than 4 grams (4,000 milligrams) total of acetaminophen in one day  NSAIDs  help decrease swelling and pain or fever  This medicine is available with or without a doctor's order  NSAIDs can cause stomach bleeding or kidney problems in certain people  If you take blood thinner medicine, always ask your healthcare provider if NSAIDs are safe for you  Always read the medicine label and follow directions  Take your medicine as directed  Contact your healthcare provider if you think your medicine is not helping or if you have side effects  Tell him of her if you are allergic to any medicine  Keep a list of the medicines, vitamins, and herbs you take  Include the amounts, and when and why you take them  Bring the list or the pill bottles to follow-up visits  Carry your medicine list with you in case of an emergency  Self-care:   Drink liquids as directed  You may need to drink more liquids than usual to stay hydrated  Ask how much liquid to drink each day and which liquids are best for you  Use a cool mist humidifier  to increase air moisture in your home  This may make it easier for you to breathe and help decrease your cough  Get more rest   Rest helps your body to heal  Slowly start to do more each day  Rest when you feel it is needed  Avoid irritants in the air  Avoid chemicals, fumes, and dust  Wear a face mask if you must work around dust or fumes  Stay inside on days when air pollution levels are high  If you have allergies, stay inside when pollen counts are high  Do not use aerosol products, such as spray-on deodorant, bug spray, and hair spray  Do not smoke or be around others who are smoking  Nicotine and other chemicals in cigarettes and cigars can cause lung damage  Ask your healthcare provider for information if you currently smoke and need help to quit  E-cigarettes or smokeless tobacco still contain nicotine  Talk to your healthcare provider before you use these products       Prevent acute bronchitis:       Ask about vaccines you may need  Get a flu vaccine each year as soon as recommended, usually in September or October  Ask your healthcare provider if you should also get a pneumonia or COVID-19 vaccine  Your healthcare provider can tell you if you should also get other vaccines, and when to get them  Prevent the spread of germs  You can decrease your risk for acute bronchitis and other illnesses by doing the following:      Wash your hands often with soap and water  Carry germ-killing hand lotion or gel with you  You can use the lotion or gel to clean your hands when soap and water are not available  Do not touch your eyes, nose, or mouth unless you have washed your hands first      Always cover your mouth when you cough to prevent the spread of germs  It is best to cough into a tissue or your shirt sleeve instead of into your hand  Ask those around you to cover their mouths when they cough  Try to avoid people who have a cold or the flu  If you are sick, stay away from others as much as possible  Follow up with your doctor as directed:  Write down questions you have so you will remember to ask them during your follow-up visits  © Copyright [employer] 2021 Information is for End User's use only and may not be sold, redistributed or otherwise used for commercial purposes  All illustrations and images included in CareNotes® are the copyrighted property of [employer]  or Aki Thurston  The above information is an  only  It is not intended as medical advice for individual conditions or treatments  Talk to your doctor, nurse or pharmacist before following any medical regimen to see if it is safe and effective for you

## 2022-08-17 ENCOUNTER — TELEPHONE (OUTPATIENT)
Dept: PAIN MEDICINE | Facility: CLINIC | Age: 57
End: 2022-08-17

## 2022-08-17 NOTE — TELEPHONE ENCOUNTER
Patient called asking for a letter from Dr Terrell Rashid that states that her UDS testing results were incorrect due to her being pre- diabetic and not due to the patient having an alcohol abuse problem    Patient's bills arent being paid by the insurance company because they think the patient's injuries are due to her having an alcohol abuse issue     pls fax letter to [phone]       [phone]    thank you

## 2022-08-17 NOTE — TELEPHONE ENCOUNTER
Letter created in Epic and signed by AS  S/W pt who requested letter to be faxed to  at [facility]  41 at below number  Faxed at requested

## 2022-08-18 ENCOUNTER — APPOINTMENT (OUTPATIENT)
Dept: LAB | Facility: CLINIC | Age: 57
End: 2022-08-18
Payer: MEDICARE

## 2022-08-18 DIAGNOSIS — R73.01 IMPAIRED FASTING GLUCOSE: ICD-10-CM

## 2022-08-18 DIAGNOSIS — D51.9 ANEMIA DUE TO VITAMIN B12 DEFICIENCY, UNSPECIFIED B12 DEFICIENCY TYPE: ICD-10-CM

## 2022-08-18 DIAGNOSIS — E03.4 ATROPHY OF THYROID: ICD-10-CM

## 2022-08-18 LAB
ALBUMIN SERPL BCP-MCNC: 3.5 G/DL (ref 3.5–5)
ALP SERPL-CCNC: 88 U/L (ref 46–116)
ALT SERPL W P-5'-P-CCNC: 34 U/L (ref 12–78)
ANION GAP SERPL CALCULATED.3IONS-SCNC: 6 MMOL/L (ref 4–13)
AST SERPL W P-5'-P-CCNC: 23 U/L (ref 5–45)
BILIRUB SERPL-MCNC: 0.25 MG/DL (ref 0.2–1)
BUN SERPL-MCNC: 24 MG/DL (ref 5–25)
CALCIUM SERPL-MCNC: 9 MG/DL (ref 8.3–10.1)
CHLORIDE SERPL-SCNC: 109 MMOL/L (ref 96–108)
CHOLEST SERPL-MCNC: 219 MG/DL
CO2 SERPL-SCNC: 25 MMOL/L (ref 21–32)
CREAT SERPL-MCNC: 0.83 MG/DL (ref 0.6–1.3)
EST. AVERAGE GLUCOSE BLD GHB EST-MCNC: 117 MG/DL
GFR SERPL CREATININE-BSD FRML MDRD: 79 ML/MIN/1.73SQ M
GLUCOSE P FAST SERPL-MCNC: 98 MG/DL (ref 65–99)
HBA1C MFR BLD: 5.7 %
HDLC SERPL-MCNC: 58 MG/DL
LDLC SERPL CALC-MCNC: 139 MG/DL (ref 0–100)
NONHDLC SERPL-MCNC: 161 MG/DL
POTASSIUM SERPL-SCNC: 3.7 MMOL/L (ref 3.5–5.3)
PROT SERPL-MCNC: 7.1 G/DL (ref 6.4–8.4)
SODIUM SERPL-SCNC: 140 MMOL/L (ref 135–147)
TRIGL SERPL-MCNC: 108 MG/DL
TSH SERPL DL<=0.05 MIU/L-ACNC: 2.71 UIU/ML (ref 0.45–4.5)
VIT B12 SERPL-MCNC: 1285 PG/ML (ref 100–900)

## 2022-08-18 PROCEDURE — 82607 VITAMIN B-12: CPT

## 2022-08-18 PROCEDURE — 80053 COMPREHEN METABOLIC PANEL: CPT

## 2022-08-18 PROCEDURE — 84443 ASSAY THYROID STIM HORMONE: CPT

## 2022-08-18 PROCEDURE — 83036 HEMOGLOBIN GLYCOSYLATED A1C: CPT

## 2022-08-18 PROCEDURE — 36415 COLL VENOUS BLD VENIPUNCTURE: CPT

## 2022-08-18 PROCEDURE — 80061 LIPID PANEL: CPT

## 2022-08-29 NOTE — PROGRESS NOTES
New Patient Progress Note      Chief Complaint   Patient presents with    insulin resistance     Referred by PCP  C/o hot flashes, excessive sweating, cant lose weight  History of Present Illness:   Pieter Bear is a 55 y o  female with hypothyroidism and prediabetes presenting to the office today to establish care  Past medical history significant for GERD, cervical disc disorder, myofascial pain syndrome, and chronic pain syndrome  PSH significant for pituitary ablation- had 1/3 of pituitary removed in her 35s- she hd a high prolactin level- had been on cabergoline for "a few years"  Reports that surgery was completed by neuro surgery-Dr Pham Durán MD   No records for review  Total colectomy, rectal mucosa active me and ileal reservoir creation  TAHBSO in her late 35s- HRT for approximately 1 year- s/e and discontinued  Topical estrogen- caused intense swelling    Patient's primary concern today is a 30 lb weight gain over the last 2 years along with fatigue, excessive sweating, and hair loss  Family history of hypothyroidism- both parents, brother and sister    Paternal grandfather and paternal aunt had T2DM    Current regimen:  Metformin 1000 mg twice daily    Patient reports that she has been following healthy diet-approximately 1000 calories day  She is active despite chronic pain for which she receives steroid injections every 2-3 months  She estimates that she receives approximately 50 of these yearly  She received 10 injections in the upper back/neck approximately 2 months ago      Cortisol excess:  Unexplained weight gain: +  Proximal muscle weakness: +  Easy bruising: +  History of hard to control diabetes: -  History of hard to control hypertension: -  Symptoms of osteoporosis/history of easy fractures: DEXA scan was performed "years ago"; denies fractures    Aldosterone excess:  History of hypertension and/or hypokalemia: -    Pheochromocytoma:  Anxiety attacks: +  Palpitations: +  Tremors: +  Headache: +; chronic migraines  Sweating: +  Hot flashes: +  Hypertensive urgency: -    History of cancer: mother- uterine CA, renal CA, paternal grandmother- breast/lung/bladder  Adrenal disease: -  Thyroid cancer: + paternal aunt  Primary hyperparathyroidism or hypercalcemia: -    For hypothyroidism, she is taking 50 mcg of levothyroxine daily  Component      Latest Ref Rng & Units 2/16/2022 8/18/2022          12:58 PM 12:05 PM   TSH 3RD GENERATON      0 450 - 4 500 uIU/mL 2 050 2 710   For vitamin-D deficiency, she is supplementing with 2000 units daily  She is not currently on an ACE-inhibitor, angiotensin receptor blocker, or statin  LDL is elevated but improving    Patient Active Problem List   Diagnosis    Chronic pain syndrome    Acute muscle stiffness of neck    Cervical disc disorder with radiculopathy of mid-cervical region    Neck pain    Chronic left-sided low back pain with left-sided sciatica    Lumbar radiculopathy    Cervicalgia    Right hip pain    Trochanteric bursitis of right hip    Pain in right hip    Myofascial pain syndrome    Carpal tunnel syndrome    Continuous opioid dependence (HCC)    Slow transit constipation    Prediabetes    Class 1 obesity due to excess calories without serious comorbidity with body mass index (BMI) of 34 0 to 34 9 in adult    History of pituitary adenoma    Sweating abnormality    Vitamin D deficiency    Hypothyroidism    Hair loss    History of total hysterectomy      Past Medical History:   Diagnosis Date    Anxiety     Asthma     exercise induced asthma    Cervical disc disorder     Chronic pain     Chronic pain disorder     Depression     Dysuria     GERD (gastroesophageal reflux disease) 2004    Headache(784 0) As a child    Low back pain     Lung abnormality     nodules    Lung nodule     Neck pain     Peripheral neuropathy     Pituitary abnormality (HCC)     tumor    Right hip pain 10/29/2019    Thrombocytopenia ([facility] [address] )     Thyroid disease       Past Surgical History:   Procedure Laterality Date    APPENDECTOMY      BREAST BIOPSY Right 1995    CHOLECYSTECTOMY      COLECTOMY      COLON SURGERY      EPIDURAL BLOCK INJECTION N/A 6/23/2016    Procedure: BLOCK / INJECTION EPIDURAL STEROID CERVICAL  C7-T1;  Surgeon: Juventino Marx MD;  Location: [place] [address] MAIN OR;  Service:     EPIDURAL BLOCK INJECTION N/A 3/31/2016    Procedure: BLOCK / INJECTION EPIDURAL STEROID CERVICAL C7-T1  (C-ARM); Surgeon: Juventino Marx MD;  Location: [facility] MAIN OR;  Service:     EPIDURAL BLOCK INJECTION N/A 8/8/2018    Procedure: C6 C7 Cervical Epidural Steroid Injection (57822); Surgeon: Juventino Marx MD;  Location: [facility] MAIN OR;  Service: Pain Management     EPIDURAL BLOCK INJECTION N/A 12/16/2021    Procedure: T4 T5 thoracic epidural steroid injection (18628); Surgeon: Juventino Marx MD;  Location: [facility] MAIN OR;  Service: Pain Management     EPIDURAL BLOCK INJECTION N/A 6/10/2022    Procedure: T4 T5 THORACIC EPIDURAL STEROID INJECTION (44009); Surgeon: Juventino Marx MD;  Location: [facility] MAIN OR;  Service: Pain Management     HYSTERECTOMY  1996    OOPHORECTOMY Bilateral 1996    PITUITARY SURGERY      PA ARTHROCENTESIS ASPIR&/INJ MAJOR JT/BURSA W/O US Right 11/8/2019    Procedure: Trochanteric Bursa Injection (24077); Surgeon: Juventino Marx MD;  Location: [facility] MAIN OR;  Service: Pain Management     PA ARTHROCENTESIS ASPIR&/INJ MAJOR JT/BURSA W/O US Right 1/23/2020    Procedure: Trochanteric Bursa Injection (70630); Surgeon: Juventino Marx MD;  Location: [facility] MAIN OR;  Service: Pain Management     PA NJX DX/THER SBST EPIDURAL/SUBRACH CERV/THORACIC N/A 1/21/2016    Procedure: BLOCK / INJECTION EPIDURAL STEROID CERVICAL C7-T1 (C-ARM);   Surgeon: Juventino Marx MD;  Location: [facility] MAIN OR;  Service: Pain Management     REDUCTION MAMMAPLASTY Bilateral 2000      Family History   Problem Relation Age of Onset    Thyroid disease Mother     Hyperlipidemia Mother     Depression Mother     Stroke Mother         TIA    Thyroid disease Father     Hyperlipidemia Father     Depression Father     Arthritis Father         Spine and knees    Ovarian cancer Sister 39    No Known Problems Brother     No Known Problems Maternal Uncle     No Known Problems Paternal Aunt     No Known Problems Paternal Uncle     Cancer Maternal Grandmother         Kidney cancer    No Known Problems Maternal Grandfather     Breast cancer Paternal Grandmother 36    Cancer Paternal Grandmother         Breast, Lung, Liver and skin cancer    No Known Problems Paternal Grandfather     No Known Problems Daughter     No Known Problems Son     Cancer Paternal Aunt         Thyroid and colon cancer    ADD / ADHD Neg Hx     Anesthesia problems Neg Hx     Cancer Neg Hx     Clotting disorder Neg Hx     Collagen disease Neg Hx     Diabetes Neg Hx     Dislocations Neg Hx     Learning disabilities Neg Hx     Neurological problems Neg Hx     Osteoporosis Neg Hx     Rheumatologic disease Neg Hx     Scoliosis Neg Hx     Vascular Disease Neg Hx      Social History     Tobacco Use    Smoking status: Never Smoker    Smokeless tobacco: Never Used   Substance Use Topics    Alcohol use: No     Allergies   Allergen Reactions    Levaquin [Levofloxacin In D5w] Shortness Of Breath     Also hives      Amitiza [Lubiprostone] Other (See Comments) and Hives     Reaction Date: 10Aug2011;   Migraines and vomiting    Biaxin [Clarithromycin] Hives    Cephradine     Erythromycin Hives     Reaction Date: 10Aug2011;     Macrobid [Nitrofurantoin Monohyd Macro] Other (See Comments)     C/o passing out    Morphine     Penicillins Hives     Reaction Date: 10Aug2011;     Sulfa Antibiotics     Tetracycline     Tetracyclines & Related Hives    Morphine And Related Rash    Valium [Diazepam] Rash and Vomiting     Reaction Date: [id number];          Current Outpatient Medications:     albuterol (ProAir HFA) 90 mcg/act inhaler, Inhale 2 puffs every 6 (six) hours as needed for wheezing, Disp: 8 5 g, Rfl: 0    albuterol (PROVENTIL HFA,VENTOLIN HFA) 90 mcg/act inhaler, Inhale, Disp: , Rfl:     aspirin 81 MG tablet, Take 81 mg by mouth daily  , Disp: , Rfl:     cholecalciferol (VITAMIN D3) 1,000 units tablet, Take 2 tablets by mouth daily, Disp: , Rfl:     cyclobenzaprine (FLEXERIL) 10 mg tablet, Take 1 tablet (10 mg total) by mouth 3 (three) times a day as needed for muscle spasms, Disp: 90 tablet, Rfl: 1    dexamethasone (DECADRON) 1 mg tablet, Once at 11PM night before AM cortisol test, Disp: 1 tablet, Rfl: 0    Diclofenac Sodium (VOLTAREN) 1 %, Apply 2 g topically 4 (four) times a day as needed (pain), Disp: 100 g, Rfl: 0    gabapentin (NEURONTIN) 600 MG tablet, Take 1 tablet (600 mg total) by mouth 3 (three) times a day, Disp: 90 tablet, Rfl: 1    ibuprofen (MOTRIN) 600 mg tablet, Take 1 tablet (600 mg total) by mouth every 6 (six) hours as needed for mild pain, Disp: 30 tablet, Rfl: 0    levothyroxine 50 mcg tablet, Take 1 tablet (50 mcg) Monday-Saturday  Take 1 5 tablets on Sunday  , Disp: 45 tablet, Rfl: 0    metFORMIN (GLUCOPHAGE) 1000 MG tablet, Take 1,000 mg by mouth 2 (two) times a day with meals, Disp: , Rfl:     metoprolol succinate (TOPROL-XL) 25 mg 24 hr tablet, , Disp: , Rfl:     oxyCODONE-acetaminophen (PERCOCET) 5-325 mg per tablet, Take 1 tablet by mouth 2 (two) times a day as needed for severe pain Take 1 tablet by mouth up to twice daily as needed for pain  Max Daily Amount: 2 tablets, Disp: 60 tablet, Rfl: 0    traZODone (DESYREL) 50 mg tablet, , Disp: , Rfl: 0    oxyCODONE-acetaminophen (PERCOCET) 5-325 mg per tablet, Take 1 tablet by mouth 2 (two) times a day as needed for severe pain Take 1 tablet by mouth up to twice daily as needed for pain   Max Daily Amount: 2 tablets, Disp: 60 tablet, Rfl: 0    Review of Systems   Constitutional: Positive for fatigue and unexpected weight change  Negative for activity change and appetite change  HENT: Negative for dental problem, sore throat, trouble swallowing and voice change  Eyes: Negative for visual disturbance  Respiratory: Negative for cough, chest tightness and shortness of breath  Cardiovascular: Positive for palpitations  Negative for chest pain and leg swelling  Gastrointestinal: Negative for constipation, diarrhea, nausea and vomiting  Endocrine: Positive for heat intolerance  Negative for cold intolerance, polydipsia, polyphagia and polyuria  Genitourinary: Positive for vaginal pain (with intercourse)  Negative for frequency  Musculoskeletal: Positive for arthralgias, back pain, myalgias and neck pain  Negative for gait problem  Skin: Negative for wound  Allergic/Immunologic: Positive for environmental allergies  Negative for food allergies  Neurological: Positive for tremors, weakness and headaches  Negative for dizziness, light-headedness and numbness  Hematological: Bruises/bleeds easily  Psychiatric/Behavioral: Negative for decreased concentration, dysphoric mood and sleep disturbance  The patient is not nervous/anxious  Physical Exam:  Body mass index is 34 54 kg/m²  /62   Pulse 80   Ht 5' 3" (1 6 m)   Wt 88 5 kg (195 lb)   SpO2 96%   BMI 34 54 kg/m²    Wt Readings from Last 3 Encounters:   08/30/22 88 5 kg (195 lb)   07/14/22 86 2 kg (190 lb)   05/18/22 86 2 kg (190 lb)       Physical Exam  Vitals reviewed  Constitutional:       General: She is not in acute distress  Appearance: She is well-developed  She is obese  She is not ill-appearing  HENT:      Head: Normocephalic and atraumatic  Eyes:      Pupils: Pupils are equal, round, and reactive to light  Neck:      Thyroid: No thyromegaly  Cardiovascular:      Rate and Rhythm: Normal rate and regular rhythm  Pulses: Normal pulses        Heart sounds: Normal heart sounds  Pulmonary:      Effort: Pulmonary effort is normal       Breath sounds: Normal breath sounds  Abdominal:      General: Bowel sounds are normal  There is no distension  Palpations: Abdomen is soft  Tenderness: There is no abdominal tenderness  Musculoskeletal:      Cervical back: Normal range of motion and neck supple  Right lower leg: No edema  Left lower leg: No edema  Lymphadenopathy:      Cervical: No cervical adenopathy  Skin:     General: Skin is warm and dry  Capillary Refill: Capillary refill takes less than 2 seconds  Neurological:      Mental Status: She is alert and oriented to person, place, and time  Gait: Gait normal    Psychiatric:         Mood and Affect: Mood normal          Behavior: Behavior normal            Labs:   Lab Results   Component Value Date    HGBA1C 5 7 (H) 08/18/2022    HGBA1C 5 9 (H) 02/16/2022    HGBA1C 5 6 03/12/2021     Lab Results   Component Value Date    CREATININE 0 83 08/18/2022    CREATININE 0 85 02/16/2022    CREATININE 0 80 03/12/2021    BUN 24 08/18/2022    K 3 7 08/18/2022     (H) 08/18/2022    CO2 25 08/18/2022     eGFR   Date Value Ref Range Status   08/18/2022 79 ml/min/1 73sq m Final     Lab Results   Component Value Date    HDL 58 08/18/2022    TRIG 108 08/18/2022     Lab Results   Component Value Date    ALT 34 08/18/2022    AST 23 08/18/2022    ALKPHOS 88 08/18/2022     Lab Results   Component Value Date    PBQ6NSSEDIDO 2 710 08/18/2022    FGW1DOQKGBYY 2 050 02/16/2022    IJR4YJXJVFOA 3 530 03/12/2021     No results found for: FREET4, TSI    Impression & Plan:    Problem List Items Addressed This Visit        Endocrine    Hypothyroidism     Counseled on basic pathophysiology of hypothyroidism  Take 50 mcg of LT4 Monday-Saturday  Take 1 5 tablets on Sunday  Check both TSH and free T4 in 6 weeks            Relevant Medications    dexamethasone (DECADRON) 1 mg tablet    levothyroxine 50 mcg tablet Other Relevant Orders    TSH, 3rd generation    T4, free       Musculoskeletal and Integument    Sweating abnormality     Suspicion for pheochromocytoma is low  However, given reported symptoms, will check plasma catecholamines and metanephrines to rule out  Relevant Orders    Prolactin Lab Collect    Catecholamines, fractionated, plasma    Metanephrine, Fractionated Plasma Free       Other    Prediabetes - Primary     Patient is currently stable on 1,000 mg of metformin BID  Counseled on basic pathophysiology of type 2 diabetes/insulin resistance  Referral given for weight management  Weight loss will reduce insulin resistance and lower blood sugars  Continue to focus on healthy diet  Continue with regular physical activity  Relevant Orders    Ambulatory Referral to Weight Management    Class 1 obesity due to excess calories without serious comorbidity with body mass index (BMI) of 34 0 to 34 9 in adult     Referral given for weight management  Will check a dexamethasone suppression test rule out hyper cortisol is in  Last steroid injection was received a little over 2 months ago  Counseled on the importance of completing this prior to receiving her next steroid injections  Unfortunately, given the frequency with which she receives these injections, it is likely that exogenous steroids are contributing to her weight gain  Relevant Medications    dexamethasone (DECADRON) 1 mg tablet    Other Relevant Orders    Ambulatory Referral to Weight Management    Prolactin Lab Collect    Cortisol Level, AM Specimen Lab Collect    History of pituitary adenoma     Unfortunately, there are no labs, notes, or images for review related to history of pituitary adenoma  Patient reports that surgery was performed by Dr Fe Arauz at [facility]  Recommended that she investigate retrieval images and consult notes  Will check prolactin levels given reported history           Relevant Orders    Prolactin Lab Collect    Vitamin D deficiency    Relevant Orders    Vitamin D 25 hydroxy Lab Collect    Hair loss     Likely multifactorial   Will check ferritin level  Relevant Orders    Ferritin Lab Collect    History of total hysterectomy     As patient underwent a total hysterectomy in her early 35s, I do recommend that she pursue a DEXA scan  For now, continue with 2000 units of vitamin-D daily  Start 1200 mg of calcium daily  Continue with weight-bearing activity  Orders Placed This Encounter   Procedures    Prolactin Lab Collect     Standing Status:   Future     Standing Expiration Date:   8/30/2023    Vitamin D 25 hydroxy Lab Collect     Standing Status:   Future     Standing Expiration Date:   8/30/2023    Cortisol Level, AM Specimen Lab Collect     This is a patient instruction: This test must be collected between 7-9 AM  This test may exhibit interference when sample is collected from a person who is consuming a supplement with a high dose of biotin (also termed as vitamin B7 or B8, vitamin H or coenzyme R)  Patient should stop biotin consumption at least 72 hours prior to the collection of the sample  Standing Status:   Future     Standing Expiration Date:   8/30/2023    Catecholamines, fractionated, plasma     This is a patient instruction: This test requires fasting for four or more hours without smoking  Avoid walnuts, bananas and [place] one week prior to test collection  Other drug interference may occur including epinephrine and epinephrine-like drugs (eg, nose drops, sinus and cough preparations, bronchodilators, appetite suppressants)  Test is unreliable in subjects on levodopa or methenamine mandelate  Avoid patient stress  Patient should lie down in quiet surroundings for at least 30 minutes before and during sample collection       Standing Status:   Future     Standing Expiration Date:   8/30/2023    Metanephrine, Fractionated Plasma Free     This is a Patient Instruction: This test requires patient fasting for 10-12 hours or longer  The patient should be laying down for at least 15 minutes before and during sample collection  Standing Status:   Future     Standing Expiration Date:   8/30/2023    TSH, 3rd generation     This is a patient instruction: This test is non-fasting  Please drink two glasses of water morning of bloodwork  Standing Status:   Future     Standing Expiration Date:   8/30/2023    T4, free     Standing Status:   Future     Standing Expiration Date:   8/30/2023   [facility] [place] Ferritin Lab Collect     Standing Status:   Future     Standing Expiration Date:   8/30/2023    Ambulatory Referral to Weight Management     Standing Status:   Future     Standing Expiration Date:   8/30/2023     Referral Priority:   Routine     Referral Type:   Consult - AMB     Referral Reason:   Specialty Services Required     Requested Specialty:   Bariatrics     Number of Visits Requested:   1     Expiration Date:   8/30/2023       Patient Instructions   1  Start taking 1,200 mg of calcium daily  2  Discussed with the patient and all questioned fully answered  She will call me if any problems arise  Follow-up appointment in 6 months       Counseled patient on diagnostic results, prognosis, risk and benefit of treatment options, instruction for management, importance of treatment compliance, Risk  factor reduction and impressions    PAULINA Lozano

## 2022-08-30 ENCOUNTER — OFFICE VISIT (OUTPATIENT)
Dept: ENDOCRINOLOGY | Facility: CLINIC | Age: 57
End: 2022-08-30
Payer: COMMERCIAL

## 2022-08-30 VITALS
HEIGHT: 63 IN | OXYGEN SATURATION: 96 % | HEART RATE: 80 BPM | SYSTOLIC BLOOD PRESSURE: 112 MMHG | BODY MASS INDEX: 34.55 KG/M2 | DIASTOLIC BLOOD PRESSURE: 62 MMHG | WEIGHT: 195 LBS

## 2022-08-30 DIAGNOSIS — E55.9 VITAMIN D DEFICIENCY: ICD-10-CM

## 2022-08-30 DIAGNOSIS — Z90.710 HISTORY OF TOTAL HYSTERECTOMY: ICD-10-CM

## 2022-08-30 DIAGNOSIS — E66.09 CLASS 1 OBESITY DUE TO EXCESS CALORIES WITHOUT SERIOUS COMORBIDITY WITH BODY MASS INDEX (BMI) OF 34.0 TO 34.9 IN ADULT: ICD-10-CM

## 2022-08-30 DIAGNOSIS — E03.9 HYPOTHYROIDISM, UNSPECIFIED TYPE: ICD-10-CM

## 2022-08-30 DIAGNOSIS — R73.03 PREDIABETES: Primary | ICD-10-CM

## 2022-08-30 DIAGNOSIS — L74.9 SWEATING ABNORMALITY: ICD-10-CM

## 2022-08-30 DIAGNOSIS — Z86.018 HISTORY OF PITUITARY ADENOMA: ICD-10-CM

## 2022-08-30 DIAGNOSIS — L65.9 HAIR LOSS: ICD-10-CM

## 2022-08-30 PROBLEM — E66.811 CLASS 1 OBESITY DUE TO EXCESS CALORIES WITHOUT SERIOUS COMORBIDITY WITH BODY MASS INDEX (BMI) OF 34.0 TO 34.9 IN ADULT: Status: ACTIVE | Noted: 2022-08-30

## 2022-08-30 PROCEDURE — 99204 OFFICE O/P NEW MOD 45 MIN: CPT | Performed by: NURSE PRACTITIONER

## 2022-08-30 RX ORDER — DEXAMETHASONE 1 MG
TABLET ORAL
Qty: 1 TABLET | Refills: 0 | Status: SHIPPED | OUTPATIENT
Start: 2022-08-30

## 2022-08-30 RX ORDER — LEVOTHYROXINE SODIUM 0.05 MG/1
TABLET ORAL
Qty: 45 TABLET | Refills: 0 | Status: SHIPPED | OUTPATIENT
Start: 2022-08-30 | End: 2022-10-17

## 2022-08-30 NOTE — ASSESSMENT & PLAN NOTE
Suspicion for pheochromocytoma is low  However, given reported symptoms, will check plasma catecholamines and metanephrines to rule out

## 2022-08-30 NOTE — ASSESSMENT & PLAN NOTE
Referral given for weight management  Will check a dexamethasone suppression test rule out hyper cortisol is in  Last steroid injection was received a little over 2 months ago  Counseled on the importance of completing this prior to receiving her next steroid injections  Unfortunately, given the frequency with which she receives these injections, it is likely that exogenous steroids are contributing to her weight gain

## 2022-08-30 NOTE — ASSESSMENT & PLAN NOTE
Unfortunately, there are no labs, notes, or images for review related to history of pituitary adenoma  Patient reports that surgery was performed by Dr Sissy Goode at [facility]  Recommended that she investigate retrieval images and consult notes  Will check prolactin levels given reported history

## 2022-08-30 NOTE — ASSESSMENT & PLAN NOTE
As patient underwent a total hysterectomy in her early 35s, I do recommend that she pursue a DEXA scan  For now, continue with 2000 units of vitamin-D daily  Start 1200 mg of calcium daily  Continue with weight-bearing activity

## 2022-08-30 NOTE — ASSESSMENT & PLAN NOTE
Patient is currently stable on 1,000 mg of metformin BID  Counseled on basic pathophysiology of type 2 diabetes/insulin resistance  Referral given for weight management  Weight loss will reduce insulin resistance and lower blood sugars  Continue to focus on healthy diet  Continue with regular physical activity

## 2022-08-30 NOTE — ASSESSMENT & PLAN NOTE
Counseled on basic pathophysiology of hypothyroidism  Take 50 mcg of LT4 Monday-Saturday  Take 1 5 tablets on Sunday  Check both TSH and free T4 in 6 weeks

## 2022-09-08 NOTE — PROGRESS NOTES
Pain Medicine Follow-Up Note    Assessment:  1  Chronic pain syndrome    2  Long-term current use of opiate analgesic    3  Uncomplicated opioid dependence ([address] [address] )    4  Myofascial pain syndrome    5  Neck pain    6  Cervical disc disorder with radiculopathy of mid-cervical region        Plan:  Orders Placed This Encounter   Procedures    Injection trigger point 3 or more muscles     Pt wants to have these injections without steroids     Standing Status:   Future     Standing Expiration Date:   9/11/2023     Scheduling Instructions: With Dr Sherren Corrigan due to lung field involvement, Pt wants to have these injections without steroids    MM ALL_Prescribed Meds and Special Instructions     Order Specific Question:   [facility] Is ALBUTEROL prescribed? Answer:   Yes     Order Specific Question:   [facility] Is CYCLOBENZAPRINE Prescribed? Answer:   Yes     Order Specific Question:   [facility] Is GABAPENTIN prescribed? Answer:   Yes     Order Specific Question:   [facility] Is METFORMIN prescribed? Answer:   Yes     Order Specific Question:   [facility] Is METOPROLOL prescribed? Answer:   Yes     Order Specific Question:   [facility] Is OXYCODONE/APAP prescribed? Answer:   Yes     Order Specific Question:   [place] Is TRAZODONE prescribed? Answer:    Yes    MM DT_Alprazolam Definitive Test    MM DT_Amphetamine Definitive Test    MM DT_Buprenorphine Definitive Test    MM DT_Butalbital Definitive Test    MM DT_Clonazepam Definitive Test    MM DT_Cocaine Definitive Test    MM DT_Codeine Definitive Test    MM DT_Dextromethorphan Definitive Test    MM Diazepam Definitive Test    MM DT_Ethyl Glucuronide/Ethyl Sulfate Definitive Test    MM DT_Fentanyl Definitive Test    MM DT_Heroin Definitive Test    MM DT_Hydrocodone Definitive Test    MM DT_Hydromorphone Definitive Test    MM DT_Kratom Definitive Test    MM DT_Levorphanol Definitive Test    MM DT_MDMA Definitive Test    MM DT_Meperidine Definitive Test    MM DT_Methadone Definitive Test    MM DT_Methamphetamine Definitive Test    MM DT_Methylphenidate Definitive Test    MM DT_Morphine Definitive Test    MM Lorazepam Definitive Test    MM DT_Oxazepam Definitive Test    MM DT_Oxycodone Definitive Test    MM DT_Oxymorphone Definitive Test    MM DT_Phencyclidine Definitive Test    MM DT_Phenobarbital Definitive Test    MM DT_Phentermine Definitive Test    MM DT_Secobarbital Definitive Test    MM DT_Spice Definitive Test    MM DT_Tapentadol Definitive Test    MM DT_Temazapam Definitive Test    MM DT_THC Definitive Test    MM DT_Tramadol Definitive Test       New Medications Ordered This Visit   Medications    gabapentin (NEURONTIN) 800 mg tablet     Sig: Take 1 tablet (800 mg total) by mouth 3 (three) times a day     Dispense:  90 tablet     Refill:  1    oxyCODONE-acetaminophen (PERCOCET) 5-325 mg per tablet     Sig: Take 1 tablet by mouth 2 (two) times a day as needed for severe pain Do not fill until 9/18/2022  Take 1 tablet by mouth up to twice daily as needed for pain  Max Daily Amount: 2 tablets     Dispense:  60 tablet     Refill:  0    oxyCODONE-acetaminophen (PERCOCET) 5-325 mg per tablet     Sig: Take 1 tablet by mouth 2 (two) times a day as needed for severe pain Do not fill until 10/18/2022  Take 1 tablet by mouth up to twice daily as needed for pain  Max Daily Amount: 2 tablets     Dispense:  60 tablet     Refill:  0    cyclobenzaprine (FLEXERIL) 10 mg tablet     Sig: Take 1 tablet (10 mg total) by mouth 3 (three) times a day as needed for muscle spasms     Dispense:  90 tablet     Refill:  1       My impressions and treatment recommendations were discussed in detail with the patient who verbalized understanding and had no further questions  At today's visit the patient states that her pain is worse with a pain score of 7/10 on the verbal numeric pain scale    Patient states that she needs to have trigger point injections again in her right trapezius and right mid back area however her endocrinologist has stated to her she needs to reduce her steroid use  I expressed to the patient that trigger point injections can be beneficial with just the use of a needle (dry needling)  At this time the patient requested trigger point injections without the use of steroids  Patient also reports that she continues to have reduced pain and improved level of functioning without significant side effects while using Percocet 5/325 mg tablet take 1 tablet by mouth up to 2 times daily as needed for severe pain, gabapentin she currently takes 600 mg by mouth 3 times daily, and cyclobenzaprine 10 mg by mouth up to 3 times daily as needed for muscle spasms  I discussed that the patient might have some more therapeutic benefit out of her gabapentin if she increases it to 800 mg t i d  patient is in agreement with this plan  Medications were E prescribed to the patient's pharmacy with do not fill dates until 09/18/2022 and 10/18/2022  [place] Prescription Drug Monitoring Program report was reviewed and was appropriate     A urine drug screen was collected at today's office visit as part of our medication management protocol  The point of care testing results were appropriate for what was being prescribed  The specimen will be sent for confirmatory testing  The drug screen is medically necessary because the patient is either dependent on opioid medication or is being considered for opioid medication therapy and the results could impact ongoing or future treatment  The drug screen is to evaluate for the presences or absence of prescribed, non-prescribed, and/or illicit drugs/substances  There are risks associated with opioid medications, including dependence, addiction and tolerance  The patient understands and agrees to use these medications only as prescribed   Potential side effects of the medications include, but are not limited to, constipation, drowsiness, addiction, impaired judgment and risk of fatal overdose if not taken as prescribed  The patient was warned against driving while taking sedation medications  Sharing medications is a felony  At this point in time, the patient is showing no signs of addiction, abuse, diversion or suicidal ideation  Complete risks and benefits including bleeding, infection, tissue reaction, nerve injury and allergic reaction were discussed  The approach was demonstrated using models and literature was provided  Verbal and written consent was obtained  Follow-up is planned in 8 weeks time or sooner as warranted  Discharge instructions were provided  I personally saw and examined the patient and I agree with the above discussed plan of care  History of Present Illness:    Lorenzo Vences is a 55 y o  female who presents to [facility] for interval re-evaluation of the above stated pain complaints  The patient has a past medical and chronic pain history as outlined in the assessment section  She was last seen on 7/13/2022  At today's visit the patient states that her pain symptoms are worse with a pain score of 7/10 on the verbal numeric pain scale  Patient states that her pain is worse at night  And the patient's pain is constant in nature  The quality of the patient's pain is described as burning, dull-aching, sharp, throbbing, pressure-like, with increased numbness and pins and needles  Patient indicates that her pain is in her posterior neck that radiates down her right arm to her right hand her bilateral low back radiating down her left leg  Patient states that the current treatment plan is providing enough pain relief to make a difference within her life by providing her with 30-40% relief of her pain symptoms      Pain Contract Signed:  9/20/2021  Last Urine Drug Screen:  05/18/2022    Other than as stated above, the patient denies any interval changes in medications, medical condition, mental condition, symptoms, or allergies since the last office visit  Review of Systems:    Review of Systems   Respiratory: Negative for shortness of breath  Cardiovascular: Negative for chest pain  Gastrointestinal: Negative for constipation, diarrhea, nausea and vomiting  Musculoskeletal: Positive for gait problem and myalgias  Negative for arthralgias and joint swelling  Decreased ROM   Pain in R arm and R hand/fingers   Skin: Negative for rash  Neurological: Negative for dizziness, seizures and weakness  All other systems reviewed and are negative          Patient Active Problem List   Diagnosis    Chronic pain syndrome    Acute muscle stiffness of neck    Cervical disc disorder with radiculopathy of mid-cervical region    Neck pain    Chronic left-sided low back pain with left-sided sciatica    Lumbar radiculopathy    Cervicalgia    Right hip pain    Trochanteric bursitis of right hip    Pain in right hip    Myofascial pain syndrome    Carpal tunnel syndrome    Continuous opioid dependence (HCC)    Slow transit constipation    Prediabetes    Class 1 obesity due to excess calories without serious comorbidity with body mass index (BMI) of 34 0 to 34 9 in adult    History of pituitary adenoma    Sweating abnormality    Vitamin D deficiency    Hypothyroidism    Hair loss    History of total hysterectomy       Past Medical History:   Diagnosis Date    Anxiety     Asthma     exercise induced asthma    Cervical disc disorder     Chronic pain     Chronic pain disorder     Depression     Dysuria     Fibromyalgia, primary Est 2016    GERD (gastroesophageal reflux disease) 2004    Headache(784 0) As a child    Low back pain     Lung abnormality     nodules    Lung nodule     Neck pain     Peripheral neuropathy     Pituitary abnormality (HCC)     tumor    Right hip pain 10/29/2019    Thrombocytopenia ([address] )  Thyroid disease        Past Surgical History:   Procedure Laterality Date    APPENDECTOMY      BREAST BIOPSY Right 1995    CHOLECYSTECTOMY      COLECTOMY      COLON SURGERY      EPIDURAL BLOCK INJECTION N/A 6/23/2016    Procedure: BLOCK / INJECTION EPIDURAL STEROID CERVICAL  C7-T1;  Surgeon: Marissa Carrillo MD;  [facility] MAIN OR;  Service:     EPIDURAL BLOCK INJECTION N/A 3/31/2016    Procedure: BLOCK / INJECTION EPIDURAL STEROID CERVICAL C7-T1  (C-ARM); Surgeon: Marissa Carrillo MD;  Location: [facility] MAIN OR;  Service:     EPIDURAL BLOCK INJECTION N/A 8/8/2018    Procedure: C6 C7 Cervical Epidural Steroid Injection (15726); Surgeon: Marissa Carrillo MD;  Location: [facility] MAIN OR;  Service: Pain Management     EPIDURAL BLOCK INJECTION N/A 12/16/2021    Procedure: T4 T5 thoracic epidural steroid injection (19132); Surgeon: Marissa Carrillo MD;  Location: [facility] MAIN OR;  Service: Pain Management     EPIDURAL BLOCK INJECTION N/A 6/10/2022    Procedure: T4 T5 THORACIC EPIDURAL STEROID INJECTION (96779); Surgeon: Marissa Carrillo MD;  Location: [facility] MAIN OR;  Service: Pain Management     HYSTERECTOMY  1996    OOPHORECTOMY Bilateral 1996    PITUITARY SURGERY      ND ARTHROCENTESIS ASPIR&/INJ MAJOR JT/BURSA W/O US Right 11/8/2019    Procedure: Trochanteric Bursa Injection (25449); Surgeon: Marissa Carrillo MD;  Location: [facility] MAIN OR;  Service: Pain Management     ND ARTHROCENTESIS ASPIR&/INJ MAJOR JT/BURSA W/O US Right 1/23/2020    Procedure: Trochanteric Bursa Injection (22011); Surgeon: Marissa Carrillo MD;  Location: [facility] MAIN OR;  Service: Pain Management     ND NJX DX/THER SBST EPIDURAL/SUBRACH CERV/THORACIC N/A 1/21/2016    Procedure: BLOCK / INJECTION EPIDURAL STEROID CERVICAL C7-T1 (C-ARM);   Surgeon: Marissa Carrillo MD;  Location: [facility] MAIN OR;  Service: Pain Management     REDUCTION MAMMAPLASTY Bilateral 2000       Family History   Problem Relation Age of Onset    Thyroid disease Mother     Hyperlipidemia Mother     Depression Mother     Stroke Mother         TIA    Thyroid disease Father     Hyperlipidemia Father     Depression Father     Arthritis Father         Spine and knees    Ovarian cancer Sister 39    No Known Problems Brother     No Known Problems Maternal Uncle     No Known Problems Paternal Aunt     No Known Problems Paternal Uncle     Cancer Maternal Grandmother         Kidney cancer    No Known Problems Maternal Grandfather     Breast cancer Paternal Grandmother 36    Cancer Paternal Grandmother         Breast, Lung, Liver and skin cancer    No Known Problems Paternal Grandfather     No Known Problems Daughter     No Known Problems Son     Cancer Paternal Aunt         Thyroid and colon cancer    Migraines Sister     ADD / ADHD Neg Hx     Anesthesia problems Neg Hx     Cancer Neg Hx     Clotting disorder Neg Hx     Collagen disease Neg Hx     Diabetes Neg Hx     Dislocations Neg Hx     Learning disabilities Neg Hx     Neurological problems Neg Hx     Osteoporosis Neg Hx     Rheumatologic disease Neg Hx     Scoliosis Neg Hx     Vascular Disease Neg Hx        Social History     Occupational History    Not on file   Tobacco Use    Smoking status: Never Smoker    Smokeless tobacco: Never Used   Vaping Use    Vaping Use: Never used   Substance and Sexual Activity    Alcohol use: No    Drug use: No    Sexual activity: Not Currently     Partners: Male     Birth control/protection: Post-menopausal         Current Outpatient Medications:     albuterol (ProAir HFA) 90 mcg/act inhaler, Inhale 2 puffs every 6 (six) hours as needed for wheezing, Disp: 8 5 g, Rfl: 0    albuterol (PROVENTIL HFA,VENTOLIN HFA) 90 mcg/act inhaler, Inhale, Disp: , Rfl:     aspirin 81 MG tablet, Take 81 mg by mouth daily  , Disp: , Rfl:     cholecalciferol (VITAMIN D3) 1,000 units tablet, Take 2 tablets by mouth daily, Disp: , Rfl:     cyclobenzaprine (FLEXERIL) 10 mg tablet, Take 1 tablet (10 mg total) by mouth 3 (three) times a day as needed for muscle spasms, Disp: 90 tablet, Rfl: 1    dexamethasone (DECADRON) 1 mg tablet, Once at 11PM night before AM cortisol test, Disp: 1 tablet, Rfl: 0    Diclofenac Sodium (VOLTAREN) 1 %, Apply 2 g topically 4 (four) times a day as needed (pain), Disp: 100 g, Rfl: 0    gabapentin (NEURONTIN) 800 mg tablet, Take 1 tablet (800 mg total) by mouth 3 (three) times a day, Disp: 90 tablet, Rfl: 1    ibuprofen (MOTRIN) 600 mg tablet, Take 1 tablet (600 mg total) by mouth every 6 (six) hours as needed for mild pain, Disp: 30 tablet, Rfl: 0    levothyroxine 50 mcg tablet, Take 1 tablet (50 mcg) Monday-Saturday  Take 1 5 tablets on Sunday  , Disp: 45 tablet, Rfl: 0    metFORMIN (GLUCOPHAGE) 1000 MG tablet, Take 1,000 mg by mouth 2 (two) times a day with meals, Disp: , Rfl:     metoprolol succinate (TOPROL-XL) 25 mg 24 hr tablet, , Disp: , Rfl:     [START ON 9/18/2022] oxyCODONE-acetaminophen (PERCOCET) 5-325 mg per tablet, Take 1 tablet by mouth 2 (two) times a day as needed for severe pain Do not fill until 9/18/2022  Take 1 tablet by mouth up to twice daily as needed for pain  Max Daily Amount: 2 tablets, Disp: 60 tablet, Rfl: 0    [START ON 9/18/2022] oxyCODONE-acetaminophen (PERCOCET) 5-325 mg per tablet, Take 1 tablet by mouth 2 (two) times a day as needed for severe pain Do not fill until 10/18/2022  Take 1 tablet by mouth up to twice daily as needed for pain   Max Daily Amount: 2 tablets, Disp: 60 tablet, Rfl: 0    traZODone (DESYREL) 50 mg tablet, , Disp: , Rfl: 0    Allergies   Allergen Reactions    Levaquin [Levofloxacin In D5w] Shortness Of Breath     Also hives      Amitiza [Lubiprostone] Other (See Comments) and Hives     Reaction Date: 10Aug2011;   Migraines and vomiting    Biaxin [Clarithromycin] Hives    Cephradine     Erythromycin Hives     Reaction Date: 10Aug2011;     Macrobid [Nitrofurantoin Monohyd Macro] Other (See Comments)     C/o passing out    Morphine     Penicillins Hives     Reaction Date: 10Aug2011;     Sulfa Antibiotics     Tetracycline     Tetracyclines & Related Hives    Morphine And Related Rash    Valium [Diazepam] Rash and Vomiting     Reaction Date: 14Jun2012;        Physical Exam:    Ht 5' 3" (1 6 m)   BMI 34 54 kg/m²     Constitutional:normal, well developed, well nourished, alert, in no distress and non-toxic and no overt pain behavior  Eyes:anicteric  HEENT:grossly intact  Neck:supple, symmetric, trachea midline and no masses  and Tenderness along the paraspinal muscles and trapezius muscles    Pulmonary:even and unlabored  Cardiovascular:No edema or pitting edema present  Skin:Normal without rashes or lesions and well hydrated  Psychiatric:Mood and affect appropriate  Neurologic:Cranial Nerves II-XII grossly intact  Musculoskeletal:antalgic and Tenderness along the posterior cervical paraspinal muscles as well as her bilateral trapezius muscles          Orders Placed This Encounter   Procedures    Injection trigger point 3 or more muscles    MM ALL_Prescribed Meds and Special Instructions    MM DT_Alprazolam Definitive Test    MM DT_Amphetamine Definitive Test    MM DT_Buprenorphine Definitive Test    MM DT_Butalbital Definitive Test    MM DT_Clonazepam Definitive Test    MM DT_Cocaine Definitive Test    MM DT_Codeine Definitive Test    MM DT_Dextromethorphan Definitive Test    MM Diazepam Definitive Test    MM DT_Ethyl Glucuronide/Ethyl Sulfate Definitive Test    MM DT_Fentanyl Definitive Test    MM DT_Heroin Definitive Test    MM DT_Hydrocodone Definitive Test    MM DT_Hydromorphone Definitive Test    MM DT_Kratom Definitive Test    MM DT_Levorphanol Definitive Test    MM DT_MDMA Definitive Test    MM DT_Meperidine Definitive Test    MM DT_Methadone Definitive Test    MM DT_Methamphetamine Definitive Test    MM DT_Methylphenidate Definitive Test    MM DT_Morphine Definitive Test    MM Lorazepam Definitive Test    MM DT_Oxazepam Definitive Test    MM DT_Oxycodone Definitive Test    MM DT_Oxymorphone Definitive Test    MM DT_Phencyclidine Definitive Test    MM DT_Phenobarbital Definitive Test    MM DT_Phentermine Definitive Test    MM DT_Secobarbital Definitive Test    MM DT_Spice Definitive Test    MM DT_Tapentadol Definitive Test    MM DT_Temazapam Definitive Test    MM DT_THC Definitive Test    MM DT_Tramadol Definitive Test

## 2022-09-09 ENCOUNTER — OFFICE VISIT (OUTPATIENT)
Dept: PAIN MEDICINE | Facility: CLINIC | Age: 57
End: 2022-09-09
Payer: COMMERCIAL

## 2022-09-09 VITALS — BODY MASS INDEX: 34.54 KG/M2 | HEIGHT: 63 IN

## 2022-09-09 DIAGNOSIS — M54.2 NECK PAIN: ICD-10-CM

## 2022-09-09 DIAGNOSIS — M50.120 CERVICAL DISC DISORDER WITH RADICULOPATHY OF MID-CERVICAL REGION: ICD-10-CM

## 2022-09-09 DIAGNOSIS — Z79.891 LONG-TERM CURRENT USE OF OPIATE ANALGESIC: ICD-10-CM

## 2022-09-09 DIAGNOSIS — M79.18 MYOFASCIAL PAIN SYNDROME: ICD-10-CM

## 2022-09-09 DIAGNOSIS — F11.20 UNCOMPLICATED OPIOID DEPENDENCE (HCC): ICD-10-CM

## 2022-09-09 DIAGNOSIS — G89.4 CHRONIC PAIN SYNDROME: Primary | ICD-10-CM

## 2022-09-09 PROCEDURE — 99214 OFFICE O/P EST MOD 30 MIN: CPT

## 2022-09-09 PROCEDURE — 80305 DRUG TEST PRSMV DIR OPT OBS: CPT

## 2022-09-09 RX ORDER — GABAPENTIN 800 MG/1
800 TABLET ORAL 3 TIMES DAILY
Qty: 90 TABLET | Refills: 1 | Status: SHIPPED | OUTPATIENT
Start: 2022-09-09

## 2022-09-09 RX ORDER — CYCLOBENZAPRINE HCL 10 MG
10 TABLET ORAL 3 TIMES DAILY PRN
Qty: 90 TABLET | Refills: 1 | Status: SHIPPED | OUTPATIENT
Start: 2022-09-09

## 2022-09-09 RX ORDER — OXYCODONE HYDROCHLORIDE AND ACETAMINOPHEN 5; 325 MG/1; MG/1
1 TABLET ORAL 2 TIMES DAILY PRN
Qty: 60 TABLET | Refills: 0 | Status: SHIPPED | OUTPATIENT
Start: 2022-09-18 | End: 2022-10-18

## 2022-09-09 RX ORDER — OXYCODONE HYDROCHLORIDE AND ACETAMINOPHEN 5; 325 MG/1; MG/1
1 TABLET ORAL 2 TIMES DAILY PRN
Qty: 60 TABLET | Refills: 0 | Status: SHIPPED | OUTPATIENT
Start: 2022-09-18 | End: 2022-10-27

## 2022-09-09 NOTE — PATIENT INSTRUCTIONS

## 2022-09-12 ENCOUNTER — APPOINTMENT (OUTPATIENT)
Dept: RADIOLOGY | Facility: CLINIC | Age: 57
End: 2022-09-12
Payer: COMMERCIAL

## 2022-09-12 ENCOUNTER — OFFICE VISIT (OUTPATIENT)
Dept: PODIATRY | Facility: CLINIC | Age: 57
End: 2022-09-12
Payer: COMMERCIAL

## 2022-09-12 VITALS
SYSTOLIC BLOOD PRESSURE: 124 MMHG | HEIGHT: 63 IN | OXYGEN SATURATION: 94 % | HEART RATE: 91 BPM | DIASTOLIC BLOOD PRESSURE: 82 MMHG | BODY MASS INDEX: 34.54 KG/M2

## 2022-09-12 DIAGNOSIS — M79.671 FOOT PAIN, RIGHT: ICD-10-CM

## 2022-09-12 DIAGNOSIS — M67.88 ACHILLES TENDONOSIS OF RIGHT LOWER EXTREMITY: ICD-10-CM

## 2022-09-12 DIAGNOSIS — M79.671 FOOT PAIN, RIGHT: Primary | ICD-10-CM

## 2022-09-12 PROCEDURE — 73630 X-RAY EXAM OF FOOT: CPT

## 2022-09-12 PROCEDURE — 99203 OFFICE O/P NEW LOW 30 MIN: CPT | Performed by: STUDENT IN AN ORGANIZED HEALTH CARE EDUCATION/TRAINING PROGRAM

## 2022-09-12 NOTE — PROGRESS NOTES
Assessment/Plan:    No problem-specific Assessment & Plan notes found for this encounter  Diagnoses and all orders for this visit:    Foot pain, right  -     X-ray foot right 3+ views; Future  -     Cam Boot  -     Ambulatory referral to Physical Therapy; Future    Achilles tendonosis of right lower extremity  -     Cam Boot  -     Ambulatory referral to Physical Therapy; Future      Plan:     1  Right xrays reviewed: Interpreted the x-ray finding with patient which is consistent with posterior calcaneal heel spur at the level of Achilles tendon attachment  2  I recommended resting inflamed hypertrophied achilles tendon via immobilization with CAM walker  Weight bear as tolerated  she can take Motrin 600 every 6-8 hours as needed for pain control  I advised patient to hold of on gym and long walks for few weeks  3  Provided home therapy and stretching exercises along with formal referral to PT  4  Educated eccentric exercises for the achilles tendinopathy  5  Stressed compliance with PT and home exercises, ice and elevate as needed for pain and swelling  6  RTC 4 weeks      Subjective:      Patient ID: Cora Sainz is a 55 y o  female  60-year-old female presents with complaint of right posterior heel pain  Patient reports she is been having posterior heel pain since last 2 months that has not been subsiding  She reports pain is usually behind her heel with pressure and dorsiflexing her foot  She also complains of tight posterior muscle compartment  She denies any other complaints at this time  Denies nausea vomiting fever chills shortness of breath        The following portions of the patient's history were reviewed and updated as appropriate:   She  has a past medical history of Anxiety, Asthma, Cervical disc disorder, Chronic pain, Chronic pain disorder, Depression, Dysuria, Fibromyalgia, primary (Est 2016), GERD (gastroesophageal reflux disease) (2004), Headache(784 0) (As a child), Low back pain, Lung abnormality, Lung nodule, Neck pain, Peripheral neuropathy, Pituitary abnormality ([address] ), Right hip pain (10/29/2019), Thrombocytopenia ([address] ), and Thyroid disease  She   Patient Active Problem List    Diagnosis Date Noted    Prediabetes 08/30/2022    Class 1 obesity due to excess calories without serious comorbidity with body mass index (BMI) of 34 0 to 34 9 in adult 08/30/2022    History of pituitary adenoma 08/30/2022    Sweating abnormality 08/30/2022    Vitamin D deficiency 08/30/2022    Hypothyroidism 08/30/2022    Hair loss 08/30/2022    History of total hysterectomy 08/30/2022    Myofascial pain syndrome 04/30/2021    Pain in right hip 01/14/2020    Right hip pain 10/29/2019    Trochanteric bursitis of right hip 10/29/2019    Cervicalgia 07/31/2018    Chronic left-sided low back pain with left-sided sciatica 05/15/2018    Lumbar radiculopathy 05/15/2018    Continuous opioid dependence ([facility] [address] ) 07/18/2016    Neck pain 03/31/2016    Chronic pain syndrome 01/21/2016    Acute muscle stiffness of neck 01/21/2016    Cervical disc disorder with radiculopathy of mid-cervical region 01/21/2016    Carpal tunnel syndrome 01/18/2016    Slow transit constipation 09/05/2012     She  has a past surgical history that includes Cholecystectomy; Appendectomy; Pituitary surgery; Epidural block injection (N/A, 6/23/2016); Epidural block injection (N/A, 3/31/2016); pr inj cerv/thorac,w/wo cntrst (N/A, 1/21/2016); Colon surgery; Colectomy; Epidural block injection (N/A, 8/8/2018); pr arthrocentesis aspir&/inj major jt/bursa w/o us (Right, 11/8/2019); pr arthrocentesis aspir&/inj major jt/bursa w/o us (Right, 1/23/2020); Hysterectomy (1996); Oophorectomy (Bilateral, 1996); Breast biopsy (Right, 1995); Reduction mammaplasty (Bilateral, 2000); Epidural block injection (N/A, 12/16/2021); and Epidural block injection (N/A, 6/10/2022)       Review of Systems   Constitutional: Negative for chills and fever    Respiratory: Negative for shortness of breath  Gastrointestinal: Negative for vomiting  Musculoskeletal: Positive for arthralgias  Skin: Positive for color change  Neurological: Negative for dizziness  Psychiatric/Behavioral: Negative for agitation  Objective:      /82 (BP Location: Left arm, Patient Position: Sitting, Cuff Size: Standard)   Pulse 91   Ht 5' 3" (1 6 m)   SpO2 94%   BMI 34 54 kg/m²          Physical Exam  Vitals reviewed  Cardiovascular:      Rate and Rhythm: Normal rate  Pulses: Normal pulses  Musculoskeletal:         General: Tenderness present  Right foot: Tenderness present  Comments: Pain with palpation noted to the right posterior calcaneus at the level of the Achilles tendon insertion  There is prominent posterior exostosis noted with thickened Achilles tendon proximal to the Achilles insertion  Patient exhibits equinus foot deformity with decreased ankle dorsiflexion (-10) with knee extended and flexed  Skin:     General: Skin is warm and dry  Neurological:      General: No focal deficit present  Mental Status: She is alert     Psychiatric:         Mood and Affect: Mood normal

## 2022-09-12 NOTE — PATIENT INSTRUCTIONS
Achilles Tendinitis   AMBULATORY CARE:   Achilles tendinitis  is swelling of the tendon that connects your calf muscle to your heel bone  It may happen suddenly or become a chronic condition  Your risk for Achilles tendinitis increases as you age  Contact your healthcare provider if:   You have a fever  Your swelling or pain gets worse  You feel or hear a sudden pop near your ankle  You cannot bend your ankle or put pressure on your leg  You have questions about your condition or care  Common signs and symptoms include the following:   Pain in your heel that gets worse with activity    Swelling in your heel or calf    Stiffness in your heel or calf    Treatment of Achilles tendinitis  may include medicine, physical therapy, or support devices  You may need surgery or other procedures if your Achilles tendinitis does not get better with other treatments  NSAIDs , such as ibuprofen, help decrease swelling, pain, and fever  This medicine is available with or without a doctor's order  NSAIDs can cause stomach bleeding or kidney problems in certain people  If you take blood thinner medicine, always ask your healthcare provider if NSAIDs are safe for you  Always read the medicine label and follow directions  Take your medicine as directed  Contact your healthcare provider if you think your medicine is not helping or if you have side effects  Tell him or her if you are allergic to any medicine  Keep a list of the medicines, vitamins, and herbs you take  Include the amounts, and when and why you take them  Bring the list or the pill bottles to follow-up visits  Carry your medicine list with you in case of an emergency  Manage your Achilles tendinitis:   Rest  as directed  Rest decreases swelling and prevents your tendinitis from getting worse  Your healthcare provider may tell you to stop your usual training or exercise activities   Ask him when you can return to your normal activities or exercise plan  Apply ice  on your Achilles tendon for 15 to 20 minutes every hour or as directed  Use an ice pack, or put crushed ice in a plastic bag  Cover it with a towel  Ice helps prevent tissue damage and decreases swelling and pain  Wear a compression bandage or use tape  as directed  This will decrease swelling and pain  Ask your healthcare provider how to wrap a compression bandage or apply tape  If you use a support device ask if you should wear a compression bandage or use tape  Elevate  your heel above the level of your heart as often as you can  This will help decrease swelling and pain  Prop your heel on pillows or blankets to keep it elevated comfortably  Stretch  as directed when you return to your exercise program  Always warm up your muscles and stretch before you exercise  Do cool down exercises and stretches when you are finished  This will keep your muscles loose and decrease stress on your Achilles tendon  Do bilateral heel drop exercises as directed  Bilateral heel drops strengthen your Achilles tendon  Do not do the following exercise unless your healthcare provider says it is safe:     Stand at the edge of a stair or raised step  Hold onto the railing for balance  Place the front part of your foot on the stair or step  Let the back of your foot hang off of the stair or step  Slowly lift your heels off the ground and then slowly lower your heels past the stair  Do not move your heels quickly  This could make your injury worse  Repeat this exercise 20 times or as directed  Slowly increase the time and intensity when you return to your exercise program   Start with short and low intensity exercises  Ask your healthcare provider how and when to increase the time and intensity of your exercise  Wear support devices or supportive shoes as directed  Support devices may include a splint, orthotic, or brace   These devices will decrease pressure on your Achilles tendon and help relieve pain  Supportive shoes will cushion your heel and protect your Achilles tendon  Replace shoes or sneakers that are worn out  Go to physical therapy and practice exercises as directed:  A physical therapist teaches you exercises to help improve movement and strength, and decrease pain  Practice these exercises at home as directed  Follow up with your doctor as directed:  Write down your questions so you remember to ask them during your visits  © Copyright [employer] 2022 Information is for End User's use only and may not be sold, redistributed or otherwise used for commercial purposes  All illustrations and images included in CareNotes® are the copyrighted property of A D A M , Inc  or [facility] Pako Espinoza   The above information is an  only  It is not intended as medical advice for individual conditions or treatments  Talk to your doctor, nurse or pharmacist before following any medical regimen to see if it is safe and effective for you

## 2022-09-13 LAB
6MAM UR QL CFM: NEGATIVE NG/ML
7AMINOCLONAZEPAM UR QL CFM: NEGATIVE NG/ML
A-OH ALPRAZ UR QL CFM: NEGATIVE NG/ML
AMPHET UR QL CFM: NEGATIVE NG/ML
AMPHET UR QL CFM: NEGATIVE NG/ML
BUPRENORPHINE UR QL CFM: NEGATIVE NG/ML
BUTALBITAL UR QL CFM: NEGATIVE NG/ML
BZE UR QL CFM: NEGATIVE NG/ML
CODEINE UR QL CFM: NEGATIVE NG/ML
EDDP UR QL CFM: NEGATIVE NG/ML
ETHYL GLUCURONIDE UR QL CFM: ABNORMAL NG/ML
ETHYL SULFATE UR QL SCN: NEGATIVE NG/ML
FENTANYL UR QL CFM: NEGATIVE NG/ML
GLIADIN IGG SER IA-ACNC: NEGATIVE NG/ML
HYDROCODONE UR QL CFM: NEGATIVE NG/ML
HYDROCODONE UR QL CFM: NEGATIVE NG/ML
HYDROMORPHONE UR QL CFM: NEGATIVE NG/ML
LORAZEPAM UR QL CFM: NEGATIVE NG/ML
MDMA UR QL CFM: NEGATIVE NG/ML
ME-PHENIDATE UR QL CFM: NEGATIVE NG/ML
MEPERIDINE UR QL CFM: NEGATIVE NG/ML
METHADONE UR QL CFM: NEGATIVE NG/ML
METHAMPHET UR QL CFM: NEGATIVE NG/ML
MORPHINE UR QL CFM: NEGATIVE NG/ML
MORPHINE UR QL CFM: NEGATIVE NG/ML
NORBUPRENORPHINE UR QL CFM: NEGATIVE NG/ML
NORDIAZEPAM UR QL CFM: NEGATIVE NG/ML
NORFENTANYL UR QL CFM: NEGATIVE NG/ML
NORHYDROCODONE UR QL CFM: NEGATIVE NG/ML
NORHYDROCODONE UR QL CFM: NEGATIVE NG/ML
NORMEPERIDINE UR QL CFM: NEGATIVE NG/ML
NOROXYCODONE UR QL CFM: NORMAL NG/ML
OXAZEPAM UR QL CFM: NEGATIVE NG/ML
OXYCODONE UR QL CFM: NORMAL NG/ML
OXYMORPHONE UR QL CFM: NEGATIVE NG/ML
OXYMORPHONE UR QL CFM: NEGATIVE NG/ML
PARA-FLUOROFENTANYL QUANTIFICATION: NORMAL NG/ML
PCP UR QL CFM: NEGATIVE NG/ML
PHENOBARB UR QL CFM: NEGATIVE NG/ML
RESULT ALL_PRESCRIBED MEDS AND SPECIAL INSTRUCTIONS: NORMAL
SECOBARBITAL UR QL CFM: NEGATIVE NG/ML
SL AMB 4-ANPP QUANTIFICATION: NORMAL NG/ML
SL AMB 5F-ADB-M7 METABOLITE QUANTIFICATION: NEGATIVE NG/ML
SL AMB 7-OH-MITRAGYNINE (KRATOM ALKALOID) QUANTIFICATION: NEGATIVE NG/ML
SL AMB AB-FUBINACA-M3 METABOLITE QUANTIFICATION: NEGATIVE NG/ML
SL AMB ACETYL FENTANYL QUANTIFICATION: NORMAL NG/ML
SL AMB ACETYL NORFENTANYL QUANTIFICATION: NORMAL NG/ML
SL AMB ACRYL FENTANYL QUANTIFICATION: NORMAL NG/ML
SL AMB CARFENTANIL QUANTIFICATION: NORMAL NG/ML
SL AMB CTHC (MARIJUANA METABOLITE) QUANTIFICATION: NEGATIVE NG/ML
SL AMB DEXTROMETHORPHAN QUANTIFICATION: ABNORMAL NG/ML
SL AMB DEXTRORPHAN (DEXTROMETHORPHAN METABOLITE) QUANT: ABNORMAL NG/ML
SL AMB DEXTRORPHAN (DEXTROMETHORPHAN METABOLITE) QUANT: ABNORMAL NG/ML
SL AMB JWH018 METABOLITE QUANTIFICATION: NEGATIVE NG/ML
SL AMB JWH073 METABOLITE QUANTIFICATION: NEGATIVE NG/ML
SL AMB MDMB-FUBINACA-M1 METABOLITE QUANTIFICATION: NEGATIVE NG/ML
SL AMB N-DESMETHYL-TRAMADOL QUANTIFICATION: NEGATIVE NG/ML
SL AMB PHENTERMINE QUANTIFICATION: NEGATIVE NG/ML
SL AMB RCS4 METABOLITE QUANTIFICATION: NEGATIVE NG/ML
SL AMB RITALINIC ACID QUANTIFICATION: NEGATIVE NG/ML
SPECIMEN DRAWN SERPL: NEGATIVE NG/ML
TAPENTADOL UR QL CFM: NEGATIVE NG/ML
TEMAZEPAM UR QL CFM: NEGATIVE NG/ML
TEMAZEPAM UR QL CFM: NEGATIVE NG/ML
TRAMADOL UR QL CFM: NEGATIVE NG/ML
URATE/CREAT 24H UR: NEGATIVE NG/ML

## 2022-09-16 ENCOUNTER — APPOINTMENT (OUTPATIENT)
Dept: RADIOLOGY | Facility: CLINIC | Age: 57
End: 2022-09-16
Payer: COMMERCIAL

## 2022-09-16 ENCOUNTER — OFFICE VISIT (OUTPATIENT)
Dept: URGENT CARE | Facility: CLINIC | Age: 57
End: 2022-09-16
Payer: COMMERCIAL

## 2022-09-16 VITALS — TEMPERATURE: 97.5 F | RESPIRATION RATE: 18 BRPM | HEART RATE: 85 BPM | OXYGEN SATURATION: 99 %

## 2022-09-16 DIAGNOSIS — R05.9 COUGH: ICD-10-CM

## 2022-09-16 DIAGNOSIS — R05.9 COUGH: Primary | ICD-10-CM

## 2022-09-16 PROCEDURE — 71046 X-RAY EXAM CHEST 2 VIEWS: CPT

## 2022-09-16 PROCEDURE — 99213 OFFICE O/P EST LOW 20 MIN: CPT | Performed by: NURSE PRACTITIONER

## 2022-09-16 RX ORDER — BENZONATATE 100 MG/1
100 CAPSULE ORAL 3 TIMES DAILY PRN
Qty: 20 CAPSULE | Refills: 0 | Status: SHIPPED | OUTPATIENT
Start: 2022-09-16

## 2022-09-16 NOTE — PROGRESS NOTES
[address]        NAME: Katherin Paget is a 55 y o  female  : 1965    MRN: [id number]  DATE: 2022  TIME: 10:17 AM    Assessment and Plan   Cough [R05 9]  1  Cough  XR chest pa & lateral    benzonatate (TESSALON PERLES) 100 mg capsule     Xray shows no acute cardiopulmonary disease  Ambulatory pulse ox 99%, denies any SOB with exertion     Patient Instructions     Patient Instructions   Take medication as directed  Rest and drink plenty of fluids  A cool mist humidifier can be helpful  If you develop a worsening cough, chest pain, shortness of breath, palpitations, coughing up blood, prolonged high fever, any new or concerning symptoms please return or proceed ER  Recommend following up with PCP in 3-5 days          Follow up with PCP in 3-5 days  Proceed to  ER if symptoms worsen  Chief Complaint     Chief Complaint   Patient presents with    Cough     Had bronchitis in July, patient states her cough never went away and has got worse in the last 2 weeks         History of Present Illness       Cough  This is a recurrent problem  Episode onset: 2 months ago, worsening over the past 2 weeks  The problem has been unchanged  The problem occurs every few minutes  The cough is non-productive  Associated symptoms include shortness of breath  Pertinent negatives include no chest pain, chills, ear pain, fever, headaches, myalgias, postnasal drip, rhinorrhea, sore throat or wheezing  Nothing aggravates the symptoms  She has tried a beta-agonist inhaler (took a course of abx 2 months ago) for the symptoms  The treatment provided no relief  There is no history of asthma, bronchitis or COPD  Review of Systems   Review of Systems   Constitutional: Negative for chills, diaphoresis, fatigue and fever  HENT: Negative for congestion, ear pain, facial swelling, postnasal drip, rhinorrhea, sinus pressure, sinus pain, sore throat, tinnitus and trouble swallowing  Eyes: Negative  Respiratory: Positive for cough and shortness of breath  Negative for chest tightness, wheezing and stridor  Cardiovascular: Negative for chest pain and palpitations  Gastrointestinal: Negative  Musculoskeletal: Negative for arthralgias, back pain, joint swelling, myalgias, neck pain and neck stiffness  Neurological: Negative for dizziness, facial asymmetry, weakness, light-headedness, numbness and headaches  Current Medications       Current Outpatient Medications:     albuterol (PROVENTIL HFA,VENTOLIN HFA) 90 mcg/act inhaler, Inhale, Disp: , Rfl:     aspirin 81 MG tablet, Take 81 mg by mouth daily  , Disp: , Rfl:     benzonatate (TESSALON PERLES) 100 mg capsule, Take 1 capsule (100 mg total) by mouth 3 (three) times a day as needed for cough, Disp: 20 capsule, Rfl: 0    cholecalciferol (VITAMIN D3) 1,000 units tablet, Take 2 tablets by mouth daily, Disp: , Rfl:     cyclobenzaprine (FLEXERIL) 10 mg tablet, Take 1 tablet (10 mg total) by mouth 3 (three) times a day as needed for muscle spasms, Disp: 90 tablet, Rfl: 1    Diclofenac Sodium (VOLTAREN) 1 %, Apply 2 g topically 4 (four) times a day as needed (pain), Disp: 100 g, Rfl: 0    gabapentin (NEURONTIN) 800 mg tablet, Take 1 tablet (800 mg total) by mouth 3 (three) times a day, Disp: 90 tablet, Rfl: 1    ibuprofen (MOTRIN) 600 mg tablet, Take 1 tablet (600 mg total) by mouth every 6 (six) hours as needed for mild pain, Disp: 30 tablet, Rfl: 0    levothyroxine 50 mcg tablet, Take 1 tablet (50 mcg) Monday-Saturday  Take 1 5 tablets on Sunday  , Disp: 45 tablet, Rfl: 0    metFORMIN (GLUCOPHAGE) 1000 MG tablet, Take 1,000 mg by mouth 2 (two) times a day with meals, Disp: , Rfl:     metoprolol succinate (TOPROL-XL) 25 mg 24 hr tablet, , Disp: , Rfl:     [START ON 9/18/2022] oxyCODONE-acetaminophen (PERCOCET) 5-325 mg per tablet, Take 1 tablet by mouth 2 (two) times a day as needed for severe pain Do not fill until 9/18/2022  Take 1 tablet by mouth up to twice daily as needed for pain  Max Daily Amount: 2 tablets, Disp: 60 tablet, Rfl: 0    [START ON 9/18/2022] oxyCODONE-acetaminophen (PERCOCET) 5-325 mg per tablet, Take 1 tablet by mouth 2 (two) times a day as needed for severe pain Do not fill until 10/18/2022  Take 1 tablet by mouth up to twice daily as needed for pain   Max Daily Amount: 2 tablets, Disp: 60 tablet, Rfl: 0    traZODone (DESYREL) 50 mg tablet, , Disp: , Rfl: 0    albuterol (ProAir HFA) 90 mcg/act inhaler, Inhale 2 puffs every 6 (six) hours as needed for wheezing (Patient not taking: Reported on 9/16/2022), Disp: 8 5 g, Rfl: 0    dexamethasone (DECADRON) 1 mg tablet, Once at 11PM night before AM cortisol test (Patient not taking: Reported on 9/16/2022), Disp: 1 tablet, Rfl: 0    Current Allergies     Allergies as of 09/16/2022 - Reviewed 09/16/2022   Allergen Reaction Noted    Levaquin [levofloxacin in d5w] Shortness Of Breath 01/21/2016    Amitiza [lubiprostone] Other (See Comments) and Hives 08/10/2011    Biaxin [clarithromycin] Hives 01/21/2016    Cephradine      Erythromycin Hives 08/10/2011    Macrobid [nitrofurantoin monohyd macro] Other (See Comments) 01/21/2016    Morphine      Penicillins Hives 08/10/2011    Sulfa antibiotics      Tetracycline      Tetracyclines & related Hives 01/21/2016    Morphine and related Rash 01/21/2016    Valium [diazepam] Rash and Vomiting 06/14/2012            The following portions of the patient's history were reviewed and updated as appropriate: allergies, current medications, past family history, past medical history, past social history, past surgical history and problem list      Past Medical History:   Diagnosis Date    Anxiety     Asthma     exercise induced asthma    Cervical disc disorder     Chronic pain     Chronic pain disorder     Depression     Dysuria     Fibromyalgia, primary Est 2016    GERD (gastroesophageal reflux disease) 2004    Headache(784 0) As a child    Low back pain     Lung abnormality     nodules    Lung nodule     Neck pain     Peripheral neuropathy     Pituitary abnormality (HCC)     tumor    Right hip pain 10/29/2019    Thrombocytopenia ([facility] [address] )     Thyroid disease        Past Surgical History:   Procedure Laterality Date    APPENDECTOMY      BREAST BIOPSY Right 1995    CHOLECYSTECTOMY      COLECTOMY      COLON SURGERY      EPIDURAL BLOCK INJECTION N/A 6/23/2016    Procedure: BLOCK / INJECTION EPIDURAL STEROID CERVICAL  C7-T1;  Surgeon: Arely Chan MD;  Location: [place] [id number] MAIN OR;  Service:     EPIDURAL BLOCK INJECTION N/A 3/31/2016    Procedure: BLOCK / INJECTION EPIDURAL STEROID CERVICAL C7-T1  (C-ARM); Surgeon: Arely Chan MD;  Location: [facility] MAIN OR;  Service:     EPIDURAL BLOCK INJECTION N/A 8/8/2018    Procedure: C6 C7 Cervical Epidural Steroid Injection (10101); Surgeon: Arely Chan MD;  Location: [facility] OR;  Service: Pain Management     EPIDURAL BLOCK INJECTION N/A 12/16/2021    Procedure: T4 T5 thoracic epidural steroid injection (22421); Surgeon: Arely Chan MD;  Location: [facility] MAIN OR;  Service: Pain Management     EPIDURAL BLOCK INJECTION N/A 6/10/2022    Procedure: T4 T5 THORACIC EPIDURAL STEROID INJECTION (21208); Surgeon: Arely Chan MD;  Location: [facility] OR;  Service: Pain Management     HYSTERECTOMY  1996    OOPHORECTOMY Bilateral 1996    PITUITARY SURGERY      [place] ARTHROCENTESIS ASPIR&/INJ MAJOR JT/BURSA W/O US Right 11/8/2019    Procedure: Trochanteric Bursa Injection (88714); Surgeon: Arely Chan MD;  Location: [facility] MAIN OR;  Service: Pain Management     [place] ARTHROCENTESIS ASPIR&/INJ MAJOR JT/BURSA W/O US Right 1/23/2020    Procedure: Trochanteric Bursa Injection (01234);   Surgeon: Arely Chan MD;  Location: [facility] MAIN OR;  Service: Pain Management     [place] [address] DX/THER SBST EPIDURAL/SUBRACH CERV/THORACIC N/A 1/21/2016    Procedure: BLOCK / INJECTION EPIDURAL STEROID CERVICAL C7-T1 (C-ARM); Surgeon: Dwayne Webster MD;  Location: [facility] MAIN OR;  Service: Pain Management     REDUCTION MAMMAPLASTY Bilateral 2000       Family History   Problem Relation Age of Onset    Thyroid disease Mother     Hyperlipidemia Mother    Terrie Courser Depression Mother     Stroke Mother         TIA    Thyroid disease Father     Hyperlipidemia Father     Depression Father     Arthritis Father         Spine and knees    Ovarian cancer Sister 39    No Known Problems Brother     No Known Problems Maternal Uncle     No Known Problems Paternal Aunt     No Known Problems Paternal Uncle     Cancer Maternal Grandmother         Kidney cancer    No Known Problems Maternal Grandfather     Breast cancer Paternal Grandmother 36    Cancer Paternal Grandmother         Breast, Lung, Liver and skin cancer    No Known Problems Paternal Grandfather     No Known Problems Daughter     No Known Problems Son     Cancer Paternal Aunt         Thyroid and colon cancer    Migraines Sister     ADD / ADHD Neg Hx     Anesthesia problems Neg Hx     Cancer Neg Hx     Clotting disorder Neg Hx     Collagen disease Neg Hx     Diabetes Neg Hx     Dislocations Neg Hx     Learning disabilities Neg Hx     Neurological problems Neg Hx     Osteoporosis Neg Hx     Rheumatologic disease Neg Hx     Scoliosis Neg Hx     Vascular Disease Neg Hx          Medications have been verified  Objective   Pulse 85   Temp 97 5 °F (36 4 °C)   Resp 18   SpO2 99%   No LMP recorded  Patient has had a hysterectomy  Physical Exam     Physical Exam  Constitutional:       General: She is not in acute distress  Appearance: She is well-developed  She is not diaphoretic  HENT:      Head: Normocephalic and atraumatic        Right Ear: Hearing, tympanic membrane, ear canal and external ear normal       Left Ear: Hearing, tympanic membrane, ear canal and external ear normal       Nose: Nose normal       Right Sinus: No maxillary sinus tenderness or frontal sinus tenderness  Left Sinus: No maxillary sinus tenderness or frontal sinus tenderness  Mouth/Throat:      Pharynx: Uvula midline  Cardiovascular:      Rate and Rhythm: Normal rate and regular rhythm  Heart sounds: Normal heart sounds, S1 normal and S2 normal    Pulmonary:      Effort: Pulmonary effort is normal       Breath sounds: Examination of the right-lower field reveals decreased breath sounds  Examination of the left-lower field reveals decreased breath sounds  Decreased breath sounds present  Lymphadenopathy:      Cervical: No cervical adenopathy  Skin:     General: Skin is warm and dry  Neurological:      Mental Status: She is alert and oriented to person, place, and time

## 2022-09-19 NOTE — RESULT ENCOUNTER NOTE
Patient's drug screen came back positive with dextromethorphan and ethyl glucuronide  I see the patient has been treated for a cold recently which this would be consistent if she was taking over-the-counter cough syrup such as Robitussin, NyQuil or DayQuil

## 2022-10-16 DIAGNOSIS — E03.9 HYPOTHYROIDISM, UNSPECIFIED TYPE: ICD-10-CM

## 2022-10-17 RX ORDER — LEVOTHYROXINE SODIUM 0.05 MG/1
TABLET ORAL
Qty: 45 TABLET | Refills: 0 | Status: SHIPPED | OUTPATIENT
Start: 2022-10-17

## 2022-10-27 ENCOUNTER — APPOINTMENT (OUTPATIENT)
Dept: RADIOLOGY | Facility: CLINIC | Age: 57
End: 2022-10-27
Payer: MEDICARE

## 2022-10-27 ENCOUNTER — OFFICE VISIT (OUTPATIENT)
Dept: URGENT CARE | Facility: CLINIC | Age: 57
End: 2022-10-27
Payer: MEDICARE

## 2022-10-27 VITALS
OXYGEN SATURATION: 100 % | WEIGHT: 195 LBS | RESPIRATION RATE: 18 BRPM | HEIGHT: 63 IN | SYSTOLIC BLOOD PRESSURE: 101 MMHG | DIASTOLIC BLOOD PRESSURE: 65 MMHG | BODY MASS INDEX: 34.55 KG/M2 | TEMPERATURE: 98 F | HEART RATE: 90 BPM

## 2022-10-27 DIAGNOSIS — S93.402A SPRAIN OF LEFT ANKLE, UNSPECIFIED LIGAMENT, INITIAL ENCOUNTER: Primary | ICD-10-CM

## 2022-10-27 DIAGNOSIS — M25.572 ACUTE LEFT ANKLE PAIN: ICD-10-CM

## 2022-10-27 PROCEDURE — G0463 HOSPITAL OUTPT CLINIC VISIT: HCPCS | Performed by: PHYSICIAN ASSISTANT

## 2022-10-27 PROCEDURE — 73610 X-RAY EXAM OF ANKLE: CPT

## 2022-10-27 PROCEDURE — 99213 OFFICE O/P EST LOW 20 MIN: CPT | Performed by: PHYSICIAN ASSISTANT

## 2022-10-27 NOTE — PROGRESS NOTES
[facility]      NAME: Carina Pineda is a 65 y o  female  : 1965    MRN: [id number]  DATE: 2022  TIME: 5:00 PM    Assessment and Plan   Sprain of left ankle, unspecified ligament, initial encounter [S93 402A]  1  Sprain of left ankle, unspecified ligament, initial encounter     2  Acute left ankle pain  XR ankle 3+ vw left       Patient Instructions   Xray appears negative for any fracture  Will follow up with radiologist report when available  Recommend elevating body part, icing the area every 2 hours for 20-30 minutes, take Ibuprofen every 6-8 hours to reduce inflammation  If not improving over the next week, follow up with PCP or orthopedics  Walking boot applied, crutch training given  To use IBU Q 4-6 hours, no more than 2400mg per day  On percocet from pain management  To follow up with PCP in 3-5 days  To present to the ER if symptoms worsen  Chief Complaint     Chief Complaint   Patient presents with   • Ankle Pain     Patient c/o left ankle pain and swelling after falling outside today feeding chickens  History of Present Illness   Carina Pineda presents to the clinic with  c/o    Ankle Pain   The incident occurred at home  The injury mechanism was a fall (while feeling her chickens)  The pain is present in the left ankle  The pain is moderate  The pain has been constant since onset  The symptoms are aggravated by movement, palpation and weight bearing  She has tried nothing for the symptoms  The treatment provided no relief  Patient reports previous left ankle sprain in the past as well  Review of Systems   Review of Systems   Constitutional: Negative for chills, diaphoresis, fatigue and fever  HENT: Negative for congestion, ear discharge, ear pain and facial swelling  Eyes: Negative for photophobia, pain, discharge, redness, itching and visual disturbance     Respiratory: Negative for apnea, cough, chest tightness, shortness of breath and wheezing  Cardiovascular: Negative for chest pain and palpitations  Gastrointestinal: Negative for abdominal pain  Musculoskeletal: Positive for arthralgias, gait problem and joint swelling  Skin: Negative for color change, rash and wound  Neurological: Negative for dizziness and headaches  Hematological: Negative for adenopathy  Current Medications     Long-Term Medications   Medication Sig Dispense Refill   • cholecalciferol (VITAMIN D3) 1,000 units tablet Take 2 tablets by mouth daily     • cyclobenzaprine (FLEXERIL) 10 mg tablet Take 1 tablet (10 mg total) by mouth 3 (three) times a day as needed for muscle spasms 90 tablet 1   • Diclofenac Sodium (VOLTAREN) 1 % Apply 2 g topically 4 (four) times a day as needed (pain) 100 g 0   • gabapentin (NEURONTIN) 800 mg tablet Take 1 tablet (800 mg total) by mouth 3 (three) times a day 90 tablet 1   • ibuprofen (MOTRIN) 600 mg tablet Take 1 tablet (600 mg total) by mouth every 6 (six) hours as needed for mild pain 30 tablet 0   • levothyroxine 50 mcg tablet TAKE ONE TABLET BY MOUTH DAILY ON MONDAY THROUGH SATURDAY AND TAKE 1 AND 1/2 TABLETS ON SUNDAY 45 tablet 0   • metFORMIN (GLUCOPHAGE) 1000 MG tablet Take 1,000 mg by mouth 2 (two) times a day with meals     • metoprolol succinate (TOPROL-XL) 25 mg 24 hr tablet      • oxyCODONE-acetaminophen (PERCOCET) 5-325 mg per tablet Take 1 tablet by mouth 2 (two) times a day as needed for severe pain Do not fill until 9/18/2022  Take 1 tablet by mouth up to twice daily as needed for pain  Max Daily Amount: 2 tablets 60 tablet 0   • traZODone (DESYREL) 50 mg tablet   0   • oxyCODONE-acetaminophen (PERCOCET) 5-325 mg per tablet Take 1 tablet by mouth 2 (two) times a day as needed for severe pain Do not fill until 10/18/2022  Take 1 tablet by mouth up to twice daily as needed for pain   Max Daily Amount: 2 tablets (Patient not taking: Reported on 10/27/2022) 60 tablet 0       Current Allergies     Allergies as of 10/27/2022 - Reviewed 09/16/2022   Allergen Reaction Noted   • Levaquin [levofloxacin in d5w] Shortness Of Breath 01/21/2016   • Amitiza [lubiprostone] Other (See Comments) and Hives 08/10/2011   • Biaxin [clarithromycin] Hives 01/21/2016   • Cephradine     • Erythromycin Hives 08/10/2011   • Macrobid [nitrofurantoin monohyd macro] Other (See Comments) 01/21/2016   • Morphine     • Penicillins Hives 08/10/2011   • Sulfa antibiotics     • Tetracycline     • Tetracyclines & related Hives 01/21/2016   • Morphine and related Rash 01/21/2016   • Valium [diazepam] Rash and Vomiting 06/14/2012            The following portions of the patient's history were reviewed and updated as appropriate: allergies, current medications, past family history, past medical history, past social history, past surgical history and problem list   Past Medical History:   Diagnosis Date   • Anxiety    • Asthma     exercise induced asthma   • Cervical disc disorder    • Chronic pain    • Chronic pain disorder    • Depression    • Dysuria    • Fibromyalgia, primary Est 2016   • GERD (gastroesophageal reflux disease) 2004   • Headache(784 0) As a child   • Low back pain    • Lung abnormality     nodules   • Lung nodule    • Neck pain    • Peripheral neuropathy    • Pituitary abnormality ([facility] [address] )     tumor   • Right hip pain 10/29/2019   • Thrombocytopenia ([facility] [address] )    • Thyroid disease      Past Surgical History:   Procedure Laterality Date   • APPENDECTOMY     • BREAST BIOPSY Right 1995   • CHOLECYSTECTOMY     • COLECTOMY     • COLON SURGERY     • EPIDURAL BLOCK INJECTION N/A 6/23/2016    Procedure: BLOCK / INJECTION EPIDURAL STEROID CERVICAL  C7-T1;  Surgeon: Jocelynn Randolph MD;  Location: [facility] MAIN OR;  Service:    • EPIDURAL BLOCK INJECTION N/A 3/31/2016    Procedure: BLOCK / INJECTION EPIDURAL STEROID CERVICAL C7-T1  (C-ARM);   Surgeon: Jocelynn Randolph MD;  Location: [facility] MAIN OR;  Service:    • EPIDURAL BLOCK INJECTION N/A 8/8/2018    Procedure: C6 C7 Cervical Epidural Steroid Injection (02911); Surgeon: Catherine Booker MD;  Location: [facility] MAIN OR;  Service: Pain Management    • EPIDURAL BLOCK INJECTION N/A 12/16/2021    Procedure: T4 T5 thoracic epidural steroid injection (09710); Surgeon: Catherine Booker MD;  Location: [facility] OR;  Service: Pain Management    • EPIDURAL BLOCK INJECTION N/A 6/10/2022    Procedure: T4 T5 THORACIC EPIDURAL STEROID INJECTION (16189); Surgeon: Catherine Booker MD;  Location: [facility] MAIN OR;  Service: Pain Management    • HYSTERECTOMY  1996   • OOPHORECTOMY Bilateral 1996   • PITUITARY SURGERY     • NC ARTHROCENTESIS ASPIR&/INJ MAJOR JT/BURSA W/O US Right 11/8/2019    Procedure: Trochanteric Bursa Injection (36050); Surgeon: Catherine Booker MD;  Location: [facility] MAIN OR;  Service: Pain Management    • NC ARTHROCENTESIS ASPIR&/INJ MAJOR JT/BURSA W/O US Right 1/23/2020    Procedure: Trochanteric Bursa Injection (18155); Surgeon: Catherine Booker MD;  Location: [facility] MAIN OR;  Service: Pain Management    • NC NJX DX/THER SBST EPIDURAL/SUBRACH CERV/THORACIC N/A 1/21/2016    Procedure: BLOCK / INJECTION EPIDURAL STEROID CERVICAL C7-T1 (C-ARM);   Surgeon: Catherine Booker MD;  Location: [facility] OR;  Service: Pain Management    • REDUCTION MAMMAPLASTY Bilateral 2000     Social History     Socioeconomic History   • Marital status: /Civil Union     Spouse name: Not on file   • Number of children: Not on file   • Years of education: Not on file   • Highest education level: Not on file   Occupational History   • Not on file   Tobacco Use   • Smoking status: Never Smoker   • Smokeless tobacco: Never Used   Vaping Use   • Vaping Use: Never used   Substance and Sexual Activity   • Alcohol use: No   • Drug use: No   • Sexual activity: Not Currently     Partners: Male     Birth control/protection: Post-menopausal   Other Topics Concern   • Not on file   Social History Narrative   • Not on file     Social Determinants of Health Financial Resource Strain: Not on file   Food Insecurity: Not on file   Transportation Needs: Not on file   Physical Activity: Not on file   Stress: Not on file   Social Connections: Not on file   Intimate Partner Violence: Not on file   Housing Stability: Not on file       Objective   /65   Pulse 90   Temp 98 °F (36 7 °C) (Temporal)   Resp 18   Ht 5' 3" (1 6 m)   Wt 88 5 kg (195 lb)   SpO2 100%   BMI 34 54 kg/m²      Physical Exam     Physical Exam  Vitals and nursing note reviewed  Constitutional:       General: She is not in acute distress  Appearance: She is well-developed  She is not diaphoretic  HENT:      Head: Normocephalic and atraumatic  Right Ear: External ear normal       Left Ear: External ear normal       Nose: Nose normal    Eyes:      General: No scleral icterus  Right eye: No discharge  Left eye: No discharge  Conjunctiva/sclera: Conjunctivae normal    Cardiovascular:      Rate and Rhythm: Normal rate and regular rhythm  Heart sounds: Normal heart sounds  No murmur heard  No friction rub  No gallop  Pulmonary:      Effort: Pulmonary effort is normal  No respiratory distress  Breath sounds: Normal breath sounds  No decreased breath sounds, wheezing, rhonchi or rales  Musculoskeletal:      Left ankle: Swelling (lateral mallolous) present  Tenderness present over the lateral malleolus  Decreased range of motion  Anterior drawer test negative  Normal pulse (dorsalis pedis pulse 2+)  Skin:     General: Skin is warm and dry  Coloration: Skin is not pale  Findings: No erythema or rash  Neurological:      Mental Status: She is alert and oriented to person, place, and time  Psychiatric:         Behavior: Behavior normal          Thought Content:  Thought content normal          Judgment: Judgment normal          Alexandra Garrett PA-C

## 2022-11-07 ENCOUNTER — OFFICE VISIT (OUTPATIENT)
Dept: URGENT CARE | Facility: CLINIC | Age: 57
End: 2022-11-07

## 2022-11-07 ENCOUNTER — APPOINTMENT (OUTPATIENT)
Dept: RADIOLOGY | Facility: CLINIC | Age: 57
End: 2022-11-07

## 2022-11-07 VITALS
SYSTOLIC BLOOD PRESSURE: 107 MMHG | RESPIRATION RATE: 16 BRPM | BODY MASS INDEX: 34.55 KG/M2 | WEIGHT: 195 LBS | HEIGHT: 63 IN | OXYGEN SATURATION: 98 % | HEART RATE: 108 BPM | DIASTOLIC BLOOD PRESSURE: 64 MMHG | TEMPERATURE: 98.4 F
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
## 2022-11-07 DIAGNOSIS — T14.8XXA PUNCTURE WOUND: Primary | ICD-10-CM

## 2022-11-07 RX ORDER — ONDANSETRON 4 MG/1
4 TABLET, ORALLY DISINTEGRATING ORAL EVERY 6 HOURS PRN
Qty: 20 TABLET | Refills: 0 | Status: SHIPPED | OUTPATIENT
Start: 2022-11-07

## 2022-11-07 RX ORDER — SULFAMETHOXAZOLE AND TRIMETHOPRIM 800; 160 MG/1; MG/1
1 TABLET ORAL EVERY 12 HOURS SCHEDULED
Qty: 8 TABLET | Refills: 0 | Status: SHIPPED | OUTPATIENT
Start: 2022-11-07 | End: 2022-11-11

## 2022-11-07 RX ORDER — CLINDAMYCIN HYDROCHLORIDE 300 MG/1
300 CAPSULE ORAL 3 TIMES DAILY
Qty: 12 CAPSULE | Refills: 0 | Status: SHIPPED | OUTPATIENT
Start: 2022-11-07 | End: 2022-11-11

## 2022-11-07 NOTE — PATIENT INSTRUCTIONS
Take Zofran, Clindamycin and Bactrim as prescribed  Return for suture removal  Keep wound covered for 24 hours then change bandage 2 times per day  Keep clean, dry and apply topical antibiotic ointment  Tylenol or Ibuprofen for pain as needed  Have wound checked in 1-2 days  Watch for signs of infection  Follow up with PCP in 3-5 days  Go to ED if symptoms worsen

## 2022-11-08 ENCOUNTER — CONSULT (OUTPATIENT)
Dept: BARIATRICS | Facility: CLINIC | Age: 57
End: 2022-11-08

## 2022-11-08 VITALS
TEMPERATURE: 97.9 F | OXYGEN SATURATION: 98 % | HEIGHT: 63 IN | SYSTOLIC BLOOD PRESSURE: 122 MMHG | DIASTOLIC BLOOD PRESSURE: 72 MMHG | BODY MASS INDEX: 35.08 KG/M2 | RESPIRATION RATE: 20 BRPM | WEIGHT: 198 LBS | HEART RATE: 79 BPM

## 2022-11-08 DIAGNOSIS — G89.4 CHRONIC PAIN SYNDROME: ICD-10-CM

## 2022-11-08 DIAGNOSIS — R73.03 PREDIABETES: ICD-10-CM

## 2022-11-08 DIAGNOSIS — E66.01 SEVERE OBESITY (HCC): Primary | ICD-10-CM

## 2022-11-08 DIAGNOSIS — E03.9 HYPOTHYROIDISM, UNSPECIFIED TYPE: ICD-10-CM

## 2022-11-08 NOTE — ASSESSMENT & PLAN NOTE
-HgbA1c 5 7  -On Metformin 1000mg BID  -avoid refined carbohydrates  -can consider GLP-1 but reports family member w/ hx thyroid cancer   Would need to determine what type in order to be able to use this class of medication

## 2022-11-08 NOTE — PATIENT INSTRUCTIONS
Goals: Food log (ie ) www myfitnesspal com,sparkpeople  com,loseit com,calorieking  com,etc    No sugary beverages  At least 64oz of water daily  Increase physical activity by 10 minutes daily   Gradually increase physical activity to a goal of 5 days per week for 30 minutes of MODERATE intensity PLUS 2 days per week of FULL BODY resistance training  7987-7079 calories per day  5-10 servings of fruits and vegetables per day  <50 g net carb  Medullary thyroid cancer

## 2022-11-08 NOTE — ASSESSMENT & PLAN NOTE
-ON Gabapentin 800mg TID (weight +) and Oxycodone  -receives frequent steroid injections ( weight +)

## 2022-11-08 NOTE — PROGRESS NOTES
Assessment/Plan:    Severe obesity ([facility] [address] )  -Discussed options of [facility]-Intensive Lifestyle Intervention Program, Very Low Calorie Diet-VLCD, Conservative Program, Vaughn-En-Y Gastric Bypass and Vertical Sleeve Gastrectomy and the role of weight loss medications   -Not a candidate for phentermine due to opioid dependence  -Initial weight loss goal of 5-10% weight loss for improved health  -Screening labs: reviewed CMP, Lipid panel, TSH, hgbA1c  Multiple labs ordered by endo that patient hasn't completed yet  Encouraged her to get these done  -Patient is interested in pursuing bariatric surgery  Patient has hx total colectomy    Will reach out to surgeon regarding patient being a candidate    Hypothyroidism  -On levothyroxine  -TSH WNL    Prediabetes  -HgbA1c 5 7  -On Metformin 1000mg BID  -avoid refined carbohydrates  -can consider GLP-1 but reports family member w/ hx thyroid cancer  Would need to determine what type in order to be able to use this class of medication    Chronic pain syndrome  -ON Gabapentin 800mg TID (weight +) and Oxycodone  -receives frequent steroid injections ( weight +)    CARLTON (obstructive sleep apnea)  -untreated  -discussed risks of untreated CARLTON as well as difficulty losing weight  Goals:    Food log (ie ) www myfitnesspal com,sparkpeople  com,loseit com,calorieking  com,etc    No sugary beverages  At least 64oz of water daily  Increase physical activity by 10 minutes daily  Gradually increase physical activity to a goal of 5 days per week for 30 minutes of MODERATE intensity PLUS 2 days per week of FULL BODY resistance training  2940-9925 calories per day  5-10 servings of fruits and vegetables per day  80 grams of protein per day      Follow up in approximately 6 weeks with Non-Surgical Physician/Advanced Practitioner      Diagnoses and all orders for this visit:    [facility] ([facility] )    Prediabetes  -     Ambulatory Referral to Weight Management    BMI 35 0-35 9,adult  - Ambulatory Referral to Weight Management    Hypothyroidism, unspecified type    Chronic pain syndrome          Subjective:   Chief Complaint   Patient presents with   • Consult       Patient ID: Terry Murry  is a 65 y o  female with excess weight/obesity here to pursue weight managment  Past Medical History:   Diagnosis Date   • Anxiety    • Asthma     exercise induced asthma   • Cervical disc disorder    • Chronic pain    • Chronic pain disorder    • Depression    • Dysuria    • Fibromyalgia, primary Est 2016   • GERD (gastroesophageal reflux disease) 2004   • Headache(784 0) As a child   • Low back pain    • Lung abnormality     nodules   • Lung nodule    • Neck pain    • Peripheral neuropathy    • Pituitary abnormality (H[facility])     tumor   • Right hip pain 10/29/2019   • Thrombocytopenia (H[facility])    • Thyroid disease          HPI:  Obesity/Excess Weight:  Severity: Severe  Onset:  Since age 24 after pregnancy , hx of pituitary tumor 1/3 of pituitary removed at age 28  Modifiers: Phentermine, Nutrisystem for 6 months   Self created diets, B12 shots  Contributing factors: Stress/Emotional Eating and Depression, TAHBSO at age 28, sedentary due to back pain  Associated symptoms: depression    Goals: 145 lbs (weighed this as a teenager)  Highest: current    Hydration: seltzer water 30-45oz, 3-6 cups of coffee w/ erythritol and collagen supplement  ETOH: denies  Exercise: denies; disabled due to her back  CARLTON: untreated  Sleep: goes to sleep at 3-4am, 4-6 hours of sleep  Occupation: former EMT, now on disability  Smoking: denies    -reports needs to follow a low fiber diet, low residue since had total colectomy - had colon removed due to slow transit and colon was twisted    -Has been on Gabapentin for past 5 years  -Has been on Metformin for past 6 months  -Gets 50 steroid shots per year for the past 4 years    The following portions of the patient's history were reviewed and updated as appropriate: allergies, current medications, past family history, past medical history, past social history, past surgical history and problem list     Review of Systems   Constitutional: Negative for chills and fever  HENT: Negative for sore throat  Respiratory: Positive for cough and shortness of breath (recovering from COVID)  Cardiovascular: Negative for chest pain and palpitations  Gastrointestinal: Positive for abdominal pain (hx of several abdominal surgeries) and nausea  Negative for constipation and vomiting  Genitourinary: Negative for dysuria  Musculoskeletal: Positive for arthralgias and back pain  Skin: Negative for rash  Neurological: Positive for headaches (Hx Migraine)  Negative for dizziness  Psychiatric/Behavioral: Negative for dysphoric mood and suicidal ideas  The patient is nervous/anxious  Objective:    /72   Pulse 79   Temp 97 9 °F (36 6 °C)   Resp 20   Ht 5' 3" (1 6 m)   Wt 89 8 kg (198 lb)   SpO2 98%   BMI 35 07 kg/m²     Physical Exam  Vitals and nursing note reviewed  Constitutional:       General: She is not in acute distress  Appearance: She is obese  She is not ill-appearing or toxic-appearing  HENT:      Head: Normocephalic and atraumatic  Mouth/Throat:      Mouth: Mucous membranes are moist    Eyes:      General: No scleral icterus  Pulmonary:      Effort: Pulmonary effort is normal  No respiratory distress  Abdominal:      Comments: Obese, protuberant   Skin:     General: Skin is warm  Neurological:      General: No focal deficit present  Mental Status: She is alert and oriented to person, place, and time  Psychiatric:         Mood and Affect: Mood normal          Thought Content:  Thought content normal          Judgment: Judgment normal

## 2022-11-08 NOTE — ASSESSMENT & PLAN NOTE
-Discussed options of [facility]-Intensive Lifestyle Intervention Program, Very Low Calorie Diet-VLCD, Conservative Program, Vaughn-En-Y Gastric Bypass and Vertical Sleeve Gastrectomy and the role of weight loss medications   -Not a candidate for phentermine due to opioid dependence  -Initial weight loss goal of 5-10% weight loss for improved health  -Screening labs: reviewed CMP, Lipid panel, TSH, hgbA1c  Multiple labs ordered by endo that patient hasn't completed yet  Encouraged her to get these done  -Patient is interested in pursuing bariatric surgery  Patient has hx total colectomy    Will reach out to surgeon regarding patient being a candidate

## 2022-11-10 PROBLEM — G47.33 OSA (OBSTRUCTIVE SLEEP APNEA): Status: ACTIVE | Noted: 2022-11-10

## 2022-11-14 DIAGNOSIS — E03.9 HYPOTHYROIDISM, UNSPECIFIED TYPE: ICD-10-CM

## 2022-11-14 NOTE — PROGRESS NOTES
Pain Medicine Follow-Up Note    Assessment:  1  Chronic pain syndrome    2  Neck pain    3  Cervical disc disorder with radiculopathy of mid-cervical region    4  Myofascial pain syndrome    5  Chronic left-sided low back pain with left-sided sciatica    6  Lumbar radiculopathy        Plan:  Orders Placed This Encounter   Procedures   • Injection trigger point 3 or more muscles     Standing Status:   Future     Standing Expiration Date:   11/18/2023     Scheduling Instructions:      Trigger points with just lidocaine  Please call patient 2nd time order  Thank you       New Medications Ordered This Visit   Medications   • gabapentin (NEURONTIN) 800 mg tablet     Sig: Take 1 tablet (800 mg total) by mouth 3 (three) times a day     Dispense:  90 tablet     Refill:  1   • Diclofenac Sodium (VOLTAREN) 1 %     Sig: Apply 2 g topically 4 (four) times a day as needed (pain)     Dispense:  100 g     Refill:  1   • cyclobenzaprine (FLEXERIL) 10 mg tablet     Sig: Take 1 tablet (10 mg total) by mouth 3 (three) times a day as needed for muscle spasms     Dispense:  90 tablet     Refill:  1   • oxyCODONE-acetaminophen (PERCOCET) 5-325 mg per tablet     Sig: Take 1 tablet by mouth 2 (two) times a day as needed for severe pain Max Daily Amount: 2 tablets Do not start before December 18, 2022  Dispense:  60 tablet     Refill:  0     Do not fill until 12/18/2022   • oxyCODONE-acetaminophen (PERCOCET) 5-325 mg per tablet     Sig: Take 1 tablet by mouth 2 (two) times a day as needed for severe pain Max Daily Amount: 2 tablets     Dispense:  60 tablet     Refill:  0     Fill today       My impressions and treatment recommendations were discussed in detail with the patient who verbalized understanding and had no further questions        The patient reports continued reduction of their pain and improvement in their functioning without significant side effects using oxycodone/acetaminophen 5/325 mg tablet, patient uses 1 tablet up to twice daily as needed for severe pain along with gabapentin 800 mg t i d  and Flexeril 10 mg tablet patient uses 1 tablet up to 3 times daily as needed for muscle spasms, therefore I will continue the patient on this medication  Oxycodone/acetaminophen 5/325 mg tablet E prescribed to the patient's pharmacy with a do not fill until date of today 11/18/2022 and 12/18/2022  Patient also reports that she needs trigger point injections in her upper back however her endocrinologist states that she is needs to avoid steroids therefore she is requesting that they be done with only the local anesthetic we use  [place] Prescription Drug Monitoring Program report was reviewed and was appropriate     There are risks associated with opioid medications, including dependence, addiction and tolerance  The patient understands and agrees to use these medications only as prescribed  Potential side effects of the medications include, but are not limited to, constipation, drowsiness, addiction, impaired judgment and risk of fatal overdose if not taken as prescribed  The patient was warned against driving while taking sedation medications  Sharing medications is a felony  At this point in time, the patient is showing no signs of addiction, abuse, diversion or suicidal ideation  Complete risks and benefits including bleeding, infection, tissue reaction, nerve injury and allergic reaction were discussed  The approach was demonstrated using models and literature was provided  Verbal and written consent was obtained  Follow-up is planned in 8 weeks time or sooner as warranted  Discharge instructions were provided  I personally saw and examined the patient and I agree with the above discussed plan of care  History of Present Illness:    Mando Kwan is a 65 y o  female who presents to [facility] for interval re-evaluation of the above stated pain complaints   The patient has a past medical and chronic pain history as outlined in the assessment section  She was last seen on 9/9/2022  At today's visit patient states that her pain symptoms are the same with a pain score of 6 to 7/10 on the verbal numeric pain scale  Patient states that her pain is worse at night  The patient's pain is constant in nature  The quality of the patient's pain is described as burning, dull-aching, sharp, throbbing, pressure-like, and numbness  Patient indicates that her pain is located in her posterior neck, bilateral shoulders, radiates down the right arm, to the right hand, as well as the mid back, bilateral low back, and left leg  Patient states that the pain relief she is now obtaining from her current treatment plan is enough to make a real difference in her life providing her with between 20-40% depending on the day  Pain Contract Signed:  11/18/2022  Last Urine Drug Screen:  9/9/2022    Other than as stated above, the patient denies any interval changes in medications, medical condition, mental condition, symptoms, or allergies since the last office visit  Review of Systems:    Review of Systems   Respiratory: Negative for shortness of breath  Cardiovascular: Negative for chest pain  Gastrointestinal: Negative for constipation, diarrhea, nausea and vomiting  Musculoskeletal: Positive for arthralgias, gait problem and myalgias  Negative for joint swelling  Decreased ROM   Pain in Lt leg/ Rt arm/ Fingers  Swelling Rt side of neck/shoulder   Skin: Negative for rash  Neurological: Negative for dizziness, seizures and weakness  All other systems reviewed and are negative          Past Medical History:   Diagnosis Date   • Anxiety    • Asthma     exercise induced asthma   • Cervical disc disorder    • Chronic pain    • Chronic pain disorder    • Depression    • Dysuria    • Fibromyalgia, primary Est 2016   • GERD (gastroesophageal reflux disease) 2004   • Headache(784 0) As a child • Low back pain    • Lung abnormality     nodules   • Lung nodule    • Neck pain    • Peripheral neuropathy    • 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   • Right hip pain 10/29/2019   • Thrombocytopenia ([facility] [address] )    • Thyroid disease        Past Surgical History:   Procedure Laterality Date   • APPENDECTOMY     • BREAST BIOPSY Right 1995   • CHOLECYSTECTOMY     • COLECTOMY     • COLON SURGERY     • EPIDURAL BLOCK INJECTION N/A 6/23/2016    Procedure: BLOCK / INJECTION EPIDURAL STEROID CERVICAL  C7-T1;  Surgeon: Dania Nixon MD;  Location: [facility] MAIN OR;  Service:    • EPIDURAL BLOCK INJECTION N/A 3/31/2016    Procedure: BLOCK / INJECTION EPIDURAL STEROID CERVICAL C7-T1  (C-ARM); Surgeon: Dania Nixon MD;  Location: [facility] MAIN OR;  Service:    • EPIDURAL BLOCK INJECTION N/A 8/8/2018    Procedure: C6 C7 Cervical Epidural Steroid Injection (42523); Surgeon: Dania Nixon MD;  Location: [facility] MAIN OR;  Service: Pain Management    • EPIDURAL BLOCK INJECTION N/A 12/16/2021    Procedure: T4 T5 thoracic epidural steroid injection (16147); Surgeon: Dania Nixon MD;  Location: [facility] MAIN OR;  Service: Pain Management    • EPIDURAL BLOCK INJECTION N/A 6/10/2022    Procedure: T4 T5 THORACIC EPIDURAL STEROID INJECTION (29167); Surgeon: Dania Nixon MD;  Location: [facility] MAIN OR;  Service: Pain Management    • HYSTERECTOMY  1996   • OOPHORECTOMY Bilateral 1996   • PITUITARY SURGERY     • [place] ARTHROCENTESIS ASPIR&/INJ MAJOR JT/BURSA W/O US Right 11/8/2019    Procedure: Trochanteric Bursa Injection (07595); Surgeon: Dania Nixon MD;  Location: [facility] MAIN OR;  Service: Pain Management    • [place] ARTHROCENTESIS ASPIR&/INJ MAJOR JT/BURSA W/O US Right 1/23/2020    Procedure: Trochanteric Bursa Injection (22088);   Surgeon: Dania Nixon MD;  Location: [facility] MAIN OR;  Service: Pain Management    • [place] NJX DX/THER SBST EPIDURAL/SUBRACH CERV/THORACIC N/A 1/21/2016    Procedure: BLOCK / INJECTION EPIDURAL STEROID CERVICAL C7-T1 (C-ARM); Surgeon: Jose Estrella MD;  Location: [facility] MAIN OR;  Service: Pain Management    • REDUCTION MAMMAPLASTY Bilateral 2000       Family History   Problem Relation Age of Onset   • Thyroid disease Mother    • Hyperlipidemia Mother    • Depression Mother    • Stroke Mother         TIA   • Thyroid disease Father    • Hyperlipidemia Father    • Depression Father    • Arthritis Father         Spine and knees   • Ovarian cancer Sister 39   • Obesity Sister    • Migraines Sister    • Obesity Brother    • No Known Problems Daughter    • No Known Problems Son    • No Known Problems Maternal Uncle    • Obesity Paternal Aunt    • Obesity Paternal Aunt    • Diabetes Paternal Aunt    • Cancer Paternal Aunt         Thyroid and colon cancer   • No Known Problems Paternal Uncle    • Cancer Maternal Grandmother         Kidney cancer   • Heart disease Maternal Grandfather    • Breast cancer Paternal Grandmother 36   • Cancer Paternal Grandmother         Breast, Lung, Liver and skin cancer   • Diabetes Paternal Grandfather    • ADD / ADHD Neg Hx    • Anesthesia problems Neg Hx    • Cancer Neg Hx    • Clotting disorder Neg Hx    • Collagen disease Neg Hx    • Dislocations Neg Hx    • Learning disabilities Neg Hx    • Neurological problems Neg Hx    • Osteoporosis Neg Hx    • Rheumatologic disease Neg Hx    • Scoliosis Neg Hx    • Vascular Disease Neg Hx        Social History     Occupational History   • Not on file   Tobacco Use   • Smoking status: Never   • Smokeless tobacco: Never   Vaping Use   • Vaping Use: Never used   Substance and Sexual Activity   • Alcohol use: No   • Drug use: No   • Sexual activity: Not Currently     Partners: Male     Birth control/protection: Post-menopausal         Current Outpatient Medications:   •  aspirin 81 MG tablet, Take 81 mg by mouth daily  , Disp: , Rfl:   •  cholecalciferol (VITAMIN D3) 1,000 units tablet, Take 2 tablets by mouth daily, Disp: , Rfl:   •  cyclobenzaprine (FLEXERIL) 10 mg tablet, Take 1 tablet (10 mg total) by mouth 3 (three) times a day as needed for muscle spasms, Disp: 90 tablet, Rfl: 1  •  Diclofenac Sodium (VOLTAREN) 1 %, Apply 2 g topically 4 (four) times a day as needed (pain), Disp: 100 g, Rfl: 1  •  gabapentin (NEURONTIN) 800 mg tablet, Take 1 tablet (800 mg total) by mouth 3 (three) times a day, Disp: 90 tablet, Rfl: 1  •  ibuprofen (MOTRIN) 600 mg tablet, Take 1 tablet (600 mg total) by mouth every 6 (six) hours as needed for mild pain, Disp: 30 tablet, Rfl: 0  •  levothyroxine 50 mcg tablet, TAKE 1 TABLET BY MOUTH DAILY ON MONDAY THROUGH SATURDAY AND TAKE 1 AND 1/2 TABLETS DAILY ON SUNDAY, Disp: 45 tablet, Rfl: 0  •  metFORMIN (GLUCOPHAGE) 1000 MG tablet, Take 1,000 mg by mouth 2 (two) times a day with meals, Disp: , Rfl:   •  metoprolol succinate (TOPROL-XL) 25 mg 24 hr tablet, , Disp: , Rfl:   •  ondansetron (Zofran ODT) 4 mg disintegrating tablet, Take 1 tablet (4 mg total) by mouth every 6 (six) hours as needed for nausea or vomiting, Disp: 20 tablet, Rfl: 0  •  [START ON 12/18/2022] oxyCODONE-acetaminophen (PERCOCET) 5-325 mg per tablet, Take 1 tablet by mouth 2 (two) times a day as needed for severe pain Max Daily Amount: 2 tablets Do not start before December 18, 2022 , Disp: 60 tablet, Rfl: 0  •  oxyCODONE-acetaminophen (PERCOCET) 5-325 mg per tablet, Take 1 tablet by mouth 2 (two) times a day as needed for severe pain Max Daily Amount: 2 tablets, Disp: 60 tablet, Rfl: 0  •  traZODone (DESYREL) 50 mg tablet, , Disp: , Rfl: 0  •  albuterol (ProAir HFA) 90 mcg/act inhaler, Inhale 2 puffs every 6 (six) hours as needed for wheezing (Patient not taking: Reported on 9/16/2022), Disp: 8 5 g, Rfl: 0  •  albuterol (PROVENTIL HFA,VENTOLIN HFA) 90 mcg/act inhaler, Inhale (Patient not taking: Reported on 10/27/2022), Disp: , Rfl:   •  benzonatate (TESSALON PERLES) 100 mg capsule, Take 1 capsule (100 mg total) by mouth 3 (three) times a day as needed for cough (Patient not taking: Reported on 10/27/2022), Disp: 20 capsule, Rfl: 0    Allergies   Allergen Reactions   • Levaquin [Levofloxacin In D5w] Shortness Of Breath     Also hives     • Amitiza [Lubiprostone] Other (See Comments) and Hives     Reaction Date: 10Aug2011;   Migraines and vomiting   • Biaxin [Clarithromycin] Hives   • Cephradine    • Erythromycin Hives     Reaction Date: 10Aug2011;    • Macrobid [Nitrofurantoin Monohyd Macro] Other (See Comments)     C/o passing out   • Morphine    • Penicillins Hives     Reaction Date: 10Aug2011;    • Sulfa Antibiotics    • Tetracycline    • Tetracyclines & Related Hives   • Morphine And Related Rash   • Valium [Diazepam] Rash and Vomiting     Reaction Date: 14Jun2012;        Physical Exam:    /77 (BP Location: Left arm, Patient Position: Sitting, Cuff Size: Standard)   Pulse 73   Ht 5' 3" (1 6 m)   BMI 35 07 kg/m²     Constitutional:normal, well developed, well nourished, alert, in no distress and non-toxic and no overt pain behavior   and obese  Eyes:anicteric  HEENT:grossly intact  Neck:supple, symmetric, trachea midline and no masses  and tenderness along paraspinals and right trapezious  Pulmonary:even and unlabored  Cardiovascular:No edema or pitting edema present  Skin:Normal without rashes or lesions and well hydrated  Psychiatric:Mood and affect appropriate  Neurologic:Cranial Nerves II-XII grossly intact  Musculoskeletal:antalgic        Orders Placed This Encounter   Procedures   • Injection trigger point 3 or more muscles

## 2022-11-15 RX ORDER — LEVOTHYROXINE SODIUM 0.05 MG/1
TABLET ORAL
Qty: 45 TABLET | Refills: 0 | Status: SHIPPED | OUTPATIENT
Start: 2022-11-15

## 2022-11-18 ENCOUNTER — OFFICE VISIT (OUTPATIENT)
Dept: PAIN MEDICINE | Facility: CLINIC | Age: 57
End: 2022-11-18

## 2022-11-18 VITALS
DIASTOLIC BLOOD PRESSURE: 77 MMHG | BODY MASS INDEX: 35.07 KG/M2 | SYSTOLIC BLOOD PRESSURE: 116 MMHG | HEIGHT: 63 IN | HEART RATE: 73 BPM

## 2022-11-18 DIAGNOSIS — M54.16 LUMBAR RADICULOPATHY: ICD-10-CM

## 2022-11-18 DIAGNOSIS — G89.29 CHRONIC LEFT-SIDED LOW BACK PAIN WITH LEFT-SIDED SCIATICA: ICD-10-CM

## 2022-11-18 DIAGNOSIS — M79.18 MYOFASCIAL PAIN SYNDROME: ICD-10-CM

## 2022-11-18 DIAGNOSIS — M50.120 CERVICAL DISC DISORDER WITH RADICULOPATHY OF MID-CERVICAL REGION: ICD-10-CM

## 2022-11-18 DIAGNOSIS — M54.42 CHRONIC LEFT-SIDED LOW BACK PAIN WITH LEFT-SIDED SCIATICA: ICD-10-CM

## 2022-11-18 DIAGNOSIS — M54.2 NECK PAIN: ICD-10-CM

## 2022-11-18 DIAGNOSIS — G89.4 CHRONIC PAIN SYNDROME: Primary | ICD-10-CM

## 2022-11-18 RX ORDER — OXYCODONE HYDROCHLORIDE AND ACETAMINOPHEN 5; 325 MG/1; MG/1
1 TABLET ORAL 2 TIMES DAILY PRN
Qty: 60 TABLET | Refills: 0 | Status: SHIPPED | OUTPATIENT
Start: 2022-11-18 | End: 2022-12-18

## 2022-11-18 RX ORDER — GABAPENTIN 800 MG/1
800 TABLET ORAL 3 TIMES DAILY
Qty: 90 TABLET | Refills: 1 | Status: SHIPPED | OUTPATIENT
Start: 2022-11-18

## 2022-11-18 RX ORDER — OXYCODONE HYDROCHLORIDE AND ACETAMINOPHEN 5; 325 MG/1; MG/1
1 TABLET ORAL 2 TIMES DAILY PRN
Qty: 60 TABLET | Refills: 0 | Status: SHIPPED | OUTPATIENT
Start: 2022-12-18 | End: 2023-01-17

## 2022-11-18 RX ORDER — CYCLOBENZAPRINE HCL 10 MG
10 TABLET ORAL 3 TIMES DAILY PRN
Qty: 90 TABLET | Refills: 1 | Status: SHIPPED | OUTPATIENT
Start: 2022-11-18

## 2022-11-18 NOTE — PATIENT INSTRUCTIONS

## 2022-11-29 ENCOUNTER — TELEPHONE (OUTPATIENT)
Dept: PAIN MEDICINE | Facility: CLINIC | Age: 57
End: 2022-11-29

## 2022-12-02 NOTE — TELEPHONE ENCOUNTER
Referred by: Cory Peace DO; Medical Diagnosis (from order):    Diagnosis Information                Diagnosis       781.2 (ICD-9-CM) - R26.9 (ICD-10-CM) - Gait abnormality                   Physical Therapy -  Daily Treatment Note    Visit: 4  Diagnosis Precautions: Peripheral neuropathy, type 2 diabetes    OBJECTIVE                                                                                                                          TREATMENT                                                                                                                  Therapeutic Exercise:  Seated gastroc stretch Left 4 x 15 sec hold  Seated hamstring stretch Left 6 x 10 sec hold  Seated Left ankle pumps x 15 reps  Seated heel raises x 15 reps; Left calf pain towards end  Side lying hip abduction Left x 12 reps; Left calf pain towards end  LAQ 2 sec hold 3 x 10 reps  Seated hamstring curls red Left 3 x 15 reps  Seated straight leg raise Left x 10 reps; Left calf pain towards end  Seated quad set 5 sec hold x 10 reps  Seated hip adduction hold 5 sec hold x 10 reps  Hip abduction red bilateral x 20 reps  Seated marches bilateral x 15 reps  NuStep level 3.0; arms 11; seat 14 x 4 min  Gait Trainin WW walker 50 ft; demonstrates improved confidence and stability     Skilled input: verbal instruction/cues, tactile instruction/cues, posture correction and facilitation    Writer verbally educated and received verbal consent for hand placement, positioning of patient, and techniques to be performed today from patient for clothing adjustments for techniques, hand placement and palpation for techniques and therapist position for techniques as described above and how they are pertinent to the patient's plan of care.    Home Exercise Program: (*above indicates provided as part of home exercise program)  Standing Tandem Balance with Counter Support - 10 reps - 3 sets - 1x daily - 7x weekly   Sidelying Hip Abduction - 10 reps - 3  S/w pt and scheduled TPI w/Dr. Teresa Berry for 12/6/22 9 am arrival. Advised if sick will need to reschedule and to refrain from vaccines 2 weeks post injection. S/w Nilam @ [facility], no auth req for [id number]. 12/2/22 1127 am Call ref# [id number]. sets - 1x daily - 3x weekly   Supine Bridge - 10 reps - 3 sets - 1x daily - 3x weekly   Single limb stance with counter 3 x 30 sec hold bilateral    Added:  Seated Gastroc Stretch with Strap- 3 sets- 15 hold- 1x daily- 7x weekly   Seated Ankle Pumps- 10 reps- 1 sets- 1x daily- 7x weekly   Seated Heel Raise- 10 reps- 1 sets- 1x daily- 7x weekly   Seated Hip Abduction with Resistance- 10 reps- 3 sets- 1x daily- 7x weekly             Procedures and total treatment time documented Time Entry flowsheet.

## 2022-12-06 ENCOUNTER — PROCEDURE VISIT (OUTPATIENT)
Dept: PAIN MEDICINE | Facility: CLINIC | Age: 57
End: 2022-12-06

## 2022-12-06 VITALS
HEIGHT: 63 IN | BODY MASS INDEX: 35.07 KG/M2 | HEART RATE: 84 BPM | SYSTOLIC BLOOD PRESSURE: 107 MMHG | DIASTOLIC BLOOD PRESSURE: 72 MMHG

## 2022-12-06 DIAGNOSIS — M79.18 MYOFASCIAL PAIN SYNDROME: Primary | ICD-10-CM

## 2022-12-06 RX ORDER — LIDOCAINE HYDROCHLORIDE 10 MG/ML
9 INJECTION, SOLUTION EPIDURAL; INFILTRATION; INTRACAUDAL; PERINEURAL ONCE
Status: COMPLETED | OUTPATIENT
Start: 2022-12-06 | End: 2022-12-06

## 2022-12-06 RX ORDER — METHYLPREDNISOLONE ACETATE 40 MG/ML
40 INJECTION, SUSPENSION INTRA-ARTICULAR; INTRALESIONAL; INTRAMUSCULAR; SOFT TISSUE ONCE
Status: COMPLETED | OUTPATIENT
Start: 2022-12-06 | End: 2022-12-06

## 2022-12-06 RX ADMIN — LIDOCAINE HYDROCHLORIDE 9 ML: 10 INJECTION, SOLUTION EPIDURAL; INFILTRATION; INTRACAUDAL; PERINEURAL at 09:43

## 2022-12-06 RX ADMIN — METHYLPREDNISOLONE ACETATE 40 MG: 40 INJECTION, SUSPENSION INTRA-ARTICULAR; INTRALESIONAL; INTRAMUSCULAR; SOFT TISSUE at 09:44

## 2022-12-06 NOTE — PROGRESS NOTES
Universal Protocol:  Consent: Written consent obtained  Risks and benefits: risks, benefits and alternatives were discussed  Consent given by: patient  Patient understanding: patient states understanding of the procedure being performed  Patient consent: the patient's understanding of the procedure matches consent given  Procedure consent: procedure consent matches procedure scheduled  Relevant documents: relevant documents present and verified  Site marked: the operative site was marked  Patient identity confirmed: verbally with patient    Supporting Documentation  Indications: pain    Injection site identified by: palpation    Procedure Details  Location(s):  Needle size: 25 G  Patient tolerance: patient tolerated the procedure well with no immediate complications  Additional procedure details: ATTENDING PHYSICIAN:  Leslie Almaraz MD     PROCEDURE:  Trigger point injections x3 to the right cervical paraspinal, x4 to the right upper trapezius, x2 to the right rhomboid, and x1 to the right deltoid musculature with local anesthetic and steroid  PRE-PROCEDURE DIAGNOSIS:  Myofascial pain syndrome  POST-PROCEDURE DIAGNOSIS:  Myofascial pain syndrome  ESTIMATED BLOOD LOSS:  Minimal     ANESTHESIA:  None  COMPLICATIONS:  None  CONSENT:  Today's procedure, its potential benefits as well as its risks and side effects were reviewed  Discussed risks of the procedure including bleeding, infection, reactions to the medications, failure the pain to improve, and potential worsening of the pain as well as pneumothorax were explained in detail to the patient, who verbalized understanding and wished to proceed  Written informed consent was thereby obtained  DESCRIPTION OF THE PROCEDURE:  After written informed consent was obtained, the patient was placed in the sitting position on the examination table  Anatomical landmarks were identified via palpation   The trigger points were identified and marked after palpation  The skin overlying these 10 marked trigger points was prepared using Betadine swabs x3 in the usual sterile fashion  Strict aseptic technique was utilized throughout the procedure  A 10 mL injectate consisting of 10 mL of 1% lidocaine mixed with 1 mL of Depo-Medrol 40 mg/mL was drawn up sterilely  Using a 25-gauge 1 25 inch needle, 1 mL of the above injectate was administered to each of the marked trigger points following negative aspiration  The patient tolerated the procedure well and all needles were removed with the tips intact  Hemostasis was maintained  There were no apparent complications  The skin was wiped clean and Band-Aids were placed as appropriate  The patient was monitored for an appropriate period of time following the procedure and remained hemodynamically stable and neurovascularly intact following the procedure as she was prior to the procedure  The patient was ultimately discharged to home with supervision in good condition  I was present for and participated in all key and critical portions of this procedure

## 2022-12-16 DIAGNOSIS — E03.9 HYPOTHYROIDISM, UNSPECIFIED TYPE: ICD-10-CM

## 2022-12-22 RX ORDER — LEVOTHYROXINE SODIUM 0.05 MG/1
TABLET ORAL
Qty: 45 TABLET | Refills: 0 | Status: SHIPPED | OUTPATIENT
Start: 2022-12-22

## 2023-01-10 NOTE — TELEPHONE ENCOUNTER
COVID-19 Screening:    • Does the patient OR patient’s household members have any of the following symptoms?  o Temperature: Fever ?100.0°F or ?37.8°C?  No  o Respiratory symptoms: New or worsening cough, shortness of breath, difficulty breathing, or sore throat? No  o GI symptoms: New onset of nausea, vomiting or diarrhea?  No  o Miscellaneous: New onset of loss of taste or smell, chills, repeated shaking with chills, muscle pain, headache, congestion or runny nose?  No  • Has the patient or a household member tested positive for COVID-19 in the last 14 days?  No  • Has the patient or a household member been tested for COVID-19 and are waiting for the results?  No     This is dr lemus's pt

## 2023-01-11 NOTE — PROGRESS NOTES
Pain Medicine Follow-Up Note    Assessment:  1  Chronic pain syndrome    2  Neck pain    3  Cervical disc disorder with radiculopathy of mid-cervical region    4  Chronic left-sided low back pain with left-sided sciatica    5  Lumbar radiculopathy    6  Severe obesity ([address] )    7  Uncomplicated opioid dependence ([address] )    8  Continuous opioid dependence ([facility] [address] )        Plan:  Orders Placed This Encounter   Procedures   • MRI lumbar spine without contrast     Standing Status:   Future     Standing Expiration Date:   1/13/2027     Scheduling Instructions: There is no preparation for this test  Please leave your jewelry and valuables at home, wedding rings are the exception  All patients will be required to change into a hospital gown and pants  Street clothes are not permitted in the MRI  Magnetic nail polish must be removed prior to arrival for your test  Please bring your insurance cards, a form of photo ID and a list of your medications with you  Arrive 15 minutes prior to your appointment time in order to register  Please bring any prior CT or MRI studies of this area that were not performed at a [facility]  To schedule this appointment, please contact Central Scheduling at [phone]  Prior to your appointment, please make sure you complete the MRI Screening Form when you e-Check in for your appointment  This will be available starting 7 days before your appointment in [address]  You may receive an e-mail with an activation code if you do not have a [employer] account  If you do not have access to a device, we will complete your screening at your appointment  Order Specific Question:   What is the patient's sedation requirement? Answer:   No Sedation     Order Specific Question:   Is the patient pregnant?      Answer:   No     Order Specific Question:   Release to patient through [employer]     Answer:   Immediate     Order Specific Question:   Is order priority selected as STAT?     Answer:   No     Order Specific Question:   Reason for Exam (FREE TEXT)     Answer:   New left sided weakness, surgical consult   • MM ALL_Prescribed Meds and Special Instructions     Order Specific Question:   [facility] Is ALBUTEROL prescribed? Answer:   Yes     Order Specific Question:   [facility] Is CYCLOBENZAPRINE Prescribed? Answer:   Yes     Order Specific Question:   [facility] Is GABAPENTIN prescribed? Answer:   Yes     Order Specific Question:   [facility] Is METFORMIN prescribed? Answer:   Yes     Order Specific Question:   [facility] Is METOPROLOL prescribed? Answer:   Yes     Order Specific Question:   [facility] Is OXYCODONE/APAP prescribed? Answer:   Yes     Order Specific Question:   [facility] Is TRAZODONE prescribed? Answer:    Yes   • MM DT_Alprazolam Definitive Test   • MM DT_Amphetamine Definitive Test   • MM DT_Buprenorphine Definitive Test   • MM DT_Butalbital Definitive Test   • MM DT_Clonazepam Definitive Test   • MM DT_Cocaine Definitive Test   • MM DT_Codeine Definitive Test   • MM DT_Dextromethorphan Definitive Test   • MM Diazepam Definitive Test   • MM DT_Ethyl Glucuronide/Ethyl Sulfate Definitive Test   • MM DT_Fentanyl Definitive Test   • MM DT_Heroin Definitive Test   • MM DT_Hydrocodone Definitive Test   • MM DT_Hydromorphone Definitive Test   • MM DT_Kratom Definitive Test   • MM DT_Levorphanol Definitive Test   • MM Lorazepam Definitive Test   • MM DT_MDMA Definitive Test   • MM DT_Meperidine Definitive Test   • MM DT_Methadone Definitive Test   • MM DT_Methamphetamine Definitive Test   • MM DT_Methylphenidate Definitive Test   • MM DT_Morphine Definitive Test   • MM DT_Oxazepam Definitive Test   • MM DT_Oxycodone Definitive Test   • MM DT_Oxymorphone Definitive Test   • MM DT_Phencyclidine Definitive Test   • MM DT_Phenobarbital Definitive Test   • MM DT_Phentermine Definitive Test   • MM DT_Secobarbital Definitive Test   • MM DT_Spice Definitive Test   • MM DT_Tapentadol Definitive Test   • MM DT_Temazapam Definitive Test   • MM DT_THC Definitive Test   • MM DT_Tramadol Definitive Test   • Ambulatory Referral to Spine Surgery     Standing Status:   Future     Standing Expiration Date:   1/13/2024     Referral Priority:   Routine     Referral Type:   Consult - AMB     Referral Reason:   Specialty Services Required     Referred to Provider:   Vandana Denise MD     Requested Specialty:   Orthopedic Surgery     Number of Visits Requested:   1     Expiration Date:   1/13/2024       New Medications Ordered This Visit   Medications   • gabapentin (NEURONTIN) 800 mg tablet     Sig: Take 1 tablet (800 mg total) by mouth 3 (three) times a day     Dispense:  90 tablet     Refill:  2   • cyclobenzaprine (FLEXERIL) 10 mg tablet     Sig: Take 1 tablet (10 mg total) by mouth 3 (three) times a day as needed for muscle spasms     Dispense:  90 tablet     Refill:  2   • oxyCODONE-acetaminophen (PERCOCET) 5-325 mg per tablet     Sig: Take 1 tablet by mouth 2 (two) times a day as needed for severe pain Max Daily Amount: 2 tablets Do not start before February 16, 2023  Dispense:  60 tablet     Refill:  0     Do not fill until 2/16/2023   • oxyCODONE-acetaminophen (PERCOCET) 5-325 mg per tablet     Sig: Take 1 tablet by mouth 2 (two) times a day as needed for severe pain Max Daily Amount: 2 tablets Do not start before January 17, 2023  Dispense:  60 tablet     Refill:  0     DNF until 1/17/2023   • oxyCODONE-acetaminophen (PERCOCET) 5-325 mg per tablet     Sig: Take 1 tablet by mouth 2 (two) times a day as needed for severe pain Max Daily Amount: 2 tablets Do not start before March 18, 2023  Dispense:  60 tablet     Refill:  0     DNF until 3/18/2023       My impressions and treatment recommendations were discussed in detail with the patient who verbalized understanding and had no further questions        The patient continues to report an overall reduction of her pain level and improvement with her functioning without significant side effects using oxycodone/acetaminophen 5/325 mg tablet patient uses 1 tablet twice daily as needed for pain along with gabapentin 800 mg tablet patient uses 1 tablet 3 times daily, and cyclobenzaprine 10 mg tablet patient uses 1 tablet 3 times daily as needed for muscle spasms, therefore I will continue the patient on this medication  Oxycodone/acetaminophen 5/325 mg tablet E-prescribed to the patient's pharmacy with a do not fill until date of 1/17/2023 , 2/16/2023 and 3/18/2023  [place] Prescription Drug Monitoring Program report was reviewed and was appropriate     A urine drug screen was collected at today's office visit as part of our medication management protocol  The point of care testing results were appropriate for what was being prescribed  The specimen will be sent for confirmatory testing  The drug screen is medically necessary because the patient is either dependent on opioid medication or is being considered for opioid medication therapy and the results could impact ongoing or future treatment  The drug screen is to evaluate for the presences or absence of prescribed, non-prescribed, and/or illicit drugs/substances  Patient states her last dose of oxycodone/acetaminophen was at 12 noon today  There are risks associated with opioid medications, including dependence, addiction and tolerance  The patient understands and agrees to use these medications only as prescribed  Potential side effects of the medications include, but are not limited to, constipation, drowsiness, addiction, impaired judgment and risk of fatal overdose if not taken as prescribed  The patient was warned against driving while taking sedation medications  Sharing medications is a felony  At this point in time, the patient is showing no signs of addiction, abuse, diversion or suicidal ideation      Patient also reports that she has new left leg weakness  After evaluating the patient I recommend that she get an updated MRI of her lumbar region as well as see Dr Lillian Huang for surgical consult  Follow-up is planned in 3 months time or sooner as warranted  Discharge instructions were provided  I personally saw and examined the patient and I agree with the above discussed plan of care  History of Present Illness:    Mike Vyas is a 65 y o  female who presents to [facility] for interval re-evaluation of the above stated pain complaints  The patient has a past medical and chronic pain history as outlined in the assessment section  She was last seen on 12/6/2022  At today's visit patient states that her pain symptoms are the same, patient reports a pain score of 7 out of 10 on the verbal numeric pain scale  On 12/6/2022 the patient has had trigger point injections which she has found beneficial for approximately 3 weeks  Pain Contract Signed: 9/9/2022  Last Urine Drug Screen: 9/9/2022  New urine drug screen: 1/13/2023    Other than as stated above, the patient denies any interval changes in medications, medical condition, mental condition, symptoms, or allergies since the last office visit  Review of Systems:    Review of Systems   Respiratory: Negative for shortness of breath  Cardiovascular: Negative for chest pain  Gastrointestinal: Negative for constipation, diarrhea, nausea and vomiting  Musculoskeletal: Positive for arthralgias, gait problem, joint swelling and myalgias  Decreased ROM   Pain in left leg   Skin: Negative for rash  Neurological: Negative for dizziness, seizures and weakness  All other systems reviewed and are negative          Past Medical History:   Diagnosis Date   • Anxiety    • Asthma     exercise induced asthma   • Cervical disc disorder    • Chronic pain    • Chronic pain disorder    • Depression    • Dysuria    • Fibromyalgia, primary Est 2016   • GERD (gastroesophageal reflux disease) 2004   • Headache(784 0) As a child   • Low back pain    • Lung abnormality     nodules   • Lung nodule    • Neck pain    • Peripheral neuropathy    • Pituitary abnormality (HCC)     tumor   • Right hip pain 10/29/2019   • Thrombocytopenia ([facility] [address] )    • Thyroid disease        Past Surgical History:   Procedure Laterality Date   • APPENDECTOMY     • BREAST BIOPSY Right 1995   • CHOLECYSTECTOMY     • COLECTOMY     • COLON SURGERY     • EPIDURAL BLOCK INJECTION N/A 6/23/2016    Procedure: BLOCK / INJECTION EPIDURAL STEROID CERVICAL  C7-T1;  Surgeon: Juventino Marx MD;  Location: [facility] MAIN OR;  Service:    • EPIDURAL BLOCK INJECTION N/A 3/31/2016    Procedure: BLOCK / INJECTION EPIDURAL STEROID CERVICAL C7-T1  (C-ARM); Surgeon: Juventino Marx MD;  Location: [facility] MAIN OR;  Service:    • EPIDURAL BLOCK INJECTION N/A 8/8/2018    Procedure: C6 C7 Cervical Epidural Steroid Injection (50658); Surgeon: Juventino Marx MD;  Location: [facility] MAIN OR;  Service: Pain Management    • EPIDURAL BLOCK INJECTION N/A 12/16/2021    Procedure: T4 T5 thoracic epidural steroid injection (04571); Surgeon: Juventino Marx MD;  Location: [facility] MAIN OR;  Service: Pain Management    • EPIDURAL BLOCK INJECTION N/A 6/10/2022    Procedure: T4 T5 THORACIC EPIDURAL STEROID INJECTION (79240); Surgeon: Juventino Marx MD;  Location: [facility] MAIN OR;  Service: Pain Management    • HYSTERECTOMY  1996   • OOPHORECTOMY Bilateral 1996   • PITUITARY SURGERY     • CT ARTHROCENTESIS ASPIR&/INJ MAJOR JT/BURSA W/O US Right 11/8/2019    Procedure: Trochanteric Bursa Injection (41665); Surgeon: Juventino Marx MD;  Location: [facility] MAIN OR;  Service: Pain Management    • CT ARTHROCENTESIS ASPIR&/INJ MAJOR JT/BURSA W/O US Right 1/23/2020    Procedure: Trochanteric Bursa Injection (88681);   Surgeon: Juventino Marx MD;  Location: [facility] MAIN OR;  Service: Pain Management    • CT NJX DX/THER SBST EPIDURAL/SUBRACH CERV/THORACIC N/A 1/21/2016    Procedure: BLOCK / INJECTION EPIDURAL STEROID CERVICAL C7-T1 (C-ARM);   Surgeon: Enriqueta Escalona MD;  Location: [facility] MAIN OR;  Service: Pain Management    • REDUCTION MAMMAPLASTY Bilateral 2000       Family History   Problem Relation Age of Onset   • Thyroid disease Mother    • Hyperlipidemia Mother    • Depression Mother    • Stroke Mother         TIA   • Thyroid disease Father    • Hyperlipidemia Father    • Depression Father    • Arthritis Father         Spine and knees   • Ovarian cancer Sister 39   • Obesity Sister    • Migraines Sister    • Obesity Brother    • No Known Problems Daughter    • No Known Problems Son    • No Known Problems Maternal Uncle    • Obesity Paternal Aunt    • Obesity Paternal Aunt    • Diabetes Paternal Aunt    • Cancer Paternal Aunt         Thyroid and colon cancer   • No Known Problems Paternal Uncle    • Cancer Maternal Grandmother         Kidney cancer   • Heart disease Maternal Grandfather    • Breast cancer Paternal Grandmother 36   • Cancer Paternal Grandmother         Breast, Lung, Liver and skin cancer   • Diabetes Paternal Grandfather    • ADD / ADHD Neg Hx    • Anesthesia problems Neg Hx    • Cancer Neg Hx    • Clotting disorder Neg Hx    • Collagen disease Neg Hx    • Dislocations Neg Hx    • Learning disabilities Neg Hx    • Neurological problems Neg Hx    • Osteoporosis Neg Hx    • Rheumatologic disease Neg Hx    • Scoliosis Neg Hx    • Vascular Disease Neg Hx        Social History     Occupational History   • Not on file   Tobacco Use   • Smoking status: Never   • Smokeless tobacco: Never   Vaping Use   • Vaping Use: Never used   Substance and Sexual Activity   • Alcohol use: No   • Drug use: No   • Sexual activity: Not Currently     Partners: Male     Birth control/protection: Post-menopausal         Current Outpatient Medications:   •  albuterol (ProAir HFA) 90 mcg/act inhaler, Inhale 2 puffs every 6 (six) hours as needed for wheezing, Disp: 8 5 g, Rfl: 0  •  albuterol (PROVENTIL HFA,VENTOLIN HFA) 90 mcg/act inhaler, Inhale, Disp: , Rfl:   •  aspirin 81 MG tablet, Take 81 mg by mouth daily  , Disp: , Rfl:   •  cholecalciferol (VITAMIN D3) 1,000 units tablet, Take 2 tablets by mouth daily, Disp: , Rfl:   •  cyclobenzaprine (FLEXERIL) 10 mg tablet, Take 1 tablet (10 mg total) by mouth 3 (three) times a day as needed for muscle spasms, Disp: 90 tablet, Rfl: 2  •  Diclofenac Sodium (VOLTAREN) 1 %, Apply 2 g topically 4 (four) times a day as needed (pain), Disp: 100 g, Rfl: 1  •  gabapentin (NEURONTIN) 800 mg tablet, Take 1 tablet (800 mg total) by mouth 3 (three) times a day, Disp: 90 tablet, Rfl: 2  •  ibuprofen (MOTRIN) 600 mg tablet, Take 1 tablet (600 mg total) by mouth every 6 (six) hours as needed for mild pain, Disp: 30 tablet, Rfl: 0  •  levothyroxine 50 mcg tablet, TAKE 1 TABLET BY MOUTH DAILY ON MONDAY THROUGH SATURDAY AND TAKE 1 AND 1/2 TABLETS DAILY ON SUNDAY, Disp: 45 tablet, Rfl: 0  •  metFORMIN (GLUCOPHAGE) 1000 MG tablet, Take 1,000 mg by mouth 2 (two) times a day with meals, Disp: , Rfl:   •  metoprolol succinate (TOPROL-XL) 25 mg 24 hr tablet, , Disp: , Rfl:   •  ondansetron (Zofran ODT) 4 mg disintegrating tablet, Take 1 tablet (4 mg total) by mouth every 6 (six) hours as needed for nausea or vomiting, Disp: 20 tablet, Rfl: 0  •  [START ON 2/16/2023] oxyCODONE-acetaminophen (PERCOCET) 5-325 mg per tablet, Take 1 tablet by mouth 2 (two) times a day as needed for severe pain Max Daily Amount: 2 tablets Do not start before February 16, 2023 , Disp: 60 tablet, Rfl: 0  •  [START ON 1/17/2023] oxyCODONE-acetaminophen (PERCOCET) 5-325 mg per tablet, Take 1 tablet by mouth 2 (two) times a day as needed for severe pain Max Daily Amount: 2 tablets Do not start before January 17, 2023 , Disp: 60 tablet, Rfl: 0  •  [START ON 3/18/2023] oxyCODONE-acetaminophen (PERCOCET) 5-325 mg per tablet, Take 1 tablet by mouth 2 (two) times a day as needed for severe pain Max Daily Amount: 2 tablets Do not start before March 18, 2023 , Disp: 60 tablet, Rfl: 0  •  traZODone (DESYREL) 50 mg tablet, , Disp: , Rfl: 0    Allergies   Allergen Reactions   • Levaquin [Levofloxacin In D5w] Shortness Of Breath     Also hives     • Amitiza [Lubiprostone] Other (See Comments) and Hives     Reaction Date: 10Aug2011;   Migraines and vomiting   • Biaxin [Clarithromycin] Hives   • Cephradine    • Erythromycin Hives     Reaction Date: 10Aug2011;    • Macrobid [Nitrofurantoin Monohyd Macro] Other (See Comments)     C/o passing out   • Morphine    • Penicillins Hives     Reaction Date: 10Aug2011;    • Sulfa Antibiotics    • Tetracycline    • Tetracyclines & Related Hives   • Morphine And Related Rash   • Valium [Diazepam] Rash and Vomiting     Reaction Date: 14Jun2012;        Physical Exam:    BP 96/69 (BP Location: Left arm, Patient Position: Sitting, Cuff Size: Standard)   Pulse 84     Constitutional:normal, well developed, well nourished, alert, in no distress and non-toxic and no overt pain behavior   and obese  Eyes:anicteric  HEENT:grossly intact  Neck:supple, symmetric, trachea midline and no masses   Pulmonary:even and unlabored  Cardiovascular:No edema or pitting edema present  Skin:Normal without rashes or lesions and well hydrated  Psychiatric:Mood and affect appropriate  Neurologic:Cranial Nerves II-XII grossly intact  Musculoskeletal:antalgic    Lumbar Spine Exam    Appearance:  Normal lordosis  Palpation/Tenderness:  left lumbar paraspinal tenderness  right lumbar paraspinal tenderness  left sacroiliac joint tenderness  right sacroiliac joint tenderness  Sensory:  no sensory deficits noted  Motor Strength:  Left hip flexion:  4/5  Left hip extension:  4/5  Right hip flexion:  5/5  Right hip extension:  5/5  Left knee flexion:  4/5  Left knee extension:  3/5  Right knee flexion:  5/5  Right knee extension:  5/5  Special Tests:  Left Straight Leg Test:  positive  Right Straight Leg Test: negative  Left Olvin's Maneuver:  positive  Right Olvin's Maneuver:  positive    Imaging  MRI lumbar spine without contrast    (Results Pending)         Orders Placed This Encounter   Procedures   • MRI lumbar spine without contrast   • MM ALL_Prescribed Meds and Special Instructions   • MM DT_Alprazolam Definitive Test   • MM DT_Amphetamine Definitive Test   • MM DT_Buprenorphine Definitive Test   • MM DT_Butalbital Definitive Test   • MM DT_Clonazepam Definitive Test   • MM DT_Cocaine Definitive Test   • MM DT_Codeine Definitive Test   • MM DT_Dextromethorphan Definitive Test   • MM Diazepam Definitive Test   • MM DT_Ethyl Glucuronide/Ethyl Sulfate Definitive Test   • MM DT_Fentanyl Definitive Test   • MM DT_Heroin Definitive Test   • MM DT_Hydrocodone Definitive Test   • MM DT_Hydromorphone Definitive Test   • MM DT_Kratom Definitive Test   • MM DT_Levorphanol Definitive Test   • MM Lorazepam Definitive Test   • MM DT_MDMA Definitive Test   • MM DT_Meperidine Definitive Test   • MM DT_Methadone Definitive Test   • MM DT_Methamphetamine Definitive Test   • MM DT_Methylphenidate Definitive Test   • MM DT_Morphine Definitive Test   • MM DT_Oxazepam Definitive Test   • MM DT_Oxycodone Definitive Test   • MM DT_Oxymorphone Definitive Test   • MM DT_Phencyclidine Definitive Test   • MM DT_Phenobarbital Definitive Test   • MM DT_Phentermine Definitive Test   • MM DT_Secobarbital Definitive Test   • MM DT_Spice Definitive Test   • MM DT_Tapentadol Definitive Test   • MM DT_Temazapam Definitive Test   • MM DT_THC Definitive Test   • MM DT_Tramadol Definitive Test   • Ambulatory Referral to Spine Surgery

## 2023-01-13 ENCOUNTER — OFFICE VISIT (OUTPATIENT)
Dept: PAIN MEDICINE | Facility: CLINIC | Age: 58
End: 2023-01-13

## 2023-01-13 VITALS — HEART RATE: 84 BPM | DIASTOLIC BLOOD PRESSURE: 69 MMHG | SYSTOLIC BLOOD PRESSURE: 96 MMHG

## 2023-01-13 DIAGNOSIS — F11.20 CONTINUOUS OPIOID DEPENDENCE (HCC): ICD-10-CM

## 2023-01-13 DIAGNOSIS — E66.01 SEVERE OBESITY (HCC): ICD-10-CM

## 2023-01-13 DIAGNOSIS — M54.42 CHRONIC LEFT-SIDED LOW BACK PAIN WITH LEFT-SIDED SCIATICA: ICD-10-CM

## 2023-01-13 DIAGNOSIS — F11.20 UNCOMPLICATED OPIOID DEPENDENCE (HCC): ICD-10-CM

## 2023-01-13 DIAGNOSIS — M54.2 NECK PAIN: ICD-10-CM

## 2023-01-13 DIAGNOSIS — G89.29 CHRONIC LEFT-SIDED LOW BACK PAIN WITH LEFT-SIDED SCIATICA: ICD-10-CM

## 2023-01-13 DIAGNOSIS — G89.4 CHRONIC PAIN SYNDROME: Primary | ICD-10-CM

## 2023-01-13 DIAGNOSIS — M50.120 CERVICAL DISC DISORDER WITH RADICULOPATHY OF MID-CERVICAL REGION: ICD-10-CM

## 2023-01-13 DIAGNOSIS — M54.16 LUMBAR RADICULOPATHY: ICD-10-CM

## 2023-01-13 RX ORDER — GABAPENTIN 800 MG/1
800 TABLET ORAL 3 TIMES DAILY
Qty: 90 TABLET | Refills: 2 | Status: SHIPPED | OUTPATIENT
Start: 2023-01-13

## 2023-01-13 RX ORDER — OXYCODONE HYDROCHLORIDE AND ACETAMINOPHEN 5; 325 MG/1; MG/1
1 TABLET ORAL 2 TIMES DAILY PRN
Qty: 60 TABLET | Refills: 0 | Status: SHIPPED | OUTPATIENT
Start: 2023-01-17 | End: 2023-02-16

## 2023-01-13 RX ORDER — OXYCODONE HYDROCHLORIDE AND ACETAMINOPHEN 5; 325 MG/1; MG/1
1 TABLET ORAL 2 TIMES DAILY PRN
Qty: 60 TABLET | Refills: 0 | Status: SHIPPED | OUTPATIENT
Start: 2023-02-16 | End: 2023-03-18

## 2023-01-13 RX ORDER — OXYCODONE HYDROCHLORIDE AND ACETAMINOPHEN 5; 325 MG/1; MG/1
1 TABLET ORAL 2 TIMES DAILY PRN
Qty: 60 TABLET | Refills: 0 | Status: SHIPPED | OUTPATIENT
Start: 2023-03-18 | End: 2023-04-17

## 2023-01-13 RX ORDER — CYCLOBENZAPRINE HCL 10 MG
10 TABLET ORAL 3 TIMES DAILY PRN
Qty: 90 TABLET | Refills: 2 | Status: SHIPPED | OUTPATIENT
Start: 2023-01-13

## 2023-01-13 NOTE — PATIENT INSTRUCTIONS
Sacroiliac Joint Injection   WHAT YOU NEED TO KNOW:   What do I need to know about a sacroiliac joint injection? A sacroiliac (SI) joint injection is done to diagnose or treat pain from sacroiliac joint syndrome  The pain caused by this syndrome may be felt in your lower back, buttocks, groin, and your thigh  How do I prepare for an SI injection? Your healthcare provider will ask you to not take any pain medicine the day of the injection  Ask him if there are any other medicines you should not take  You will need to find someone to drive you home after your procedure  What will happen during the SI injection? You will be awake for your injection  You may be given calming medicine if you are anxious  You will lie on your stomach with a pillow under your abdomen  Your healthcare provider will give you an injection of medicine to numb the area  He may use an x-ray, ultrasound, or CT scan to find the area to inject  You may also be given an injection of contrast material to help your SI joint show up better  Then, your healthcare provider will inject local anesthesia, antiinflammatory medicine, or both into your SI joint  Healthcare providers will watch you closely for any problems for up to 30 minutes after your injection  Your healthcare provider will check to see if your pain decreases after the injection  What are the risks of an SI injection? You may have some weakness for a short time after your injection  The SI injection can cause bleeding, infection, and pain  It can also cause temporary weakness in your leg and problems urinating  You may have an allergic reaction to the medicine that is injected into your SI joint  Your pain may return and you may need more treatment  CARE AGREEMENT:   You have the right to help plan your care  Learn about your health condition and how it may be treated  Discuss treatment options with your healthcare providers to decide what care you want to receive   You always have the right to refuse treatment  The above information is an  only  It is not intended as medical advice for individual conditions or treatments  Talk to your doctor, nurse or pharmacist before following any medical regimen to see if it is safe and effective for you  © Copyright [employer] 2022 Information is for End User's use only and may not be sold, redistributed or otherwise used for commercial purposes  All illustrations and images included in CareNotes® are the copyrighted property of [employer]  or [facility] Pako Espinoza  Opioid Safety   AMBULATORY CARE:   Opioid safety  includes the correct use, storage, and disposal of opioids  Examples of opioid medicines to treat pain include oxycodone, morphine, fentanyl, and codeine  Call your local emergency number (911 in the [address] [facility]), or have someone else call if:   You have a seizure  You cannot be woken  You have trouble staying awake and your breathing is slow or shallow  Your speech is slurred, or you are confused  You are dizzy or stumble when you walk  Call your doctor, or have someone close to you call if:   You are extremely drowsy, or you have trouble staying awake or speaking  You have pale or clammy skin  You have blue fingernails or lips  Your heartbeat is slower than normal     You cannot stop vomiting  You have questions or concerns about your condition or care  Use opioids safely:   Take prescribed opioids exactly as directed  Opioids come with directions based on the kind of opioid and how it is given  Do not take more than the recommended amount, or for longer than needed  Do not give opioids to others or take opioids that belong to someone else  Misuse of opioids can lead to an addiction or overdose  Do not mix opioids with other medicines or alcohol  The combination can cause an overdose, or lead to a coma  Do not drive or operate heavy machinery after you take the opioid    Your provider or pharmacist can tell you how long to wait after a dose before you do these activities  Talk to your healthcare provider if you have any side effects  He or she can help you prevent or relieve side effects  Side effects include nausea, sleepiness, itching, and trouble thinking clearly  Manage constipation:  Constipation is the most common side effect of opioid medicine  Constipation is when you have hard, dry bowel movements, or you go longer than usual between bowel movements  Tell your healthcare provider about all changes in your bowel movements while you are taking opioids  He or she may recommend laxative medicine to help you have a bowel movement  He or she may also change the kind of opioid you are taking, or change when you take it  The following are more ways you can prevent or relieve constipation:  Drink liquids as directed  You may need to drink extra liquids to help soften and move your bowels  Ask how much liquid to drink each day and which liquids are best for you  Eat high-fiber foods  This may help decrease constipation by adding bulk to your bowel movements  High-fiber foods include fruits, vegetables, whole-grain breads and cereals, and beans  Your healthcare provider or dietitian can help you create a high-fiber meal plan  Your provider may also recommend a fiber supplement if you cannot get enough fiber from food  Exercise regularly  Regular physical activity can help stimulate your intestines  Walking is a good exercise to prevent or relieve constipation  Ask which exercises are best for you  Schedule a time each day to have a bowel movement  This may help train your body to have regular bowel movements  Bend forward while you are on the toilet to help move the bowel movement out  Sit on the toilet for at least 10 minutes, even if you do not have a bowel movement  Store opioids safely:   Store opioids where others cannot easily get them    Keep them in a locked cabinet or secure area  Do not  keep them in a purse or other bag you carry with you  A person may be looking for something else and find the opioids  Make sure opioids are stored out of the reach of children  A child can easily overdose on opioids  Opioids may look like candy to a small child  The best way to dispose of opioids: The laws vary by country and area  In the [place], the best way is to return the opioids through a take-back program  This program is offered by the [employer]  The following are options for using the program:  Take the opioids to a NIDA collection site  The site is often a law enforcement center  Call your local law enforcement center for scheduled take-back days in your area  You will be given information on where to go if the collection site is in a different location  Take the opioids to an approved pharmacy or hospital   A pharmacy or hospital may be set up as a collection site  You will need to ask if it is a NIDA collection site if you were not directed there  A pharmacy or doctor's office may not be able to take back opioids unless it is a NIDA site  Use a mail-back system  This means you are given containers to put the opioids into  You will then mail them in the containers  Use a take-back drop box  This is a place to leave the opioids at any time  People and animals will not be able to get into the box  Your local law enforcement agency can tell you where to find a drop box in your area  Other safe ways to dispose of opioids: The medicine may come with disposal instructions  The instructions may vary depending on the brand of medicine you are using  Instructions may come in a Medication Guide, but not every medicine has one  You may instead get instructions from your pharmacy or doctor  Follow instructions carefully  The following are general guidelines to follow:  Find out if you can flush the opioid    Some opioids can be flushed down the toilet or poured into the sink  You will need to contact authorities in your area to see if this is an option for you  The FDA also offers a list of medicines that are safe to flush down the toilet  You can check the list if you cannot get the information for your local area  Ask your waste management company about rules for putting opioids in the trash  The company will be able to give you specific directions  Scratch out personal information on the original medicine label so it cannot be read  Then put it in the trash  Do not label the trash or put any information on it about the opioids  It should look like regular household trash so no one is tempted to look for the opioids  Keep the trash out of the reach of children and animals  Always make sure trash is secure  Talk to officials if you live in a facility  If you live in a nursing home or assisted living center, talk to an official  The person will know the rules for your area  Other ways to manage pain:   Ask your healthcare provider about non-opioid medicines to control pain  Nonprescription medicines include NSAIDs (such as ibuprofen) and acetaminophen  Prescription medicines include muscle relaxers, antidepressants, and steroids  Pain may be managed without any medicines  Some ways to relieve pain include massage, aromatherapy, or meditation  Physical or occupational therapy may also help  For more information:   Drug Enforcement Administration  [facility] [address]  Phone: 0- [phone] - [phone]  Web Address: [facility]/drug_disposal/    [address]  [place]  [place] , [address] [facility]  Phone: 8- [phone] - [phone]  Web Address: http://[employer]/    Follow up with your doctor or pain specialist as directed: You may need to have your dose adjusted  Your doctor or pain specialist can also help you find ways to manage pain without opioids   Write down your questions so you remember to ask them during your visits  © Copyright [employer] 2022 Information is for End User's use only and may not be sold, redistributed or otherwise used for commercial purposes  All illustrations and images included in CareNotes® are the copyrighted property of A [employer] A M , Inc  or Aki Thurston  The above information is an  only  It is not intended as medical advice for individual conditions or treatments  Talk to your doctor, nurse or pharmacist before following any medical regimen to see if it is safe and effective for you

## 2023-01-18 LAB
6MAM UR QL CFM: NEGATIVE NG/ML
7AMINOCLONAZEPAM UR QL CFM: NEGATIVE NG/ML
A-OH ALPRAZ UR QL CFM: NEGATIVE NG/ML
AMPHET UR QL CFM: NEGATIVE NG/ML
AMPHET UR QL CFM: NEGATIVE NG/ML
BUPRENORPHINE UR QL CFM: NEGATIVE NG/ML
BUTALBITAL UR QL CFM: NEGATIVE NG/ML
BZE UR QL CFM: NEGATIVE NG/ML
CODEINE UR QL CFM: NEGATIVE NG/ML
EDDP UR QL CFM: NEGATIVE NG/ML
ETHYL GLUCURONIDE UR QL CFM: NEGATIVE NG/ML
ETHYL SULFATE UR QL SCN: NEGATIVE NG/ML
FENTANYL UR QL CFM: NEGATIVE NG/ML
GLIADIN IGG SER IA-ACNC: NEGATIVE NG/ML
HYDROCODONE UR QL CFM: NEGATIVE NG/ML
HYDROCODONE UR QL CFM: NEGATIVE NG/ML
HYDROMORPHONE UR QL CFM: NEGATIVE NG/ML
LORAZEPAM UR QL CFM: NEGATIVE NG/ML
MDMA UR QL CFM: NEGATIVE NG/ML
ME-PHENIDATE UR QL CFM: NEGATIVE NG/ML
MEPERIDINE UR QL CFM: NEGATIVE NG/ML
METHADONE UR QL CFM: NEGATIVE NG/ML
METHAMPHET UR QL CFM: NEGATIVE NG/ML
MORPHINE UR QL CFM: NEGATIVE NG/ML
MORPHINE UR QL CFM: NEGATIVE NG/ML
NORBUPRENORPHINE UR QL CFM: NEGATIVE NG/ML
NORDIAZEPAM UR QL CFM: NEGATIVE NG/ML
NORFENTANYL UR QL CFM: NEGATIVE NG/ML
NORHYDROCODONE UR QL CFM: NEGATIVE NG/ML
NORHYDROCODONE UR QL CFM: NEGATIVE NG/ML
NORMEPERIDINE UR QL CFM: NEGATIVE NG/ML
NOROXYCODONE UR QL CFM: NORMAL NG/ML
OXAZEPAM UR QL CFM: NEGATIVE NG/ML
OXYCODONE UR QL CFM: NORMAL NG/ML
OXYMORPHONE UR QL CFM: NEGATIVE NG/ML
OXYMORPHONE UR QL CFM: NEGATIVE NG/ML
PARA-FLUOROFENTANYL QUANTIFICATION: NORMAL NG/ML
PCP UR QL CFM: NEGATIVE NG/ML
PHENOBARB UR QL CFM: NEGATIVE NG/ML
RESULT ALL_PRESCRIBED MEDS AND SPECIAL INSTRUCTIONS: NORMAL
SECOBARBITAL UR QL CFM: NEGATIVE NG/ML
SL AMB 4-ANPP QUANTIFICATION: NORMAL NG/ML
SL AMB 5F-ADB-M7 METABOLITE QUANTIFICATION: NEGATIVE NG/ML
SL AMB 7-OH-MITRAGYNINE (KRATOM ALKALOID) QUANTIFICATION: NEGATIVE NG/ML
SL AMB AB-FUBINACA-M3 METABOLITE QUANTIFICATION: NEGATIVE NG/ML
SL AMB ACETYL FENTANYL QUANTIFICATION: NORMAL NG/ML
SL AMB ACETYL NORFENTANYL QUANTIFICATION: NORMAL NG/ML
SL AMB ACRYL FENTANYL QUANTIFICATION: NORMAL NG/ML
SL AMB CARFENTANIL QUANTIFICATION: NORMAL NG/ML
SL AMB CTHC (MARIJUANA METABOLITE) QUANTIFICATION: NEGATIVE NG/ML
SL AMB DEXTROMETHORPHAN QUANTIFICATION: ABNORMAL NG/ML
SL AMB DEXTRORPHAN (DEXTROMETHORPHAN METABOLITE) QUANT: ABNORMAL NG/ML
SL AMB DEXTRORPHAN (DEXTROMETHORPHAN METABOLITE) QUANT: ABNORMAL NG/ML
SL AMB JWH018 METABOLITE QUANTIFICATION: NEGATIVE NG/ML
SL AMB JWH073 METABOLITE QUANTIFICATION: NEGATIVE NG/ML
SL AMB MDMB-FUBINACA-M1 METABOLITE QUANTIFICATION: NEGATIVE NG/ML
SL AMB N-DESMETHYL-TRAMADOL QUANTIFICATION: NEGATIVE NG/ML
SL AMB PHENTERMINE QUANTIFICATION: NEGATIVE NG/ML
SL AMB RCS4 METABOLITE QUANTIFICATION: NEGATIVE NG/ML
SL AMB RITALINIC ACID QUANTIFICATION: NEGATIVE NG/ML
SPECIMEN DRAWN SERPL: NEGATIVE NG/ML
TAPENTADOL UR QL CFM: NEGATIVE NG/ML
TEMAZEPAM UR QL CFM: NEGATIVE NG/ML
TEMAZEPAM UR QL CFM: NEGATIVE NG/ML
TRAMADOL UR QL CFM: NEGATIVE NG/ML
URATE/CREAT 24H UR: NEGATIVE NG/ML

## 2023-02-03 ENCOUNTER — HOSPITAL ENCOUNTER (OUTPATIENT)
Dept: MRI IMAGING | Facility: HOSPITAL | Age: 58
End: 2023-02-03

## 2023-02-03 DIAGNOSIS — G89.29 CHRONIC LEFT-SIDED LOW BACK PAIN WITH LEFT-SIDED SCIATICA: ICD-10-CM

## 2023-02-03 DIAGNOSIS — M54.16 LUMBAR RADICULOPATHY: ICD-10-CM

## 2023-02-03 DIAGNOSIS — M54.42 CHRONIC LEFT-SIDED LOW BACK PAIN WITH LEFT-SIDED SCIATICA: ICD-10-CM

## 2023-02-08 ENCOUNTER — TELEPHONE (OUTPATIENT)
Dept: PAIN MEDICINE | Facility: CLINIC | Age: 58
End: 2023-02-08

## 2023-02-08 NOTE — TELEPHONE ENCOUNTER
----- Message from [facility], [address] sent at 2/8/2023  8:07 AM EST -----  Small disc herniations at L3-4 and L4-5 without canal stenosis or foraminal nerve impingement      There is mild facet arthropathy present at L4-5 and L5-S1  Imaging does not explain why you have weakness in your left leg  You may benefit from physical therapy

## 2023-02-20 DIAGNOSIS — E03.9 HYPOTHYROIDISM, UNSPECIFIED TYPE: ICD-10-CM

## 2023-02-20 RX ORDER — LEVOTHYROXINE SODIUM 0.05 MG/1
TABLET ORAL
Qty: 32 TABLET | Refills: 0 | Status: SHIPPED | OUTPATIENT
Start: 2023-02-20

## 2023-02-20 NOTE — TELEPHONE ENCOUNTER
Last levels checked August of 2022  Did inform patient she needs to get labwork done before upcoming appointment

## 2023-03-02 ENCOUNTER — HOSPITAL ENCOUNTER (OUTPATIENT)
Dept: RADIOLOGY | Facility: HOSPITAL | Age: 58
Discharge: HOME | End: 2023-03-02
Attending: FAMILY MEDICINE

## 2023-03-02 VITALS — BODY MASS INDEX: 34.38 KG/M2 | HEIGHT: 63 IN | WEIGHT: 194 LBS

## 2023-03-02 DIAGNOSIS — Z12.31 ENCOUNTER FOR SCREENING MAMMOGRAM FOR MALIGNANT NEOPLASM OF BREAST: ICD-10-CM

## 2023-03-18 DIAGNOSIS — E03.9 HYPOTHYROIDISM, UNSPECIFIED TYPE: ICD-10-CM

## 2023-03-20 RX ORDER — LEVOTHYROXINE SODIUM 0.05 MG/1
TABLET ORAL
Qty: 32 TABLET | Refills: 0 | Status: SHIPPED | OUTPATIENT
Start: 2023-03-20

## 2023-04-21 DIAGNOSIS — E03.9 HYPOTHYROIDISM, UNSPECIFIED TYPE: ICD-10-CM

## 2023-04-21 RX ORDER — LEVOTHYROXINE SODIUM 0.05 MG/1
TABLET ORAL
Qty: 32 TABLET | Refills: 0 | OUTPATIENT
Start: 2023-04-21

## 2023-04-21 NOTE — TELEPHONE ENCOUNTER
[address] [facility], [address] [facility] 5 hours ago (10:19 AM)     It looks like we did a courtesy refill, patient has not had labs done since 8/2022 and has not been seen in office  Needs to get labs done and schedule appointment      Please call the patient to schedule an appointment  Thank you

## 2023-05-08 ENCOUNTER — APPOINTMENT (OUTPATIENT)
Dept: LAB | Facility: CLINIC | Age: 58
End: 2023-05-08

## 2023-05-08 DIAGNOSIS — E55.9 VITAMIN D DEFICIENCY: ICD-10-CM

## 2023-05-08 DIAGNOSIS — L65.9 HAIR LOSS: ICD-10-CM

## 2023-05-08 DIAGNOSIS — L74.9 SWEATING ABNORMALITY: ICD-10-CM

## 2023-05-08 DIAGNOSIS — E03.9 HYPOTHYROIDISM, UNSPECIFIED TYPE: ICD-10-CM

## 2023-05-08 DIAGNOSIS — E66.09 CLASS 1 OBESITY DUE TO EXCESS CALORIES WITHOUT SERIOUS COMORBIDITY WITH BODY MASS INDEX (BMI) OF 34.0 TO 34.9 IN ADULT: ICD-10-CM

## 2023-05-08 DIAGNOSIS — E13.3593 DIABETES MELLITUS OF OTHER TYPE WITH PROLIFERATIVE RETINOPATHY OF BOTH EYES, MACULAR EDEMA PRESENCE UNSPECIFIED, UNSPECIFIED PROLIFERATIVE RETINOPATHY TYPE, UNSPECIFIED WHETHER LONG TERM INSULI* (HCC): ICD-10-CM

## 2023-05-08 DIAGNOSIS — Z86.018 HISTORY OF PITUITARY ADENOMA: ICD-10-CM

## 2023-05-08 LAB
25(OH)D3 SERPL-MCNC: 77.4 NG/ML (ref 30–100)
ALBUMIN SERPL BCP-MCNC: 3.7 G/DL (ref 3.5–5)
ALP SERPL-CCNC: 91 U/L (ref 46–116)
ALT SERPL W P-5'-P-CCNC: 39 U/L (ref 12–78)
ANION GAP SERPL CALCULATED.3IONS-SCNC: 3 MMOL/L (ref 4–13)
AST SERPL W P-5'-P-CCNC: 29 U/L (ref 5–45)
BILIRUB SERPL-MCNC: 0.33 MG/DL (ref 0.2–1)
BUN SERPL-MCNC: 13 MG/DL (ref 5–25)
CALCIUM SERPL-MCNC: 9.2 MG/DL (ref 8.3–10.1)
CHLORIDE SERPL-SCNC: 109 MMOL/L (ref 96–108)
CO2 SERPL-SCNC: 26 MMOL/L (ref 21–32)
CREAT SERPL-MCNC: 0.76 MG/DL (ref 0.6–1.3)
FERRITIN SERPL-MCNC: 10 NG/ML (ref 8–388)
GFR SERPL CREATININE-BSD FRML MDRD: 87 ML/MIN/1.73SQ M
GLUCOSE P FAST SERPL-MCNC: 100 MG/DL (ref 65–99)
POTASSIUM SERPL-SCNC: 4.2 MMOL/L (ref 3.5–5.3)
PROLACTIN SERPL-MCNC: 16.5 NG/ML
PROT SERPL-MCNC: 7.4 G/DL (ref 6.4–8.4)
SODIUM SERPL-SCNC: 138 MMOL/L (ref 135–147)
T4 FREE SERPL-MCNC: 0.87 NG/DL (ref 0.76–1.46)
TSH SERPL DL<=0.05 MIU/L-ACNC: 3.29 UIU/ML (ref 0.45–4.5)

## 2023-05-09 NOTE — PROGRESS NOTES
Established Patient Progress Note       Chief Complaint   Patient presents with   • Hypothyroidism        Impression & Plan:    Problem List Items Addressed This Visit        Endocrine    Hypothyroidism - Primary     While TFT are stable, free T4 is on the low side of normal and patient continues to report significant fatigue  In order to simplify regimen, change levothyroxine to 75 mcg daily  Repeat TFT in 6 weeks  Relevant Medications    levothyroxine (Synthroid) 75 mcg tablet    Other Relevant Orders    TSH, 3rd generation with Free T4 reflex    Type 2 diabetes mellitus without complication, without long-term current use of insulin (HCC)     Stable  Continue Metformin  Check A1C prior to next appointment  Lab Results   Component Value Date    HGBA1C 5 7 (H) 08/18/2022            Relevant Orders    Hemoglobin A1C       Other    Severe obesity ([address] )     Would consider addition of GLP-1 Wegovy however, this is not currently covered by patient's insurance  Vitamin D deficiency     Vit D is high-normal  Recommend reducing from 20,000 iu daily to 10,000 iu daily  Relevant Orders    Vitamin D 25 hydroxy    Low ferritin     Start 325 mg of FeS every other day  Take in the morning with food            Relevant Medications    ferrous sulfate 324 (65 Fe) mg    Other Relevant Orders    Ferritin Lab Collect       Orders Placed This Encounter   Procedures   • TSH, 3rd generation with Free T4 reflex     Standing Status:   Future     Standing Expiration Date:   5/10/2024   • Ferritin Lab Collect     Standing Status:   Future     Standing Expiration Date:   5/10/2024   • Vitamin D 25 hydroxy     Standing Status:   Future     Standing Expiration Date:   11/10/2023   • Hemoglobin A1C     Standing Status:   Future     Standing Expiration Date:   5/10/2024       History of Present Illness:     Olvin Wolfe is a 65 y o  female with hypothyroidism and prediabetes presenting to the office today for follow-up  At patient's initial appointment on 8/30/2022, her primary concern was a 30 pound weight gain over a 2-year  Along with fatigue, excessive sweating, and hair loss  She does have a family history of hypothyroidism in both of her parents, her brother, and her sister  Paternal grandfather and paternal aunt had type 2 diabetes  PSH significant for total colectomy and reported pituitary resection    For hypothyroidism, she is taking 50 mcg of levothyroxine daily  She reports taking this consistently and correctly  Thyroid function is stable however she continues to note fatigue, difficulty losing weight, and hair loss  Component      Latest Ref Rng & Units 8/18/2022 5/8/2023          12:05 PM 12:21 PM   TSH 3RD GENERATON      0 450 - 4 500 uIU/mL 2 710 3 290     For prediabetes, she is taking metformin 1000 mg twice daily  She is tolerating this well  Last hemoglobin A1c was stable    Patient Active Problem List   Diagnosis   • Chronic pain syndrome   • Acute muscle stiffness of neck   • Cervical disc disorder with radiculopathy of mid-cervical region   • Neck pain   • Chronic left-sided low back pain with left-sided sciatica   • Lumbar radiculopathy   • Cervicalgia   • Right hip pain   • Trochanteric bursitis of right hip   • Pain in right hip   • Myofascial pain syndrome   • Carpal tunnel syndrome   • Continuous opioid dependence (HCC)   • Slow transit constipation   • Prediabetes   • Severe obesity ([address] )   • History of pituitary adenoma   • Sweating abnormality   • Vitamin D deficiency   • Hypothyroidism   • Hair loss   • History of total hysterectomy   • CARLTON (obstructive sleep apnea)   • Type 2 diabetes mellitus without complication, without long-term current use of insulin (HCC)   • Low ferritin      Past Medical History:   Diagnosis Date   • Anxiety    • Asthma     exercise induced asthma   • Cervical disc disorder    • Chronic pain    • Chronic pain disorder    • Depression    • Dysuria • Fibromyalgia, primary Est 2016   • GERD (gastroesophageal reflux disease) 2004   • Headache(784 0) As a child   • Low back pain    • Lung abnormality     nodules   • Lung nodule    • Neck pain    • Peripheral neuropathy    • Pituitary abnormality (HCC)     tumor   • Right hip pain 10/29/2019   • Thrombocytopenia ([facility] [address] )       Past Surgical History:   Procedure Laterality Date   • APPENDECTOMY     • BREAST BIOPSY Right 1995   • CHOLECYSTECTOMY     • COLECTOMY     • COLON SURGERY     • EPIDURAL BLOCK INJECTION N/A 6/23/2016    Procedure: BLOCK / INJECTION EPIDURAL STEROID CERVICAL  C7-T1;  Surgeon: Rony Morris MD;  Location: [facility] MAIN OR;  Service:    • EPIDURAL BLOCK INJECTION N/A 3/31/2016    Procedure: BLOCK / INJECTION EPIDURAL STEROID CERVICAL C7-T1  (C-ARM); Surgeon: Rony Morris MD;  Location: [facility] MAIN OR;  Service:    • EPIDURAL BLOCK INJECTION N/A 8/8/2018    Procedure: C6 C7 Cervical Epidural Steroid Injection (56304); Surgeon: Rony Morris MD;  Location: [facility] MAIN OR;  Service: Pain Management    • EPIDURAL BLOCK INJECTION N/A 12/16/2021    Procedure: T4 T5 thoracic epidural steroid injection (17938); Surgeon: Rony Morris MD;  Location: [facility] MAIN OR;  Service: Pain Management    • EPIDURAL BLOCK INJECTION N/A 6/10/2022    Procedure: T4 T5 THORACIC EPIDURAL STEROID INJECTION (33479); Surgeon: Rony Morris MD;  Location: [facility] MAIN OR;  Service: Pain Management    • HYSTERECTOMY  1996   • OOPHORECTOMY Bilateral 1996   • PITUITARY SURGERY     • HI ARTHROCENTESIS ASPIR&/INJ MAJOR JT/BURSA W/O US Right 11/8/2019    Procedure: Trochanteric Bursa Injection (48700); Surgeon: Rony Morris MD;  Location: [facility] MAIN OR;  Service: Pain Management    • HI ARTHROCENTESIS ASPIR&/INJ MAJOR JT/BURSA W/O US Right 1/23/2020    Procedure: Trochanteric Bursa Injection (64231);   Surgeon: Rony Morris MD;  Location: [facility] MAIN OR;  Service: Pain Management    • HI NJX DX/THER SBST EPIDURAL/SUBRACH CERV/THORACIC N/A 1/21/2016    Procedure: BLOCK / INJECTION EPIDURAL STEROID CERVICAL C7-T1 (C-ARM);   Surgeon: Bertha Diaz MD;  Location: [facility] MAIN OR;  Service: Pain Management    • REDUCTION MAMMAPLASTY Bilateral 2000      Family History   Problem Relation Age of Onset   • Thyroid disease Mother    • Hyperlipidemia Mother    • Depression Mother    • Stroke Mother         TIA   • Thyroid disease Father    • Hyperlipidemia Father    • Depression Father    • Arthritis Father         Spine and knees   • Endometrial cancer Sister    • Obesity Sister    • Migraines Sister    • No Known Problems Daughter    • Cancer Maternal Grandmother         Kidney cancer   • Melanoma Maternal Grandmother    • Heart disease Maternal Grandfather    • Breast cancer Paternal Grandmother 36   • Cancer Paternal Grandmother         Breast, Lung, Liver and skin cancer   • Diabetes Paternal Grandfather    • Obesity Brother    • No Known Problems Son    • No Known Problems Maternal Uncle    • Obesity Paternal Aunt    • Colon cancer Paternal Aunt    • Obesity Paternal Aunt    • Diabetes Paternal Aunt    • Cancer Paternal Aunt         Thyroid and colon cancer   • No Known Problems Paternal Uncle    • ADD / ADHD Neg Hx    • Anesthesia problems Neg Hx    • Cancer Neg Hx    • Clotting disorder Neg Hx    • Collagen disease Neg Hx    • Dislocations Neg Hx    • Learning disabilities Neg Hx    • Neurological problems Neg Hx    • Osteoporosis Neg Hx    • Rheumatologic disease Neg Hx    • Scoliosis Neg Hx    • Vascular Disease Neg Hx      Social History     Tobacco Use   • Smoking status: Never   • Smokeless tobacco: Never   Substance Use Topics   • Alcohol use: No     Allergies   Allergen Reactions   • Levaquin [Levofloxacin In D5w] Shortness Of Breath     Also hives     • Amitiza [Lubiprostone] Other (See Comments) and Hives     Reaction Date: 10Aug2011;   Migraines and vomiting   • Biaxin [Clarithromycin] Hives   • Cephradine • Erythromycin Hives     Reaction Date: 10Aug2011;    • Macrobid [Nitrofurantoin Monohyd Macro] Other (See Comments)     C/o passing out   • Morphine    • Penicillins Hives     Reaction Date: 10Aug2011;    • Sulfa Antibiotics    • Tetracycline    • Tetracyclines & Related Hives   • Morphine And Related Rash   • Valium [Diazepam] Rash and Vomiting     Reaction Date: 14Jun2012;        Current Outpatient Medications:   •  albuterol (ProAir HFA) 90 mcg/act inhaler, Inhale 2 puffs every 6 (six) hours as needed for wheezing, Disp: 8 5 g, Rfl: 0  •  albuterol (PROVENTIL HFA,VENTOLIN HFA) 90 mcg/act inhaler, Inhale, Disp: , Rfl:   •  aspirin 81 MG tablet, Take 81 mg by mouth daily  , Disp: , Rfl:   •  cholecalciferol (VITAMIN D3) 1,000 units tablet, Take 2 tablets by mouth daily, Disp: , Rfl:   •  cyclobenzaprine (FLEXERIL) 10 mg tablet, Take 1 tablet (10 mg total) by mouth 3 (three) times a day as needed for muscle spasms, Disp: 90 tablet, Rfl: 2  •  Diclofenac Sodium (VOLTAREN) 1 %, Apply 2 g topically 4 (four) times a day as needed (pain), Disp: 100 g, Rfl: 1  •  ferrous sulfate 324 (65 Fe) mg, Take 1 tablet every other day in the morning with breakfast , Disp: 45 tablet, Rfl: 1  •  gabapentin (NEURONTIN) 800 mg tablet, Take 1 tablet (800 mg total) by mouth 3 (three) times a day, Disp: 90 tablet, Rfl: 2  •  ibuprofen (MOTRIN) 600 mg tablet, Take 1 tablet (600 mg total) by mouth every 6 (six) hours as needed for mild pain, Disp: 30 tablet, Rfl: 0  •  levothyroxine (Synthroid) 75 mcg tablet, Take 1 tablet (75 mcg total) by mouth daily, Disp: 60 tablet, Rfl: 0  •  metFORMIN (GLUCOPHAGE) 1000 MG tablet, Take 1,000 mg by mouth 2 (two) times a day with meals, Disp: , Rfl:   •  metoprolol succinate (TOPROL-XL) 25 mg 24 hr tablet, , Disp: , Rfl:   •  ondansetron (Zofran ODT) 4 mg disintegrating tablet, Take 1 tablet (4 mg total) by mouth every 6 (six) hours as needed for nausea or vomiting, Disp: 20 tablet, Rfl: 0  •  [START ON "6/18/2023] oxyCODONE-acetaminophen (PERCOCET) 5-325 mg per tablet, Take 1 tablet by mouth 2 (two) times a day as needed for severe pain Max Daily Amount: 2 tablets Do not start before June 18, 2023 , Disp: 60 tablet, Rfl: 0  •  [START ON 5/19/2023] oxyCODONE-acetaminophen (PERCOCET) 5-325 mg per tablet, Take 1 tablet by mouth 2 (two) times a day as needed for severe pain Max Daily Amount: 2 tablets Do not start before May 19, 2023 , Disp: 60 tablet, Rfl: 0  •  oxyCODONE-acetaminophen (PERCOCET) 5-325 mg per tablet, Take 1 tablet by mouth 2 (two) times a day as needed for severe pain Max Daily Amount: 2 tablets Do not start before April 19, 2023 , Disp: 60 tablet, Rfl: 0  •  traZODone (DESYREL) 50 mg tablet, , Disp: , Rfl: 0    Review of Systems   Constitutional: Positive for fatigue  Negative for activity change, appetite change and unexpected weight change (difficulty losing weight)  HENT: Negative for dental problem, sore throat, trouble swallowing and voice change  Eyes: Negative for visual disturbance  Respiratory: Negative for cough, chest tightness and shortness of breath  Cardiovascular: Negative for chest pain, palpitations and leg swelling  Gastrointestinal: Negative for constipation, diarrhea, nausea and vomiting  Endocrine: Negative for cold intolerance, heat intolerance, polydipsia, polyphagia and polyuria  Genitourinary: Negative for frequency  Musculoskeletal: Negative for arthralgias, back pain, gait problem and myalgias  Skin: Negative for wound  Allergic/Immunologic: Positive for environmental allergies  Negative for food allergies  Neurological: Negative for dizziness, tremors, weakness, light-headedness, numbness and headaches  Psychiatric/Behavioral: Negative for decreased concentration, dysphoric mood and sleep disturbance  The patient is not nervous/anxious  Physical Exam:  Body mass index is 35 25 kg/m²    /80   Pulse 72   Resp 18   Ht 5' 3\" (1 6 m)   " Wt 90 3 kg (199 lb)   BMI 35 25 kg/m²    Wt Readings from Last 3 Encounters:   05/10/23 90 3 kg (199 lb)   04/07/23 87 1 kg (192 lb)   03/02/23 88 kg (194 lb)       Physical Exam  Vitals reviewed  Constitutional:       General: She is not in acute distress  Appearance: She is well-developed  She is obese  She is not ill-appearing  HENT:      Head: Normocephalic and atraumatic  Eyes:      Pupils: Pupils are equal, round, and reactive to light  Neck:      Thyroid: No thyromegaly  Cardiovascular:      Rate and Rhythm: Normal rate and regular rhythm  Pulses: Normal pulses  Heart sounds: Normal heart sounds  Pulmonary:      Effort: Pulmonary effort is normal       Breath sounds: Normal breath sounds  Abdominal:      General: Bowel sounds are normal  There is no distension  Palpations: Abdomen is soft  Tenderness: There is no abdominal tenderness  Musculoskeletal:      Cervical back: Normal range of motion and neck supple  Right lower leg: No edema  Left lower leg: No edema  Lymphadenopathy:      Cervical: No cervical adenopathy  Skin:     General: Skin is warm and dry  Capillary Refill: Capillary refill takes less than 2 seconds  Neurological:      Mental Status: She is alert and oriented to person, place, and time        Gait: Gait normal    Psychiatric:         Mood and Affect: Mood normal          Behavior: Behavior normal          Labs:     Lab Results   Component Value Date    CREATININE 0 76 05/08/2023    CREATININE 0 83 08/18/2022    CREATININE 0 85 02/16/2022    BUN 13 05/08/2023    K 4 2 05/08/2023     (H) 05/08/2023    CO2 26 05/08/2023     eGFR   Date Value Ref Range Status   05/08/2023 87 ml/min/1 73sq m Final       Lab Results   Component Value Date    HDL 58 08/18/2022    TRIG 108 08/18/2022       Lab Results   Component Value Date    ALT 39 05/08/2023    AST 29 05/08/2023    ALKPHOS 91 05/08/2023       Lab Results   Component Value Date FREET4 0 87 05/08/2023       There are no Patient Instructions on file for this visit  Discussed with the patient and all questioned fully answered  She will call me if any problems arise  Follow-up appointment in 6 months       Counseled patient on diagnostic results, prognosis, risk and benefit of treatment options, instruction for management, importance of treatment compliance, Risk  factor reduction and impressions    Leopoldo Rei, CRNP

## 2023-05-10 ENCOUNTER — OFFICE VISIT (OUTPATIENT)
Dept: ENDOCRINOLOGY | Facility: CLINIC | Age: 58
End: 2023-05-10

## 2023-05-10 VITALS
HEIGHT: 63 IN | WEIGHT: 199 LBS | BODY MASS INDEX: 35.26 KG/M2 | DIASTOLIC BLOOD PRESSURE: 80 MMHG | RESPIRATION RATE: 18 BRPM | HEART RATE: 72 BPM | SYSTOLIC BLOOD PRESSURE: 120 MMHG

## 2023-05-10 DIAGNOSIS — E66.01 SEVERE OBESITY (HCC): ICD-10-CM

## 2023-05-10 DIAGNOSIS — E11.9 TYPE 2 DIABETES MELLITUS WITHOUT COMPLICATION, WITHOUT LONG-TERM CURRENT USE OF INSULIN (HCC): ICD-10-CM

## 2023-05-10 DIAGNOSIS — R79.0 LOW FERRITIN: ICD-10-CM

## 2023-05-10 DIAGNOSIS — E03.9 HYPOTHYROIDISM, UNSPECIFIED TYPE: Primary | ICD-10-CM

## 2023-05-10 DIAGNOSIS — E55.9 VITAMIN D DEFICIENCY: ICD-10-CM

## 2023-05-10 RX ORDER — LEVOTHYROXINE SODIUM 0.07 MG/1
75 TABLET ORAL DAILY
Qty: 60 TABLET | Refills: 0 | Status: SHIPPED | OUTPATIENT
Start: 2023-05-10

## 2023-05-10 RX ORDER — FERROUS SULFATE TAB EC 324 MG (65 MG FE EQUIVALENT) 324 (65 FE) MG
TABLET DELAYED RESPONSE ORAL
Qty: 45 TABLET | Refills: 1 | Status: SHIPPED | OUTPATIENT
Start: 2023-05-10

## 2023-05-10 NOTE — ASSESSMENT & PLAN NOTE
Would consider addition of GLP-1 Wegovy however, this is not currently covered by patient's insurance

## 2023-05-10 NOTE — ASSESSMENT & PLAN NOTE
While TFT are stable, free T4 is on the low side of normal and patient continues to report significant fatigue  In order to simplify regimen, change levothyroxine to 75 mcg daily  Repeat TFT in 6 weeks

## 2023-05-10 NOTE — ASSESSMENT & PLAN NOTE
Stable  Continue Metformin  Check A1C prior to next appointment     Lab Results   Component Value Date    HGBA1C 5 7 (H) 08/18/2022

## 2023-07-06 NOTE — PROGRESS NOTES
Pain Medicine Follow-Up Note    Assessment:  1. Chronic pain syndrome    2. Myofascial pain syndrome    3. Neck pain    4. Cervical disc disorder with radiculopathy of mid-cervical region    5. Uncomplicated opioid dependence ([address])    6. Long-term current use of opiate analgesic        Plan:  Orders Placed This Encounter   Procedures   • FL spine and pain procedure     Standing Status:   Future     Standing Expiration Date:   7/7/2027     Order Specific Question:   Reason for Exam:     Answer:   trigger point injection with Clay     Order Specific Question:   Is the patient pregnant? Answer:   No     Order Specific Question:   Anticoagulant hold needed? Answer:   no   • MM ALL_Prescribed Meds and Special Instructions     Order Specific Question:   [facility] Is ALBUTEROL prescribed? Answer:   Yes     Order Specific Question:   [facility] Is CYCLOBENZAPRINE Prescribed? Answer:   Yes     Order Specific Question:   [facility] Is GABAPENTIN prescribed? Answer:   Yes     Order Specific Question:   [facility] Is METFORMIN prescribed? Answer:   Yes     Order Specific Question:   [place] Is METOPROLOL prescribed? Answer:   Yes     Order Specific Question:   [place] Is OXYCODONE/APAP prescribed? Answer:   Yes     Order Specific Question:   [place] Is TRAZODONE prescribed? Answer:    Yes   • MM DT_Alprazolam Definitive Test   • MM DT_Amphetamine Definitive Test   • MM DT_Buprenorphine Definitive Test   • MM DT_Butalbital Definitive Test   • MM DT_Clonazepam Definitive Test   • MM DT_Cocaine Definitive Test   • MM DT_Codeine Definitive Test   • MM DT_Dextromethorphan Definitive Test   • MM Diazepam Definitive Test   • MM DT_Ethyl Glucuronide/Ethyl Sulfate Definitive Test   • MM DT_Fentanyl Definitive Test   • MM DT_Heroin Definitive Test   • MM DT_Hydrocodone Definitive Test   • MM DT_Hydromorphone Definitive Test   • MM DT_Kratom Definitive Test   • MM DT_Levorphanol Definitive Test   • MM DT_MDMA Definitive Test   • MM DT_Meperidine Definitive Test   • MM DT_Methadone Definitive Test   • MM DT_Methamphetamine Definitive Test   • MM DT_Methylphenidate Definitive Test   • MM DT_Morphine Definitive Test   • MM Lorazepam Definitive Test   • MM DT_Oxazepam Definitive Test   • MM DT_Oxycodone Definitive Test   • MM DT_Oxymorphone Definitive Test   • MM DT_Phencyclidine Definitive Test   • MM DT_Phenobarbital Definitive Test   • MM DT_Phentermine Definitive Test   • MM DT_Secobarbital Definitive Test   • MM DT_Spice Definitive Test   • MM DT_Tapentadol Definitive Test   • MM DT_Temazapam Definitive Test   • MM DT_THC Definitive Test   • MM DT_Tramadol Definitive Test       New Medications Ordered This Visit   Medications   • gabapentin (NEURONTIN) 800 mg tablet     Sig: Take 1 tablet (800 mg total) by mouth 3 (three) times a day     Dispense:  90 tablet     Refill:  2   • cyclobenzaprine (FLEXERIL) 10 mg tablet     Sig: Take 1 tablet (10 mg total) by mouth 3 (three) times a day as needed for muscle spasms     Dispense:  90 tablet     Refill:  2   • oxyCODONE-acetaminophen (PERCOCET) 5-325 mg per tablet     Sig: Take 1 tablet by mouth 2 (two) times a day as needed for severe pain Max Daily Amount: 2 tablets Do not start before July 20, 2023. Dispense:  60 tablet     Refill:  0     DNF until 7/20/2023   • oxyCODONE-acetaminophen (PERCOCET) 5-325 mg per tablet     Sig: Take 1 tablet by mouth 2 (two) times a day as needed for severe pain Max Daily Amount: 2 tablets Do not start before August 19, 2023. Dispense:  60 tablet     Refill:  0     DNF until 8/19/2023   • naloxone (NARCAN) 4 mg/0.1 mL nasal spray     Sig: Administer 1 spray into a nostril. If no response after 2-3 minutes, give another dose in the other nostril using a new spray.      Dispense:  1 each     Refill:  1       My impressions and treatment recommendations were discussed in detail with the patient who verbalized understanding and had no further questions. The patient continues to report an overall reduction of her pain level and improvement with her functioning without significant side effects using oxycodone/acetaminophen 5/325 mg tablet patient uses 1 tablet up to twice daily as needed for severe pain along with gabapentin 800 mg tablet 3 times daily and cyclobenzaprine 10 mg tablet 1 tablet up to 3 times a day as needed for pain/muscle spasm, therefore I will continue the patient on these medications. Oxycodone/acetaminophen 5/325 mg tablet E-prescribed to the patient's pharmacy with a do not fill until date of 7/20/2023 and 8/19/2023. [address] Prescription Drug Monitoring Program report was reviewed and was appropriate     A urine drug screen was collected at today's office visit as part of our medication management protocol. The point of care testing results were appropriate for what was being prescribed. The specimen will be sent for confirmatory testing. The drug screen is medically necessary because the patient is either dependent on opioid medication or is being considered for opioid medication therapy and the results could impact ongoing or future treatment. The drug screen is to evaluate for the presences or absence of prescribed, non-prescribed, and/or illicit drugs/substances. There are risks associated with opioid medications, including dependence, addiction and tolerance. The patient understands and agrees to use these medications only as prescribed. Potential side effects of the medications include, but are not limited to, constipation, drowsiness, addiction, impaired judgment and risk of fatal overdose if not taken as prescribed. The patient was warned against driving while taking sedation medications. Sharing medications is a felony. At this point in time, the patient is showing no signs of addiction, abuse, diversion or suicidal ideation.     Patient reporting worsening neck and upper back pain. Trigger points noted throughout her upper back in the past the patient has had trigger point injections which have provided her excellent pain relief at this time I recommend that the patient have a repeat trigger point injection. Complete risks and benefits including bleeding, infection, tissue reaction, nerve injury and allergic reaction were discussed. The approach was demonstrated using models and literature was provided. Verbal and written consent was obtained. Follow-up is planned in 8 weeks time or sooner as warranted. Discharge instructions were provided. I personally saw and examined the patient and I agree with the above discussed plan of care. History of Present Illness:    Mina Guerrero is a 65 y.o. female who presents to [facility] for interval re-evaluation of the above stated pain complaints. The patient has a past medical and chronic pain history as outlined in the assessment section. She was last seen on 4/7/2023. At today's visit patient states that their pain symptoms are worse with a pain score of 8/10 on the verbal numeric pain scale. The patient's pain is worse at night. The patient's pain is constant in nature. And the quality of the patient's pain is described as burning, sharp, pressure-like, shooting, numbness, and pins-and-needles. The patient's pain is located in the posterior neck, bilateral shoulders, down right arm to right hand and mid low back. Patient states the amount of pain relief she is obtaining from her current pain relievers is enough to make a real difference in her life due to it providing her approximately 30 to 40% relief of her pain. Patient is currently using oxycodone/acetaminophen 5/325 mg tablet, gabapentin, and cyclobenzaprine for pain, patient denies any side effects using these medications.     Pain Contract Signed: 1/13/2023  Last Urine Drug Screen: 7/7/2023    Other than as stated above, the patient denies any interval changes in medications, medical condition, mental condition, symptoms, or allergies since the last office visit. Review of Systems:    Review of Systems   Respiratory: Negative for shortness of breath. Cardiovascular: Negative for chest pain. Gastrointestinal: Negative for constipation, diarrhea, nausea and vomiting. Musculoskeletal: Positive for gait problem and myalgias. Negative for arthralgias and joint swelling. Decreased ROM   Swelling: neck/shoulder area  Pain in extremity: rt. fingers   Skin: Negative for rash. Neurological: Negative for dizziness, seizures and weakness. All other systems reviewed and are negative. Past Medical History:   Diagnosis Date   • Anxiety    • Asthma     exercise induced asthma   • Cervical disc disorder    • Chronic pain    • Chronic pain disorder    • Depression    • Dysuria    • Fibromyalgia, primary Est 2016   • GERD (gastroesophageal reflux disease) 2004   • Headache(784.0) As a child   • Low back pain    • Lung abnormality     nodules   • Lung nodule    • Neck pain    • Peripheral neuropathy    • Pituitary abnormality (HCC)     tumor   • Right hip pain 10/29/2019   • Thrombocytopenia ([address])        Past Surgical History:   Procedure Laterality Date   • APPENDECTOMY     • BREAST BIOPSY Right 1995   • CHOLECYSTECTOMY     • COLECTOMY     • COLON SURGERY     • EPIDURAL BLOCK INJECTION N/A 6/23/2016    Procedure: BLOCK / INJECTION EPIDURAL STEROID CERVICAL  C7-T1;  Surgeon: Derik Ku MD;  Location: [address] [place] [address] [address] MAIN OR;  Service:    • EPIDURAL BLOCK INJECTION N/A 3/31/2016    Procedure: BLOCK / INJECTION EPIDURAL STEROID CERVICAL C7-T1  (C-ARM); Surgeon: Derik Ku MD;  Location: [facility] MAIN OR;  Service:    • EPIDURAL BLOCK INJECTION N/A 8/8/2018    Procedure: C6 C7 Cervical Epidural Steroid Injection (56028);   Surgeon: Derik Ku MD;  Location: [facility] MAIN OR;  Service: Pain Management    • EPIDURAL BLOCK INJECTION N/A 12/16/2021    Procedure: T4 T5 thoracic epidural steroid injection (55523); Surgeon: Daniela Lu MD;  Location: [facility] MAIN OR;  Service: Pain Management    • EPIDURAL BLOCK INJECTION N/A 6/10/2022    Procedure: T4 T5 THORACIC EPIDURAL STEROID INJECTION (00205); Surgeon: Daniela Lu MD;  Location: [facility] MAIN OR;  Service: Pain Management    • HYSTERECTOMY  1996   • OOPHORECTOMY Bilateral 1996   • PITUITARY SURGERY     • CO ARTHROCENTESIS ASPIR&/INJ MAJOR JT/BURSA W/O US Right 11/8/2019    Procedure: Trochanteric Bursa Injection (61607); Surgeon: Daniela Lu MD;  Location: [facility] MAIN OR;  Service: Pain Management    • CO ARTHROCENTESIS ASPIR&/INJ MAJOR JT/BURSA W/O US Right 1/23/2020    Procedure: Trochanteric Bursa Injection (73621); Surgeon: Daniela Lu MD;  Location: [facility] MAIN OR;  Service: Pain Management    • CO NJX DX/THER SBST EPIDURAL/SUBRACH CERV/THORACIC N/A 1/21/2016    Procedure: BLOCK / INJECTION EPIDURAL STEROID CERVICAL C7-T1 (C-ARM);   Surgeon: Daniela Lu MD;  Location: [facility] MAIN OR;  Service: Pain Management    • REDUCTION MAMMAPLASTY Bilateral 2000       Family History   Problem Relation Age of Onset   • Thyroid disease Mother    • Hyperlipidemia Mother    • Depression Mother    • Stroke Mother         TIA   • Thyroid disease Father    • Hyperlipidemia Father    • Depression Father    • Arthritis Father         Spine and knees   • Endometrial cancer Sister    • Obesity Sister    • Migraines Sister    • No Known Problems Daughter    • Cancer Maternal Grandmother         Kidney cancer   • Melanoma Maternal Grandmother    • Heart disease Maternal Grandfather    • Breast cancer Paternal Grandmother 36   • Cancer Paternal Grandmother         Breast, Lung, Liver and skin cancer   • Diabetes Paternal Grandfather    • Obesity Brother    • No Known Problems Son    • No Known Problems Maternal Uncle    • Obesity Paternal Aunt    • Colon cancer Paternal Aunt    • Obesity Paternal Aunt • Diabetes Paternal Aunt    • Cancer Paternal Aunt         Thyroid and colon cancer   • No Known Problems Paternal Uncle    • ADD / ADHD Neg Hx    • Anesthesia problems Neg Hx    • Cancer Neg Hx    • Clotting disorder Neg Hx    • Collagen disease Neg Hx    • Dislocations Neg Hx    • Learning disabilities Neg Hx    • Neurological problems Neg Hx    • Osteoporosis Neg Hx    • Rheumatologic disease Neg Hx    • Scoliosis Neg Hx    • Vascular Disease Neg Hx        Social History     Occupational History   • Not on file   Tobacco Use   • Smoking status: Never   • Smokeless tobacco: Never   Vaping Use   • Vaping Use: Never used   Substance and Sexual Activity   • Alcohol use: No   • Drug use: No   • Sexual activity: Not Currently     Partners: Male     Birth control/protection: Post-menopausal         Current Outpatient Medications:   •  albuterol (ProAir HFA) 90 mcg/act inhaler, Inhale 2 puffs every 6 (six) hours as needed for wheezing, Disp: 8.5 g, Rfl: 0  •  albuterol (PROVENTIL HFA,VENTOLIN HFA) 90 mcg/act inhaler, Inhale, Disp: , Rfl:   •  aspirin 81 MG tablet, Take 81 mg by mouth daily. , Disp: , Rfl:   •  cholecalciferol (VITAMIN D3) 1,000 units tablet, Take 2 tablets by mouth daily, Disp: , Rfl:   •  cyclobenzaprine (FLEXERIL) 10 mg tablet, Take 1 tablet (10 mg total) by mouth 3 (three) times a day as needed for muscle spasms, Disp: 90 tablet, Rfl: 2  •  Diclofenac Sodium (VOLTAREN) 1 %, Apply 2 g topically 4 (four) times a day as needed (pain), Disp: 100 g, Rfl: 1  •  ferrous sulfate 324 (65 Fe) mg, Take 1 tablet every other day in the morning with breakfast., Disp: 45 tablet, Rfl: 1  •  gabapentin (NEURONTIN) 800 mg tablet, Take 1 tablet (800 mg total) by mouth 3 (three) times a day, Disp: 90 tablet, Rfl: 2  •  ibuprofen (MOTRIN) 600 mg tablet, Take 1 tablet (600 mg total) by mouth every 6 (six) hours as needed for mild pain, Disp: 30 tablet, Rfl: 0  •  levothyroxine (Synthroid) 75 mcg tablet, Take 1 tablet (75 mcg total) by mouth daily, Disp: 60 tablet, Rfl: 0  •  metFORMIN (GLUCOPHAGE) 1000 MG tablet, Take 1,000 mg by mouth 2 (two) times a day with meals, Disp: , Rfl:   •  metoprolol succinate (TOPROL-XL) 25 mg 24 hr tablet, , Disp: , Rfl:   •  naloxone (NARCAN) 4 mg/0.1 mL nasal spray, Administer 1 spray into a nostril.  If no response after 2-3 minutes, give another dose in the other nostril using a new spray., Disp: 1 each, Rfl: 1  •  ondansetron (Zofran ODT) 4 mg disintegrating tablet, Take 1 tablet (4 mg total) by mouth every 6 (six) hours as needed for nausea or vomiting, Disp: 20 tablet, Rfl: 0  •  [START ON 7/20/2023] oxyCODONE-acetaminophen (PERCOCET) 5-325 mg per tablet, Take 1 tablet by mouth 2 (two) times a day as needed for severe pain Max Daily Amount: 2 tablets Do not start before July 20, 2023., Disp: 60 tablet, Rfl: 0  •  [START ON 8/19/2023] oxyCODONE-acetaminophen (PERCOCET) 5-325 mg per tablet, Take 1 tablet by mouth 2 (two) times a day as needed for severe pain Max Daily Amount: 2 tablets Do not start before August 19, 2023., Disp: 60 tablet, Rfl: 0  •  traZODone (DESYREL) 50 mg tablet, , Disp: , Rfl: 0    Allergies   Allergen Reactions   • Levaquin [Levofloxacin In D5w] Shortness Of Breath     Also hives     • Amitiza [Lubiprostone] Other (See Comments) and Hives     Reaction Date: 10Aug2011;   Migraines and vomiting   • Biaxin [Clarithromycin] Hives   • Cephradine    • Erythromycin Hives     Reaction Date: 10Aug2011;    • Macrobid [Nitrofurantoin Monohyd Macro] Other (See Comments)     C/o passing out   • Morphine    • Penicillins Hives     Reaction Date: 10Aug2011;    • Sulfa Antibiotics    • Tetracycline    • Tetracyclines & Related Hives   • Morphine And Related Rash   • Valium [Diazepam] Rash and Vomiting     Reaction Date: 14Jun2012;        Physical Exam:    BP 98/67 (BP Location: Left arm, Patient Position: Sitting, Cuff Size: Standard)   Pulse 93   Ht 5' 3" (1.6 m)   Wt 90.3 kg (199 lb) Comment: pt. refused scale  BMI 35.25 kg/m²     Constitutional:normal, well developed, well nourished, alert, in no distress and non-toxic and no overt pain behavior.  and obese  Eyes:anicteric  HEENT:grossly intact  Neck:supple, symmetric, trachea midline and no masses   Pulmonary:even and unlabored  Cardiovascular:No edema or pitting edema present  Skin:Normal without rashes or lesions and well hydrated  Psychiatric:Mood and affect appropriate  Neurologic:Cranial Nerves II-XII grossly intact  Musculoskeletal:antalgic, TTP posterior neck, bilateral shoulders, upper back      Imaging  FL spine and pain procedure    (Results Pending)         Orders Placed This Encounter   Procedures   • FL spine and pain procedure   • MM ALL_Prescribed Meds and Special Instructions   • MM DT_Alprazolam Definitive Test   • MM DT_Amphetamine Definitive Test   • MM DT_Buprenorphine Definitive Test   • MM DT_Butalbital Definitive Test   • MM DT_Clonazepam Definitive Test   • MM DT_Cocaine Definitive Test   • MM DT_Codeine Definitive Test   • MM DT_Dextromethorphan Definitive Test   • MM Diazepam Definitive Test   • MM DT_Ethyl Glucuronide/Ethyl Sulfate Definitive Test   • MM DT_Fentanyl Definitive Test   • MM DT_Heroin Definitive Test   • MM DT_Hydrocodone Definitive Test   • MM DT_Hydromorphone Definitive Test   • MM DT_Kratom Definitive Test   • MM DT_Levorphanol Definitive Test   • MM DT_MDMA Definitive Test   • MM DT_Meperidine Definitive Test   • MM DT_Methadone Definitive Test   • MM DT_Methamphetamine Definitive Test   • MM DT_Methylphenidate Definitive Test   • MM DT_Morphine Definitive Test   • MM Lorazepam Definitive Test   • MM DT_Oxazepam Definitive Test   • MM DT_Oxycodone Definitive Test   • MM DT_Oxymorphone Definitive Test   • MM DT_Phencyclidine Definitive Test   • MM DT_Phenobarbital Definitive Test   • MM DT_Phentermine Definitive Test   • MM DT_Secobarbital Definitive Test   • MM DT_Spice Definitive Test   • MM DT_Tapentadol Definitive Test   • MM DT_Temazapam Definitive Test   • MM DT_THC Definitive Test   • MM DT_Tramadol Definitive Test       This document was created using speech voice recognition software. Grammatical errors, random word insertions, pronoun errors, and incomplete sentences are an occasional consequence of this system due to software limitations, ambient noise, and hardware issues. Any formal questions or concerns about content, text, or information contained within the body of this dictation should be directly addressed to the provider for clarification.

## 2023-07-07 ENCOUNTER — OFFICE VISIT (OUTPATIENT)
Dept: PAIN MEDICINE | Facility: CLINIC | Age: 58
End: 2023-07-07
Payer: MEDICARE

## 2023-07-07 VITALS
SYSTOLIC BLOOD PRESSURE: 98 MMHG | DIASTOLIC BLOOD PRESSURE: 67 MMHG | HEART RATE: 93 BPM | WEIGHT: 199 LBS | BODY MASS INDEX: 35.26 KG/M2 | HEIGHT: 63 IN

## 2023-07-07 DIAGNOSIS — M50.120 CERVICAL DISC DISORDER WITH RADICULOPATHY OF MID-CERVICAL REGION: ICD-10-CM

## 2023-07-07 DIAGNOSIS — F11.20 UNCOMPLICATED OPIOID DEPENDENCE (HCC): ICD-10-CM

## 2023-07-07 DIAGNOSIS — M79.18 MYOFASCIAL PAIN SYNDROME: ICD-10-CM

## 2023-07-07 DIAGNOSIS — G89.4 CHRONIC PAIN SYNDROME: Primary | ICD-10-CM

## 2023-07-07 DIAGNOSIS — Z79.891 LONG-TERM CURRENT USE OF OPIATE ANALGESIC: ICD-10-CM

## 2023-07-07 DIAGNOSIS — M54.2 NECK PAIN: ICD-10-CM

## 2023-07-07 PROCEDURE — 99214 OFFICE O/P EST MOD 30 MIN: CPT

## 2023-07-07 RX ORDER — OXYCODONE HYDROCHLORIDE AND ACETAMINOPHEN 5; 325 MG/1; MG/1
1 TABLET ORAL 2 TIMES DAILY PRN
Qty: 60 TABLET | Refills: 0 | Status: SHIPPED | OUTPATIENT
Start: 2023-07-20 | End: 2023-08-19

## 2023-07-07 RX ORDER — CYCLOBENZAPRINE HCL 10 MG
10 TABLET ORAL 3 TIMES DAILY PRN
Qty: 90 TABLET | Refills: 2 | Status: SHIPPED | OUTPATIENT
Start: 2023-07-07

## 2023-07-07 RX ORDER — NALOXONE HYDROCHLORIDE 4 MG/.1ML
SPRAY NASAL
Qty: 1 EACH | Refills: 1 | Status: SHIPPED | OUTPATIENT
Start: 2023-07-07 | End: 2024-07-06

## 2023-07-07 RX ORDER — GABAPENTIN 800 MG/1
800 TABLET ORAL 3 TIMES DAILY
Qty: 90 TABLET | Refills: 2 | Status: SHIPPED | OUTPATIENT
Start: 2023-07-07

## 2023-07-07 RX ORDER — OXYCODONE HYDROCHLORIDE AND ACETAMINOPHEN 5; 325 MG/1; MG/1
1 TABLET ORAL 2 TIMES DAILY PRN
Qty: 60 TABLET | Refills: 0 | Status: SHIPPED | OUTPATIENT
Start: 2023-08-19 | End: 2023-09-18

## 2023-07-07 NOTE — PATIENT INSTRUCTIONS
Opioid Safety   AMBULATORY CARE:   Opioid safety  includes the correct use, storage, and disposal of opioids. Examples of opioid medicines to treat pain include oxycodone, morphine, fentanyl, and codeine. Call your local emergency number (911 in the [address]), or have someone else call if:   You have a seizure. You cannot be woken. You have trouble staying awake and your breathing is slow or shallow. Your speech is slurred, or you are confused. You are dizzy or stumble when you walk. Call your doctor, or have someone close to you call if:   You are extremely drowsy, or you have trouble staying awake or speaking. You have pale or clammy skin. You have blue fingernails or lips. Your heartbeat is slower than normal.    You cannot stop vomiting. You have questions or concerns about your condition or care. Use opioids safely:   Take prescribed opioids exactly as directed. Opioids come with directions based on the kind of opioid and how it is given. Do not take more than the recommended amount, or for longer than needed. Do not give opioids to others or take opioids that belong to someone else. Misuse of opioids can lead to an addiction or overdose. Do not mix opioids with other medicines or alcohol. The combination can cause an overdose, or lead to a coma. Do not drive or operate heavy machinery after you take the opioid. Your provider or pharmacist can tell you how long to wait after a dose before you do these activities. Talk to your healthcare provider if you have any side effects. He or she can help you prevent or relieve side effects. Side effects include nausea, sleepiness, itching, and trouble thinking clearly. Manage constipation:  Constipation is the most common side effect of opioid medicine. Constipation is when you have hard, dry bowel movements, or you go longer than usual between bowel movements.  Tell your healthcare provider about all changes in your bowel movements while you are taking opioids. He or she may recommend laxative medicine to help you have a bowel movement. He or she may also change the kind of opioid you are taking, or change when you take it. The following are more ways you can prevent or relieve constipation:  Drink liquids as directed. You may need to drink extra liquids to help soften and move your bowels. Ask how much liquid to drink each day and which liquids are best for you. Eat high-fiber foods. This may help decrease constipation by adding bulk to your bowel movements. High-fiber foods include fruits, vegetables, whole-grain breads and cereals, and beans. Your healthcare provider or dietitian can help you create a high-fiber meal plan. Your provider may also recommend a fiber supplement if you cannot get enough fiber from food. Exercise regularly. Regular physical activity can help stimulate your intestines. Walking is a good exercise to prevent or relieve constipation. Ask which exercises are best for you. Schedule a time each day to have a bowel movement. This may help train your body to have regular bowel movements. Bend forward while you are on the toilet to help move the bowel movement out. Sit on the toilet for at least 10 minutes, even if you do not have a bowel movement. Store opioids safely:   Store opioids where others cannot easily get them. Keep them in a locked cabinet or secure area. Do not  keep them in a purse or other bag you carry with you. A person may be looking for something else and find the opioids. Make sure opioids are stored out of the reach of children. A child can easily overdose on opioids. Opioids may look like candy to a small child. The best way to dispose of opioids: The laws vary by country and area. In the [facility], the best way is to return the opioids through a take-back program. This program is offered by the [employer] ([employer]).  The following are options for using the program:  Take the opioids to a NIDA collection site. The site is often a law enforcement center. Call your local law enforcement center for scheduled take-back days in your area. You will be given information on where to go if the collection site is in a different location. Take the opioids to an approved pharmacy or hospital.  A pharmacy or hospital may be set up as a collection site. You will need to ask if it is a NIDA collection site if you were not directed there. A pharmacy or doctor's office may not be able to take back opioids unless it is a NIDA site. Use a mail-back system. This means you are given containers to put the opioids into. You will then mail them in the containers. Use a take-back drop box. This is a place to leave the opioids at any time. People and animals will not be able to get into the box. Your local law enforcement agency can tell you where to find a drop box in your area. Other safe ways to dispose of opioids: The medicine may come with disposal instructions. The instructions may vary depending on the brand of medicine you are using. Instructions may come in a Medication Guide, but not every medicine has one. You may instead get instructions from your pharmacy or doctor. Follow instructions carefully. The following are general guidelines to follow:  Find out if you can flush the opioid. Some opioids can be flushed down the toilet or poured into the sink. You will need to contact authorities in your area to see if this is an option for you. The FDA also offers a list of medicines that are safe to flush down the toilet. You can check the list if you cannot get the information for your local area. Ask your waste management company about rules for putting opioids in the trash. The company will be able to give you specific directions. Scratch out personal information on the original medicine label so it cannot be read. Then put it in the trash.  Do not label the trash or put any information on it about the opioids. It should look like regular household trash so no one is tempted to look for the opioids. Keep the trash out of the reach of children and animals. Always make sure trash is secure. Talk to officials if you live in a facility. If you live in a nursing home or assisted living center, talk to an official. The person will know the rules for your area. Other ways to manage pain:   Ask your healthcare provider about non-opioid medicines to control pain. Nonprescription medicines include NSAIDs (such as ibuprofen) and acetaminophen. Prescription medicines include muscle relaxers, antidepressants, and steroids. Pain may be managed without any medicines. Some ways to relieve pain include massage, aromatherapy, or meditation. Physical or occupational therapy may also help. For more information:   Drug Enforcement Administration  [address] [facility] , [address] [facility]  Phone: 4- 124 - [phone]  Web Address: [employer]. [email or web address]_[email or web address]/    [address] and Drug Administration  [address] [facility] , [address]  Phone: 6- 098 - [phone]  Web Address: http://[employer]/    Follow up with your doctor or pain specialist as directed: You may need to have your dose adjusted. Your doctor or pain specialist can also help you find ways to manage pain without opioids. Write down your questions so you remember to ask them during your visits. © Copyright [employer] 2022 Information is for End User's use only and may not be sold, redistributed or otherwise used for commercial purposes. All illustrations and images included in CareNotes® are the copyrighted property of [employer] or [address] [place] [address]  The above information is an  only. It is not intended as medical advice for individual conditions or treatments.  Talk to your doctor, nurse or pharmacist before following any medical regimen to see if it is safe and effective for you. Trigger Point Injection   WHAT YOU NEED TO KNOW:   What do I need to know about a trigger point injection? A trigger point injection is used to relax a muscle knot. This helps relieve pain. You may be able to have more than one trigger point treated during a session. How do I prepare for a trigger point injection? Your healthcare provider will tell you how to prepare. Arrange to have someone drive you home after the injection. Tell your provider about all medicines you take, including pain medicine, blood thinners, and muscle relaxers. He or she will tell you if you need to stop any medicine for the injection, and when to stop. He or she will tell you which medicines to take or not take on the day of the injection. Tell your provider about all your allergies, including to any pain medicine. What will happen during a trigger point injection? You may be sitting or lying, depending on where the trigger point is located. Your healthcare provider will feel for a knot in the muscle. He or she may halina your skin over the knot. Your provider will put a needle through your skin and into the trigger point. Saline (salt solution), pain relievers, or other medicines may be pushed through the needle into the trigger point. Your provider may use only a dry needle (no medicine). He or she will pull the needle almost all the way out and then push it in again. He or she will repeat this several times until the muscle stops twitching or feels relaxed. Your provider will remove the needle and stretch the muscle area. He or she may apply pressure to the area for 2 minutes. A bandage will be put over the injection site to prevent bleeding or an infection. What should I expect after a trigger point injection? You may feel pain relief right away. It is normal for some pain to start again 2 hours later.  An ice pack or over-the-counter pain medicine can help lower the pain.    You may feel sore in the injection site for a few days. If you need another injection in the same area, wait until the area is not sore. Your healthcare provider may give you specific activity instructions to follow at home or recommend physical therapy. In general, you should try to stay active. Avoid strenuous activity for the first 3 or 4 days after the injection. Do not have more injections if you still have trigger point pain after 2 or 3 injections. What are the risks of a trigger point injection? You may have a severe allergic reaction to pain medicine injected. The injection may be painful, or you may be sore where you got the injection. You may bleed, bruise, or develop an infection in the injection area. The injection may cause you to feel faint. Rarely, the needle may cause muscle or blood vessel damage or your lung may collapse if you get the injection near your chest.  CARE AGREEMENT:   You have the right to help plan your care. Learn about your health condition and how it may be treated. Discuss treatment options with your healthcare providers to decide what care you want to receive. You always have the right to refuse treatment. The above information is an  only. It is not intended as medical advice for individual conditions or treatments. Talk to your doctor, nurse or pharmacist before following any medical regimen to see if it is safe and effective for you. © Copyright [employer] 2022 Information is for End User's use only and may not be sold, redistributed or otherwise used for commercial purposes.  All illustrations and images included in CareNotes® are the copyrighted property of [employer] Inc[employer] or  [employer]

## 2023-07-11 ENCOUNTER — PROCEDURE VISIT (OUTPATIENT)
Dept: PAIN MEDICINE | Facility: CLINIC | Age: 58
End: 2023-07-11
Payer: MEDICARE

## 2023-07-11 VITALS — DIASTOLIC BLOOD PRESSURE: 71 MMHG | SYSTOLIC BLOOD PRESSURE: 106 MMHG | HEART RATE: 71 BPM

## 2023-07-11 DIAGNOSIS — M79.18 MYOFASCIAL PAIN SYNDROME: Primary | ICD-10-CM

## 2023-07-11 PROCEDURE — 20553 NJX 1/MLT TRIGGER POINTS 3/>: CPT | Performed by: ANESTHESIOLOGY

## 2023-07-11 RX ORDER — METHYLPREDNISOLONE ACETATE 40 MG/ML
40 INJECTION, SUSPENSION INTRA-ARTICULAR; INTRALESIONAL; INTRAMUSCULAR; SOFT TISSUE ONCE
Status: COMPLETED | OUTPATIENT
Start: 2023-07-11 | End: 2023-07-11

## 2023-07-11 RX ORDER — BUPIVACAINE HYDROCHLORIDE 2.5 MG/ML
10 INJECTION, SOLUTION EPIDURAL; INFILTRATION; INTRACAUDAL ONCE
Status: COMPLETED | OUTPATIENT
Start: 2023-07-11 | End: 2023-07-11

## 2023-07-11 RX ADMIN — METHYLPREDNISOLONE ACETATE 40 MG: 40 INJECTION, SUSPENSION INTRA-ARTICULAR; INTRALESIONAL; INTRAMUSCULAR; SOFT TISSUE at 14:46

## 2023-07-11 RX ADMIN — BUPIVACAINE HYDROCHLORIDE 10 ML: 2.5 INJECTION, SOLUTION EPIDURAL; INFILTRATION; INTRACAUDAL at 14:47

## 2023-07-11 NOTE — PROGRESS NOTES
Universal Protocol:  Consent: Written consent obtained. Risks and benefits: risks, benefits and alternatives were discussed  Consent given by: patient  Patient understanding: patient states understanding of the procedure being performed  Patient consent: the patient's understanding of the procedure matches consent given  Procedure consent: procedure consent matches procedure scheduled  Relevant documents: relevant documents present and verified  Site marked: the operative site was marked  Patient identity confirmed: verbally with patient    Supporting Documentation  Indications: pain    Injection site identified by: palpation    Procedure Details  Location(s):  Needle size: 25 G  Patient tolerance: patient tolerated the procedure well with no immediate complications  Additional procedure details: ATTENDING PHYSICIAN:  Lynnea Dancer, MD.    PROCEDURE:  Trigger point injections x1 to the left cervical paraspinal, x1 to the right cervical paraspinal, x3 to the left upper trapezius, x3 to the right upper trapezius, x1 to the right rhomboid, and x1 to the left rhomboid musculature with local anesthetic and steroid. PRE-PROCEDURE DIAGNOSIS:  Myofascial pain syndrome. POST-PROCEDURE DIAGNOSIS:  Myofascial pain syndrome. ESTIMATED BLOOD LOSS:  Minimal.    ANESTHESIA:  None. COMPLICATIONS:  None. CONSENT:  Today's procedure, its potential benefits as well as its risks and side effects were reviewed. Discussed risks of the procedure including bleeding, infection, reactions to the medications, failure the pain to improve, and potential worsening of the pain as well as pneumothorax were explained in detail to the patient, who verbalized understanding and wished to proceed. Written informed consent was thereby obtained. DESCRIPTION OF THE PROCEDURE:  After written informed consent was obtained, the patient was placed in the sitting position on the examination table.  Anatomical landmarks were identified via palpation. The trigger points were identified and marked after palpation. The skin overlying these 10 marked trigger points was prepared using Betadine swabs x3 in the usual sterile fashion. Strict aseptic technique was utilized throughout the procedure. A mL injectate consisting of (3) mL of 1% lidocaine mixed with 1 mL of Depo-Medrol 40 mg/mL was drawn up sterilely. Using a 25-gauge 1.25 inch needle, 1 mL of the above injectate was administered to each of the marked trigger points following negative aspiration. The patient tolerated the procedure well and all needles were removed with the tips intact. Hemostasis was maintained. There were no apparent complications. The skin was wiped clean and Band-Aids were placed as appropriate. The patient was monitored for an appropriate period of time following the procedure and remained hemodynamically stable and neurovascularly intact following the procedure as she was prior to the procedure. The patient was ultimately discharged to home with supervision in good condition. I was present for and participated in all key and critical portions of this procedure.

## 2023-07-12 LAB
6MAM UR QL CFM: NEGATIVE NG/ML
7AMINOCLONAZEPAM UR QL CFM: NEGATIVE NG/ML
A-OH ALPRAZ UR QL CFM: NEGATIVE NG/ML
AMPHET UR QL CFM: NEGATIVE NG/ML
AMPHET UR QL CFM: NEGATIVE NG/ML
BUPRENORPHINE UR QL CFM: NEGATIVE NG/ML
BUTALBITAL UR QL CFM: NEGATIVE NG/ML
BZE UR QL CFM: NEGATIVE NG/ML
CODEINE UR QL CFM: NEGATIVE NG/ML
EDDP UR QL CFM: NEGATIVE NG/ML
ETHYL GLUCURONIDE UR QL CFM: NEGATIVE NG/ML
ETHYL SULFATE UR QL SCN: NEGATIVE NG/ML
FENTANYL UR QL CFM: NEGATIVE NG/ML
GLIADIN IGG SER IA-ACNC: NEGATIVE NG/ML
HYDROCODONE UR QL CFM: NEGATIVE NG/ML
HYDROCODONE UR QL CFM: NEGATIVE NG/ML
HYDROMORPHONE UR QL CFM: NEGATIVE NG/ML
LORAZEPAM UR QL CFM: NEGATIVE NG/ML
MDMA UR QL CFM: NEGATIVE NG/ML
ME-PHENIDATE UR QL CFM: NEGATIVE NG/ML
MEPERIDINE UR QL CFM: NEGATIVE NG/ML
METHADONE UR QL CFM: NEGATIVE NG/ML
METHAMPHET UR QL CFM: NEGATIVE NG/ML
MORPHINE UR QL CFM: NEGATIVE NG/ML
MORPHINE UR QL CFM: NEGATIVE NG/ML
NORBUPRENORPHINE UR QL CFM: NEGATIVE NG/ML
NORDIAZEPAM UR QL CFM: NEGATIVE NG/ML
NORFENTANYL UR QL CFM: NEGATIVE NG/ML
NORHYDROCODONE UR QL CFM: NEGATIVE NG/ML
NORHYDROCODONE UR QL CFM: NEGATIVE NG/ML
NORMEPERIDINE UR QL CFM: NEGATIVE NG/ML
NOROXYCODONE UR QL CFM: NORMAL NG/ML
OXAZEPAM UR QL CFM: NEGATIVE NG/ML
OXYCODONE UR QL CFM: NORMAL NG/ML
OXYMORPHONE UR QL CFM: NORMAL NG/ML
OXYMORPHONE UR QL CFM: NORMAL NG/ML
PARA-FLUOROFENTANYL QUANTIFICATION: NORMAL NG/ML
PCP UR QL CFM: NEGATIVE NG/ML
PHENOBARB UR QL CFM: NEGATIVE NG/ML
RESULT ALL_PRESCRIBED MEDS AND SPECIAL INSTRUCTIONS: NORMAL
SECOBARBITAL UR QL CFM: NEGATIVE NG/ML
SL AMB 4-ANPP QUANTIFICATION: NORMAL NG/ML
SL AMB 5F-ADB-M7 METABOLITE QUANTIFICATION: NEGATIVE NG/ML
SL AMB 7-OH-MITRAGYNINE (KRATOM ALKALOID) QUANTIFICATION: NEGATIVE NG/ML
SL AMB AB-FUBINACA-M3 METABOLITE QUANTIFICATION: NEGATIVE NG/ML
SL AMB ACETYL FENTANYL QUANTIFICATION: NORMAL NG/ML
SL AMB ACETYL NORFENTANYL QUANTIFICATION: NORMAL NG/ML
SL AMB ACRYL FENTANYL QUANTIFICATION: NORMAL NG/ML
SL AMB CARFENTANIL QUANTIFICATION: NORMAL NG/ML
SL AMB CTHC (MARIJUANA METABOLITE) QUANTIFICATION: NEGATIVE NG/ML
SL AMB DEXTROMETHORPHAN QUANTIFICATION: NEGATIVE NG/ML
SL AMB DEXTRORPHAN (DEXTROMETHORPHAN METABOLITE) QUANT: NEGATIVE NG/ML
SL AMB DEXTRORPHAN (DEXTROMETHORPHAN METABOLITE) QUANT: NEGATIVE NG/ML
SL AMB JWH018 METABOLITE QUANTIFICATION: NEGATIVE NG/ML
SL AMB JWH073 METABOLITE QUANTIFICATION: NEGATIVE NG/ML
SL AMB MDMB-FUBINACA-M1 METABOLITE QUANTIFICATION: NEGATIVE NG/ML
SL AMB N-DESMETHYL-TRAMADOL QUANTIFICATION: NEGATIVE NG/ML
SL AMB PHENTERMINE QUANTIFICATION: NEGATIVE NG/ML
SL AMB RCS4 METABOLITE QUANTIFICATION: NEGATIVE NG/ML
SL AMB RITALINIC ACID QUANTIFICATION: NEGATIVE NG/ML
SPECIMEN DRAWN SERPL: NEGATIVE NG/ML
TAPENTADOL UR QL CFM: NEGATIVE NG/ML
TEMAZEPAM UR QL CFM: NEGATIVE NG/ML
TEMAZEPAM UR QL CFM: NEGATIVE NG/ML
TRAMADOL UR QL CFM: NEGATIVE NG/ML
URATE/CREAT 24H UR: NEGATIVE NG/ML

## 2023-08-09 ENCOUNTER — APPOINTMENT (OUTPATIENT)
Dept: LAB | Facility: CLINIC | Age: 58
End: 2023-08-09
Payer: COMMERCIAL

## 2023-08-09 DIAGNOSIS — E03.9 HYPOTHYROIDISM, UNSPECIFIED TYPE: ICD-10-CM

## 2023-08-09 LAB — TSH SERPL DL<=0.05 MIU/L-ACNC: 3.66 UIU/ML (ref 0.45–4.5)

## 2023-08-09 PROCEDURE — 36415 COLL VENOUS BLD VENIPUNCTURE: CPT

## 2023-08-09 PROCEDURE — 84443 ASSAY THYROID STIM HORMONE: CPT

## 2023-08-10 RX ORDER — LEVOTHYROXINE SODIUM 0.07 MG/1
75 TABLET ORAL DAILY
Qty: 90 TABLET | Refills: 1 | Status: SHIPPED | OUTPATIENT
Start: 2023-08-10

## 2023-09-05 NOTE — PROGRESS NOTES
Pain Medicine Follow-Up Note    Assessment:  1. Chronic pain syndrome    2. Neck pain    3. Cervical disc disorder with radiculopathy of mid-cervical region    4. Acute deep vein thrombosis (DVT) of calf muscle vein of right lower extremity (HCC)        Plan:      New Medications Ordered This Visit   Medications   • oxyCODONE-acetaminophen (PERCOCET) 5-325 mg per tablet     Sig: Take 1 tablet by mouth 2 (two) times a day as needed for severe pain Max Daily Amount: 2 tablets Do not start before October 18, 2023. Dispense:  60 tablet     Refill:  0     DNF until 10/18/2023   • oxyCODONE-acetaminophen (PERCOCET) 5-325 mg per tablet     Sig: Take 1 tablet by mouth 2 (two) times a day as needed for severe pain Max Daily Amount: 2 tablets Do not start before September 18, 2023. Dispense:  60 tablet     Refill:  0     DNF until 9/18/2023   • cyclobenzaprine (FLEXERIL) 10 mg tablet     Sig: Take 1 tablet (10 mg total) by mouth 3 (three) times a day as needed for muscle spasms     Dispense:  90 tablet     Refill:  1   • gabapentin (NEURONTIN) 800 mg tablet     Sig: Take 1 tablet (800 mg total) by mouth 3 (three) times a day     Dispense:  90 tablet     Refill:  1       My impressions and treatment recommendations were discussed in detail with the patient who verbalized understanding and had no further questions. The patient continues to report an overall reduction of her pain level and improvement with her functioning without significant side effects using oxycodone/acetaminophen 5/325 mg tablet patient uses 1 tablet up to twice daily as needed for severe pain along with gabapentin 800 mg tablet 3 times daily and cyclobenzaprine 10 mg tablet 1 tablet up to 3 times a day as needed for pain/muscle spasm, therefore I will continue the patient on these medications.   Oxycodone/acetaminophen 5/325 mg tablet E-prescribed to the patient's pharmacy with a do not fill until date of 9/18/2023 and 10/18/2023. [place] Prescription Drug Monitoring Program report was reviewed and was appropriate     There are risks associated with opioid medications, including dependence, addiction and tolerance. The patient understands and agrees to use these medications only as prescribed. Potential side effects of the medications include, but are not limited to, constipation, drowsiness, addiction, impaired judgment and risk of fatal overdose if not taken as prescribed. The patient was warned against driving while taking sedation medications. Sharing medications is a felony. At this point in time, the patient is showing no signs of addiction, abuse, diversion or suicidal ideation. Patient has a cast on her right lower leg A bone spur removal.  She was also recently diagnosed with a DVT in her right calf. Follow-up is planned in 8 weeks time or sooner as warranted. Discharge instructions were provided. I personally saw and examined the patient and I agree with the above discussed plan of care. History of Present Illness:    Rajinder Ellis is a 65 y.o. female who presents to [address] for interval re-evaluation of the above stated pain complaints. The patient has a past medical and chronic pain history as outlined in the assessment section. She was last seen on 7/7/2023. At today's visit patient states that their pain symptoms are same with a pain score of 7/10 on the verbal numeric pain scale. The patient's pain is worse evening. The patient's pain is constant in nature. And the quality of the patient's pain is described as burning dull aching, sharp, throbbing, numbness, pins & needles. The patient's pain is located in the neck, shoulder, arm, back, and legs.     Pain Contract Signed: 1/13/2023  Last Urine Drug Screen: 7/7/2023    Other than as stated above, the patient denies any interval changes in medications, medical condition, mental condition, symptoms, or allergies since the last office visit. Review of Systems:    Review of Systems   Respiratory: Negative for shortness of breath. Cardiovascular: Negative for chest pain. Gastrointestinal: Negative for constipation, diarrhea, nausea and vomiting. Musculoskeletal: Positive for back pain, gait problem, joint swelling, neck pain and neck stiffness. Negative for arthralgias and myalgias. Skin: Negative for rash. Neurological: Positive for weakness. Negative for dizziness and seizures. All other systems reviewed and are negative. Past Medical History:   Diagnosis Date   • Anxiety    • Asthma     exercise induced asthma   • Cervical disc disorder    • Chronic pain    • Chronic pain disorder    • Depression    • Dysuria    • Fibromyalgia, primary Est 2016   • GERD (gastroesophageal reflux disease) 2004   • Headache(784.0) As a child   • Low back pain    • Lung abnormality     nodules   • Lung nodule    • Neck pain    • Peripheral neuropathy    • Pituitary abnormality (HCC)     tumor   • Right hip pain 10/29/2019   • Thrombocytopenia ([address])        Past Surgical History:   Procedure Laterality Date   • APPENDECTOMY     • BREAST BIOPSY Right 1995   • CHOLECYSTECTOMY     • COLECTOMY     • COLON SURGERY     • EPIDURAL BLOCK INJECTION N/A 6/23/2016    Procedure: BLOCK / INJECTION EPIDURAL STEROID CERVICAL  C7-T1;  Surgeon: Coleman Sampson MD;  Location: [address] [place] [address] MAIN OR;  Service:    • EPIDURAL BLOCK INJECTION N/A 3/31/2016    Procedure: BLOCK / INJECTION EPIDURAL STEROID CERVICAL C7-T1  (C-ARM); Surgeon: Coleman Sampson MD;  Location: [facility] MAIN OR;  Service:    • EPIDURAL BLOCK INJECTION N/A 8/8/2018    Procedure: C6 C7 Cervical Epidural Steroid Injection (04894); Surgeon: Coleman Sampson MD;  Location: [facility] MAIN OR;  Service: Pain Management    • EPIDURAL BLOCK INJECTION N/A 12/16/2021    Procedure: T4 T5 thoracic epidural steroid injection (31337);   Surgeon: Coleman Sampson MD;  Location: [facility] OR;  Service: Pain Management    • EPIDURAL BLOCK INJECTION N/A 6/10/2022    Procedure: T4 T5 THORACIC EPIDURAL STEROID INJECTION (48713); Surgeon: Eddie Hansen MD;  Location: [facility] MAIN OR;  Service: Pain Management    • HYSTERECTOMY  1996   • OOPHORECTOMY Bilateral 1996   • PITUITARY SURGERY     • [place] ARTHROCENTESIS ASPIR&/INJ MAJOR JT/BURSA W/O US Right 11/8/2019    Procedure: Trochanteric Bursa Injection (69044); Surgeon: Eddie Hansen MD;  Location: [facility] MAIN OR;  Service: Pain Management    • [place] ARTHROCENTESIS ASPIR&/INJ MAJOR JT/BURSA W/O US Right 1/23/2020    Procedure: Trochanteric Bursa Injection (69032); Surgeon: Eddie Hansen MD;  Location: [facility] OR;  Service: Pain Management    • [place] NJX DX/THER SBST EPIDURAL/SUBRACH CERV/THORACIC N/A 1/21/2016    Procedure: BLOCK / INJECTION EPIDURAL STEROID CERVICAL C7-T1 (C-ARM);   Surgeon: Eddie Hansen MD;  Location: [facility] MAIN OR;  Service: Pain Management    • REDUCTION MAMMAPLASTY Bilateral 2000       Family History   Problem Relation Age of Onset   • Thyroid disease Mother    • Hyperlipidemia Mother    • Depression Mother    • Stroke Mother         TIA   • Thyroid disease Father    • Hyperlipidemia Father    • Depression Father    • Arthritis Father         Spine and knees   • Endometrial cancer Sister    • Obesity Sister    • Migraines Sister    • No Known Problems Daughter    • Cancer Maternal Grandmother         Kidney cancer   • Melanoma Maternal Grandmother    • Heart disease Maternal Grandfather    • Breast cancer Paternal Grandmother 36   • Cancer Paternal Grandmother         Breast, Lung, Liver and skin cancer   • Diabetes Paternal Grandfather    • Obesity Brother    • No Known Problems Son    • No Known Problems Maternal Uncle    • Obesity Paternal Aunt    • Colon cancer Paternal Aunt    • Obesity Paternal Aunt    • Diabetes Paternal Aunt    • Cancer Paternal Aunt         Thyroid and colon cancer   • No Known Problems Paternal Uncle    • ADD / ADHD Neg Hx    • Anesthesia problems Neg Hx    • Cancer Neg Hx    • Clotting disorder Neg Hx    • Collagen disease Neg Hx    • Dislocations Neg Hx    • Learning disabilities Neg Hx    • Neurological problems Neg Hx    • Osteoporosis Neg Hx    • Rheumatologic disease Neg Hx    • Scoliosis Neg Hx    • Vascular Disease Neg Hx        Social History     Occupational History   • Not on file   Tobacco Use   • Smoking status: Never   • Smokeless tobacco: Never   Vaping Use   • Vaping Use: Never used   Substance and Sexual Activity   • Alcohol use: No   • Drug use: No   • Sexual activity: Not Currently     Partners: Male     Birth control/protection: Post-menopausal         Current Outpatient Medications:   •  albuterol (PROVENTIL HFA,VENTOLIN HFA) 90 mcg/act inhaler, Inhale, Disp: , Rfl:   •  aspirin 81 MG tablet, Take 81 mg by mouth daily. , Disp: , Rfl:   •  cholecalciferol (VITAMIN D3) 1,000 units tablet, Take 2 tablets by mouth daily, Disp: , Rfl:   •  cyclobenzaprine (FLEXERIL) 10 mg tablet, Take 1 tablet (10 mg total) by mouth 3 (three) times a day as needed for muscle spasms, Disp: 90 tablet, Rfl: 1  •  Diclofenac Sodium (VOLTAREN) 1 %, Apply 2 g topically 4 (four) times a day as needed (pain), Disp: 100 g, Rfl: 1  •  ferrous sulfate 324 (65 Fe) mg, Take 1 tablet every other day in the morning with breakfast., Disp: 45 tablet, Rfl: 1  •  gabapentin (NEURONTIN) 800 mg tablet, Take 1 tablet (800 mg total) by mouth 3 (three) times a day, Disp: 90 tablet, Rfl: 1  •  ibuprofen (MOTRIN) 600 mg tablet, Take 1 tablet (600 mg total) by mouth every 6 (six) hours as needed for mild pain, Disp: 30 tablet, Rfl: 0  •  levothyroxine (Synthroid) 75 mcg tablet, Take 1 tablet (75 mcg total) by mouth daily, Disp: 90 tablet, Rfl: 1  •  metFORMIN (GLUCOPHAGE) 1000 MG tablet, Take 1,000 mg by mouth 2 (two) times a day with meals, Disp: , Rfl:   •  metoprolol succinate (TOPROL-XL) 25 mg 24 hr tablet, , Disp: , Rfl:   •  naloxone (NARCAN) 4 mg/0.1 mL nasal spray, Administer 1 spray into a nostril. If no response after 2-3 minutes, give another dose in the other nostril using a new spray., Disp: 1 each, Rfl: 1  •  ondansetron (Zofran ODT) 4 mg disintegrating tablet, Take 1 tablet (4 mg total) by mouth every 6 (six) hours as needed for nausea or vomiting, Disp: 20 tablet, Rfl: 0  •  [START ON 10/18/2023] oxyCODONE-acetaminophen (PERCOCET) 5-325 mg per tablet, Take 1 tablet by mouth 2 (two) times a day as needed for severe pain Max Daily Amount: 2 tablets Do not start before October 18, 2023., Disp: 60 tablet, Rfl: 0  •  [START ON 9/18/2023] oxyCODONE-acetaminophen (PERCOCET) 5-325 mg per tablet, Take 1 tablet by mouth 2 (two) times a day as needed for severe pain Max Daily Amount: 2 tablets Do not start before September 18, 2023., Disp: 60 tablet, Rfl: 0  •  traZODone (DESYREL) 50 mg tablet, , Disp: , Rfl: 0  •  albuterol (ProAir HFA) 90 mcg/act inhaler, Inhale 2 puffs every 6 (six) hours as needed for wheezing (Patient not taking: Reported on 9/8/2023), Disp: 8.5 g, Rfl: 0    Allergies   Allergen Reactions   • Levaquin [Levofloxacin In D5w] Shortness Of Breath     Also hives     • Amitiza [Lubiprostone] Other (See Comments) and Hives     Reaction Date: 10Aug2011;   Migraines and vomiting   • Biaxin [Clarithromycin] Hives   • Cephradine    • Erythromycin Hives     Reaction Date: 10Aug2011;    • Macrobid [Nitrofurantoin Monohyd Macro] Other (See Comments)     C/o passing out   • Morphine    • Penicillins Hives     Reaction Date: 10Aug2011;    • Sulfa Antibiotics    • Tetracycline    • Tetracyclines & Related Hives   • Morphine And Related Rash   • Valium [Diazepam] Rash and Vomiting     Reaction Date: 14Jun2012;        Physical Exam:    /75   Pulse 78     Constitutional:normal, well developed, well nourished, alert, in no distress and non-toxic and no overt pain behavior.  and obese  Eyes:anicteric  HEENT:grossly intact  Neck:supple, symmetric, trachea midline and no masses   Pulmonary:even and unlabored  Cardiovascular:No edema or pitting edema present  Skin:Normal without rashes or lesions and well hydrated  Psychiatric:Mood and affect appropriate  Neurologic:Cranial Nerves II-XII grossly intact  Musculoskeletal:antalgic and Patient has a cast on her right ankle, ambulating with a scooter      This document was created using speech voice recognition software. Grammatical errors, random word insertions, pronoun errors, and incomplete sentences are an occasional consequence of this system due to software limitations, ambient noise, and hardware issues. Any formal questions or concerns about content, text, or information contained within the body of this dictation should be directly addressed to the provider for clarification.

## 2023-09-08 ENCOUNTER — OFFICE VISIT (OUTPATIENT)
Dept: PAIN MEDICINE | Facility: CLINIC | Age: 58
End: 2023-09-08

## 2023-09-08 VITALS — SYSTOLIC BLOOD PRESSURE: 112 MMHG | HEART RATE: 78 BPM | DIASTOLIC BLOOD PRESSURE: 75 MMHG

## 2023-09-08 DIAGNOSIS — M54.2 NECK PAIN: ICD-10-CM

## 2023-09-08 DIAGNOSIS — G89.4 CHRONIC PAIN SYNDROME: ICD-10-CM

## 2023-09-08 DIAGNOSIS — I82.461 ACUTE DEEP VEIN THROMBOSIS (DVT) OF CALF MUSCLE VEIN OF RIGHT LOWER EXTREMITY (HCC): ICD-10-CM

## 2023-09-08 DIAGNOSIS — M50.120 CERVICAL DISC DISORDER WITH RADICULOPATHY OF MID-CERVICAL REGION: ICD-10-CM

## 2023-09-08 RX ORDER — OXYCODONE HYDROCHLORIDE AND ACETAMINOPHEN 5; 325 MG/1; MG/1
1 TABLET ORAL 2 TIMES DAILY PRN
Qty: 60 TABLET | Refills: 0 | Status: SHIPPED | OUTPATIENT
Start: 2023-10-18 | End: 2023-11-17

## 2023-09-08 RX ORDER — CYCLOBENZAPRINE HCL 10 MG
10 TABLET ORAL 3 TIMES DAILY PRN
Qty: 90 TABLET | Refills: 1 | Status: SHIPPED | OUTPATIENT
Start: 2023-09-08

## 2023-09-08 RX ORDER — GABAPENTIN 800 MG/1
800 TABLET ORAL 3 TIMES DAILY
Qty: 90 TABLET | Refills: 1 | Status: SHIPPED | OUTPATIENT
Start: 2023-09-08

## 2023-09-08 RX ORDER — OXYCODONE HYDROCHLORIDE AND ACETAMINOPHEN 5; 325 MG/1; MG/1
1 TABLET ORAL 2 TIMES DAILY PRN
Qty: 60 TABLET | Refills: 0 | Status: SHIPPED | OUTPATIENT
Start: 2023-09-18 | End: 2023-10-18

## 2023-09-08 NOTE — PATIENT INSTRUCTIONS

## 2023-09-30 DIAGNOSIS — E03.9 HYPOTHYROIDISM, UNSPECIFIED TYPE: ICD-10-CM

## 2023-10-03 RX ORDER — LEVOTHYROXINE SODIUM 0.07 MG/1
75 TABLET ORAL DAILY
Qty: 90 TABLET | Refills: 2 | Status: SHIPPED | OUTPATIENT
Start: 2023-10-03

## 2023-11-06 ENCOUNTER — OFFICE VISIT (OUTPATIENT)
Dept: PAIN MEDICINE | Facility: CLINIC | Age: 58
End: 2023-11-06
Payer: MEDICARE

## 2023-11-06 VITALS — DIASTOLIC BLOOD PRESSURE: 78 MMHG | TEMPERATURE: 98.2 F | SYSTOLIC BLOOD PRESSURE: 112 MMHG | HEART RATE: 106 BPM

## 2023-11-06 DIAGNOSIS — M50.120 CERVICAL DISC DISORDER WITH RADICULOPATHY OF MID-CERVICAL REGION: ICD-10-CM

## 2023-11-06 DIAGNOSIS — F11.20 UNCOMPLICATED OPIOID DEPENDENCE (HCC): ICD-10-CM

## 2023-11-06 DIAGNOSIS — G89.4 CHRONIC PAIN SYNDROME: ICD-10-CM

## 2023-11-06 DIAGNOSIS — M79.18 MYOFASCIAL PAIN SYNDROME: ICD-10-CM

## 2023-11-06 DIAGNOSIS — Z79.891 LONG-TERM CURRENT USE OF OPIATE ANALGESIC: Primary | ICD-10-CM

## 2023-11-06 DIAGNOSIS — M54.2 NECK PAIN: ICD-10-CM

## 2023-11-06 PROCEDURE — 99214 OFFICE O/P EST MOD 30 MIN: CPT | Performed by: STUDENT IN AN ORGANIZED HEALTH CARE EDUCATION/TRAINING PROGRAM

## 2023-11-06 RX ORDER — OXYCODONE HYDROCHLORIDE AND ACETAMINOPHEN 5; 325 MG/1; MG/1
1 TABLET ORAL 2 TIMES DAILY PRN
Qty: 60 TABLET | Refills: 0 | Status: SHIPPED | OUTPATIENT
Start: 2023-11-17 | End: 2023-12-17

## 2023-11-06 RX ORDER — OXYCODONE HYDROCHLORIDE AND ACETAMINOPHEN 5; 325 MG/1; MG/1
1 TABLET ORAL 2 TIMES DAILY PRN
Qty: 60 TABLET | Refills: 0 | Status: SHIPPED | OUTPATIENT
Start: 2023-12-17 | End: 2024-01-16

## 2023-11-06 NOTE — PROGRESS NOTES
Pain Medicine Follow-Up Note    Assessment:  1. Long-term current use of opiate analgesic    2. Uncomplicated opioid dependence ([address])    3. Chronic pain syndrome    4. Myofascial pain syndrome    5. Neck pain    6. Cervical disc disorder with radiculopathy of mid-cervical region      Patient is a pleasant 41-year-old woman presenting as a follow-up visit after last being seen on 9/8/2023 by Marie Lewis. Patient has been on chronic opioid therapy for years for multiple pain complaints including cervical disc disorder with radiculopathy, left-sided low back pain with lumbar radiculopathy and trochanteric bursitis. Patient is currently managed on oxycodone 5-325 mg twice daily, Flexeril 10 mg 3 times daily as needed and gabapentin 800 mg 3 times daily. Patient's last Percocet prescription was filled on 10/19/2023 and her last UDS was on 7/7/2023. Pain contract is up-to-date. Patient dates her symptoms are the same since last visit, rating her pain is 6 out of 10 on numeric rating scale. The pain is worse at night and the pain is constant, occurring hundredths anytime. The quality pain is burning, dull aching, throbbing, pressure-like, shooting, numbing, pins-and-needles and is in the bilateral cervical, thoracic and lumbar spine with left-sided lumbar radicular symptoms and bilateral cervical radicular symptoms. She states her current pain relievers are making a significant difference in her life as they are jimproving her pain by 30 to 40%. She denies any side effects from her current medication. Of note patient has received trigger point injections in the cervical paraspinal muscles typically once to twice a year which provides her with significant benefit for her neck pain and right hand radicular symptoms. On exam patient with tenderness to palpation in bilateral cervicals paraspinal muscles with taut bands appreciated and positive jump sign.   Given this we will schedule patient for trigger point injections targeting bilateral cervical paraspinal muscles. Patient counseled on risk and benefits of this procedure like to proceed. Additionally, we will continue patient's Percocet, gabapentin and Flexeril as previously prescribed.     Plan:  Continue Percocet 5-325 mg BID prn; 60 tablets  Continue gabapentin 800 mg TID   Continue flexeril 10 mg TID prn   Continue diclofenac ointment 1% 4 times daily prn  Continue exercise therapy  Schedule for trigger point injections of the cervical paraspinal muscles   Follow up for trigger point injections  Oral swab drug screen today  Up to date on opioid agreement   Orders Placed This Encounter   Procedures   • MM OF_Alprazolam Definitive   • MM OF_Amphetamine Definitive Test   • MM OF_Buprenorphine Definitive Test   • MM OF_Carisoprodol Definitive Test   • MM OF_Clonazepam Definitive   • MM OF_Cocaine Definitive Test   • MM OF_Codeine Definitive   • MM OF_Dextromethorphan Definitive Test   • MM OF_Diazepam Definitive   • MM OF_Fentanyl Definitive Test   • MM OF_Gabapentin Definitive Test   • MM OF_Heroin Definitive Test   • MM OF_Hydrocodone Definitive   • MM OF_Hydromorphone Definitive   • MM OF_Levorphanol Definitive Test   • MM OF_Lorazepam Definitive   • MM OF_MDMA Definitive Test   • MM OF_Meperidine Definitive Test   • MM OF_Methadone Definitive Test   • MM OF_Methylphenidate Definitive Test   • MM OF_Morphine Definitive   • MM OF_Naltrexone Definitive Test   • MM OF_Oxazepam Definitive   • MM OF_Oxycodone Definitive Test - Oral Fluid   • MM OF_Oxymorphone Definitive Test   • MM OF_Phencyclidine Definitive Test   • MM OF_Pregabalin Definitive Test   • MM OF_Tapentadol Definitive Test   • MM OF_Temazepam Definitive   • MM OF_THC Definitive Test   • MM OF_Tramadol Definitive Test       New Medications Ordered This Visit   Medications   • apixaban (Eliquis) 2.5 mg     Sig: Take by mouth 2 (two) times a day   • oxyCODONE-acetaminophen (PERCOCET) 5-325 mg per tablet Sig: Take 1 tablet by mouth 2 (two) times a day as needed for severe pain Max Daily Amount: 2 tablets Do not start before November 17, 2023. Dispense:  60 tablet     Refill:  0     DNF until 10/18/2023   • oxyCODONE-acetaminophen (PERCOCET) 5-325 mg per tablet     Sig: Take 1 tablet by mouth 2 (two) times a day as needed for severe pain Max Daily Amount: 2 tablets Do not start before December 17, 2023. Dispense:  60 tablet     Refill:  0     DNF until 10/18/2023       My impressions and treatment recommendations were discussed in detail with the patient who verbalized understanding and had no further questions. [place] Prescription Drug Monitoring Program report was reviewed and was appropriate  and [place] Prescription Drug Monitoring Program report was reviewed and was appropriate     There are risks associated with opioid medications, including dependence, addiction and tolerance. The patient understands and agrees to use these medications only as prescribed. Potential side effects of the medications include, but are not limited to, constipation, drowsiness, addiction, impaired judgment and risk of fatal overdose if not taken as prescribed. The patient was warned against driving while taking sedation medications. Sharing medications is a felony. At this point in time, the patient is showing no signs of addiction, abuse, diversion or suicidal ideation. Complete risks and benefits including bleeding, infection, tissue reaction, nerve injury and allergic reaction were discussed. The approach was demonstrated using models and literature was provided. Verbal and written consent was obtained. An oral drug screen swab was collected at today's office visit. The swab will be sent for confirmatory testing.  The drug screen is medically necessary because the patient is either dependent on opioid medication or is being considered for opioid medication therapy and the results could impact ongoing or future treatment. The drug screen is to evaluate for the presences or absence of prescribed, non-prescribed, and/or illicit drugs/substances. Follow-up is planned in four weeks time or sooner as warranted. Discharge instructions were provided. I personally saw and examined the patient and I agree with the above discussed plan of care. History of Present Illness:    Ilene Rust is a 63 y.o. female who presents to [facility] for interval re-evaluation of the above stated pain complaints. The patient has a past medical and chronic pain history as outlined in the assessment section. She was last seen on 9/8/2023. Patient is a pleasant 78-year-old woman presenting as a follow-up visit after last being seen on 9/8/2023 by Zhen Johnson. Patient has been on chronic opioid therapy for years for multiple pain complaints including cervical disc disorder with radiculopathy, left-sided low back pain with lumbar radiculopathy and trochanteric bursitis. Patient is currently managed on oxycodone 5-325 mg twice daily, Flexeril 10 mg 3 times daily as needed and gabapentin 800 mg 3 times daily. Patient's last Percocet prescription was filled on 10/19/2023 and her last UDS was on 7/7/2023. Pain contract is up-to-date. Patient dates her symptoms are the same since last visit, rating her pain is 6 out of 10 on numeric rating scale. The pain is worse at night and the pain is constant, occurring hundredths anytime. The quality pain is burning, dull aching, throbbing, pressure-like, shooting, numbing, pins-and-needles and is in the bilateral cervical, thoracic and lumbar spine with left-sided lumbar radicular symptoms and bilateral cervical radicular symptoms. She states her current pain relievers are making a significant difference in her life as they are jimproving her pain by 30 to 40%. She denies any side effects from her current medication.     Of note patient has received trigger point injections in the cervical paraspinal muscles typically once to twice a year which provides her with significant benefit for her neck pain and right hand radicular symptoms. Other than as stated above, the patient denies any interval changes in medications, medical condition, mental condition, symptoms, or allergies since the last office visit. Review of Systems:    Review of Systems   Constitutional:  Negative for unexpected weight change. HENT:  Negative for ear pain. Eyes:  Negative for visual disturbance. Respiratory:  Negative for shortness of breath and wheezing. Gastrointestinal:  Negative for abdominal pain. Musculoskeletal:  Positive for back pain, gait problem, joint swelling, neck pain and neck stiffness. Decreased ROM, Joint and muscle pain   Neurological:  Positive for weakness, numbness and headaches. Psychiatric/Behavioral:  Positive for dysphoric mood and sleep disturbance. Negative for decreased concentration. The patient is nervous/anxious.           Past Medical History:   Diagnosis Date   • Anxiety    • Asthma     exercise induced asthma   • Cervical disc disorder    • Chronic pain    • Chronic pain disorder    • Depression    • Dysuria    • Fibromyalgia, primary Est 2016   • GERD (gastroesophageal reflux disease) 2004   • Headache(784.0) As a child   • Low back pain    • Lung abnormality     nodules   • Lung nodule    • Neck pain    • Peripheral neuropathy    • Pituitary abnormality (HCC)     tumor   • Right hip pain 10/29/2019   • Thrombocytopenia ([address])        Past Surgical History:   Procedure Laterality Date   • APPENDECTOMY     • BREAST BIOPSY Right 1995   • CHOLECYSTECTOMY     • COLECTOMY     • COLON SURGERY     • EPIDURAL BLOCK INJECTION N/A 6/23/2016    Procedure: BLOCK / INJECTION EPIDURAL STEROID CERVICAL  C7-T1;  Surgeon: Diamond Dallas MD;  Location: [address] [place] [address] MAIN OR;  Service:    • EPIDURAL BLOCK INJECTION N/A 3/31/2016 Procedure: BLOCK / INJECTION EPIDURAL STEROID CERVICAL C7-T1  (C-ARM); Surgeon: Camelia Martines MD;  Location: [facility] OR;  Service:    • EPIDURAL BLOCK INJECTION N/A 8/8/2018    Procedure: C6 C7 Cervical Epidural Steroid Injection (83421); Surgeon: Camelia Martines MD;  Location: [facility] OR;  Service: Pain Management    • EPIDURAL BLOCK INJECTION N/A 12/16/2021    Procedure: T4 T5 thoracic epidural steroid injection (60249); Surgeon: Camelia Martines MD;  Location: [facility] OR;  Service: Pain Management    • EPIDURAL BLOCK INJECTION N/A 6/10/2022    Procedure: T4 T5 THORACIC EPIDURAL STEROID INJECTION (88661); Surgeon: Camelia Martines MD;  Location: [facility] OR;  Service: Pain Management    • HYSTERECTOMY  1996   • OOPHORECTOMY Bilateral 1996   • PITUITARY SURGERY     • KS ARTHROCENTESIS ASPIR&/INJ MAJOR JT/BURSA W/O US Right 11/8/2019    Procedure: Trochanteric Bursa Injection (87663); Surgeon: Camelia Martines MD;  Location: [facility] OR;  Service: Pain Management    • KS ARTHROCENTESIS ASPIR&/INJ MAJOR JT/BURSA W/O US Right 1/23/2020    Procedure: Trochanteric Bursa Injection (83680); Surgeon: Camelia Martines MD;  Location: [facility] OR;  Service: Pain Management    • KS NJX DX/THER SBST EPIDURAL/SUBRACH CERV/THORACIC N/A 1/21/2016    Procedure: BLOCK / INJECTION EPIDURAL STEROID CERVICAL C7-T1 (C-ARM);   Surgeon: Camelia Martines MD;  Location: [facility] OR;  Service: Pain Management    • REDUCTION MAMMAPLASTY Bilateral 2000       Family History   Problem Relation Age of Onset   • Thyroid disease Mother    • Hyperlipidemia Mother    • Depression Mother    • Stroke Mother         TIA   • Thyroid disease Father    • Hyperlipidemia Father    • Depression Father    • Arthritis Father         Spine and knees   • Endometrial cancer Sister    • Obesity Sister    • Migraines Sister    • No Known Problems Daughter    • Cancer Maternal Grandmother         Kidney cancer   • Melanoma Maternal Grandmother    • Heart disease Maternal Grandfather    • Breast cancer Paternal Grandmother 36   • Cancer Paternal Grandmother         Breast, Lung, Liver and skin cancer   • Diabetes Paternal Grandfather    • Obesity Brother    • No Known Problems Son    • No Known Problems Maternal Uncle    • Obesity Paternal Aunt    • Colon cancer Paternal Aunt    • Obesity Paternal Aunt    • Diabetes Paternal Aunt    • Cancer Paternal Aunt         Thyroid and colon cancer   • No Known Problems Paternal Uncle    • ADD / ADHD Neg Hx    • Anesthesia problems Neg Hx    • Cancer Neg Hx    • Clotting disorder Neg Hx    • Collagen disease Neg Hx    • Dislocations Neg Hx    • Learning disabilities Neg Hx    • Neurological problems Neg Hx    • Osteoporosis Neg Hx    • Rheumatologic disease Neg Hx    • Scoliosis Neg Hx    • Vascular Disease Neg Hx        Social History     Occupational History   • Not on file   Tobacco Use   • Smoking status: Never   • Smokeless tobacco: Never   Vaping Use   • Vaping Use: Never used   Substance and Sexual Activity   • Alcohol use: No   • Drug use: No   • Sexual activity: Not Currently     Partners: Male     Birth control/protection: Post-menopausal         Current Outpatient Medications:   •  albuterol (ProAir HFA) 90 mcg/act inhaler, Inhale 2 puffs every 6 (six) hours as needed for wheezing, Disp: 8.5 g, Rfl: 0  •  albuterol (PROVENTIL HFA,VENTOLIN HFA) 90 mcg/act inhaler, Inhale, Disp: , Rfl:   •  apixaban (Eliquis) 2.5 mg, Take by mouth 2 (two) times a day, Disp: , Rfl:   •  cholecalciferol (VITAMIN D3) 1,000 units tablet, Take 2 tablets by mouth daily, Disp: , Rfl:   •  cyclobenzaprine (FLEXERIL) 10 mg tablet, Take 1 tablet (10 mg total) by mouth 3 (three) times a day as needed for muscle spasms, Disp: 90 tablet, Rfl: 1  •  Diclofenac Sodium (VOLTAREN) 1 %, Apply 2 g topically 4 (four) times a day as needed (pain), Disp: 100 g, Rfl: 1  •  ferrous sulfate 324 (65 Fe) mg, Take 1 tablet every other day in the morning with breakfast., Disp: 45 tablet, Rfl: 1  •  gabapentin (NEURONTIN) 800 mg tablet, Take 1 tablet (800 mg total) by mouth 3 (three) times a day, Disp: 90 tablet, Rfl: 1  •  ibuprofen (MOTRIN) 600 mg tablet, Take 1 tablet (600 mg total) by mouth every 6 (six) hours as needed for mild pain, Disp: 30 tablet, Rfl: 0  •  levothyroxine 75 mcg tablet, TAKE 1 TABLET BY MOUTH EVERY DAY, Disp: 90 tablet, Rfl: 2  •  metFORMIN (GLUCOPHAGE) 1000 MG tablet, Take 1,000 mg by mouth 2 (two) times a day with meals, Disp: , Rfl:   •  metoprolol succinate (TOPROL-XL) 25 mg 24 hr tablet, , Disp: , Rfl:   •  naloxone (NARCAN) 4 mg/0.1 mL nasal spray, Administer 1 spray into a nostril. If no response after 2-3 minutes, give another dose in the other nostril using a new spray., Disp: 1 each, Rfl: 1  •  ondansetron (Zofran ODT) 4 mg disintegrating tablet, Take 1 tablet (4 mg total) by mouth every 6 (six) hours as needed for nausea or vomiting, Disp: 20 tablet, Rfl: 0  •  [START ON 11/17/2023] oxyCODONE-acetaminophen (PERCOCET) 5-325 mg per tablet, Take 1 tablet by mouth 2 (two) times a day as needed for severe pain Max Daily Amount: 2 tablets Do not start before November 17, 2023., Disp: 60 tablet, Rfl: 0  •  [START ON 12/17/2023] oxyCODONE-acetaminophen (PERCOCET) 5-325 mg per tablet, Take 1 tablet by mouth 2 (two) times a day as needed for severe pain Max Daily Amount: 2 tablets Do not start before December 17, 2023., Disp: 60 tablet, Rfl: 0  •  traZODone (DESYREL) 50 mg tablet, , Disp: , Rfl: 0  •  aspirin 81 MG tablet, Take 81 mg by mouth daily.  (Patient not taking: Reported on 11/6/2023), Disp: , Rfl:   •  oxyCODONE-acetaminophen (PERCOCET) 5-325 mg per tablet, Take 1 tablet by mouth 2 (two) times a day as needed for severe pain Max Daily Amount: 2 tablets Do not start before September 18, 2023., Disp: 60 tablet, Rfl: 0    Allergies   Allergen Reactions   • Levaquin [Levofloxacin In D5w] Shortness Of Breath     Also hives     • Amitiza [Lubiprostone] Other (See Comments) and Hives     Reaction Date: 10Aug2011;   Migraines and vomiting   • Biaxin [Clarithromycin] Hives   • Cephradine    • Erythromycin Hives     Reaction Date: 10Aug2011;    • Macrobid [Nitrofurantoin Monohyd Macro] Other (See Comments)     C/o passing out   • Morphine    • Penicillins Hives     Reaction Date: 10Aug2011;    • Sulfa Antibiotics    • Tetracycline    • Tetracyclines & Related Hives   • Morphine And Related Rash   • Valium [Diazepam] Rash and Vomiting     Reaction Date: 14Jun2012;        Physical Exam:    /78   Pulse (!) 106   Temp 98.2 °F (36.8 °C)     Constitutional:normal, well developed, well nourished, alert, in no distress and non-toxic and no overt pain behavior. Eyes:anicteric  HEENT:grossly intact  Neck:supple, symmetric, trachea midline and no masses   Pulmonary:even and unlabored  Cardiovascular:No edema or pitting edema present  Skin:Normal without rashes or lesions and well hydrated  Psychiatric:Mood and affect appropriate  Neurologic:Cranial Nerves II-XII grossly intact  Musculoskeletal:normal gait. TTP in cervical paraspinal muscles, taut bands and + Jump sign.        Imaging  No orders to display         Orders Placed This Encounter   Procedures   • MM OF_Alprazolam Definitive   • MM OF_Amphetamine Definitive Test   • MM OF_Buprenorphine Definitive Test   • MM OF_Carisoprodol Definitive Test   • MM OF_Clonazepam Definitive   • MM OF_Cocaine Definitive Test   • MM OF_Codeine Definitive   • MM OF_Dextromethorphan Definitive Test   • MM OF_Diazepam Definitive   • MM OF_Fentanyl Definitive Test   • MM OF_Gabapentin Definitive Test   • MM OF_Heroin Definitive Test   • MM OF_Hydrocodone Definitive   • MM OF_Hydromorphone Definitive   • MM OF_Levorphanol Definitive Test   • MM OF_Lorazepam Definitive   • MM OF_MDMA Definitive Test   • MM OF_Meperidine Definitive Test   • MM OF_Methadone Definitive Test   • MM OF_Methylphenidate Definitive Test   • MM OF_Morphine Definitive   • MM OF_Naltrexone Definitive Test   • MM OF_Oxazepam Definitive   • MM OF_Oxycodone Definitive Test - Oral Fluid   • MM OF_Oxymorphone Definitive Test   • MM OF_Phencyclidine Definitive Test   • MM OF_Pregabalin Definitive Test   • MM OF_Tapentadol Definitive Test   • MM OF_Temazepam Definitive   • MM OF_THC Definitive Test   • MM OF_Tramadol Definitive Test

## 2023-11-08 LAB
7AMINOCLONAZEPAM SAL QL CFM: NEGATIVE NG/ML
AMPHET SAL QL CFM: NEGATIVE NG/ML
BUPRENORPHINE SAL QL SCN: NEGATIVE NG/ML
CARBOXYTHC SAL QL CFM: NEGATIVE NG/ML
CCP IGG SERPL-ACNC: NEGATIVE
COCAINE SAL QL CFM: NEGATIVE NG/ML
CODEINE SAL QL CFM: NEGATIVE NG/ML
D-METHORPHAN SAL QL CFM: NEGATIVE NG/ML
DXO+LEVORPHANOL SAL QL CFM: NEGATIVE NG/ML
DXO+LEVORPHANOL SAL QL CFM: NEGATIVE NG/ML
EDDP SAL QL CFM: NEGATIVE NG/ML
GABAPENTIN SAL QL CFM: NORMAL NG/ML
HYDROCODONE SAL QL CFM: NEGATIVE NG/ML
HYDROCODONE SAL QL CFM: NEGATIVE NG/ML
HYDROMORPHONE SAL QL CFM: NEGATIVE NG/ML
LEUKEMIA MARKERS BLD-IMP: NEGATIVE NG/ML
M PROTEIN 3 UR ELPH-MCNC: NORMAL NG/ML
M TB TUBERC IGNF/MITOGEN IGNF CONTROL: NEGATIVE NG/ML
METHADONE SAL QL CFM: NEGATIVE NG/ML
MORPHINE SAL QL CFM: NEGATIVE NG/ML
MORPHINE SAL QL CFM: NEGATIVE NG/ML
NALTREXOL SAL QL CFM: NEGATIVE NG/ML
NALTREXONE SAL QL CFM: NEGATIVE NG/ML
OXYMORPHONE SAL QL CFM: NEGATIVE NG/ML
OXYMORPHONE SAL QL CFM: NEGATIVE NG/ML
PCP SAL QL CFM: NEGATIVE NG/ML
PREGABALIN SAL QL CFM: NEGATIVE NG/ML
SL AMB 6-MAM (HEROIN METABOLITE) QUANTIFICATION: NEGATIVE NG/ML
SL AMB ALPRAZOLAM QUANTIFICATION: NEGATIVE NG/ML
SL AMB CARISOPRODOL QUANTIFICATION: NEGATIVE NG/ML
SL AMB CLONAZEPAM QUANTIFICATION: NEGATIVE NG/ML
SL AMB DIAZEPAM QUANTIFICATION: NEGATIVE NG/ML
SL AMB FENTANYL QUANTIFICATION: NEGATIVE NG/ML
SL AMB MDMA QUANTIFICATION: NEGATIVE NG/ML
SL AMB MEPROBAMATE QUANTIFICATION: NEGATIVE NG/ML
SL AMB N-DESMETHYL-TRAMADOL QUANTIFICATION SALIVA: NEGATIVE NG/ML
SL AMB NORBUPRENORPHINE QUANTIFICATION: NEGATIVE NG/ML
SL AMB NORDIAZEPAM QUANTIFICATION: NEGATIVE NG/ML
SL AMB NORFENTANYL QUANTIFICATION: NEGATIVE NG/ML
SL AMB NORHYDROCODONE QUANTIFICATION: NEGATIVE NG/ML
SL AMB NORHYDROCODONE QUANTIFICATION: NEGATIVE NG/ML
SL AMB NORMEPERIDINE QUANTIFICATION: NEGATIVE NG/ML
SL AMB NOROXYCODONE QUANTIFICATION: NORMAL NG/ML
SL AMB OXAZEPAM QUANTIFICATION: NEGATIVE NG/ML
SL AMB RITALINIC ACID QUANTIFICATION: NEGATIVE
SL AMB TEMAZEPAM QUANTIFICATION: NEGATIVE NG/ML
SL AMB TEMAZEPAM QUANTIFICATION: NEGATIVE NG/ML
SL AMB TRAMADOL QUANTIFICATION: NEGATIVE NG/ML
SQUAMOUS #/AREA URNS HPF: NEGATIVE NG/ML
TAPENTADOL SAL QL CFM: NEGATIVE NG/ML

## 2023-11-16 ENCOUNTER — TELEPHONE (OUTPATIENT)
Age: 58
End: 2023-11-16

## 2023-11-16 NOTE — TELEPHONE ENCOUNTER
Caller: pt    Doctor: Diana Garcia    Reason for call: pt is sick and needs to r/s.     Call back#: [phone]

## 2023-11-17 LAB — HBA1C MFR BLD HPLC: 5.8 %

## 2023-12-06 ENCOUNTER — PROCEDURE VISIT (OUTPATIENT)
Dept: PAIN MEDICINE | Facility: CLINIC | Age: 58
End: 2023-12-06
Payer: MEDICARE

## 2023-12-06 VITALS — TEMPERATURE: 98.1 F | DIASTOLIC BLOOD PRESSURE: 75 MMHG | SYSTOLIC BLOOD PRESSURE: 115 MMHG | HEART RATE: 88 BPM

## 2023-12-06 DIAGNOSIS — G89.4 CHRONIC PAIN SYNDROME: ICD-10-CM

## 2023-12-06 DIAGNOSIS — M79.18 MYOFASCIAL PAIN SYNDROME: ICD-10-CM

## 2023-12-06 DIAGNOSIS — M54.2 NECK PAIN: Primary | ICD-10-CM

## 2023-12-06 PROCEDURE — 20552 NJX 1/MLT TRIGGER POINT 1/2: CPT | Performed by: STUDENT IN AN ORGANIZED HEALTH CARE EDUCATION/TRAINING PROGRAM

## 2023-12-06 RX ORDER — LIDOCAINE HYDROCHLORIDE 10 MG/ML
5 INJECTION, SOLUTION INFILTRATION; PERINEURAL ONCE
Status: COMPLETED | OUTPATIENT
Start: 2023-12-06 | End: 2023-12-06

## 2023-12-06 RX ORDER — METHYLPREDNISOLONE ACETATE 40 MG/ML
40 INJECTION, SUSPENSION INTRA-ARTICULAR; INTRALESIONAL; INTRAMUSCULAR; SOFT TISSUE ONCE
Status: COMPLETED | OUTPATIENT
Start: 2023-12-06 | End: 2023-12-06

## 2023-12-06 RX ADMIN — LIDOCAINE HYDROCHLORIDE 5 ML: 10 INJECTION, SOLUTION INFILTRATION; PERINEURAL at 16:59

## 2023-12-06 RX ADMIN — LIDOCAINE HYDROCHLORIDE 5 ML: 10 INJECTION, SOLUTION INFILTRATION; PERINEURAL at 16:30

## 2023-12-06 RX ADMIN — METHYLPREDNISOLONE ACETATE 40 MG: 40 INJECTION, SUSPENSION INTRA-ARTICULAR; INTRALESIONAL; INTRAMUSCULAR; SOFT TISSUE at 16:30

## 2023-12-06 RX ADMIN — METHYLPREDNISOLONE ACETATE 40 MG: 40 INJECTION, SUSPENSION INTRA-ARTICULAR; INTRALESIONAL; INTRAMUSCULAR; SOFT TISSUE at 16:59

## 2023-12-06 NOTE — PROGRESS NOTES
Universal Protocol:  Consent: Verbal consent obtained. Risks and benefits: risks, benefits and alternatives were discussed  Consent given by: patient  Patient understanding: patient states understanding of the procedure being performed  Patient consent: the patient's understanding of the procedure matches consent given  Procedure consent: procedure consent matches procedure scheduled  Relevant documents: relevant documents present and verified  Test results: test results not available  Site marked: the operative site was not marked  Radiology Images displayed and confirmed.  If images not available, report reviewed: imaging studies not available  Patient identity confirmed: verbally with patient  Supporting Documentation  Indications: pain   Trigger Point Injections: single/multiple trigger point(s): 1-2 muscle groups    Injection site identified by: palpation  Procedure Details  Location(s):    Neck/Upper Back: L cervical paraspinals, R cervical paraspinals, R upper trapezius and R levator scapulae     Prep: patient was prepped and draped in usual sterile fashion  Needle size: 25 G  Medications: 5 mL lidocaine (XYLOCAINE) injection 1 %; 5 mL lidocaine (XYLOCAINE) injection 1 %; 40 mg methylPREDNISolone acetate (DEPO-MEDROL) injection 40 mg/mL CHIEF COMPLAINT/DIAGNOSIS:    HPI: 76 y/o F PMHx Afib s/p ablation, anxiety, CVA, HLD, HTN; s/p recent ablation by Dr. Meredith two days ago developed chills/weakness, lethargy, poor appetite and fainted as per daughter - pt does not remember. On the floor felt weak, dizzy, nauseous, had 1 episode of vomiting in ambulance. Feels better now    2/10:  REVIEW OF SYSTEMS:  All other review of systems is negative unless indicated above    PHYSICAL EXAM:  Constitutional: NAD, awake and alert, well-developed  HEENT: PERR, EOMI, Normal Hearing, MMM  Neck: Soft and supple, No LAD, No JVD  Respiratory: Breath sounds are clear bilaterally, No wheezing, rales or rhonchi  Cardiovascular: S1 and S2, regular rate and rhythm, no Murmurs, gallops or rubs  Gastrointestinal: Bowel Sounds present, soft, nontender, nondistended, no guarding, no rebound  Extremities: No peripheral edema  Vascular: 2+ peripheral pulses  Neurological: A/O x 3, no focal deficits  Musculoskeletal: 5/5 strength b/l upper and lower extremities  Skin: No rashes    Vital Signs Last 24 Hrs  T(C): 37.1 (10 Feb 2021 22:45), Max: 38.5 (10 Feb 2021 21:13)  T(F): 98.8 (10 Feb 2021 22:45), Max: 101.3 (10 Feb 2021 21:13)  HR: 85 (10 Feb 2021 21:13) (64 - 92)  BP: 153/68 (10 Feb 2021 21:13) (136/70 - 165/85)  BP(mean): 71 (10 Feb 2021 15:21) (71 - 89)  RR: 16 (10 Feb 2021 21:13) (14 - 16)  SpO2: 99% (10 Feb 2021 21:13) (96% - 100%)    LABS: All Labs Reviewed:                        11.1 5.31  )-----------( 266      ( 11 Feb 2021 07:15 )             34.5     02-11    141  |  108  |  12  ----------------------------<  120<H>  3.3<L>   |  30  |  0.65    Ca    8.6      11 Feb 2021 07:15    TPro  7.4  /  Alb  3.1<L>  /  TBili  0.9  /  DBili  x   /  AST  35  /  ALT  39  /  AlkPhos  78  02-09      CARDIAC MARKERS ( 10 Feb 2021 15:02 )  .087 ng/mL / x     / x     / x     / x      CARDIAC MARKERS ( 10 Feb 2021 10:04 )  0.119 ng/mL / x     / x     / x     / x      CARDIAC MARKERS ( 09 Feb 2021 23:23 )  0.210 ng/mL / x     / x     / x     / x        RADIOLOGY:  < from: US Duplex Carotid Arteries Complete, Bilateral (02.10.21 @ 11:56) >  IMPRESSION: No significant hemodynamic stenosis of either carotid artery.  Atherosclerosis of the carotid arteries is present.  Measurement of carotid stenosis is based on velocity parameters that correlate the residual internal carotid diameter with that of themore distal vessel in accordance with a method such as the [employer] American Symptomatic Carotid Endarterectomy Trial (NASCET).  < end of copied text >    < from: CT Head No Cont (02.10.21 @ 10:53) >  IMPRESSION: No acute intracranial hemorrhage or mass effect.  < end of copied text >    ECHOCARDIOGRAM:  < from: TTE Echo Complete w/o Contrast w/ Doppler (02.10.21 @ 13:53) >   Summary     The left ventricle is normal in size, wall motion and contractility.   Mild concentric left ventricular hypertrophy is present.   Estimated left ventricular ejection fraction is 60-65 %.   The left atrium is mildly dilated.   Normal appearing right ventricle structure and function.   Normal aortic valve structure and function.   Mild to Moderatemitral regurgitation is present.   Mild mitral annular calcification is present.   Trace pericardial effusion is present.  < end of copied text >    MEDICATIONS  (STANDING):  apixaban 5 milliGRAM(s) Oral every 12 hours  aspirin enteric coated 81 milliGRAM(s) Oral daily  atorvastatin 40 milliGRAM(s) Oral at bedtime  melatonin 5 milliGRAM(s) Oral at bedtime  metoprolol tartrate 50 milliGRAM(s) Oral two times a day    MEDICATIONS  (PRN):  acetaminophen   Tablet .. 650 milliGRAM(s) Oral every 6 hours PRN Temp greater or equal to 38C (100.4F), Mild Pain (1 - 3)  ALPRAZolam 0.25 milliGRAM(s) Oral daily PRN anxiety    TELEMETRY REVIEW:  2/11:    ASSESSMENT AND PLAN:    1. Syncope in the settings of dehydration with decreased po intake/diuretics/chills 2 days after atrial flutter ablation - likely vasovagal, but can not rule out new pericardial effusion after recent ablation/less likely but possible CVA after ablation as eliquis was stopped for 24h   - due to fever will need to r/o infectious etiology - BCx, UA and if abn UCx   - no abx for now as fevers resolved and no leukocytosis present    2. Troponinemia - expected after ablation - trend    3. LE edema with crackles on exam of lungs - possibly related to IVF given vs underlying HFpEF   - f/u BNP and TTE    4. HTN on BBL    5. HLD on statin    6. PAF s/p ablation - discussed with NP from Dr. Meredith ([phone]) - will f/u TTE, BNP, troponins, cardiology eval and update [facility] team if significant abnormalities present    7. Hx of PVD s/p bypass RLE - neg cath 4 y.a., will obtain CUS    8. VTE proph - on eliquis    CHIEF COMPLAINT/DIAGNOSIS:    HPI: 78 y/o F PMHx Afib s/p ablation, anxiety, CVA, HLD, HTN; s/p recent ablation by Dr. Meredith two days ago developed chills/weakness, lethargy, poor appetite and fainted as per daughter - pt does not remember. On the floor felt weak, dizzy, nauseous, had 1 episode of vomiting in ambulance. Feels better now    2/10: c/o dizziness, lightheadedness.   REVIEW OF SYSTEMS:  All other review of systems is negative unless indicated above    PHYSICAL EXAM:  Constitutional: NAD, awake and alert, well-developed  HEENT: PERR, EOMI, Normal Hearing, MMM  Neck: Soft and supple, No LAD, No JVD  Respiratory: Breath sounds are clear bilaterally, No wheezing, rales or rhonchi  Cardiovascular: S1 and S2, regular rate and rhythm, no Murmurs, gallops or rubs  Gastrointestinal: Bowel Sounds present, soft, nontender, nondistended, no guarding, no rebound  Extremities: No peripheral edema  Vascular: 2+ peripheral pulses  Neurological: A/O x 3, no focal deficits  Musculoskeletal: 5/5 strength b/l upper and lower extremities  Skin: No rashes    Vital Signs Last 24 Hrs  T(C): 37.1 (10 Feb 2021 22:45), Max: 38.5 (10 Feb 2021 21:13)  T(F): 98.8 (10 Feb 2021 22:45), Max: 101.3 (10 Feb 2021 21:13)  HR: 85 (10 Feb 2021 21:13) (64 - 92)  BP: 153/68 (10 Feb 2021 21:13) (136/70 - 165/85)  BP(mean): 71 (10 Feb 2021 15:21) (71 - 89)  RR: 16 (10 Feb 2021 21:13) (14 - 16)  SpO2: 99% (10 Feb 2021 21:13) (96% - 100%)    LABS: All Labs Reviewed:                        11.1 5.31  )-----------( 266      ( 11 Feb 2021 07:15 )             34.5     02-11    141  |  108  |  12  ----------------------------<  120<H>  3.3<L>   |  30  |  0.65    Ca    8.6      11 Feb 2021 07:15    TPro  7.4  /  Alb  3.1<L>  /  TBili  0.9  /  DBili  x   /  AST  35  /  ALT  39  /  AlkPhos  78  02-09      CARDIAC MARKERS ( 10 Feb 2021 15:02 )  .087 ng/mL / x     / x     / x     / x      CARDIAC MARKERS ( 10 Feb 2021 10:04 )  0.119 ng/mL / x     / x     / x     / x      CARDIAC MARKERS ( 09 Feb 2021 23:23 )  0.210 ng/mL / x     / x     / x     / x        RADIOLOGY:  < from: US Duplex Carotid Arteries Complete, Bilateral (02.10.21 @ 11:56) >  IMPRESSION: No significant hemodynamic stenosis of either carotid artery.  Atherosclerosis of the carotid arteries is present.  Measurement of carotid stenosis is based on velocity parameters that correlate the residual internal carotid diameter with that of themore distal vessel in accordance with a method such as the [employer] Symptomatic Carotid Endarterectomy Trial (NASCET).  < end of copied text >    < from: CT Head No Cont (02.10.21 @ 10:53) >  IMPRESSION: No acute intracranial hemorrhage or mass effect.  < end of copied text >    ECHOCARDIOGRAM:  < from: TTE Echo Complete w/o Contrast w/ Doppler (02.10.21 @ 13:53) >   Summary     The left ventricle is normal in size, wall motion and contractility.   Mild concentric left ventricular hypertrophy is present.   Estimated left ventricular ejection fraction is 60-65 %.   The left atrium is mildly dilated.   Normal appearing right ventricle structure and function.   Normal aortic valve structure and function.   Mild to Moderatemitral regurgitation is present.   Mild mitral annular calcification is present.   Trace pericardial effusion is present.  < end of copied text >    MEDICATIONS  (STANDING):  apixaban 5 milliGRAM(s) Oral every 12 hours  aspirin enteric coated 81 milliGRAM(s) Oral daily  atorvastatin 40 milliGRAM(s) Oral at bedtime  melatonin 5 milliGRAM(s) Oral at bedtime  metoprolol tartrate 50 milliGRAM(s) Oral two times a day    MEDICATIONS  (PRN):  acetaminophen   Tablet .. 650 milliGRAM(s) Oral every 6 hours PRN Temp greater or equal to 38C (100.4F), Mild Pain (1 - 3)  ALPRAZolam 0.25 milliGRAM(s) Oral daily PRN anxiety    TELEMETRY REVIEW:  2/11:    ASSESSMENT AND PLAN:    1. Syncope in the settings of dehydration with decreased po intake/diuretics/chills 2 days after atrial flutter ablation - likely vasovagal, but can not rule out new pericardial effusion after recent ablation/less likely but possible CVA after ablation as eliquis was stopped for 24h   - due to fever will need to r/o infectious etiology - BCx, UA and if abn UCx   - no abx for now as fevers resolved and no leukocytosis present    2. Troponinemia - expected after ablation - trend    3. LE edema with crackles on exam of lungs - possibly related to IVF given vs underlying HFpEF   - f/u BNP and TTE    4. HTN on BBL    5. HLD on statin    6. PAF s/p ablation - discussed with NP from Dr. Meredith ([phone]) - will f/u TTE, BNP, troponins, cardiology eval and update [facility] team if significant abnormalities present    7. Hx of PVD s/p bypass RLE - neg cath 4 y.a., will obtain CUS    8. VTE proph - on eliquis    CHIEF COMPLAINT/DIAGNOSIS: Syncope     HPI: 78 y/o F PMHx Afib s/p ablation, anxiety, CVA, HLD, HTN; s/p recent ablation by Dr. Meredith two days ago developed chills/weakness, lethargy, poor appetite and fainted as per daughter - pt does not remember. On the floor felt weak, dizzy, nauseous, had 1 episode of vomiting in ambulance. Feels better now    2/11: febrile last night, sore throat this morning.   2/10: c/o dizziness, lightheadedness.     REVIEW OF SYSTEMS:  All other review of systems is negative unless indicated above    PHYSICAL EXAM:  Constitutional: NAD, awake and alert, well-developed  HEENT: MMM  Neck: Soft and supple, No LAD, No JVD  Respiratory: Breath sounds are clear bilaterally, No wheezing, rales or rhonchi  Cardiovascular: S1 and S2, regular rate and rhythm, no Murmurs, gallops or rubs  Gastrointestinal: Bowel Sounds present, soft, nontender, nondistended, no guarding, no rebound  Extremities: No peripheral edema  Vascular: 2+ peripheral pulses  Neurological: A/O x 3, no focal deficits  Musculoskeletal: 5/5 strength b/l upper and lower extremities  Skin: No rashes. Groin incision C/D/I     Vital Signs Last 24 Hrs  T(C): 37.1 (10 Feb 2021 22:45), Max: 38.5 (10 Feb 2021 21:13)  T(F): 98.8 (10 Feb 2021 22:45), Max: 101.3 (10 Feb 2021 21:13)  HR: 85 (10 Feb 2021 21:13) (64 - 92)  BP: 153/68 (10 Feb 2021 21:13) (136/70 - 165/85)  BP(mean): 71 (10 Feb 2021 15:21) (71 - 89)  RR: 16 (10 Feb 2021 21:13) (14 - 16)  SpO2: 99% (10 Feb 2021 21:13) (96% - 100%) RA     LABS: All Labs Reviewed:                        11.1   5.31  )-----------( 266      ( 11 Feb 2021 07:15 )             34.5     02-11    141  |  108  |  12  ----------------------------<  120<H>  3.3<L>   |  30  |  0.65    Ca    8.6      11 Feb 2021 07:15    TPro  7.4  /  Alb  3.1<L>  /  TBili  0.9  /  DBili  x   /  AST  35  /  ALT  39  /  AlkPhos  78  02-09      CARDIAC MARKERS ( 10 Feb 2021 15:02 )  .087 ng/mL / x     / x     / x     / x      CARDIAC MARKERS ( 10 Feb 2021 10:04 )  0.119 ng/mL / x     / x     / x     / x      CARDIAC MARKERS ( 09 Feb 2021 23:23 )  0.210 ng/mL / x     / x     / x     / x        RADIOLOGY:  < from: US Duplex Carotid Arteries Complete, Bilateral (02.10.21 @ 11:56) >  IMPRESSION: No significant hemodynamic stenosis of either carotid artery.  Atherosclerosis of the carotid arteries is present.  Measurement of carotid stenosis is based on velocity parameters that correlate the residual internal carotid diameter with that of the more distal vessel in accordance with a method such as the [employer] Symptomatic Carotid Endarterectomy Trial (NASCET).  < end of copied text >    < from: CT Head No Cont (02.10.21 @ 10:53) >  IMPRESSION: No acute intracranial hemorrhage or mass effect.  < end of copied text >    ECHOCARDIOGRAM:  < from: TTE Echo Complete w/o Contrast w/ Doppler (02.10.21 @ 13:53) >   Summary   The left ventricle is normal in size, wall motion and contractility.   Mild concentric left ventricular hypertrophy is present.   Estimated left ventricular ejection fraction is 60-65 %.   The left atrium is mildly dilated.   Normal appearing right ventricle structure and function.   Normal aortic valve structure and function.   Mild to Moderate mitral regurgitation is present.   Mild mitral annular calcification is present.   Trace pericardial effusion is present.  < end of copied text >    MEDICATIONS  (STANDING):  apixaban 5 milliGRAM(s) Oral every 12 hours  aspirin enteric coated 81 milliGRAM(s) Oral daily  atorvastatin 40 milliGRAM(s) Oral at bedtime  melatonin 5 milliGRAM(s) Oral at bedtime  metoprolol tartrate 50 milliGRAM(s) Oral two times a day    MEDICATIONS  (PRN):  acetaminophen   Tablet .. 650 milliGRAM(s) Oral every 6 hours PRN Temp greater or equal to 38C (100.4F), Mild Pain (1 - 3)  ALPRAZolam 0.25 milliGRAM(s) Oral daily PRN anxiety    TELEMETRY REVIEW:  2/11: NSR, 1st degree, intermittent episodes of AFib     ASSESSMENT AND PLAN:    1. Syncope in the settings of dehydration with decreased po intake/diuretics/chills 2 days after atrial flutter ablation - likely vasovagal, but can not rule out new pericardial effusion after recent ablation/less likely but possible CVA after ablation as eliquis was stopped for 24h   - due to fever will need to r/o infectious etiology - BCx, UA and if abn UCx   - no abx for now as fevers resolved and no leukocytosis present    -Neuro consult for possible CVA    - will continue on tele     2. Troponinemia - expected after ablation - trend    3. Chronic HTN   - on BBL    4.  HLD   - continue statin    5. PAF s/p ablation - discussed with NP from Dr. Meredith ([phone]) -   -ECHO: EF 60-65%,  Mild to Moderate mitral regurgitation is present,  -Mild mitral annular calcification is present, -Trace pericardial effusion is present, expected after ablation. No clinical signs of pericarditis    -troponin elevated expected after ablation.    -Cards eval appreciated.     6. Hx of PVD s/p bypass RLE    US Carotid Arterial, Bilateral: No significant hemodynamic stenosis of either carotid artery.  Atherosclerosis of the carotid arteries is present.    7. VTE proph    -on Eliquis    CHIEF COMPLAINT/DIAGNOSIS: Syncope     HPI: 78 y/o F PMHx Afib s/p ablation, anxiety, CVA, HLD, HTN; s/p recent ablation by Dr. Meredith two days ago developed chills/weakness, lethargy, poor appetite and fainted as per daughter - pt does not remember. On the floor felt weak, dizzy, nauseous, had 1 episode of vomiting in ambulance. Feels better now    2/11: febrile last night, sore throat this morning.     REVIEW OF SYSTEMS:  All other review of systems is negative unless indicated above    PHYSICAL EXAM:  Constitutional: NAD, awake and alert, well-developed  HEENT: MMM  Neck: Soft and supple, No LAD, No JVD  Respiratory: Breath sounds are clear bilaterally, No wheezing, rales or rhonchi  Cardiovascular: S1 and S2, regular rate and rhythm, no Murmurs, gallops or rubs  Gastrointestinal: Bowel Sounds present, soft, nontender, nondistended, no guarding, no rebound  Extremities: No peripheral edema  Vascular: 2+ peripheral pulses  Neurological: A/O x 3, no focal deficits  Musculoskeletal: 5/5 strength b/l upper and lower extremities  Skin: No rashes. Groin incision C/D/I     Vital Signs Last 24 Hrs  T(C): 37.1 (10 Feb 2021 22:45), Max: 38.5 (10 Feb 2021 21:13)  T(F): 98.8 (10 Feb 2021 22:45), Max: 101.3 (10 Feb 2021 21:13)  HR: 85 (10 Feb 2021 21:13) (64 - 92)  BP: 153/68 (10 Feb 2021 21:13) (136/70 - 165/85)  BP(mean): 71 (10 Feb 2021 15:21) (71 - 89)  RR: 16 (10 Feb 2021 21:13) (14 - 16)  SpO2: 99% (10 Feb 2021 21:13) (96% - 100%) RA     LABS: All Labs Reviewed:                        11.1 5.31  )-----------( 266      ( 11 Feb 2021 07:15 )             34.5     02-11    141  |  108  |  12  ----------------------------<  120<H>  3.3<L>   |  30  |  0.65    Ca    8.6      11 Feb 2021 07:15    TPro  7.4  /  Alb  3.1<L>  /  TBili  0.9  /  DBili  x   /  AST  35  /  ALT  39  /  AlkPhos  78  02-09      CARDIAC MARKERS ( 10 Feb 2021 15:02 )  .087 ng/mL / x     / x     / x     / x      CARDIAC MARKERS ( 10 Feb 2021 10:04 )  0.119 ng/mL / x     / x     / x     / x      CARDIAC MARKERS ( 09 Feb 2021 23:23 )  0.210 ng/mL / x     / x     / x     / x        RADIOLOGY:  < from: US Duplex Carotid Arteries Complete, Bilateral (02.10.21 @ 11:56) >  IMPRESSION: No significant hemodynamic stenosis of either carotid artery.  Atherosclerosis of the carotid arteries is present.  Measurement of carotid stenosis is based on velocity parameters that correlate the residual internal carotid diameter with that of the more distal vessel in accordance with a method such as the [employer] American Symptomatic Carotid Endarterectomy Trial (NASCET).  < end of copied text >    < from: CT Head No Cont (02.10.21 @ 10:53) >  IMPRESSION: No acute intracranial hemorrhage or mass effect.  < end of copied text >    ECHOCARDIOGRAM:  < from: TTE Echo Complete w/o Contrast w/ Doppler (02.10.21 @ 13:53) >   Summary   The left ventricle is normal in size, wall motion and contractility.   Mild concentric left ventricular hypertrophy is present.   Estimated left ventricular ejection fraction is 60-65 %.   The left atrium is mildly dilated.   Normal appearing right ventricle structure and function.   Normal aortic valve structure and function.   Mild to Moderate mitral regurgitation is present.   Mild mitral annular calcification is present.   Trace pericardial effusion is present.  < end of copied text >    MEDICATIONS  (STANDING):  apixaban 5 milliGRAM(s) Oral every 12 hours  aspirin enteric coated 81 milliGRAM(s) Oral daily  atorvastatin 40 milliGRAM(s) Oral at bedtime  melatonin 5 milliGRAM(s) Oral at bedtime  metoprolol tartrate 50 milliGRAM(s) Oral two times a day    MEDICATIONS  (PRN):  acetaminophen   Tablet .. 650 milliGRAM(s) Oral every 6 hours PRN Temp greater or equal to 38C (100.4F), Mild Pain (1 - 3)  ALPRAZolam 0.25 milliGRAM(s) Oral daily PRN anxiety    TELEMETRY REVIEW:  2/11: NSR, 1st degree, intermittent episodes of AFib     ASSESSMENT AND PLAN:    1) Syncope, suspected dehydration / poss vasovagal episode   - tele monitoring. tele review as above.   - s/p IVF in ED. Orthostatic VS negative  - Tmax 101.3 ~ Infectious w/u: UA neg, CXR clear, BCX NGTD, RVP/COVID neg, f/u Ucx  - CTH neg. Eliquis held for recent ablation. no MRI for now. no focal neuro deficits   - US Carotid Arterial, Bilateral: No significant hemodynamic stenosis of either carotid artery.  - Cardio, Neuro     2) PAF s/p ablation   - ECHO: EF 60-65%,  Mild to Moderate mitral regurgitation is present,  -Mild mitral annular calcification is present, -Trace pericardial effusion is present, expected after ablation. No clinical signs of pericarditis   - Troponin elevated expected after ablation.   - Cards eval appreciated.     3) HTN | HLD  - on BBL, statin    4) Hx of PVD s/p bypass RLE      5) VTE proph   - Cont. Eliquis     Dispo: monitor on tele x24 hours. f/u Ucx. Neuro eval.

## 2023-12-13 ENCOUNTER — TELEPHONE (OUTPATIENT)
Dept: PAIN MEDICINE | Facility: CLINIC | Age: 58
End: 2023-12-13

## 2023-12-20 ENCOUNTER — TELEPHONE (OUTPATIENT)
Age: 58
End: 2023-12-20

## 2023-12-20 NOTE — TELEPHONE ENCOUNTER
Patients GI provider:  ?    Number to return call: [phone]    Reason for call: Pt's daughter calling to reschedule the patient's ENDO/DIAB appt.  Transferred her to that dept.    Scheduled procedure/appointment date if applicable: 12/20/23

## 2024-01-03 ENCOUNTER — OFFICE VISIT (OUTPATIENT)
Dept: PAIN MEDICINE | Facility: CLINIC | Age: 59
End: 2024-01-03
Payer: MEDICARE

## 2024-01-03 VITALS — TEMPERATURE: 98.2 F | DIASTOLIC BLOOD PRESSURE: 70 MMHG | HEART RATE: 90 BPM | SYSTOLIC BLOOD PRESSURE: 138 MMHG

## 2024-01-03 DIAGNOSIS — M50.120 CERVICAL DISC DISORDER WITH RADICULOPATHY OF MID-CERVICAL REGION: ICD-10-CM

## 2024-01-03 DIAGNOSIS — E11.9 TYPE 2 DIABETES MELLITUS WITHOUT COMPLICATION, WITHOUT LONG-TERM CURRENT USE OF INSULIN (HCC): ICD-10-CM

## 2024-01-03 DIAGNOSIS — E66.01 SEVERE OBESITY (HCC): ICD-10-CM

## 2024-01-03 DIAGNOSIS — F33.2 SEVERE EPISODE OF RECURRENT MAJOR DEPRESSIVE DISORDER, WITHOUT PSYCHOTIC FEATURES (HCC): ICD-10-CM

## 2024-01-03 DIAGNOSIS — I82.461 ACUTE DEEP VEIN THROMBOSIS (DVT) OF CALF MUSCLE VEIN OF RIGHT LOWER EXTREMITY (HCC): ICD-10-CM

## 2024-01-03 DIAGNOSIS — F33.0 MILD EPISODE OF RECURRENT MAJOR DEPRESSIVE DISORDER (HCC): Primary | ICD-10-CM

## 2024-01-03 DIAGNOSIS — M54.2 NECK PAIN: ICD-10-CM

## 2024-01-03 DIAGNOSIS — G89.4 CHRONIC PAIN SYNDROME: ICD-10-CM

## 2024-01-03 PROCEDURE — 99214 OFFICE O/P EST MOD 30 MIN: CPT | Performed by: STUDENT IN AN ORGANIZED HEALTH CARE EDUCATION/TRAINING PROGRAM

## 2024-01-03 RX ORDER — GABAPENTIN 800 MG/1
800 TABLET ORAL 3 TIMES DAILY
Qty: 270 TABLET | Refills: 1 | Status: SHIPPED | OUTPATIENT
Start: 2024-01-03 | End: 2024-07-01

## 2024-01-03 RX ORDER — OXYCODONE HYDROCHLORIDE AND ACETAMINOPHEN 5; 325 MG/1; MG/1
1 TABLET ORAL 2 TIMES DAILY PRN
Qty: 60 TABLET | Refills: 0 | Status: SHIPPED | OUTPATIENT
Start: 2024-03-16 | End: 2024-04-15

## 2024-01-03 RX ORDER — OXYCODONE HYDROCHLORIDE AND ACETAMINOPHEN 5; 325 MG/1; MG/1
1 TABLET ORAL 2 TIMES DAILY PRN
Qty: 60 TABLET | Refills: 0 | Status: SHIPPED | OUTPATIENT
Start: 2024-01-16 | End: 2024-02-15

## 2024-01-03 RX ORDER — OXYCODONE HYDROCHLORIDE AND ACETAMINOPHEN 5; 325 MG/1; MG/1
1 TABLET ORAL 2 TIMES DAILY PRN
Qty: 60 TABLET | Refills: 0 | Status: SHIPPED | OUTPATIENT
Start: 2024-02-15 | End: 2024-03-16

## 2024-01-03 RX ORDER — CYCLOBENZAPRINE HCL 10 MG
10 TABLET ORAL 3 TIMES DAILY PRN
Qty: 270 TABLET | Refills: 1 | Status: SHIPPED | OUTPATIENT
Start: 2024-01-03 | End: 2024-07-01

## 2024-01-03 NOTE — PROGRESS NOTES
Assessment:  1. Mild episode of recurrent major depressive disorder (HCC)    2. Chronic pain syndrome    3. Neck pain    4. Cervical disc disorder with radiculopathy of mid-cervical region    5. Severe episode of recurrent major depressive disorder, without psychotic features ([facility])    6. Acute deep vein thrombosis (DVT) of calf muscle vein of right lower extremity ([facility])    7. Severe obesity ([facility])    8. Type 2 diabetes mellitus without complication, without long-term current use of insulin ([facility])      Patient is a pleasant 58-year-old woman Who returns as a follow-up visit after last being seen on 11/6/2023 for long-term current use of opiate analgesics, chronic pain syndrome and cervical disc disorder.  At that time patient was continued on Percocet 5-325 mg twice daily as needed, gabapentin 800 mg 3 times daily and Flexeril 10 mg 3 times daily as needed.  Drug screen was performed at that time.  Patient dates her symptoms are the same since the last visit rating her pain today is a 7 out of 10 on numeric rating scale.  She continues to get ongoing relief from her trigger point injections which were performed on 12/6/2023.  Her pain is worse at night and the pain is constant, occurring 100% of the time.  The quality the pain is burning, dull and aching, sharp, pressure-like, shooting, numbing down her left leg.  Patient also with pain in her bilateral neck, mid thoracic spine and midline lumbar spine.  Patient does report that her current pain relievers are making a significant difference in her life as she reports 30 to 40% pain relief with her current regimen.    Given patient's continued improvement in pain and function on current regimen of Percocet, Flexeril and gabapentin will continue at current dose.  Patient also reporting significant benefit and improvement in function with prior T4-T5 thoracic epidural so we will discuss further at upcoming appointment.  Patient to follow-up in 3 months and at that time we  will get UDS and consider scheduling for thoracic epidural.  Patient amenable to this plan.    Plan:  Continue Percocet 5-325 mg BID prn; 3 months  Continue gabapentin 800 mg TID; 6 months  Continue flexeril 10 mg TID prn; 6 month supply  Opioid agreement signed today  Repeat UDS at next visit  Discuss repeat thoracic epidural at next visit    No orders of the defined types were placed in this encounter.      New Medications Ordered This Visit   Medications   • oxyCODONE-acetaminophen (PERCOCET) 5-325 mg per tablet     Sig: Take 1 tablet by mouth 2 (two) times a day as needed for severe pain Max Daily Amount: 2 tablets Do not start before January 16, 2024.     Dispense:  60 tablet     Refill:  0     DNF until 9/18/2023   • oxyCODONE-acetaminophen (PERCOCET) 5-325 mg per tablet     Sig: Take 1 tablet by mouth 2 (two) times a day as needed for severe pain Max Daily Amount: 2 tablets Do not start before February 15, 2024.     Dispense:  60 tablet     Refill:  0     DNF until 9/18/2023   • oxyCODONE-acetaminophen (PERCOCET) 5-325 mg per tablet     Sig: Take 1 tablet by mouth 2 (two) times a day as needed for severe pain Max Daily Amount: 2 tablets Do not start before March 16, 2024.     Dispense:  60 tablet     Refill:  0     DNF until 9/18/2023   • gabapentin (NEURONTIN) 800 mg tablet     Sig: Take 1 tablet (800 mg total) by mouth 3 (three) times a day     Dispense:  270 tablet     Refill:  1   • cyclobenzaprine (FLEXERIL) 10 mg tablet     Sig: Take 1 tablet (10 mg total) by mouth 3 (three) times a day as needed for muscle spasms     Dispense:  270 tablet     Refill:  1         My impressions and treatment recommendations were discussed in detail with the patient, who verbalized understanding and had no further questions.      There are risks associated with opioid medications, including dependence, addiction and tolerance. The patient understands and agrees to use these medications only as prescribed. Potential side  effects of the medications include, but are not limited to, constipation, drowsiness, addiction, impaired judgment and risk of fatal overdose if not taken as prescribed. The patient was warned against driving while taking sedation medications.  Sharing medications is a felony. At this point in time, the patient is showing no signs of addiction, abuse, diversion or suicidal ideation.      [place] Prescription Drug Monitoring Program report was reviewed and was appropriate  and [place] Prescription Drug Monitoring Program report was reviewed and was appropriate       Follow-up is planned in three months time or sooner as warranted.  Discharge instructions were provided. I personally saw and examined the patient and I agree with the above discussed plan of care.    History of Present Illness:    Ольга Manning is a 58 y.o. female who presents to [facility] for initial evaluation of the above stated pain complaints. The patient has a past medical and chronic pain history as outlined in the assessment section. She was referred by No referring provider defined for this encounter..    Patient is a pleasant 58-year-old woman Who returns as a follow-up visit after last being seen on 11/6/2023 for long-term current use of opiate analgesics, chronic pain syndrome and cervical disc disorder.  At that time patient was continued on Percocet 5-325 mg twice daily as needed, gabapentin 800 mg 3 times daily and Flexeril 10 mg 3 times daily as needed.  Drug screen was performed at that time.  Patient dates her symptoms are the same since the last visit rating her pain today is a 7 out of 10 on numeric rating scale.  She continues to get ongoing relief from her trigger point injections which were performed on 12/6/2023.  Her pain is worse at night and the pain is constant, occurring 100% of the time.  The quality the pain is burning, dull and aching, sharp, pressure-like, shooting, numbing down her  left leg.  Patient also with pain in her bilateral neck, mid thoracic spine and midline lumbar spine.  Patient does report that her current pain relievers are making a significant difference in her life as she reports 30 to 40% pain relief with her current regimen.    Review of Systems:    Review of Systems   Constitutional:  Negative for unexpected weight change.   HENT:  Negative for ear pain.    Eyes:  Negative for visual disturbance.   Respiratory:  Negative for shortness of breath and wheezing.    Gastrointestinal:  Negative for abdominal pain.   Musculoskeletal:  Positive for back pain and gait problem.        Decreased ROM, Muscle weakness, joint pain and swelling in the neck area, pain in the rt arm and left leg   Neurological:  Negative for weakness and numbness.   Psychiatric/Behavioral:  Positive for sleep disturbance. Negative for decreased concentration.            Past Medical History:   Diagnosis Date   • Anxiety    • Asthma     exercise induced asthma   • Cervical disc disorder    • Chronic pain    • Chronic pain disorder    • Chronic pain syndrome 01/21/2016   • Depression    • Dysuria    • Fibromyalgia, primary Est 2016   • GERD (gastroesophageal reflux disease) 2004   • Headache(784.0) As a child   • Low back pain    • Lung abnormality     nodules   • Lung nodule    • Neck pain    • Peripheral neuropathy    • Pituitary abnormality (HCC)     tumor   • Right hip pain 10/29/2019   • Thrombocytopenia (HCC)        Past Surgical History:   Procedure Laterality Date   • APPENDECTOMY     • BREAST BIOPSY Right 1995   • CHOLECYSTECTOMY     • COLECTOMY     • COLON SURGERY     • EPIDURAL BLOCK INJECTION N/A 6/23/2016    Procedure: BLOCK / INJECTION EPIDURAL STEROID CERVICAL  C7-T1;  Surgeon: Brian Cash MD;  Location: [facility] MAIN OR;  Service:    • EPIDURAL BLOCK INJECTION N/A 3/31/2016    Procedure: BLOCK / INJECTION EPIDURAL STEROID CERVICAL C7-T1  (C-ARM);  Surgeon: Brian Cash MD;  Location: [facility]  MAIN OR;  Service:    • EPIDURAL BLOCK INJECTION N/A 8/8/2018    Procedure: C6 C7 Cervical Epidural Steroid Injection (79173);  Surgeon: Brian Cash MD;  Location: [facility] MAIN OR;  Service: Pain Management    • EPIDURAL BLOCK INJECTION N/A 12/16/2021    Procedure: T4 T5 thoracic epidural steroid injection (34686);  Surgeon: Brian Cash MD;  Location: [facility] MAIN OR;  Service: Pain Management    • EPIDURAL BLOCK INJECTION N/A 6/10/2022    Procedure: T4 T5 THORACIC EPIDURAL STEROID INJECTION (57081);  Surgeon: Brian Cash MD;  Location: [facility] MAIN OR;  Service: Pain Management    • HYSTERECTOMY  1996   • OOPHORECTOMY Bilateral 1996   • PITUITARY SURGERY     • DE ARTHROCENTESIS ASPIR&/INJ MAJOR JT/BURSA W/O US Right 11/8/2019    Procedure: Trochanteric Bursa Injection (20610);  Surgeon: Brian Cash MD;  Location: [facility] MAIN OR;  Service: Pain Management    • DE ARTHROCENTESIS ASPIR&/INJ MAJOR JT/BURSA W/O US Right 1/23/2020    Procedure: Trochanteric Bursa Injection (20610);  Surgeon: Brian Cash MD;  Location: [facility] MAIN OR;  Service: Pain Management    • DE NJX DX/THER SBST EPIDURAL/SUBRACH CERV/THORACIC N/A 1/21/2016    Procedure: BLOCK / INJECTION EPIDURAL STEROID CERVICAL C7-T1 (C-ARM);  Surgeon: Brian Cash MD;  Location: [facility] MAIN OR;  Service: Pain Management    • REDUCTION MAMMAPLASTY Bilateral 2000       Family History   Problem Relation Age of Onset   • Thyroid disease Mother    • Hyperlipidemia Mother    • Depression Mother    • Stroke Mother         TIA   • Thyroid disease Father    • Hyperlipidemia Father    • Depression Father    • Arthritis Father         Spine and knees   • Endometrial cancer Sister    • Obesity Sister    • Migraines Sister    • No Known Problems Daughter    • Cancer Maternal Grandmother         Kidney cancer   • Melanoma Maternal Grandmother    • Heart disease Maternal Grandfather    • Breast cancer Paternal Grandmother 40   • Cancer Paternal Grandmother          Breast, Lung, Liver and skin cancer   • Diabetes Paternal Grandfather    • Obesity Brother    • No Known Problems Son    • No Known Problems Maternal Uncle    • Obesity Paternal Aunt    • Colon cancer Paternal Aunt    • Obesity Paternal Aunt    • Diabetes Paternal Aunt    • Cancer Paternal Aunt         Thyroid and colon cancer   • No Known Problems Paternal Uncle    • ADD / ADHD Neg Hx    • Anesthesia problems Neg Hx    • Cancer Neg Hx    • Clotting disorder Neg Hx    • Collagen disease Neg Hx    • Dislocations Neg Hx    • Learning disabilities Neg Hx    • Neurological problems Neg Hx    • Osteoporosis Neg Hx    • Rheumatologic disease Neg Hx    • Scoliosis Neg Hx    • Vascular Disease Neg Hx        Social History     Occupational History   • Not on file   Tobacco Use   • Smoking status: Never   • Smokeless tobacco: Never   Vaping Use   • Vaping status: Never Used   Substance and Sexual Activity   • Alcohol use: No   • Drug use: No   • Sexual activity: Not Currently     Partners: Male     Birth control/protection: Post-menopausal         Current Outpatient Medications:   •  albuterol (ProAir HFA) 90 mcg/act inhaler, Inhale 2 puffs every 6 (six) hours as needed for wheezing, Disp: 8.5 g, Rfl: 0  •  albuterol (PROVENTIL HFA,VENTOLIN HFA) 90 mcg/act inhaler, Inhale, Disp: , Rfl:   •  aspirin 81 MG tablet, Take 81 mg by mouth daily, Disp: , Rfl:   •  cholecalciferol (VITAMIN D3) 1,000 units tablet, Take 2 tablets by mouth daily, Disp: , Rfl:   •  cyclobenzaprine (FLEXERIL) 10 mg tablet, Take 1 tablet (10 mg total) by mouth 3 (three) times a day as needed for muscle spasms, Disp: 270 tablet, Rfl: 1  •  Diclofenac Sodium (VOLTAREN) 1 %, Apply 2 g topically 4 (four) times a day as needed (pain), Disp: 100 g, Rfl: 1  •  ferrous sulfate 324 (65 Fe) mg, Take 1 tablet every other day in the morning with breakfast., Disp: 45 tablet, Rfl: 1  •  gabapentin (NEURONTIN) 800 mg tablet, Take 1 tablet (800 mg total) by mouth 3 (three)  times a day, Disp: 270 tablet, Rfl: 1  •  ibuprofen (MOTRIN) 600 mg tablet, Take 1 tablet (600 mg total) by mouth every 6 (six) hours as needed for mild pain, Disp: 30 tablet, Rfl: 0  •  levothyroxine 75 mcg tablet, TAKE 1 TABLET BY MOUTH EVERY DAY, Disp: 90 tablet, Rfl: 2  •  metFORMIN (GLUCOPHAGE) 1000 MG tablet, Take 1,000 mg by mouth 2 (two) times a day with meals, Disp: , Rfl:   •  metoprolol succinate (TOPROL-XL) 25 mg 24 hr tablet, , Disp: , Rfl:   •  naloxone (NARCAN) 4 mg/0.1 mL nasal spray, Administer 1 spray into a nostril. If no response after 2-3 minutes, give another dose in the other nostril using a new spray., Disp: 1 each, Rfl: 1  •  ondansetron (Zofran ODT) 4 mg disintegrating tablet, Take 1 tablet (4 mg total) by mouth every 6 (six) hours as needed for nausea or vomiting, Disp: 20 tablet, Rfl: 0  •  oxyCODONE-acetaminophen (PERCOCET) 5-325 mg per tablet, Take 1 tablet by mouth 2 (two) times a day as needed for severe pain Max Daily Amount: 2 tablets Do not start before December 17, 2023., Disp: 60 tablet, Rfl: 0  •  [START ON 1/16/2024] oxyCODONE-acetaminophen (PERCOCET) 5-325 mg per tablet, Take 1 tablet by mouth 2 (two) times a day as needed for severe pain Max Daily Amount: 2 tablets Do not start before January 16, 2024., Disp: 60 tablet, Rfl: 0  •  [START ON 2/15/2024] oxyCODONE-acetaminophen (PERCOCET) 5-325 mg per tablet, Take 1 tablet by mouth 2 (two) times a day as needed for severe pain Max Daily Amount: 2 tablets Do not start before February 15, 2024., Disp: 60 tablet, Rfl: 0  •  [START ON 3/16/2024] oxyCODONE-acetaminophen (PERCOCET) 5-325 mg per tablet, Take 1 tablet by mouth 2 (two) times a day as needed for severe pain Max Daily Amount: 2 tablets Do not start before March 16, 2024., Disp: 60 tablet, Rfl: 0  •  traZODone (DESYREL) 50 mg tablet, , Disp: , Rfl: 0  •  apixaban (Eliquis) 2.5 mg, Take by mouth 2 (two) times a day, Disp: , Rfl:   •  oxyCODONE-acetaminophen (PERCOCET) 5-325  mg per tablet, Take 1 tablet by mouth 2 (two) times a day as needed for severe pain Max Daily Amount: 2 tablets Do not start before November 17, 2023., Disp: 60 tablet, Rfl: 0    Allergies   Allergen Reactions   • Levaquin [Levofloxacin In D5w] Shortness Of Breath     Also hives     • Amitiza [Lubiprostone] Other (See Comments) and Hives     Reaction Date: 10Aug2011;   Migraines and vomiting   • Biaxin [Clarithromycin] Hives   • Cephradine    • Erythromycin Hives     Reaction Date: 10Aug2011;    • Macrobid [Nitrofurantoin Monohyd Macro] Other (See Comments)     C/o passing out   • Morphine    • Penicillins Hives     Reaction Date: 10Aug2011;    • Sulfa Antibiotics    • Tetracycline    • Tetracyclines & Related Hives   • Morphine And Related Rash   • Valium [Diazepam] Rash and Vomiting     Reaction Date: 14Jun2012;        Physical Exam:    /70   Pulse 90   Temp 98.2 °F (36.8 °C)     Constitutional: normal, well developed, well nourished, alert, in no distress and non-toxic and no overt pain behavior.  Eyes: anicteric  HEENT: grossly intact  Neck: supple, symmetric, trachea midline and no masses   Pulmonary:even and unlabored  Cardiovascular:No edema or pitting edema present  Skin:Normal without rashes or lesions and well hydrated  Psychiatric:Mood and affect appropriate  Neurologic:Cranial Nerves II-XII grossly intact  Musculoskeletal:normal gait.     Imaging  No orders to display       No orders of the defined types were placed in this encounter.

## 2024-02-21 ENCOUNTER — OFFICE VISIT (OUTPATIENT)
Dept: ENDOCRINOLOGY | Facility: CLINIC | Age: 59
End: 2024-02-21
Payer: COMMERCIAL

## 2024-02-21 VITALS
OXYGEN SATURATION: 97 % | HEART RATE: 88 BPM | BODY MASS INDEX: 34.45 KG/M2 | WEIGHT: 194.4 LBS | DIASTOLIC BLOOD PRESSURE: 70 MMHG | SYSTOLIC BLOOD PRESSURE: 110 MMHG | RESPIRATION RATE: 16 BRPM | HEIGHT: 63 IN

## 2024-02-21 DIAGNOSIS — R73.03 PREDIABETES: ICD-10-CM

## 2024-02-21 DIAGNOSIS — E03.9 HYPOTHYROIDISM, UNSPECIFIED TYPE: Primary | ICD-10-CM

## 2024-02-21 DIAGNOSIS — E55.9 VITAMIN D DEFICIENCY: ICD-10-CM

## 2024-02-21 DIAGNOSIS — L74.9 SWEATING ABNORMALITY: ICD-10-CM

## 2024-02-21 PROBLEM — Z98.890 S/P ACHILLES TENDON REPAIR: Status: ACTIVE | Noted: 2023-09-11

## 2024-02-21 PROBLEM — S92.002K: Status: ACTIVE | Noted: 2024-01-04

## 2024-02-21 PROCEDURE — 99214 OFFICE O/P EST MOD 30 MIN: CPT | Performed by: NURSE PRACTITIONER

## 2024-02-21 NOTE — ASSESSMENT & PLAN NOTE
Recommend starting Ozempic. Patient will benefit from increased glycemic control, weight loss, and CV protection.  Denies any history of pancreatitis, medullary thyroid cancer, or M EN-2.  Inject 0.25 mg once weekly for 4 weeks then increase to 0.5 mg once weekly.  Reviewed mechanism of action as well as potential side effects namely nausea, vomiting, constipation, and diarrhea.  Patient is to notify me should she experience any of these.  Follow-up in 4 months.  Check A1c prior.

## 2024-02-21 NOTE — ASSESSMENT & PLAN NOTE
CT abd/pelvis from 2022- no adrenal nodules noted. No suspicion for pheo. Will check fasting cortisol. Based on patient report sounds like vasomotor/anxiety related heat intolerance.

## 2024-02-21 NOTE — PROGRESS NOTES
Established Patient Progress Note     CC: Endocrinology follow up visit    Impression & Plan:    Problem List Items Addressed This Visit       Prediabetes     Recommend starting Ozempic. Patient will benefit from increased glycemic control, weight loss, and CV protection.  Denies any history of pancreatitis, medullary thyroid cancer, or M EN-2.  Inject 0.25 mg once weekly for 4 weeks then increase to 0.5 mg once weekly.  Reviewed mechanism of action as well as potential side effects namely nausea, vomiting, constipation, and diarrhea.  Patient is to notify me should she experience any of these.  Follow-up in 4 months.  Check A1c prior.         Relevant Medications    semaglutide, 0.25 or 0.5 mg/dose, (Ozempic, 0.25 or 0.5 MG/DOSE,) 2 mg/3 mL injection pen    Other Relevant Orders    Hemoglobin A1C    Basic metabolic panel    Sweating abnormality     CT abd/pelvis from 2022- no adrenal nodules noted. No suspicion for pheo. Will check fasting cortisol. Based on patient report sounds like vasomotor/anxiety related heat intolerance.          Relevant Orders    Cortisol Level,7-9 AM Specimen    Vitamin D deficiency     Continue Vit D supplements.          Hypothyroidism - Primary     Check Tsh with reflex to T4. Continue 75 mcg of levothyroxine daily.          Relevant Orders    TSH, 3rd generation       History of Present Illness:     Ольга Manning is a 58 y.o. female with hypothyroidism and prediabetes with obesity presenting today for routine follow up.    Patient is taking 1,000 mg of metformin BID. Tolerating well. However, patient reports that despite diet changes, she is not losing weight.       Component      Latest Ref Rng 11/17/2023   Hemoglobin A1C      <5.7 % 5.8 (H) (E)   Hemoglobin A1C       5.8 (E)      Legend:  (H) High  (E) External lab result    For hypothyroidism, she is taking 75 mcg of levothyroxine daily.  She reports taking this consistently correctly.  Thyroid function as of August 2023 was  stable.    Component      Latest Ref Rng 5/8/2023 8/9/2023   TSH 3RD GENERATON      0.450 - 4.500 uIU/mL 3.290  3.664        Patient Active Problem List   Diagnosis    Chronic pain syndrome    Acute muscle stiffness of neck    Cervical disc disorder with radiculopathy of mid-cervical region    Neck pain    Chronic left-sided low back pain with left-sided sciatica    Lumbar radiculopathy    Cervicalgia    Right hip pain    Trochanteric bursitis of right hip    Pain in right hip    Myofascial pain syndrome    Carpal tunnel syndrome    Continuous opioid dependence (HCC)    Prediabetes    Severe obesity (HCC)    History of pituitary adenoma    Sweating abnormality    Vitamin D deficiency    Hypothyroidism    Hair loss    History of total hysterectomy    CARLTON (obstructive sleep apnea)    Low ferritin    Mild episode of recurrent major depressive disorder (HCC)    Severe episode of recurrent major depressive disorder, without psychotic features ([facility])    Acute deep vein thrombosis (DVT) of calf muscle vein of right lower extremity (HCC)    Avulsion fracture of calcaneus with nonunion, left    History of total colectomy    S/P Achilles tendon repair      Past Medical History:   Diagnosis Date    Anxiety     Asthma     exercise induced asthma    Cervical disc disorder     Chronic pain     Chronic pain disorder     Chronic pain syndrome 01/21/2016    Depression     Dysuria     Fibromyalgia, primary Est 2016    GERD (gastroesophageal reflux disease) 2004    Headache(784.0) As a child    Low back pain     Lung abnormality     nodules    Lung nodule     Neck pain     Peripheral neuropathy     Pituitary abnormality (HCC)     tumor    Right hip pain 10/29/2019    Thrombocytopenia ([facility])       Past Surgical History:   Procedure Laterality Date    APPENDECTOMY      BREAST BIOPSY Right 1995    CHOLECYSTECTOMY      COLECTOMY      COLON SURGERY      EPIDURAL BLOCK INJECTION N/A 6/23/2016    Procedure: BLOCK / INJECTION EPIDURAL STEROID  CERVICAL  C7-T1;  Surgeon: Brian Cash MD;  Location: [facility] MAIN OR;  Service:     EPIDURAL BLOCK INJECTION N/A 3/31/2016    Procedure: BLOCK / INJECTION EPIDURAL STEROID CERVICAL C7-T1  (C-ARM);  Surgeon: Brian Cash MD;  Location: [facility] MAIN OR;  Service:     EPIDURAL BLOCK INJECTION N/A 8/8/2018    Procedure: C6 C7 Cervical Epidural Steroid Injection (42463);  Surgeon: Brian Cash MD;  Location: [facility] MAIN OR;  Service: Pain Management     EPIDURAL BLOCK INJECTION N/A 12/16/2021    Procedure: T4 T5 thoracic epidural steroid injection ([id number]);  Surgeon: Brian Cash MD;  Location: [facility] MAIN OR;  Service: Pain Management     EPIDURAL BLOCK INJECTION N/A 6/10/2022    Procedure: T4 T5 THORACIC EPIDURAL STEROID INJECTION ([id number]);  Surgeon: Brian Cash MD;  Location: [facility] MAIN OR;  Service: Pain Management     HYSTERECTOMY  1996    OOPHORECTOMY Bilateral 1996    PITUITARY SURGERY      AR ARTHROCENTESIS ASPIR&/INJ MAJOR JT/BURSA W/O US Right 11/8/2019    Procedure: Trochanteric Bursa Injection ([id number]);  Surgeon: Brian Cash MD;  Location: [facility] MAIN OR;  Service: Pain Management     AR ARTHROCENTESIS ASPIR&/INJ MAJOR JT/BURSA W/O US Right 1/23/2020    Procedure: Trochanteric Bursa Injection ([id number]);  Surgeon: Brian Cash MD;  Location: [facility] MAIN OR;  Service: Pain Management     AR NJX DX/THER SBST EPIDURAL/SUBRACH CERV/THORACIC N/A 1/21/2016    Procedure: BLOCK / INJECTION EPIDURAL STEROID CERVICAL C7-T1 (C-ARM);  Surgeon: Brian Cash MD;  Location: [facility] MAIN OR;  Service: Pain Management     REDUCTION MAMMAPLASTY Bilateral 2000      Family History   Problem Relation Age of Onset    Thyroid disease Mother     Hyperlipidemia Mother     Depression Mother     Stroke Mother         TIA    Thyroid disease Father     Hyperlipidemia Father     Depression Father     Arthritis Father         Spine and knees    Endometrial cancer Sister     Obesity Sister     Migraines Sister     No Known  Problems Daughter     Cancer Maternal Grandmother         Kidney cancer    Melanoma Maternal Grandmother     Heart disease Maternal Grandfather     Breast cancer Paternal Grandmother 40    Cancer Paternal Grandmother         Breast, Lung, Liver and skin cancer    Diabetes Paternal Grandfather     Obesity Brother     No Known Problems Son     No Known Problems Maternal Uncle     Obesity Paternal Aunt     Colon cancer Paternal Aunt     Obesity Paternal Aunt     Diabetes Paternal Aunt     Cancer Paternal Aunt         Thyroid and colon cancer    No Known Problems Paternal Uncle     ADD / ADHD Neg Hx     Anesthesia problems Neg Hx     Cancer Neg Hx     Clotting disorder Neg Hx     Collagen disease Neg Hx     Dislocations Neg Hx     Learning disabilities Neg Hx     Neurological problems Neg Hx     Osteoporosis Neg Hx     Rheumatologic disease Neg Hx     Scoliosis Neg Hx     Vascular Disease Neg Hx      Social History     Tobacco Use    Smoking status: Never    Smokeless tobacco: Never   Substance Use Topics    Alcohol use: No     Allergies   Allergen Reactions    Levaquin [Levofloxacin In D5w] Shortness Of Breath     Also hives      Clarithromycin Hives and Rash    Lubiprostone Hives and Other (See Comments)     Reaction Date: 10Aug2011;     Migraines and vomiting    Reaction Date: 10Aug2011;  Migraines and vomiting    Cephradine     Erythromycin Hives     Reaction Date: 10Aug2011;     Macrobid [Nitrofurantoin Monohyd Macro] Other (See Comments)     C/o passing out    Morphine     Penicillins Hives     Reaction Date: 10Aug2011;     Sulfa Antibiotics     Tetracycline     Tetracyclines & Related Hives    Levofloxacin Rash    Morphine And Related Rash    Valium [Diazepam] Rash and Vomiting     Reaction Date: 14Jun2012;        Current Outpatient Medications:     albuterol (ProAir HFA) 90 mcg/act inhaler, Inhale 2 puffs every 6 (six) hours as needed for wheezing, Disp: 8.5 g, Rfl: 0    albuterol (PROVENTIL HFA,VENTOLIN HFA) 90  mcg/act inhaler, Inhale, Disp: , Rfl:     cholecalciferol (VITAMIN D3) 1,000 units tablet, Take 2 tablets by mouth daily, Disp: , Rfl:     cyclobenzaprine (FLEXERIL) 10 mg tablet, Take 1 tablet (10 mg total) by mouth 3 (three) times a day as needed for muscle spasms, Disp: 270 tablet, Rfl: 1    Diclofenac Sodium (VOLTAREN) 1 %, Apply 2 g topically 4 (four) times a day as needed (pain), Disp: 100 g, Rfl: 1    gabapentin (NEURONTIN) 800 mg tablet, Take 1 tablet (800 mg total) by mouth 3 (three) times a day, Disp: 270 tablet, Rfl: 1    levothyroxine 75 mcg tablet, TAKE 1 TABLET BY MOUTH EVERY DAY, Disp: 90 tablet, Rfl: 2    metFORMIN (GLUCOPHAGE) 1000 MG tablet, Take 1,000 mg by mouth 2 (two) times a day with meals, Disp: , Rfl:     metoprolol succinate (TOPROL-XL) 25 mg 24 hr tablet, , Disp: , Rfl:     naloxone (NARCAN) 4 mg/0.1 mL nasal spray, Administer 1 spray into a nostril. If no response after 2-3 minutes, give another dose in the other nostril using a new spray., Disp: 1 each, Rfl: 1    ondansetron (Zofran ODT) 4 mg disintegrating tablet, Take 1 tablet (4 mg total) by mouth every 6 (six) hours as needed for nausea or vomiting, Disp: 20 tablet, Rfl: 0    oxyCODONE-acetaminophen (PERCOCET) 5-325 mg per tablet, Take 1 tablet by mouth 2 (two) times a day as needed for severe pain Max Daily Amount: 2 tablets Do not start before February 15, 2024., Disp: 60 tablet, Rfl: 0    [START ON 3/16/2024] oxyCODONE-acetaminophen (PERCOCET) 5-325 mg per tablet, Take 1 tablet by mouth 2 (two) times a day as needed for severe pain Max Daily Amount: 2 tablets Do not start before March 16, 2024., Disp: 60 tablet, Rfl: 0    semaglutide, 0.25 or 0.5 mg/dose, (Ozempic, 0.25 or 0.5 MG/DOSE,) 2 mg/3 mL injection pen, Inject 0.25 mg once weekly for 4 weeks then increase to 0.5 mg once weekly., Disp: 3 mL, Rfl: 2    traZODone (DESYREL) 50 mg tablet, , Disp: , Rfl: 0    apixaban (Eliquis) 2.5 mg, Take by mouth 2 (two) times a day, Disp: ,  Rfl:     aspirin 81 MG tablet, Take 81 mg by mouth daily (Patient not taking: Reported on 2/21/2024), Disp: , Rfl:     ibuprofen (MOTRIN) 600 mg tablet, Take 1 tablet (600 mg total) by mouth every 6 (six) hours as needed for mild pain (Patient not taking: Reported on 2/21/2024), Disp: 30 tablet, Rfl: 0    oxyCODONE-acetaminophen (PERCOCET) 5-325 mg per tablet, Take 1 tablet by mouth 2 (two) times a day as needed for severe pain Max Daily Amount: 2 tablets Do not start before November 17, 2023., Disp: 60 tablet, Rfl: 0    oxyCODONE-acetaminophen (PERCOCET) 5-325 mg per tablet, Take 1 tablet by mouth 2 (two) times a day as needed for severe pain Max Daily Amount: 2 tablets Do not start before December 17, 2023., Disp: 60 tablet, Rfl: 0    oxyCODONE-acetaminophen (PERCOCET) 5-325 mg per tablet, Take 1 tablet by mouth 2 (two) times a day as needed for severe pain Max Daily Amount: 2 tablets Do not start before January 16, 2024., Disp: 60 tablet, Rfl: 0    Review of Systems   Constitutional:  Positive for fatigue and unexpected weight change. Negative for activity change and appetite change.   HENT:  Negative for dental problem, sore throat, trouble swallowing and voice change.    Eyes:  Negative for visual disturbance.   Respiratory:  Negative for cough, chest tightness and shortness of breath.    Cardiovascular:  Negative for chest pain, palpitations and leg swelling.   Gastrointestinal:  Negative for constipation, diarrhea, nausea and vomiting.   Endocrine: Positive for heat intolerance. Negative for cold intolerance, polydipsia, polyphagia and polyuria.   Genitourinary:  Negative for frequency.   Musculoskeletal:  Negative for arthralgias, back pain, gait problem and myalgias.   Skin:  Negative for wound.   Allergic/Immunologic: Negative for environmental allergies and food allergies.   Neurological:  Negative for dizziness, weakness, light-headedness, numbness and headaches.   Psychiatric/Behavioral:  Negative for  "decreased concentration, dysphoric mood and sleep disturbance. The patient is not nervous/anxious.        Physical Exam:  Body mass index is 34.44 kg/m².  /70   Pulse 88   Resp 16   Ht 5' 3\" (1.6 m)   Wt 88.2 kg (194 lb 6.4 oz)   SpO2 97%   BMI 34.44 kg/m²    Wt Readings from Last 3 Encounters:   02/21/24 88.2 kg (194 lb 6.4 oz)   07/07/23 90.3 kg (199 lb)   05/10/23 90.3 kg (199 lb)       Physical Exam  Vitals reviewed.   Constitutional:       General: She is not in acute distress.     Appearance: She is well-developed. She is obese. She is not ill-appearing.   HENT:      Head: Normocephalic and atraumatic.   Eyes:      Pupils: Pupils are equal, round, and reactive to light.   Neck:      Thyroid: No thyromegaly.   Cardiovascular:      Rate and Rhythm: Normal rate and regular rhythm.      Pulses: Normal pulses.      Heart sounds: Normal heart sounds.   Pulmonary:      Effort: Pulmonary effort is normal.      Breath sounds: Normal breath sounds.   Abdominal:      General: Bowel sounds are normal. There is no distension.      Palpations: Abdomen is soft.      Tenderness: There is no abdominal tenderness.   Musculoskeletal:      Cervical back: Normal range of motion and neck supple.      Right lower leg: No edema.      Left lower leg: No edema.   Lymphadenopathy:      Cervical: No cervical adenopathy.   Skin:     General: Skin is warm and dry.      Capillary Refill: Capillary refill takes less than 2 seconds.   Neurological:      Mental Status: She is alert and oriented to person, place, and time.      Gait: Gait normal.   Psychiatric:         Mood and Affect: Mood normal.         Behavior: Behavior normal.         Labs:       Discussed with the patient and all questioned fully answered. She will call me if any problems arise.    Follow-up appointment in 4 months.     Counseled patient on diagnostic results, prognosis, risk and benefit of treatment options, instruction for management, importance of " treatment compliance, Risk  factor reduction and impressions    PAULINA Trujillo

## 2024-04-22 ENCOUNTER — OFFICE VISIT (OUTPATIENT)
Dept: PAIN MEDICINE | Facility: CLINIC | Age: 59
End: 2024-04-22
Payer: MEDICARE

## 2024-04-22 VITALS
HEIGHT: 63 IN | SYSTOLIC BLOOD PRESSURE: 94 MMHG | BODY MASS INDEX: 34.44 KG/M2 | DIASTOLIC BLOOD PRESSURE: 64 MMHG | HEART RATE: 86 BPM

## 2024-04-22 DIAGNOSIS — G89.4 CHRONIC PAIN SYNDROME: Primary | ICD-10-CM

## 2024-04-22 DIAGNOSIS — M54.9 MID BACK PAIN: ICD-10-CM

## 2024-04-22 DIAGNOSIS — M54.2 NECK PAIN: ICD-10-CM

## 2024-04-22 DIAGNOSIS — F11.20 UNCOMPLICATED OPIOID DEPENDENCE (HCC): ICD-10-CM

## 2024-04-22 DIAGNOSIS — Z79.891 LONG-TERM CURRENT USE OF OPIATE ANALGESIC: ICD-10-CM

## 2024-04-22 DIAGNOSIS — M50.120 CERVICAL DISC DISORDER WITH RADICULOPATHY OF MID-CERVICAL REGION: ICD-10-CM

## 2024-04-22 DIAGNOSIS — M79.18 MYOFASCIAL PAIN SYNDROME: ICD-10-CM

## 2024-04-22 PROCEDURE — 99214 OFFICE O/P EST MOD 30 MIN: CPT

## 2024-04-22 RX ORDER — OXYCODONE HYDROCHLORIDE AND ACETAMINOPHEN 5; 325 MG/1; MG/1
1 TABLET ORAL 2 TIMES DAILY PRN
Qty: 60 TABLET | Refills: 0 | Status: SHIPPED | OUTPATIENT
Start: 2024-04-27 | End: 2024-05-27

## 2024-04-22 RX ORDER — OXYCODONE HYDROCHLORIDE AND ACETAMINOPHEN 5; 325 MG/1; MG/1
1 TABLET ORAL 2 TIMES DAILY PRN
Qty: 60 TABLET | Refills: 0 | Status: SHIPPED | OUTPATIENT
Start: 2024-05-26 | End: 2024-06-25

## 2024-04-22 RX ORDER — OXYCODONE HYDROCHLORIDE AND ACETAMINOPHEN 5; 325 MG/1; MG/1
1 TABLET ORAL 2 TIMES DAILY PRN
Qty: 60 TABLET | Refills: 0 | Status: SHIPPED | OUTPATIENT
Start: 2024-06-25 | End: 2024-07-25

## 2024-04-22 NOTE — PROGRESS NOTES
Pain Medicine Follow-Up Note    Assessment:  1. Chronic pain syndrome    2. Neck pain    3. Cervical disc disorder with radiculopathy of mid-cervical region    4. Myofascial pain syndrome    5. Mid back pain    6. Uncomplicated opioid dependence (HCC)    7. Long-term current use of opiate analgesic        Plan:  Orders Placed This Encounter   Procedures    MM OF_Alprazolam Definitive    MM OF_Amphetamine Definitive Test    MM OF_Atomoxetine Definitive Test    MM OF_Buprenorphine Definitive Test    MM OF_Clonazepam Definitive    MM OF_Cocaine Definitive Test    MM OF_Codeine Definitive    MM OF_Diazepam Definitive    MM OF_Fentanyl Definitive Test    MM OF_Heroin Definitive Test    MM OF_Hydrocodone Definitive    MM OF_Hydromorphone Definitive    MM OF_Lorazepam Definitive    MM OF_MDMA Definitive Test    MM OF_Meperidine Definitive Test    MM OF_Methadone Definitive Test    MM OF_Methylphenidate Definitive Test    MM OF_Morphine Definitive    MM OF_Oxazepam Definitive    MM OF_Oxycodone Definitive Test - Oral Fluid    MM OF_Oxymorphone Definitive Test    MM OF_Phencyclidine Definitive Test    MM OF_Temazepam Definitive    MM OF_THC Definitive Test    MM OF_Tramadol Definitive Test    X-ray thoracic spine 3 views     Standing Status:   Future     Standing Expiration Date:   4/22/2028     Scheduling Instructions:      Bring along any outside films relating to this procedure.           Order Specific Question:   Is the patient pregnant?     Answer:   No    MM ALL_Prescribed Meds and Special Instructions     Order Specific Question:   [facility] Is ALBUTEROL prescribed?     Answer:   Yes     Order Specific Question:   [facility] Is CYCLOBENZAPRINE Prescribed?     Answer:   Yes     Order Specific Question:   [facility] Is GABAPENTIN prescribed?     Answer:   Yes     Order Specific Question:   [facility] Is METFORMIN prescribed?     Answer:   Yes     Order Specific Question:   [facility] Is METOPROLOL prescribed?      Answer:   Yes     Order Specific Question:   [facility] Is NALOXONE Prescribed?     Answer:   Yes     Order Specific Question:   [facility] Is TRAZODONE prescribed?     Answer:   Yes       New Medications Ordered This Visit   Medications    oxyCODONE-acetaminophen (PERCOCET) 5-325 mg per tablet     Sig: Take 1 tablet by mouth 2 (two) times a day as needed for severe pain Max Daily Amount: 2 tablets Do not start before April 27, 2024.     Dispense:  60 tablet     Refill:  0     DNF until 4/27/2024    oxyCODONE-acetaminophen (PERCOCET) 5-325 mg per tablet     Sig: Take 1 tablet by mouth 2 (two) times a day as needed for severe pain Max Daily Amount: 2 tablets Do not start before May 26, 2024.     Dispense:  60 tablet     Refill:  0     DNF until 5/26/2024    oxyCODONE-acetaminophen (PERCOCET) 5-325 mg per tablet     Sig: Take 1 tablet by mouth 2 (two) times a day as needed for severe pain Max Daily Amount: 2 tablets Do not start before June 25, 2024.     Dispense:  60 tablet     Refill:  0     DNF until 6/25/2024    Diclofenac Sodium (VOLTAREN) 1 %     Sig: Apply 2 g topically 4 (four) times a day as needed (pain)     Dispense:  100 g     Refill:  3       My impressions and treatment recommendations were discussed in detail with the patient who verbalized understanding and had no further questions.      Patient returns the office concerned because she had surgery and was prescribed oxycodone/acetaminophen 5/325 mg tablet patient stated she knew not to  this medication but the pharmacist had filled it and she was not able to return it.  Patient stated she use this medication the same way she use the medication as prescribed by us so therefore it just extended her medication by 15 days which is appropriate.      The patient continues to report an overall reduction of her pain level and improvement with her functioning without significant side effects using oxycodone/acetaminophen 5/325 mg tablet patient uses 1  tablet up to twice daily as needed for severe pain along with gabapentin 800 mg tablet 3 times daily and cyclobenzaprine 10 mg tablet 1 tablet up to 3 times a day as needed for pain/muscle spasm, therefore I will continue the patient on these medications.  Oxycodone/acetaminophen 5/325 mg tablet E-prescribed to the patient's pharmacy with a do not fill until date of  4/27/2024, 5/26/2024 and 6/25/2024.    Patient inquiring about having an additional T4-T5 epidural steroid injection that she last had in 2021 with Dr. Cassidy.  Patient reports that she had excellent pain relief and that the pain symptoms she is having today is a burning sensation that wraps around her rib cage following a T4 dermatome pattern. I will get a xray of her thoracic spine. She may benefit from a erector spinea plane block.    [place] Prescription Drug Monitoring Program report was reviewed and was appropriate     A urine drug screen was collected at today's office visit as part of our medication management protocol. The point of care testing results were appropriate for what was being prescribed. The specimen will be sent for confirmatory testing. The drug screen is medically necessary because the patient is either dependent on opioid medication or is being considered for opioid medication therapy and the results could impact ongoing or future treatment. The drug screen is to evaluate for the presences or absence of prescribed, non-prescribed, and/or illicit drugs/substances.    There are risks associated with opioid medications, including dependence, addiction and tolerance. The patient understands and agrees to use these medications only as prescribed. Potential side effects of the medications include, but are not limited to, constipation, drowsiness, addiction, impaired judgment and risk of fatal overdose if not taken as prescribed. The patient was warned against driving while taking sedation medications.  Sharing medications is a  bonnie. At this point in time, the patient is showing no signs of addiction, abuse, diversion or suicidal ideation.    Follow-up is planned in 3 months time or sooner as warranted.  Discharge instructions were provided. I personally saw and examined the patient and I agree with the above discussed plan of care.    History of Present Illness:    Ольга Manning is a 58 y.o. female who presents to [facility] for interval re-evaluation of the above stated pain complaints. The patient has a past medical and chronic pain history as outlined in the assessment section. She was last seen on 1/3/2024.    At today's visit patient states that their pain symptoms are the same with a pain score of 7/10 on the verbal numeric pain scale.  The patient's pain is worse in the morning.  The patient's pain is constant in nature.  And the quality of the patient's pain is described as dull-aching, sharp, throbbing, pressure-like, and numbness as well as pins-and-needles.  The patient's pain is located in the posterior neck, bilateral shoulders radiating into her bilateral hands as well as her upper back, mid back wrapping across her chest, left hip radiating down to her foot.  Patient states the amount of pain relief she is now obtaining from her current pain relievers is enough to make a difference in her life by reducing her pain by 30% on some days.  Patient denies any side effects using oxycodone/acetaminophen, gabapentin and cyclobenzaprine.    Pain Contract Signed: 1/5/2024  Last Urine Drug Screen: 11/6/2023  New urine drug screen: 4/22/2024    Other than as stated above, the patient denies any interval changes in medications, medical condition, mental condition, symptoms, or allergies since the last office visit.         Review of Systems:    Review of Systems   Respiratory:  Negative for shortness of breath.    Cardiovascular:  Negative for chest pain.   Gastrointestinal:  Negative for constipation,  diarrhea, nausea and vomiting.   Musculoskeletal:  Positive for arthralgias, back pain, gait problem and myalgias. Negative for joint swelling.        Decreased range of motion, joint stiffness, pain in the left leg   Skin:  Negative for rash.   Neurological:  Positive for weakness. Negative for dizziness and seizures.   All other systems reviewed and are negative.        Past Medical History:   Diagnosis Date    Anxiety     Asthma     exercise induced asthma    Cervical disc disorder     Chronic pain     Chronic pain disorder     Chronic pain syndrome 01/21/2016    Depression     Dysuria     Fibromyalgia, primary Est 2016    GERD (gastroesophageal reflux disease) 2004    Headache(784.0) As a child    Low back pain     Lung abnormality     nodules    Lung nodule     Neck pain     Peripheral neuropathy     Pituitary abnormality (HCC)     tumor    Right hip pain 10/29/2019    Thrombocytopenia (HCC)        Past Surgical History:   Procedure Laterality Date    APPENDECTOMY      BREAST BIOPSY Right 1995    CHOLECYSTECTOMY      COLECTOMY      COLON SURGERY      EPIDURAL BLOCK INJECTION N/A 6/23/2016    Procedure: BLOCK / INJECTION EPIDURAL STEROID CERVICAL  C7-T1;  Surgeon: Brian Cash MD;  Location: [facility] MAIN OR;  Service:     EPIDURAL BLOCK INJECTION N/A 3/31/2016    Procedure: BLOCK / INJECTION EPIDURAL STEROID CERVICAL C7-T1  (C-ARM);  Surgeon: Brian Cash MD;  Location: [facility] MAIN OR;  Service:     EPIDURAL BLOCK INJECTION N/A 8/8/2018    Procedure: C6 C7 Cervical Epidural Steroid Injection (72372);  Surgeon: Brian Cash MD;  Location: [facility] MAIN OR;  Service: Pain Management     EPIDURAL BLOCK INJECTION N/A 12/16/2021    Procedure: T4 T5 thoracic epidural steroid injection (72095);  Surgeon: Brian Cash MD;  Location: [facility] MAIN OR;  Service: Pain Management     EPIDURAL BLOCK INJECTION N/A 6/10/2022    Procedure: T4 T5 THORACIC EPIDURAL STEROID INJECTION (70331);  Surgeon: Brian Cash MD;   Location: [facility] MAIN OR;  Service: Pain Management     HYSTERECTOMY  1996    OOPHORECTOMY Bilateral 1996    PITUITARY SURGERY      WA ARTHROCENTESIS ASPIR&/INJ MAJOR JT/BURSA W/O US Right 11/8/2019    Procedure: Trochanteric Bursa Injection (20610);  Surgeon: Brian Cash MD;  Location: [facility] MAIN OR;  Service: Pain Management     WA ARTHROCENTESIS ASPIR&/INJ MAJOR JT/BURSA W/O US Right 1/23/2020    Procedure: Trochanteric Bursa Injection (20610);  Surgeon: Brian Cash MD;  Location: [facility] MAIN OR;  Service: Pain Management     WA NJX DX/THER SBST EPIDURAL/SUBRACH CERV/THORACIC N/A 1/21/2016    Procedure: BLOCK / INJECTION EPIDURAL STEROID CERVICAL C7-T1 (C-ARM);  Surgeon: Brian Cash MD;  Location: [facility] MAIN OR;  Service: Pain Management     REDUCTION MAMMAPLASTY Bilateral 2000       Family History   Problem Relation Age of Onset    Thyroid disease Mother     Hyperlipidemia Mother     Depression Mother     Stroke Mother         TIA    Thyroid disease Father     Hyperlipidemia Father     Depression Father     Arthritis Father         Spine and knees    Endometrial cancer Sister     Obesity Sister     Migraines Sister     No Known Problems Daughter     Cancer Maternal Grandmother         Kidney cancer    Melanoma Maternal Grandmother     Heart disease Maternal Grandfather     Breast cancer Paternal Grandmother 40    Cancer Paternal Grandmother         Breast, Lung, Liver and skin cancer    Diabetes Paternal Grandfather     Obesity Brother     No Known Problems Son     No Known Problems Maternal Uncle     Obesity Paternal Aunt     Colon cancer Paternal Aunt     Obesity Paternal Aunt     Diabetes Paternal Aunt     Cancer Paternal Aunt         Thyroid and colon cancer    No Known Problems Paternal Uncle     ADD / ADHD Neg Hx     Anesthesia problems Neg Hx     Cancer Neg Hx     Clotting disorder Neg Hx     Collagen disease Neg Hx     Dislocations Neg Hx     Learning disabilities Neg Hx     Neurological  problems Neg Hx     Osteoporosis Neg Hx     Rheumatologic disease Neg Hx     Scoliosis Neg Hx     Vascular Disease Neg Hx        Social History     Occupational History    Not on file   Tobacco Use    Smoking status: Never    Smokeless tobacco: Never   Vaping Use    Vaping status: Never Used   Substance and Sexual Activity    Alcohol use: No    Drug use: No    Sexual activity: Not Currently     Partners: Male     Birth control/protection: Post-menopausal         Current Outpatient Medications:     albuterol (ProAir HFA) 90 mcg/act inhaler, Inhale 2 puffs every 6 (six) hours as needed for wheezing, Disp: 8.5 g, Rfl: 0    albuterol (PROVENTIL HFA,VENTOLIN HFA) 90 mcg/act inhaler, Inhale, Disp: , Rfl:     apixaban (Eliquis) 2.5 mg, Take by mouth 2 (two) times a day, Disp: , Rfl:     cholecalciferol (VITAMIN D3) 1,000 units tablet, Take 2 tablets by mouth daily, Disp: , Rfl:     cyclobenzaprine (FLEXERIL) 10 mg tablet, Take 1 tablet (10 mg total) by mouth 3 (three) times a day as needed for muscle spasms, Disp: 270 tablet, Rfl: 1    Diclofenac Sodium (VOLTAREN) 1 %, Apply 2 g topically 4 (four) times a day as needed (pain), Disp: 100 g, Rfl: 3    gabapentin (NEURONTIN) 800 mg tablet, Take 1 tablet (800 mg total) by mouth 3 (three) times a day, Disp: 270 tablet, Rfl: 1    levothyroxine 75 mcg tablet, TAKE 1 TABLET BY MOUTH EVERY DAY, Disp: 90 tablet, Rfl: 2    metFORMIN (GLUCOPHAGE) 1000 MG tablet, Take 1,000 mg by mouth 2 (two) times a day with meals, Disp: , Rfl:     metoprolol succinate (TOPROL-XL) 25 mg 24 hr tablet, , Disp: , Rfl:     naloxone (NARCAN) 4 mg/0.1 mL nasal spray, Administer 1 spray into a nostril. If no response after 2-3 minutes, give another dose in the other nostril using a new spray., Disp: 1 each, Rfl: 1    ondansetron (Zofran ODT) 4 mg disintegrating tablet, Take 1 tablet (4 mg total) by mouth every 6 (six) hours as needed for nausea or vomiting, Disp: 20 tablet, Rfl: 0    [START ON 4/27/2024]  oxyCODONE-acetaminophen (PERCOCET) 5-325 mg per tablet, Take 1 tablet by mouth 2 (two) times a day as needed for severe pain Max Daily Amount: 2 tablets Do not start before April 27, 2024., Disp: 60 tablet, Rfl: 0    [START ON 5/26/2024] oxyCODONE-acetaminophen (PERCOCET) 5-325 mg per tablet, Take 1 tablet by mouth 2 (two) times a day as needed for severe pain Max Daily Amount: 2 tablets Do not start before May 26, 2024., Disp: 60 tablet, Rfl: 0    [START ON 6/25/2024] oxyCODONE-acetaminophen (PERCOCET) 5-325 mg per tablet, Take 1 tablet by mouth 2 (two) times a day as needed for severe pain Max Daily Amount: 2 tablets Do not start before June 25, 2024., Disp: 60 tablet, Rfl: 0    semaglutide, 0.25 or 0.5 mg/dose, (Ozempic, 0.25 or 0.5 MG/DOSE,) 2 mg/3 mL injection pen, Inject 0.25 mg once weekly for 4 weeks then increase to 0.5 mg once weekly., Disp: 3 mL, Rfl: 2    traZODone (DESYREL) 50 mg tablet, , Disp: , Rfl: 0    aspirin 81 MG tablet, Take 81 mg by mouth daily (Patient not taking: Reported on 2/21/2024), Disp: , Rfl:     ibuprofen (MOTRIN) 600 mg tablet, Take 1 tablet (600 mg total) by mouth every 6 (six) hours as needed for mild pain (Patient not taking: Reported on 2/21/2024), Disp: 30 tablet, Rfl: 0    Allergies   Allergen Reactions    Levaquin [Levofloxacin In D5w] Shortness Of Breath     Also hives      Clarithromycin Hives and Rash    Lubiprostone Hives and Other (See Comments)     Reaction Date: 10Aug2011;     Migraines and vomiting    Reaction Date: 10Aug2011;  Migraines and vomiting    Cephradine     Erythromycin Hives     Reaction Date: 10Aug2011;     Macrobid [Nitrofurantoin Monohyd Macro] Other (See Comments)     C/o passing out    Morphine     Penicillins Hives     Reaction Date: 10Aug2011;     Sulfa Antibiotics     Tetracycline     Tetracyclines & Related Hives    Levofloxacin Rash    Morphine And Related Rash    Valium [Diazepam] Rash and Vomiting     Reaction Date: 14Jun2012;        Physical  "Exam:    BP 94/64   Pulse 86   Ht 5' 3\" (1.6 m)   BMI 34.44 kg/m²     Constitutional:normal, well developed, well nourished, alert, in no distress and non-toxic and no overt pain behavior. and obese  Eyes:anicteric  HEENT:grossly intact  Neck: Limited range of motion, tenderness left trapezius muscle, trigger points noted  Pulmonary:even and unlabored  Cardiovascular:No edema or pitting edema present  Skin:Normal without rashes or lesions and well hydrated  Psychiatric:Mood and affect appropriate  Neurologic:Cranial Nerves II-XII grossly intact  Musculoskeletal:antalgic gait      Imaging  X-ray thoracic spine 3 views    (Results Pending)         Orders Placed This Encounter   Procedures    MM OF_Alprazolam Definitive    MM OF_Amphetamine Definitive Test    MM OF_Atomoxetine Definitive Test    MM OF_Buprenorphine Definitive Test    MM OF_Clonazepam Definitive    MM OF_Cocaine Definitive Test    MM OF_Codeine Definitive    MM OF_Diazepam Definitive    MM OF_Fentanyl Definitive Test    MM OF_Heroin Definitive Test    MM OF_Hydrocodone Definitive    MM OF_Hydromorphone Definitive    MM OF_Lorazepam Definitive    MM OF_MDMA Definitive Test    MM OF_Meperidine Definitive Test    MM OF_Methadone Definitive Test    MM OF_Methylphenidate Definitive Test    MM OF_Morphine Definitive    MM OF_Oxazepam Definitive    MM OF_Oxycodone Definitive Test - Oral Fluid    MM OF_Oxymorphone Definitive Test    MM OF_Phencyclidine Definitive Test    MM OF_Temazepam Definitive    MM OF_THC Definitive Test    MM OF_Tramadol Definitive Test    X-ray thoracic spine 3 views    MM ALL_Prescribed Meds and Special Instructions       This document was created using speech voice recognition software.   Grammatical errors, random word insertions, pronoun errors, and incomplete sentences are an occasional consequence of this system due to software limitations, ambient noise, and hardware issues.   Any formal questions or concerns about content, " text, or information contained within the body of this dictation should be directly addressed to the provider for clarification.

## 2024-04-23 NOTE — PATIENT INSTRUCTIONS

## 2024-04-25 LAB
7AMINOCLONAZEPAM SAL QL CFM: NEGATIVE NG/ML
AMPHET SAL QL CFM: NEGATIVE NG/ML
BUPRENORPHINE SAL QL SCN: NEGATIVE NG/ML
CARBOXYTHC SAL QL CFM: NEGATIVE NG/ML
CCP IGG SERPL-ACNC: NEGATIVE
COCAINE SAL QL CFM: NEGATIVE NG/ML
CODEINE SAL QL CFM: NEGATIVE NG/ML
EDDP SAL QL CFM: NEGATIVE NG/ML
HYDROCODONE SAL QL CFM: NEGATIVE NG/ML
HYDROCODONE SAL QL CFM: NEGATIVE NG/ML
HYDROMORPHONE SAL QL CFM: NEGATIVE NG/ML
LEUKEMIA MARKERS BLD-IMP: NEGATIVE NG/ML
M PROTEIN 3 UR ELPH-MCNC: ABNORMAL NG/ML
M TB TUBERC IGNF/MITOGEN IGNF CONTROL: NEGATIVE NG/ML
METHADONE SAL QL CFM: NEGATIVE NG/ML
MORPHINE SAL QL CFM: NEGATIVE NG/ML
MORPHINE SAL QL CFM: NEGATIVE NG/ML
OXYMORPHONE SAL QL CFM: NEGATIVE NG/ML
OXYMORPHONE SAL QL CFM: NEGATIVE NG/ML
PCP SAL QL CFM: NEGATIVE NG/ML
RESULT ALL_PRESCRIBED MEDS AND SPECIAL INSTRUCTIONS: NORMAL
SL AMB 6-MAM (HEROIN METABOLITE) QUANTIFICATION: NEGATIVE NG/ML
SL AMB ALPRAZOLAM QUANTIFICATION: NEGATIVE NG/ML
SL AMB ATOMOXETINE QUANTIFICATION: NORMAL
SL AMB CLONAZEPAM QUANTIFICATION: NEGATIVE NG/ML
SL AMB DIAZEPAM QUANTIFICATION: NEGATIVE NG/ML
SL AMB FENTANYL QUANTIFICATION: NEGATIVE NG/ML
SL AMB MDMA QUANTIFICATION: NEGATIVE NG/ML
SL AMB N-DESMETHYL-TRAMADOL QUANTIFICATION SALIVA: NEGATIVE NG/ML
SL AMB NORBUPRENORPHINE QUANTIFICATION: NEGATIVE NG/ML
SL AMB NORDIAZEPAM QUANTIFICATION: NEGATIVE NG/ML
SL AMB NORFENTANYL QUANTIFICATION: NEGATIVE NG/ML
SL AMB NORHYDROCODONE QUANTIFICATION: NEGATIVE NG/ML
SL AMB NORHYDROCODONE QUANTIFICATION: NEGATIVE NG/ML
SL AMB NORMEPERIDINE QUANTIFICATION: NEGATIVE NG/ML
SL AMB NOROXYCODONE QUANTIFICATION: NEGATIVE NG/ML
SL AMB OXAZEPAM QUANTIFICATION: NEGATIVE NG/ML
SL AMB RITALINIC ACID QUANTIFICATION: NEGATIVE
SL AMB TEMAZEPAM QUANTIFICATION: NEGATIVE NG/ML
SL AMB TEMAZEPAM QUANTIFICATION: NEGATIVE NG/ML
SL AMB TRAMADOL QUANTIFICATION: NEGATIVE NG/ML
SQUAMOUS #/AREA URNS HPF: NEGATIVE NG/ML

## 2024-05-03 DIAGNOSIS — R73.03 PREDIABETES: Primary | ICD-10-CM

## 2024-05-03 DIAGNOSIS — E66.01 SEVERE OBESITY (HCC): ICD-10-CM

## 2024-05-17 ENCOUNTER — TELEPHONE (OUTPATIENT)
Age: 59
End: 2024-05-17

## 2024-05-17 NOTE — TELEPHONE ENCOUNTER
Caller: pt    Doctor: arnulfo    Reason for call: pt needs to r/s her TPI.    Call back#: [phone]

## 2024-05-20 NOTE — TELEPHONE ENCOUNTER
Caller: louise Rolon    Doctor: Linda    Reason for call: pt retuning schedulers call    Call back#: [phone]

## 2024-05-31 DIAGNOSIS — E03.9 HYPOTHYROIDISM, UNSPECIFIED TYPE: ICD-10-CM

## 2024-05-31 RX ORDER — LEVOTHYROXINE SODIUM 0.07 MG/1
75 TABLET ORAL DAILY
Qty: 90 TABLET | Refills: 1 | Status: SHIPPED | OUTPATIENT
Start: 2024-05-31

## 2024-07-09 ENCOUNTER — OFFICE VISIT (OUTPATIENT)
Dept: ENDOCRINOLOGY | Facility: CLINIC | Age: 59
End: 2024-07-09
Payer: MEDICARE

## 2024-07-09 VITALS
DIASTOLIC BLOOD PRESSURE: 64 MMHG | TEMPERATURE: 98.3 F | HEIGHT: 63 IN | WEIGHT: 175.8 LBS | BODY MASS INDEX: 31.15 KG/M2 | SYSTOLIC BLOOD PRESSURE: 110 MMHG | HEART RATE: 102 BPM | OXYGEN SATURATION: 96 %

## 2024-07-09 DIAGNOSIS — E03.9 HYPOTHYROIDISM, UNSPECIFIED TYPE: Primary | ICD-10-CM

## 2024-07-09 DIAGNOSIS — R11.0 NAUSEA: ICD-10-CM

## 2024-07-09 DIAGNOSIS — L74.9 SWEATING ABNORMALITY: ICD-10-CM

## 2024-07-09 DIAGNOSIS — E55.9 VITAMIN D DEFICIENCY: ICD-10-CM

## 2024-07-09 DIAGNOSIS — R73.03 PREDIABETES: ICD-10-CM

## 2024-07-09 DIAGNOSIS — E66.01 SEVERE OBESITY (HCC): ICD-10-CM

## 2024-07-09 DIAGNOSIS — Z86.018 HISTORY OF PITUITARY ADENOMA: ICD-10-CM

## 2024-07-09 PROCEDURE — 99214 OFFICE O/P EST MOD 30 MIN: CPT | Performed by: NURSE PRACTITIONER

## 2024-07-09 RX ORDER — ONDANSETRON 4 MG/1
4 TABLET, ORALLY DISINTEGRATING ORAL EVERY 6 HOURS PRN
Qty: 20 TABLET | Refills: 1 | Status: SHIPPED | OUTPATIENT
Start: 2024-07-09

## 2024-07-09 NOTE — ASSESSMENT & PLAN NOTE
Unfortunately, there are no labs, notes, or images for review related to history of pituitary adenoma.  Patient reports that surgery was performed by Dr. Farhan Mccain at [facility]-not sure of date.  Recommended that she investigate retrieval images and consult notes.  Will check pituitary hormones.

## 2024-07-09 NOTE — ASSESSMENT & PLAN NOTE
Patient has lost approximately 25 pounds in the last year while being on GLP-1 making diet lifestyle modifications.  Continue.

## 2024-07-09 NOTE — PROGRESS NOTES
Established Patient Progress Note     CC: Endocrinology follow up visit    Impression & Plan:    Problem List Items Addressed This Visit       Prediabetes     Check hemoglobin A1c.  Continue metformin 1000 mg twice daily and Ozempic 1 mg once weekly.  Continue with healthy diet and regular physical activity.         Relevant Medications    semaglutide, 1 mg/dose, (Ozempic, 1 MG/DOSE,) 4 mg/3 mL injection pen    Other Relevant Orders    Hemoglobin A1C    Comprehensive metabolic panel    Severe obesity (HCC)     Patient has lost approximately 25 pounds in the last year while being on GLP-1 making diet lifestyle modifications.  Continue.         Relevant Medications    semaglutide, 1 mg/dose, (Ozempic, 1 MG/DOSE,) 4 mg/3 mL injection pen    History of pituitary adenoma     Unfortunately, there are no labs, notes, or images for review related to history of pituitary adenoma.  Patient reports that surgery was performed by Dr. Farhan Mccain at [facility]-not sure of date.  Recommended that she investigate retrieval images and consult notes.  Will check pituitary hormones.         Relevant Orders    ACTH    Prolactin    Insulin-like growth factor 1 (IGF-1)    Sweating abnormality     Patient will complete previously ordered labs.  CT abdomen/pelvis from 2022-no adrenal nodules noted.  No suspicion for pheochromocytoma.  Counseled patient on the importance of remaining well-hydrated as I believe some of her heat intolerance/lightheadedness is related to dehydration and potential hypotension.         Relevant Orders    Cortisol Level,7-9 AM Specimen    Vitamin D deficiency     Check vitamin D level.  Continue supplement-2000 units daily.         Relevant Orders    Vitamin D 25 hydroxy    Hypothyroidism - Primary     Check TSH and free T4.  Continue 75 mcg of levothyroxine daily.         Relevant Orders    TSH, 3rd generation    T4, free     Other Visit Diagnoses       Nausea        Relevant Medications     ondansetron (Zofran ODT) 4 mg disintegrating tablet            History of Present Illness:     Ольга Manning is a 58 y.o. female with hypothyroidism and prediabetes presenting today for routine follow up.    For hypothyroidism, she is taking 75 mcg of LT4 daily.    For prediabetes, she is taking 1 mg of Ozempic once weekly and metformin 1000 mg twice daily.    Patient did not complete labs prior to today's appointment.    Continues to report episodes of heat intolerance (excessive sweating, especially after showering), increased anxiety, tremor, palpitations, and lightheadedness.  Patient reports that while she is under a great deal of stress, these episodes have been occurring for approximately 3 to 4 years.  She reports she is eating enough and that her blood sugars, when checked, are not less than 70 mg/dL.  She does not check her blood pressure at home.  She does admit that she does not drink water but rather caffeinated coffee for the majority of the day.      Patient Active Problem List   Diagnosis    Chronic pain syndrome    Acute muscle stiffness of neck    Cervical disc disorder with radiculopathy of mid-cervical region    Neck pain    Chronic left-sided low back pain with left-sided sciatica    Lumbar radiculopathy    Cervicalgia    Right hip pain    Trochanteric bursitis of right hip    Pain in right hip    Myofascial pain syndrome    Carpal tunnel syndrome    Continuous opioid dependence (HCC)    Prediabetes    Severe obesity (HCC)    History of pituitary adenoma    Sweating abnormality    Vitamin D deficiency    Hypothyroidism    Hair loss    History of total hysterectomy    CARLTON (obstructive sleep apnea)    Low ferritin    Mild episode of recurrent major depressive disorder (HCC)    Severe episode of recurrent major depressive disorder, without psychotic features (HCC)    Acute deep vein thrombosis (DVT) of calf muscle vein of right lower extremity (HCC)    Avulsion fracture of calcaneus with  nonunion, left    History of total colectomy    S/P Achilles tendon repair      Past Medical History:   Diagnosis Date    Anxiety     Asthma     exercise induced asthma    Cervical disc disorder     Chronic pain     Chronic pain disorder     Chronic pain syndrome 01/21/2016    Depression     Dysuria     Fibromyalgia, primary Est 2016    GERD (gastroesophageal reflux disease) 2004    Headache(784.0) As a child    Low back pain     Lung abnormality     nodules    Lung nodule     Neck pain     Peripheral neuropathy     Pituitary abnormality (HCC)     tumor    Right hip pain 10/29/2019    Thrombocytopenia (HCC)       Past Surgical History:   Procedure Laterality Date    APPENDECTOMY      BREAST BIOPSY Right 1995    CHOLECYSTECTOMY      COLECTOMY      COLON SURGERY      EPIDURAL BLOCK INJECTION N/A 6/23/2016    Procedure: BLOCK / INJECTION EPIDURAL STEROID CERVICAL  C7-T1;  Surgeon: Brian Cash MD;  Location: [facility] MAIN OR;  Service:     EPIDURAL BLOCK INJECTION N/A 3/31/2016    Procedure: BLOCK / INJECTION EPIDURAL STEROID CERVICAL C7-T1  (C-ARM);  Surgeon: Brian Cash MD;  Location: [facility] MAIN OR;  Service:     EPIDURAL BLOCK INJECTION N/A 8/8/2018    Procedure: C6 C7 Cervical Epidural Steroid Injection (50714);  Surgeon: Brian Cash MD;  Location: [facility] MAIN OR;  Service: Pain Management     EPIDURAL BLOCK INJECTION N/A 12/16/2021    Procedure: T4 T5 thoracic epidural steroid injection (98309);  Surgeon: Brian Cash MD;  Location: [facility] MAIN OR;  Service: Pain Management     EPIDURAL BLOCK INJECTION N/A 6/10/2022    Procedure: T4 T5 THORACIC EPIDURAL STEROID INJECTION (32868);  Surgeon: Brian Cash MD;  Location: [facility] MAIN OR;  Service: Pain Management     HYSTERECTOMY  1996    OOPHORECTOMY Bilateral 1996    PITUITARY SURGERY      [place] ARTHROCENTESIS ASPIR&/INJ MAJOR JT/BURSA W/O US Right 11/8/2019    Procedure: Trochanteric Bursa Injection (83104);  Surgeon: Brian Cash MD;  Location: [facility]  MAIN OR;  Service: Pain Management     GA ARTHROCENTESIS ASPIR&/INJ MAJOR JT/BURSA W/O US Right 1/23/2020    Procedure: Trochanteric Bursa Injection (20610);  Surgeon: Brian Cahs MD;  Location: [facility] MAIN OR;  Service: Pain Management     GA NJX DX/THER SBST EPIDURAL/SUBRACH CERV/THORACIC N/A 1/21/2016    Procedure: BLOCK / INJECTION EPIDURAL STEROID CERVICAL C7-T1 (C-ARM);  Surgeon: Brian Cash MD;  Location: [facility] MAIN OR;  Service: Pain Management     REDUCTION MAMMAPLASTY Bilateral 2000      Family History   Problem Relation Age of Onset    Thyroid disease Mother     Hyperlipidemia Mother     Depression Mother     Stroke Mother         TIA    Thyroid disease Father     Hyperlipidemia Father     Depression Father     Arthritis Father         Spine and knees    Endometrial cancer Sister     Obesity Sister     Migraines Sister     No Known Problems Daughter     Cancer Maternal Grandmother         Kidney cancer    Melanoma Maternal Grandmother     Heart disease Maternal Grandfather     Breast cancer Paternal Grandmother 40    Cancer Paternal Grandmother         Breast, Lung, Liver and skin cancer    Diabetes Paternal Grandfather     Obesity Brother     No Known Problems Son     No Known Problems Maternal Uncle     Obesity Paternal Aunt     Colon cancer Paternal Aunt     Obesity Paternal Aunt     Diabetes Paternal Aunt     Cancer Paternal Aunt         Thyroid and colon cancer    No Known Problems Paternal Uncle     ADD / ADHD Neg Hx     Anesthesia problems Neg Hx     Cancer Neg Hx     Clotting disorder Neg Hx     Collagen disease Neg Hx     Dislocations Neg Hx     Learning disabilities Neg Hx     Neurological problems Neg Hx     Osteoporosis Neg Hx     Rheumatologic disease Neg Hx     Scoliosis Neg Hx     Vascular Disease Neg Hx      Social History     Tobacco Use    Smoking status: Never     Passive exposure: Current    Smokeless tobacco: Never   Substance Use Topics    Alcohol use: No     Allergies    Allergen Reactions    Levaquin [Levofloxacin In D5w] Shortness Of Breath     Also hives      Clarithromycin Hives and Rash    Lubiprostone Hives and Other (See Comments)     Reaction Date: 10Aug2011;     Migraines and vomiting    Reaction Date: 10Aug2011;  Migraines and vomiting    Cephradine     Erythromycin Hives     Reaction Date: 10Aug2011;     Macrobid [Nitrofurantoin Monohyd Macro] Other (See Comments)     C/o passing out    Morphine     Penicillins Hives     Reaction Date: 10Aug2011;     Sulfa Antibiotics     Tetracycline     Tetracyclines & Related Hives    Levofloxacin Rash    Morphine And Codeine Rash    Valium [Diazepam] Rash and Vomiting     Reaction Date: 14Jun2012;        Current Outpatient Medications:     albuterol (ProAir HFA) 90 mcg/act inhaler, Inhale 2 puffs every 6 (six) hours as needed for wheezing, Disp: 8.5 g, Rfl: 0    aspirin 81 MG tablet, Take 81 mg by mouth daily, Disp: , Rfl:     cholecalciferol (VITAMIN D3) 1,000 units tablet, Take 2 tablets by mouth daily, Disp: , Rfl:     cyclobenzaprine (FLEXERIL) 10 mg tablet, Take 1 tablet (10 mg total) by mouth 3 (three) times a day as needed for muscle spasms, Disp: 270 tablet, Rfl: 1    Diclofenac Sodium (VOLTAREN) 1 %, Apply 2 g topically 4 (four) times a day as needed (pain), Disp: 100 g, Rfl: 3    gabapentin (NEURONTIN) 800 mg tablet, Take 1 tablet (800 mg total) by mouth 3 (three) times a day, Disp: 270 tablet, Rfl: 1    ibuprofen (MOTRIN) 600 mg tablet, Take 1 tablet (600 mg total) by mouth every 6 (six) hours as needed for mild pain, Disp: 30 tablet, Rfl: 0    levothyroxine 75 mcg tablet, TAKE 1 TABLET BY MOUTH EVERY DAY, Disp: 90 tablet, Rfl: 1    metFORMIN (GLUCOPHAGE) 1000 MG tablet, Take 1,000 mg by mouth 2 (two) times a day with meals, Disp: , Rfl:     metoprolol succinate (TOPROL-XL) 25 mg 24 hr tablet, , Disp: , Rfl:     naloxone (NARCAN) 4 mg/0.1 mL nasal spray, Administer 1 spray into a nostril. If no response after 2-3 minutes,  give another dose in the other nostril using a new spray., Disp: 1 each, Rfl: 1    ondansetron (Zofran ODT) 4 mg disintegrating tablet, Take 1 tablet (4 mg total) by mouth every 6 (six) hours as needed for nausea or vomiting, Disp: 20 tablet, Rfl: 1    oxyCODONE-acetaminophen (PERCOCET) 5-325 mg per tablet, Take 1 tablet by mouth 2 (two) times a day as needed for severe pain Max Daily Amount: 2 tablets Do not start before June 25, 2024., Disp: 60 tablet, Rfl: 0    semaglutide, 1 mg/dose, (Ozempic, 1 MG/DOSE,) 4 mg/3 mL injection pen, Inject 0.75 mL (1 mg total) under the skin every 7 days, Disp: 3 mL, Rfl: 2    traZODone (DESYREL) 50 mg tablet, , Disp: , Rfl: 0    Review of Systems   Constitutional:  Positive for fatigue. Negative for activity change, appetite change and unexpected weight change.   HENT:  Negative for dental problem, sore throat, trouble swallowing and voice change.    Eyes:  Negative for visual disturbance.   Respiratory:  Positive for chest tightness. Negative for cough and shortness of breath.    Cardiovascular:  Positive for palpitations. Negative for chest pain and leg swelling.   Gastrointestinal:  Negative for constipation, diarrhea, nausea and vomiting.   Endocrine: Positive for heat intolerance. Negative for cold intolerance, polydipsia, polyphagia and polyuria.   Genitourinary:  Negative for frequency.   Musculoskeletal:  Negative for arthralgias, back pain, gait problem and myalgias.   Skin:  Negative for wound.   Allergic/Immunologic: Positive for environmental allergies. Negative for food allergies.   Neurological:  Positive for tremors and light-headedness. Negative for dizziness, weakness, numbness and headaches.   Hematological:  Does not bruise/bleed easily.   Psychiatric/Behavioral:  Positive for dysphoric mood. Negative for decreased concentration and sleep disturbance. The patient is nervous/anxious.        Physical Exam:  Body mass index is 31.14 kg/m².  /64 (BP Location:  "Right arm, Patient Position: Sitting, Cuff Size: Standard)   Pulse 102   Temp 98.3 °F (36.8 °C)   Ht 5' 3\" (1.6 m)   Wt 79.7 kg (175 lb 12.8 oz)   SpO2 96%   BMI 31.14 kg/m²    Wt Readings from Last 3 Encounters:   07/09/24 79.7 kg (175 lb 12.8 oz)   02/21/24 88.2 kg (194 lb 6.4 oz)   07/07/23 90.3 kg (199 lb)       Physical Exam  Vitals reviewed.   Constitutional:       General: She is not in acute distress.     Appearance: She is well-developed. She is obese. She is not ill-appearing.   HENT:      Head: Normocephalic and atraumatic.   Eyes:      Pupils: Pupils are equal, round, and reactive to light.   Neck:      Thyroid: No thyromegaly.   Cardiovascular:      Rate and Rhythm: Normal rate.      Pulses: Normal pulses.   Pulmonary:      Effort: Pulmonary effort is normal.   Musculoskeletal:      Cervical back: Normal range of motion and neck supple.      Right lower leg: No edema.      Left lower leg: No edema.   Lymphadenopathy:      Cervical: No cervical adenopathy.   Skin:     General: Skin is warm and dry.      Capillary Refill: Capillary refill takes less than 2 seconds.   Neurological:      Mental Status: She is alert and oriented to person, place, and time.      Gait: Gait normal.   Psychiatric:         Mood and Affect: Mood normal.         Behavior: Behavior normal.         Labs:       Discussed with the patient and all questioned fully answered. She will call me if any problems arise.    Follow-up appointment in 6 months.     Counseled patient on diagnostic results, prognosis, risk and benefit of treatment options, instruction for management, importance of treatment compliance, Risk  factor reduction and impressions    PAULINA Trujillo    "

## 2024-07-09 NOTE — ASSESSMENT & PLAN NOTE
Patient will complete previously ordered labs.  CT abdomen/pelvis from 2022-no adrenal nodules noted.  No suspicion for pheochromocytoma.  Counseled patient on the importance of remaining well-hydrated as I believe some of her heat intolerance/lightheadedness is related to dehydration and potential hypotension.   [Time Spent: ___ minutes] : I have spent [unfilled] minutes of time on the encounter. [>50% of the face to face encounter time was spent on counseling and/or coordination of care for ___] : Greater than 50% of the face to face encounter time was spent on counseling and/or coordination of care for [unfilled]

## 2024-07-09 NOTE — ASSESSMENT & PLAN NOTE
Check hemoglobin A1c.  Continue metformin 1000 mg twice daily and Ozempic 1 mg once weekly.  Continue with healthy diet and regular physical activity.

## 2024-07-17 ENCOUNTER — OFFICE VISIT (OUTPATIENT)
Dept: PAIN MEDICINE | Facility: CLINIC | Age: 59
End: 2024-07-17
Payer: MEDICARE

## 2024-07-17 VITALS
WEIGHT: 175 LBS | HEART RATE: 109 BPM | DIASTOLIC BLOOD PRESSURE: 65 MMHG | SYSTOLIC BLOOD PRESSURE: 86 MMHG | HEIGHT: 63 IN | BODY MASS INDEX: 31.01 KG/M2

## 2024-07-17 DIAGNOSIS — Z79.891 LONG-TERM CURRENT USE OF OPIATE ANALGESIC: ICD-10-CM

## 2024-07-17 DIAGNOSIS — G89.4 CHRONIC PAIN SYNDROME: Primary | ICD-10-CM

## 2024-07-17 DIAGNOSIS — M54.2 NECK PAIN: ICD-10-CM

## 2024-07-17 DIAGNOSIS — M50.120 CERVICAL DISC DISORDER WITH RADICULOPATHY OF MID-CERVICAL REGION: ICD-10-CM

## 2024-07-17 DIAGNOSIS — F11.20 UNCOMPLICATED OPIOID DEPENDENCE (HCC): ICD-10-CM

## 2024-07-17 DIAGNOSIS — M79.18 MYOFASCIAL PAIN SYNDROME: ICD-10-CM

## 2024-07-17 PROCEDURE — 99214 OFFICE O/P EST MOD 30 MIN: CPT

## 2024-07-17 RX ORDER — CYCLOBENZAPRINE HCL 10 MG
10 TABLET ORAL 3 TIMES DAILY PRN
Qty: 270 TABLET | Refills: 1 | Status: SHIPPED | OUTPATIENT
Start: 2024-07-17 | End: 2025-01-13

## 2024-07-17 RX ORDER — GABAPENTIN 800 MG/1
800 TABLET ORAL 3 TIMES DAILY
Qty: 270 TABLET | Refills: 1 | Status: SHIPPED | OUTPATIENT
Start: 2024-07-17 | End: 2025-01-13

## 2024-07-17 RX ORDER — OXYCODONE HYDROCHLORIDE AND ACETAMINOPHEN 5; 325 MG/1; MG/1
1 TABLET ORAL 2 TIMES DAILY PRN
Qty: 60 TABLET | Refills: 0 | Status: SHIPPED | OUTPATIENT
Start: 2024-09-20 | End: 2024-10-20

## 2024-07-17 RX ORDER — OXYCODONE HYDROCHLORIDE AND ACETAMINOPHEN 5; 325 MG/1; MG/1
1 TABLET ORAL 2 TIMES DAILY PRN
Qty: 60 TABLET | Refills: 0 | Status: SHIPPED | OUTPATIENT
Start: 2024-07-24 | End: 2024-08-23

## 2024-07-17 RX ORDER — OXYCODONE HYDROCHLORIDE AND ACETAMINOPHEN 5; 325 MG/1; MG/1
1 TABLET ORAL 2 TIMES DAILY PRN
Qty: 60 TABLET | Refills: 0 | Status: SHIPPED | OUTPATIENT
Start: 2024-08-22 | End: 2024-09-21

## 2024-07-17 NOTE — PROGRESS NOTES
Pain Medicine Follow-Up Note    Assessment:  1. Chronic pain syndrome    2. Cervical disc disorder with radiculopathy of mid-cervical region    3. Myofascial pain syndrome    4. Neck pain    5. Long-term current use of opiate analgesic    6. Uncomplicated opioid dependence (HCC)        Plan:      New Medications Ordered This Visit   Medications    oxyCODONE-acetaminophen (PERCOCET) 5-325 mg per tablet     Sig: Take 1 tablet by mouth 2 (two) times a day as needed for severe pain Max Daily Amount: 2 tablets Do not start before August 22, 2024.     Dispense:  60 tablet     Refill:  0     DNF until 8/22/2024    cyclobenzaprine (FLEXERIL) 10 mg tablet     Sig: Take 1 tablet (10 mg total) by mouth 3 (three) times a day as needed for muscle spasms     Dispense:  270 tablet     Refill:  1    gabapentin (NEURONTIN) 800 mg tablet     Sig: Take 1 tablet (800 mg total) by mouth 3 (three) times a day     Dispense:  270 tablet     Refill:  1    oxyCODONE-acetaminophen (PERCOCET) 5-325 mg per tablet     Sig: Take 1 tablet by mouth 2 (two) times a day as needed for severe pain Max Daily Amount: 2 tablets Do not start before July 24, 2024.     Dispense:  60 tablet     Refill:  0     DNF until 7/24/2024    oxyCODONE-acetaminophen (PERCOCET) 5-325 mg per tablet     Sig: Take 1 tablet by mouth 2 (two) times a day as needed for severe pain Max Daily Amount: 2 tablets Do not start before September 20, 2024.     Dispense:  60 tablet     Refill:  0     DNF until 9/20/2024       My impressions and treatment recommendations were discussed in detail with the patient who verbalized understanding and had no further questions.      Patient here to discuss refilling medications and repeating trigger point injections.  Her blood pressure is a bit low and her pulse is a little elevated did discuss that this could indicate dehydration and with the current increase in temperatures we have been experiencing it would not be unusual.  Patient states  that she has been following up with multiple people regarding her ongoing low blood pressure.    The patient continues to report an overall reduction of her pain level and improvement with her functioning without significant side effects using oxycodone/acetaminophen 5/325 mg tablet patient uses 1 tablet up to twice daily as needed for severe pain along with gabapentin 800 mg tablet 3 times daily and cyclobenzaprine 10 mg tablet 1 tablet up to 3 times a day as needed for pain/muscle spasm, therefore I will continue the patient on these medications.  Oxycodone/acetaminophen 5/325 mg tablet E-prescribed to the patient's pharmacy with a do not fill until date of 7/24/2024, 8/22/2024, and 9/20/2024.    [place] Prescription Drug Monitoring Program report was reviewed and was appropriate     There are risks associated with opioid medications, including dependence, addiction and tolerance. The patient understands and agrees to use these medications only as prescribed. Potential side effects of the medications include, but are not limited to, constipation, drowsiness, addiction, impaired judgment and risk of fatal overdose if not taken as prescribed. The patient was warned against driving while taking sedation medications.  Sharing medications is a felony. At this point in time, the patient is showing no signs of addiction, abuse, diversion or suicidal ideation.    Patient does have tenderness with palpation throughout her bilateral paraspinal muscles and her bilateral trapezius muscles.  In the past she has benefited greatly from trigger point injections.  I recommend that she have these injections repeated. Complete risks and benefits including bleeding, infection, tissue reaction, nerve injury and allergic reaction were discussed. The approach was demonstrated using models and literature was provided. Verbal and written consent was obtained.    Follow-up is planned in 12 weeks time or sooner as warranted.  Discharge  instructions were provided. I personally saw and examined the patient and I agree with the above discussed plan of care.    History of Present Illness:    Ольга Manning is a 58 y.o. female who presents to [facility] for interval re-evaluation of the above stated pain complaints. The patient has a past medical and chronic pain history as outlined in the assessment section. She was last seen on 4/22/2024.    At today's visit patient states that their pain symptoms are the same with a pain score of 6/10 on the verbal numeric pain scale.  The patient's pain is worse in the morning, evening, and at night.  The patient's pain is constant in nature.  And the quality of the patient's pain is described as  burning, dull-aching, sharp, throbbing, shooting, numbness, and pins-and-needles..  The patient's pain is located in the neck, bilateral shoulders, radiating down her right arm as well as in her left low back radiating down her left leg.  The patient states the amount of pain relief she is obtaining from her current pain relievers is enough to make a real difference in her life by reducing her pain symptoms by 30%.  Patient denies any side effects using oxycodone/acetaminophen, cyclobenzaprine, and gabapentin.    Pain Contract Signed:  1/5/2024  Last Urine Drug Screen:  4/22/2024    Other than as stated above, the patient denies any interval changes in medications, medical condition, mental condition, symptoms, or allergies since the last office visit.         Review of Systems:    Review of Systems   Respiratory:  Negative for shortness of breath.    Cardiovascular:  Positive for chest pain.   Gastrointestinal:  Negative for constipation, diarrhea, nausea and vomiting.   Musculoskeletal:  Positive for gait problem. Negative for arthralgias, joint swelling and myalgias.        Decreased range of motion, joint stiffness, swelling in the right shoulder and neck, pain in the right arm and left  leg   Skin:  Negative for rash.   Neurological:  Positive for dizziness and weakness. Negative for seizures.   All other systems reviewed and are negative.        Past Medical History:   Diagnosis Date    Anxiety     Asthma     exercise induced asthma    Cervical disc disorder     Chronic pain     Chronic pain disorder     Chronic pain syndrome 01/21/2016    Depression     Dysuria     Fibromyalgia, primary Est 2016    GERD (gastroesophageal reflux disease) 2004    Headache(784.0) As a child    Low back pain     Lung abnormality     nodules    Lung nodule     Neck pain     Peripheral neuropathy     Pituitary abnormality (HCC)     tumor    Right hip pain 10/29/2019    Thrombocytopenia (HCC)        Past Surgical History:   Procedure Laterality Date    APPENDECTOMY      BREAST BIOPSY Right 1995    CHOLECYSTECTOMY      COLECTOMY      COLON SURGERY      EPIDURAL BLOCK INJECTION N/A 6/23/2016    Procedure: BLOCK / INJECTION EPIDURAL STEROID CERVICAL  C7-T1;  Surgeon: Brian Cash MD;  Location: [facility] MAIN OR;  Service:     EPIDURAL BLOCK INJECTION N/A 3/31/2016    Procedure: BLOCK / INJECTION EPIDURAL STEROID CERVICAL C7-T1  (C-ARM);  Surgeon: Brian Cash MD;  Location: [facility] MAIN OR;  Service:     EPIDURAL BLOCK INJECTION N/A 8/8/2018    Procedure: C6 C7 Cervical Epidural Steroid Injection (74730);  Surgeon: Brian Cash MD;  Location: [facility] MAIN OR;  Service: Pain Management     EPIDURAL BLOCK INJECTION N/A 12/16/2021    Procedure: T4 T5 thoracic epidural steroid injection (31054);  Surgeon: Brian Cash MD;  Location: [facility] MAIN OR;  Service: Pain Management     EPIDURAL BLOCK INJECTION N/A 6/10/2022    Procedure: T4 T5 THORACIC EPIDURAL STEROID INJECTION (58444);  Surgeon: Brian Cash MD;  Location: [facility] MAIN OR;  Service: Pain Management     HYSTERECTOMY  1996    OOPHORECTOMY Bilateral 1996    PITUITARY SURGERY      DE ARTHROCENTESIS ASPIR&/INJ MAJOR JT/BURSA W/O US Right 11/8/2019    Procedure:  Trochanteric Bursa Injection (20610);  Surgeon: Brian Cash MD;  Location: [facility] MAIN OR;  Service: Pain Management     [place] ARTHROCENTESIS ASPIR&/INJ MAJOR JT/BURSA W/O US Right 1/23/2020    Procedure: Trochanteric Bursa Injection (20610);  Surgeon: Brian Cash MD;  Location: [facility] MAIN OR;  Service: Pain Management     [place] NJX DX/THER SBST EPIDURAL/SUBRACH CERV/THORACIC N/A 1/21/2016    Procedure: BLOCK / INJECTION EPIDURAL STEROID CERVICAL C7-T1 (C-ARM);  Surgeon: Brian Cash MD;  Location: [facility] MAIN OR;  Service: Pain Management     REDUCTION MAMMAPLASTY Bilateral 2000       Family History   Problem Relation Age of Onset    Thyroid disease Mother     Hyperlipidemia Mother     Depression Mother     Stroke Mother         TIA    Thyroid disease Father     Hyperlipidemia Father     Depression Father     Arthritis Father         Spine and knees    Endometrial cancer Sister     Obesity Sister     Migraines Sister     No Known Problems Daughter     Cancer Maternal Grandmother         Kidney cancer    Melanoma Maternal Grandmother     Heart disease Maternal Grandfather     Breast cancer Paternal Grandmother 40    Cancer Paternal Grandmother         Breast, Lung, Liver and skin cancer    Diabetes Paternal Grandfather     Obesity Brother     No Known Problems Son     No Known Problems Maternal Uncle     Obesity Paternal Aunt     Colon cancer Paternal Aunt     Obesity Paternal Aunt     Diabetes Paternal Aunt     Cancer Paternal Aunt         Thyroid and colon cancer    No Known Problems Paternal Uncle     ADD / ADHD Neg Hx     Anesthesia problems Neg Hx     Cancer Neg Hx     Clotting disorder Neg Hx     Collagen disease Neg Hx     Dislocations Neg Hx     Learning disabilities Neg Hx     Neurological problems Neg Hx     Osteoporosis Neg Hx     Rheumatologic disease Neg Hx     Scoliosis Neg Hx     Vascular Disease Neg Hx        Social History     Occupational History    Not on file   Tobacco Use    Smoking status:  Never     Passive exposure: Current    Smokeless tobacco: Never   Vaping Use    Vaping status: Never Used   Substance and Sexual Activity    Alcohol use: No    Drug use: No    Sexual activity: Not Currently     Partners: Male     Birth control/protection: Post-menopausal         Current Outpatient Medications:     albuterol (ProAir HFA) 90 mcg/act inhaler, Inhale 2 puffs every 6 (six) hours as needed for wheezing, Disp: 8.5 g, Rfl: 0    aspirin 81 MG tablet, Take 81 mg by mouth daily, Disp: , Rfl:     cholecalciferol (VITAMIN D3) 1,000 units tablet, Take 2 tablets by mouth daily, Disp: , Rfl:     cyclobenzaprine (FLEXERIL) 10 mg tablet, Take 1 tablet (10 mg total) by mouth 3 (three) times a day as needed for muscle spasms, Disp: 270 tablet, Rfl: 1    Diclofenac Sodium (VOLTAREN) 1 %, Apply 2 g topically 4 (four) times a day as needed (pain), Disp: 100 g, Rfl: 3    gabapentin (NEURONTIN) 800 mg tablet, Take 1 tablet (800 mg total) by mouth 3 (three) times a day, Disp: 270 tablet, Rfl: 1    ibuprofen (MOTRIN) 600 mg tablet, Take 1 tablet (600 mg total) by mouth every 6 (six) hours as needed for mild pain, Disp: 30 tablet, Rfl: 0    levothyroxine 75 mcg tablet, TAKE 1 TABLET BY MOUTH EVERY DAY, Disp: 90 tablet, Rfl: 1    metFORMIN (GLUCOPHAGE) 1000 MG tablet, Take 1,000 mg by mouth 2 (two) times a day with meals, Disp: , Rfl:     metoprolol succinate (TOPROL-XL) 25 mg 24 hr tablet, , Disp: , Rfl:     ondansetron (Zofran ODT) 4 mg disintegrating tablet, Take 1 tablet (4 mg total) by mouth every 6 (six) hours as needed for nausea or vomiting, Disp: 20 tablet, Rfl: 1    [START ON 8/22/2024] oxyCODONE-acetaminophen (PERCOCET) 5-325 mg per tablet, Take 1 tablet by mouth 2 (two) times a day as needed for severe pain Max Daily Amount: 2 tablets Do not start before August 22, 2024., Disp: 60 tablet, Rfl: 0    [START ON 7/24/2024] oxyCODONE-acetaminophen (PERCOCET) 5-325 mg per tablet, Take 1 tablet by mouth 2 (two) times a day  "as needed for severe pain Max Daily Amount: 2 tablets Do not start before July 24, 2024., Disp: 60 tablet, Rfl: 0    [START ON 9/20/2024] oxyCODONE-acetaminophen (PERCOCET) 5-325 mg per tablet, Take 1 tablet by mouth 2 (two) times a day as needed for severe pain Max Daily Amount: 2 tablets Do not start before September 20, 2024., Disp: 60 tablet, Rfl: 0    semaglutide, 1 mg/dose, (Ozempic, 1 MG/DOSE,) 4 mg/3 mL injection pen, Inject 0.75 mL (1 mg total) under the skin every 7 days, Disp: 3 mL, Rfl: 2    traZODone (DESYREL) 50 mg tablet, , Disp: , Rfl: 0    naloxone (NARCAN) 4 mg/0.1 mL nasal spray, Administer 1 spray into a nostril. If no response after 2-3 minutes, give another dose in the other nostril using a new spray., Disp: 1 each, Rfl: 1    Allergies   Allergen Reactions    Levaquin [Levofloxacin In D5w] Shortness Of Breath     Also hives      Clarithromycin Hives and Rash    Lubiprostone Hives and Other (See Comments)     Reaction Date: 10Aug2011;     Migraines and vomiting    Reaction Date: 10Aug2011;  Migraines and vomiting    Cephradine     Erythromycin Hives     Reaction Date: 10Aug2011;     Macrobid [Nitrofurantoin Monohyd Macro] Other (See Comments)     C/o passing out    Morphine     Penicillins Hives     Reaction Date: 10Aug2011;     Sulfa Antibiotics     Tetracycline     Tetracyclines & Related Hives    Levofloxacin Rash    Morphine And Codeine Rash    Valium [Diazepam] Rash and Vomiting     Reaction Date: 14Jun2012;        Physical Exam:    BP (!) 86/65   Pulse (!) 109   Ht 5' 3\" (1.6 m)   Wt 79.4 kg (175 lb)   BMI 31.00 kg/m²     Constitutional:normal, well developed, well nourished, alert, in no distress and non-toxic and no overt pain behavior. and obese  Eyes:anicteric  HEENT:grossly intact  Neck: Tenderness to palpation throughout bilateral paraspinal and trapezius muscles  Pulmonary:even and unlabored  Cardiovascular:No edema or pitting edema present  Skin:Normal without rashes or lesions " and well hydrated  Psychiatric:Mood and affect appropriate  Neurologic:Cranial Nerves II-XII grossly intact  Musculoskeletal:antalgic gait      This document was created using speech voice recognition software.   Grammatical errors, random word insertions, pronoun errors, and incomplete sentences are an occasional consequence of this system due to software limitations, ambient noise, and hardware issues.   Any formal questions or concerns about content, text, or information contained within the body of this dictation should be directly addressed to the provider for clarification.

## 2024-07-19 ENCOUNTER — TRANSCRIBE ORDERS (OUTPATIENT)
Dept: LAB | Facility: CLINIC | Age: 59
End: 2024-07-19

## 2024-07-19 ENCOUNTER — APPOINTMENT (OUTPATIENT)
Dept: LAB | Facility: CLINIC | Age: 59
End: 2024-07-19
Payer: MEDICARE

## 2024-07-19 DIAGNOSIS — R00.0 TACHYCARDIA, UNSPECIFIED: ICD-10-CM

## 2024-07-19 DIAGNOSIS — R73.03 PREDIABETES: ICD-10-CM

## 2024-07-19 DIAGNOSIS — E11.9 CONTROLLED TYPE 2 DIABETES MELLITUS WITHOUT COMPLICATION, UNSPECIFIED WHETHER LONG TERM INSULIN USE (HCC): ICD-10-CM

## 2024-07-19 DIAGNOSIS — E61.1 IRON DEFICIENCY: Primary | ICD-10-CM

## 2024-07-19 DIAGNOSIS — R42 LIGHTHEADEDNESS: ICD-10-CM

## 2024-07-19 LAB
25(OH)D3 SERPL-MCNC: 67.2 NG/ML (ref 30–100)
ALBUMIN SERPL BCG-MCNC: 4.1 G/DL (ref 3.5–5)
ALP SERPL-CCNC: 108 U/L (ref 34–104)
ALT SERPL W P-5'-P-CCNC: 25 U/L (ref 7–52)
ANION GAP SERPL CALCULATED.3IONS-SCNC: 13 MMOL/L (ref 4–13)
AST SERPL W P-5'-P-CCNC: 23 U/L (ref 13–39)
BASOPHILS # BLD AUTO: 0.05 THOUSANDS/ÂΜL (ref 0–0.1)
BASOPHILS NFR BLD AUTO: 1 % (ref 0–1)
BILIRUB SERPL-MCNC: 0.36 MG/DL (ref 0.2–1)
BUN SERPL-MCNC: 13 MG/DL (ref 5–25)
CALCIUM SERPL-MCNC: 9.8 MG/DL (ref 8.4–10.2)
CHLORIDE SERPL-SCNC: 101 MMOL/L (ref 96–108)
CO2 SERPL-SCNC: 25 MMOL/L (ref 21–32)
CORTIS AM PEAK SERPL-MCNC: 15 UG/DL (ref 6.7–22.6)
CREAT SERPL-MCNC: 0.87 MG/DL (ref 0.6–1.3)
EOSINOPHIL # BLD AUTO: 0.13 THOUSAND/ÂΜL (ref 0–0.61)
EOSINOPHIL NFR BLD AUTO: 2 % (ref 0–6)
ERYTHROCYTE [DISTWIDTH] IN BLOOD BY AUTOMATED COUNT: 13.8 % (ref 11.6–15.1)
EST. AVERAGE GLUCOSE BLD GHB EST-MCNC: 108 MG/DL
FERRITIN SERPL-MCNC: 27 NG/ML (ref 11–307)
GFR SERPL CREATININE-BSD FRML MDRD: 74 ML/MIN/1.73SQ M
GLUCOSE P FAST SERPL-MCNC: 75 MG/DL (ref 65–99)
HBA1C MFR BLD: 5.4 %
HCT VFR BLD AUTO: 40.3 % (ref 34.8–46.1)
HGB BLD-MCNC: 12.8 G/DL (ref 11.5–15.4)
IMM GRANULOCYTES # BLD AUTO: 0.03 THOUSAND/UL (ref 0–0.2)
IMM GRANULOCYTES NFR BLD AUTO: 0 % (ref 0–2)
LYMPHOCYTES # BLD AUTO: 2.73 THOUSANDS/ÂΜL (ref 0.6–4.47)
LYMPHOCYTES NFR BLD AUTO: 37 % (ref 14–44)
MCH RBC QN AUTO: 28.4 PG (ref 26.8–34.3)
MCHC RBC AUTO-ENTMCNC: 31.8 G/DL (ref 31.4–37.4)
MCV RBC AUTO: 89 FL (ref 82–98)
MONOCYTES # BLD AUTO: 0.62 THOUSAND/ÂΜL (ref 0.17–1.22)
MONOCYTES NFR BLD AUTO: 8 % (ref 4–12)
NEUTROPHILS # BLD AUTO: 3.79 THOUSANDS/ÂΜL (ref 1.85–7.62)
NEUTS SEG NFR BLD AUTO: 52 % (ref 43–75)
NRBC BLD AUTO-RTO: 0 /100 WBCS
PLATELET # BLD AUTO: 505 THOUSANDS/UL (ref 149–390)
PMV BLD AUTO: 10 FL (ref 8.9–12.7)
POTASSIUM SERPL-SCNC: 3.4 MMOL/L (ref 3.5–5.3)
PROLACTIN SERPL-MCNC: 21.76 NG/ML (ref 2.74–19.64)
PROT SERPL-MCNC: 7.1 G/DL (ref 6.4–8.4)
RBC # BLD AUTO: 4.51 MILLION/UL (ref 3.81–5.12)
SODIUM SERPL-SCNC: 139 MMOL/L (ref 135–147)
T4 FREE SERPL-MCNC: 1.14 NG/DL (ref 0.61–1.12)
TSH SERPL DL<=0.05 MIU/L-ACNC: 0.31 UIU/ML (ref 0.45–4.5)
WBC # BLD AUTO: 7.35 THOUSAND/UL (ref 4.31–10.16)

## 2024-07-19 PROCEDURE — 84305 ASSAY OF SOMATOMEDIN: CPT | Performed by: NURSE PRACTITIONER

## 2024-07-19 PROCEDURE — 84443 ASSAY THYROID STIM HORMONE: CPT

## 2024-07-19 PROCEDURE — 82728 ASSAY OF FERRITIN: CPT

## 2024-07-19 PROCEDURE — 82306 VITAMIN D 25 HYDROXY: CPT | Performed by: NURSE PRACTITIONER

## 2024-07-19 PROCEDURE — 80053 COMPREHEN METABOLIC PANEL: CPT | Performed by: NURSE PRACTITIONER

## 2024-07-19 PROCEDURE — 84146 ASSAY OF PROLACTIN: CPT | Performed by: NURSE PRACTITIONER

## 2024-07-19 PROCEDURE — 83036 HEMOGLOBIN GLYCOSYLATED A1C: CPT | Performed by: NURSE PRACTITIONER

## 2024-07-19 PROCEDURE — 36415 COLL VENOUS BLD VENIPUNCTURE: CPT | Performed by: NURSE PRACTITIONER

## 2024-07-19 PROCEDURE — 82024 ASSAY OF ACTH: CPT | Performed by: NURSE PRACTITIONER

## 2024-07-19 PROCEDURE — 85025 COMPLETE CBC W/AUTO DIFF WBC: CPT

## 2024-07-19 PROCEDURE — 84439 ASSAY OF FREE THYROXINE: CPT | Performed by: NURSE PRACTITIONER

## 2024-07-20 NOTE — PATIENT INSTRUCTIONS
Opioid Safety   AMBULATORY CARE:   Opioid safety  includes the correct use, storage, and disposal of opioids. Examples of opioid medicines to treat pain include oxycodone, morphine, fentanyl, and codeine.   Call your local emergency number (911 in the [place]), or have someone else call if:   You have a seizure.    You cannot be woken.    You have trouble staying awake and your breathing is slow or shallow.     Your speech is slurred, or you are confused.    You are dizzy or stumble when you walk.    Call your doctor, or have someone close to you call if:   You are extremely drowsy, or you have trouble staying awake or speaking.    You have pale or clammy skin.    You have blue fingernails or lips.    Your heartbeat is slower than normal.    You cannot stop vomiting.    You have questions or concerns about your condition or care.    Use opioids safely:   Take prescribed opioids exactly as directed.  Opioids come with directions based on the kind of opioid and how it is given. Do not take more than the recommended amount, or for longer than needed.    Do not give opioids to others or take opioids that belong to someone else.  Misuse of opioids can lead to an addiction or overdose.    Do not mix opioids with other medicines or alcohol.  The combination can cause an overdose, or lead to a coma.    Do not drive or operate heavy machinery after you take the opioid.  Your provider or pharmacist can tell you how long to wait after a dose before you do these activities.    Talk to your healthcare provider if you have any side effects.  He or she can help you prevent or relieve side effects. Side effects include nausea, sleepiness, itching, and trouble thinking clearly.    Manage constipation:  Constipation is the most common side effect of opioid medicine. Constipation is when you have hard, dry bowel movements, or you go longer than usual between bowel movements. Tell your healthcare provider about all changes in your bowel  movements while you are taking opioids. He or she may recommend laxative medicine to help you have a bowel movement. He or she may also change the kind of opioid you are taking, or change when you take it. The following are more ways you can prevent or relieve constipation:  Drink liquids as directed.  You may need to drink extra liquids to help soften and move your bowels. Ask how much liquid to drink each day and which liquids are best for you.    Eat high-fiber foods.  This may help decrease constipation by adding bulk to your bowel movements. High-fiber foods include fruits, vegetables, whole-grain breads and cereals, and beans. Your healthcare provider or dietitian can help you create a high-fiber meal plan. Your provider may also recommend a fiber supplement if you cannot get enough fiber from food.         Exercise regularly.  Regular physical activity can help stimulate your intestines. Walking is a good exercise to prevent or relieve constipation. Ask which exercises are best for you.         Schedule a time each day to have a bowel movement.  This may help train your body to have regular bowel movements. Bend forward while you are on the toilet to help move the bowel movement out. Sit on the toilet for at least 10 minutes, even if you do not have a bowel movement.    Store opioids safely:   Store opioids where others cannot easily get them.  Keep them in a locked cabinet or secure area. Do not  keep them in a purse or other bag you carry with you. A person may be looking for something else and find the opioids.    Make sure opioids are stored out of the reach of children.  A child can easily overdose on opioids. Opioids may look like candy to a small child.    The best way to dispose of opioids:  The laws vary by country and area. In the [place], the best way is to return the opioids through a take-back program. This program is offered by the [place] Drug Enforcement Agency (NIDA). The following are  options for using the program:  Take the opioids to a NIDA collection site.  The site is often a law enforcement center. Call your local law enforcement center for scheduled take-back days in your area. You will be given information on where to go if the collection site is in a different location.    Take the opioids to an approved pharmacy or hospital.  A pharmacy or hospital may be set up as a collection site. You will need to ask if it is a NIDA collection site if you were not directed there. A pharmacy or doctor's office may not be able to take back opioids unless it is a NIDA site.    Use a mail-back system.  This means you are given containers to put the opioids into. You will then mail them in the containers.    Use a take-back drop box.  This is a place to leave the opioids at any time. People and animals will not be able to get into the box. Your local law enforcement agency can tell you where to find a drop box in your area.    Other safe ways to dispose of opioids:  The medicine may come with disposal instructions. The instructions may vary depending on the brand of medicine you are using. Instructions may come in a Medication Guide, but not every medicine has one. You may instead get instructions from your pharmacy or doctor. Follow instructions carefully. The following are general guidelines to follow:  Find out if you can flush the opioid.  Some opioids can be flushed down the toilet or poured into the sink. You will need to contact authorities in your area to see if this is an option for you. The FDA also offers a list of medicines that are safe to flush down the toilet. You can check the list if you cannot get the information for your local area.    Ask your waste management company about rules for putting opioids in the trash.  The company will be able to give you specific directions. Scratch out personal information on the original medicine label so it cannot be read. Then put it in the trash. Do not  label the trash or put any information on it about the opioids. It should look like regular household trash so no one is tempted to look for the opioids. Keep the trash out of the reach of children and animals. Always make sure trash is secure.    Talk to officials if you live in a facility.  If you live in a nursing home or assisted living center, talk to an official. The person will know the rules for your area.    Other ways to manage pain:   Ask your healthcare provider about non-opioid medicines to control pain.  Nonprescription medicines include NSAIDs (such as ibuprofen) and acetaminophen. Prescription medicines include muscle relaxers, antidepressants, and steroids.    Pain may be managed without any medicines.  Some ways to relieve pain include massage, aromatherapy, or meditation. Physical or occupational therapy may also help.    For more information:   Drug Enforcement Administration  [address] [place] [address]  Phone: 8- [phone] - [phone]  Web Address: https://www.[employer].gov/drug_disposal/     Food and Drug Administration  [address] [place] [address]  Phone: 7- 363 - [phone]  Web Address: http://www.fda.gov    Follow up with your doctor or pain specialist as directed:  You may need to have your dose adjusted. Your doctor or pain specialist can also help you find ways to manage pain without opioids. Write down your questions so you remember to ask them during your visits.  © Copyright [employer] 2022 Information is for End User's use only and may not be sold, redistributed or otherwise used for commercial purposes. All illustrations and images included in CareNotes® are the copyrighted property of [employer] Inc[employer] or [employer]  The above information is an  only. It is not intended as medical advice for individual conditions or treatments. Talk to your doctor, nurse or pharmacist before following any medical regimen to see  if it is safe and effective for you.     Trigger Point Injection   WHAT YOU NEED TO KNOW:   What do I need to know about a trigger point injection?  A trigger point injection is used to relax a muscle knot. This helps relieve pain. You may be able to have more than one trigger point treated during a session.  How do I prepare for a trigger point injection?   Your healthcare provider will tell you how to prepare. Arrange to have someone drive you home after the injection.    Tell your provider about all medicines you take, including pain medicine, blood thinners, and muscle relaxers. He or she will tell you if you need to stop any medicine for the injection, and when to stop. He or she will tell you which medicines to take or not take on the day of the injection.    Tell your provider about all your allergies, including to any pain medicine.    What will happen during a trigger point injection?   You may be sitting or lying, depending on where the trigger point is located. Your healthcare provider will feel for a knot in the muscle. He or she may halina your skin over the knot.    Your provider will put a needle through your skin and into the trigger point. Saline (salt solution), pain relievers, or other medicines may be pushed through the needle into the trigger point. Your provider may use only a dry needle (no medicine). He or she will pull the needle almost all the way out and then push it in again. He or she will repeat this several times until the muscle stops twitching or feels relaxed.    Your provider will remove the needle and stretch the muscle area. He or she may apply pressure to the area for 2 minutes. A bandage will be put over the injection site to prevent bleeding or an infection.    What should I expect after a trigger point injection?   You may feel pain relief right away. It is normal for some pain to start again 2 hours later. An ice pack or over-the-counter pain medicine can help lower the  pain.    You may feel sore in the injection site for a few days. If you need another injection in the same area, wait until the area is not sore.    Your healthcare provider may give you specific activity instructions to follow at home or recommend physical therapy. In general, you should try to stay active. Avoid strenuous activity for the first 3 or 4 days after the injection.    Do not have more injections if you still have trigger point pain after 2 or 3 injections.    What are the risks of a trigger point injection?  You may have a severe allergic reaction to pain medicine injected. The injection may be painful, or you may be sore where you got the injection. You may bleed, bruise, or develop an infection in the injection area. The injection may cause you to feel faint. Rarely, the needle may cause muscle or blood vessel damage or your lung may collapse if you get the injection near your chest.  CARE AGREEMENT:   You have the right to help plan your care. Learn about your health condition and how it may be treated. Discuss treatment options with your healthcare providers to decide what care you want to receive. You always have the right to refuse treatment. The above information is an  only. It is not intended as medical advice for individual conditions or treatments. Talk to your doctor, nurse or pharmacist before following any medical regimen to see if it is safe and effective for you.  © Copyright [employer] 2022 Information is for End User's use only and may not be sold, redistributed or otherwise used for commercial purposes. All illustrations and images included in CareNotes® are the copyrighted property of [employer] or [employer]

## 2024-07-21 LAB — IGF-I SERPL-MCNC: 73 NG/ML (ref 60–207)

## 2024-07-23 DIAGNOSIS — E03.9 HYPOTHYROIDISM, UNSPECIFIED TYPE: Primary | ICD-10-CM

## 2024-07-23 LAB — ACTH PLAS-MCNC: 11.4 PG/ML (ref 7.2–63.3)

## 2024-07-23 RX ORDER — LEVOTHYROXINE SODIUM 0.07 MG/1
TABLET ORAL
Qty: 90 TABLET | Refills: 1 | Status: SHIPPED | OUTPATIENT
Start: 2024-07-23

## 2024-07-29 ENCOUNTER — TELEPHONE (OUTPATIENT)
Age: 59
End: 2024-07-29

## 2024-07-29 NOTE — TELEPHONE ENCOUNTER
Caller: Ольга    Doctor: FELIZ    Reason for call: pt calling to reschedule. Xfer to nurse    Call back#: [phone]

## 2024-08-01 ENCOUNTER — PROCEDURE VISIT (OUTPATIENT)
Dept: PAIN MEDICINE | Facility: CLINIC | Age: 59
End: 2024-08-01
Payer: MEDICARE

## 2024-08-01 VITALS — HEART RATE: 120 BPM | SYSTOLIC BLOOD PRESSURE: 100 MMHG | DIASTOLIC BLOOD PRESSURE: 60 MMHG

## 2024-08-01 DIAGNOSIS — M79.18 MYOFASCIAL PAIN SYNDROME: ICD-10-CM

## 2024-08-01 DIAGNOSIS — M54.2 NECK PAIN: Primary | ICD-10-CM

## 2024-08-01 PROCEDURE — 20552 NJX 1/MLT TRIGGER POINT 1/2: CPT | Performed by: STUDENT IN AN ORGANIZED HEALTH CARE EDUCATION/TRAINING PROGRAM

## 2024-08-01 RX ORDER — METHYLPREDNISOLONE ACETATE 40 MG/ML
40 INJECTION, SUSPENSION INTRA-ARTICULAR; INTRALESIONAL; INTRAMUSCULAR; SOFT TISSUE ONCE
Status: COMPLETED | OUTPATIENT
Start: 2024-08-01 | End: 2024-08-01

## 2024-08-01 RX ORDER — LIDOCAINE HYDROCHLORIDE 10 MG/ML
9 INJECTION, SOLUTION INFILTRATION; PERINEURAL ONCE
Status: COMPLETED | OUTPATIENT
Start: 2024-08-01 | End: 2024-08-01

## 2024-08-01 RX ADMIN — METHYLPREDNISOLONE ACETATE 40 MG: 40 INJECTION, SUSPENSION INTRA-ARTICULAR; INTRALESIONAL; INTRAMUSCULAR; SOFT TISSUE at 12:03

## 2024-08-01 RX ADMIN — LIDOCAINE HYDROCHLORIDE 9 ML: 10 INJECTION, SOLUTION INFILTRATION; PERINEURAL at 12:02

## 2024-08-01 NOTE — PROGRESS NOTES
Universal Protocol:  Consent: Verbal consent obtained.  Risks and benefits: risks, benefits and alternatives were discussed  Consent given by: patient  Patient understanding: patient states understanding of the procedure being performed  Patient consent: the patient's understanding of the procedure matches consent given  Procedure consent: procedure consent matches procedure scheduled  Relevant documents: relevant documents present and verified  Test results: test results not available  Site marked: the operative site was not marked  Radiology Images displayed and confirmed. If images not available, report reviewed: imaging studies not available  Patient identity confirmed: verbally with patient  Supporting Documentation  Indications: pain   Trigger Point Injections: single/multiple trigger point(s): 1-2 muscle groups    Injection site identified by: palpation  Procedure Details  Location(s):    Neck/Upper Back: L cervical paraspinals, R cervical paraspinals, R upper trapezius and R levator scapulae     Prep: patient was prepped and draped in usual sterile fashion  Needle size: 25 G  Medications: 5 mL lidocaine (XYLOCAINE) injection 1 %; 5 mL lidocaine (XYLOCAINE) injection 1 %; 40 mg methylPREDNISolone acetate (DEPO-MEDROL) injection 40 mg/mL

## 2024-08-15 ENCOUNTER — TELEPHONE (OUTPATIENT)
Dept: OBGYN CLINIC | Facility: HOSPITAL | Age: 59
End: 2024-08-15

## 2024-09-24 ENCOUNTER — APPOINTMENT (OUTPATIENT)
Dept: LAB | Facility: HOSPITAL | Age: 59
End: 2024-09-24
Payer: MEDICARE

## 2024-09-24 DIAGNOSIS — E03.9 HYPOTHYROIDISM, UNSPECIFIED TYPE: ICD-10-CM

## 2024-09-24 LAB
T4 FREE SERPL-MCNC: 1 NG/DL (ref 0.61–1.12)
TSH SERPL DL<=0.05 MIU/L-ACNC: 0.19 UIU/ML (ref 0.45–4.5)

## 2024-09-24 PROCEDURE — 36415 COLL VENOUS BLD VENIPUNCTURE: CPT

## 2024-09-24 PROCEDURE — 84443 ASSAY THYROID STIM HORMONE: CPT

## 2024-09-24 PROCEDURE — 84439 ASSAY OF FREE THYROXINE: CPT

## 2024-09-25 DIAGNOSIS — R73.03 PREDIABETES: Primary | ICD-10-CM

## 2024-09-25 DIAGNOSIS — E66.01 SEVERE OBESITY (HCC): ICD-10-CM

## 2024-09-26 DIAGNOSIS — E03.9 HYPOTHYROIDISM, UNSPECIFIED TYPE: Primary | ICD-10-CM

## 2024-09-26 RX ORDER — LEVOTHYROXINE SODIUM 50 UG/1
50 TABLET ORAL DAILY
Qty: 90 TABLET | Refills: 0 | Status: SHIPPED | OUTPATIENT
Start: 2024-09-26

## 2024-10-16 ENCOUNTER — OFFICE VISIT (OUTPATIENT)
Dept: PAIN MEDICINE | Facility: CLINIC | Age: 59
End: 2024-10-16
Payer: MEDICARE

## 2024-10-16 ENCOUNTER — PREP FOR PROCEDURE (OUTPATIENT)
Dept: OBGYN CLINIC | Facility: CLINIC | Age: 59
End: 2024-10-16

## 2024-10-16 VITALS
BODY MASS INDEX: 31.01 KG/M2 | HEART RATE: 102 BPM | HEIGHT: 63 IN | WEIGHT: 175 LBS | SYSTOLIC BLOOD PRESSURE: 101 MMHG | DIASTOLIC BLOOD PRESSURE: 66 MMHG

## 2024-10-16 DIAGNOSIS — M54.2 NECK PAIN: ICD-10-CM

## 2024-10-16 DIAGNOSIS — M79.18 MYOFASCIAL PAIN SYNDROME: ICD-10-CM

## 2024-10-16 DIAGNOSIS — M50.120 CERVICAL DISC DISORDER WITH RADICULOPATHY OF MID-CERVICAL REGION: ICD-10-CM

## 2024-10-16 DIAGNOSIS — Z79.891 LONG-TERM CURRENT USE OF OPIATE ANALGESIC: ICD-10-CM

## 2024-10-16 DIAGNOSIS — M51.16 INTERVERTEBRAL DISC DISORDER WITH RADICULOPATHY OF LUMBAR REGION: Primary | ICD-10-CM

## 2024-10-16 DIAGNOSIS — G89.4 CHRONIC PAIN SYNDROME: Primary | ICD-10-CM

## 2024-10-16 DIAGNOSIS — F11.20 UNCOMPLICATED OPIOID DEPENDENCE (HCC): ICD-10-CM

## 2024-10-16 PROCEDURE — 99214 OFFICE O/P EST MOD 30 MIN: CPT

## 2024-10-16 RX ORDER — MONTELUKAST SODIUM 10 MG/1
TABLET ORAL
COMMUNITY
Start: 2024-08-24

## 2024-10-16 RX ORDER — FLUOXETINE 40 MG/1
40 CAPSULE ORAL DAILY
COMMUNITY
Start: 2024-09-28

## 2024-10-16 RX ORDER — OXYCODONE AND ACETAMINOPHEN 5; 325 MG/1; MG/1
1 TABLET ORAL 2 TIMES DAILY PRN
Qty: 60 TABLET | Refills: 0 | Status: SHIPPED | OUTPATIENT
Start: 2024-12-21 | End: 2025-01-20

## 2024-10-16 RX ORDER — OXYCODONE AND ACETAMINOPHEN 5; 325 MG/1; MG/1
1 TABLET ORAL 2 TIMES DAILY PRN
Qty: 60 TABLET | Refills: 0 | Status: SHIPPED | OUTPATIENT
Start: 2024-10-22 | End: 2024-11-21

## 2024-10-16 RX ORDER — OXYCODONE AND ACETAMINOPHEN 5; 325 MG/1; MG/1
1 TABLET ORAL 2 TIMES DAILY PRN
Qty: 60 TABLET | Refills: 0 | Status: SHIPPED | OUTPATIENT
Start: 2024-11-21 | End: 2024-12-21

## 2024-10-16 RX ORDER — METOPROLOL SUCCINATE 50 MG/1
50 TABLET, EXTENDED RELEASE ORAL DAILY
COMMUNITY
Start: 2024-08-10

## 2024-10-16 NOTE — PATIENT INSTRUCTIONS
Core Strengthening Exercises   WHAT YOU NEED TO KNOW:   What do I need to know about core strengthening exercises?  Core strengthening exercises help heal and strengthen muscles of your hips, back, and abdomen to prevent reinjury. They are beginning exercises to help support your spine. Ask your healthcare provider if you need to see a physical therapist for more advanced exercises.  Do the exercises on a mat or firm surface  (not on a bed) to support your spine and avoid low back pain.     Do the exercises in the same order every time  to train your muscles to work together. Your healthcare provider will show you how to perform these exercises. Do them every day, or as directed by your healthcare provider.     Move slowly and smoothly.  Avoid fast or jerky motions.     Stop if you feel pain.  It is normal to feel some discomfort at first. Regular exercise will help decrease your discomfort over time.  How do I perform core strengthening exercises safely?  Hold each exercise for 5 seconds. When you can do the exercise without pain for 5 seconds, increase your hold to 10 to 15 seconds. When you can do the exercise without pain for 10 to 15 seconds, add the next exercise. Increase the time you hold each exercise, or repeat the exercises as directed. As you do each exercise, breathe normally. Do not hold your breath.  Abdominal bracing:  Lie on your back with your knees bent and feet flat on the floor. Place your arms in a relaxed position beside your body. Pull your belly button in toward your spine. Do not flatten or arch your back. Tighten the abdominal muscles below your belly button. Hold for 5 seconds. Begin all of your exercises with abdominal bracing. You can also practice abdominal bracing throughout the day while you are sitting or standing.           Bridging:  Lie on your back with your knees bent and feet flat on the floor. Rest your arms at your side. Tighten your buttocks, and then lift your hips 1 inch  off the floor. Hold for 5 seconds. When you can do this exercise without pain for 10 seconds, increase the distance you lift your hips. A good goal is to be able to lift your hips so that your shoulders, hips, and knees are in a straight line.           Curl up:  Lie on your back with your knees bent and feet flat on the floor. Place your hands, palms down, underneath the curve in your lower back. Next, with your elbows on the floor, lift your shoulders and chest 2 to 3 inches. Keep your head in line with your shoulders. Hold this position for 5 seconds. When you can do this exercise without pain for 10 to 15 seconds, you may add a rotation. While your shoulders and chest are lifted off the ground, turn slightly to the left and hold. Repeat on the other side.           Dead bug:  Lie on your back with your knees bent and feet flat on the floor. Place your arms in a relaxed position beside your body. Begin with abdominal bracing. Next, raise one leg, keeping your knee bent. Hold for 5 seconds. Repeat with the other leg. When you can do this exercise without pain for 10 to 15 seconds, you may raise one straight leg and hold. Repeat with the other leg.           Quadruped:  Place your hands and knees on the floor. Keep your wrists directly below your shoulders and your knees directly below your hips. Pull your belly button in toward your spine. Do not flatten or arch your back. Tighten your abdominal muscles below your belly button. Hold for 5 seconds. When you can do this exercise without pain for 10 to 15 seconds, you may extend one arm and hold. Repeat on the other side.           Side Bridge:      Standing side bridge:  Stand next to a wall and extend one arm toward the wall. Place your palm flat on the wall with your fingers pointing upward. Begin with abdominal bracing. Next, without moving your feet, slowly bend your arm to 90 degrees. Hold for 5 seconds. Repeat on the other side. When you can do this exercise  without pain for 10 to 15 seconds, you may do the bent leg side bridge on the floor.           Bent leg side bridge:  Lie on one side with your legs, hips, and shoulders in a straight line. Prop yourself up onto your forearm so your elbow is directly below your shoulder. Bend your knees back to 90 degrees. Begin with abdominal bracing. Next, lift your hips and balance yourself on your forearm and knees. Hold for 5 seconds. Repeat on the other side. When you can do this exercise without pain for 10 to 15 seconds, you may do the straight leg side bridge on the floor.           Straight leg side bridge:  Lie on one side with your legs, hips, and shoulders in a straight line. Prop yourself up onto your forearm so your elbow is directly below your shoulder. Begin with abdominal bracing. Lift your hips off the floor and balance yourself on your forearm and the outside of your flexed foot. Do not let your ankle bend sideways. Hold for 5 seconds. Repeat on the other side. When you can do this exercise without pain for 10 to 15 seconds, ask your healthcare provider for more advanced exercises.       When should I contact my healthcare provider?   Your pain becomes worse.    You have new pain.    You have questions or concerns about your condition, care, or exercise program.  CARE AGREEMENT:   You have the right to help plan your care. Learn about your health condition and how it may be treated. Discuss treatment options with your caregivers to decide what care you want to receive. You always have the right to refuse treatment. The above information is an  only. It is not intended as medical advice for individual conditions or treatments. Talk to your doctor, nurse or pharmacist before following any medical regimen to see if it is safe and effective for you.  © 2016 [employer]. Information is for End User's use only and may not be sold, redistributed or otherwise used for commercial purposes.  All illustrations and images included in CareNotes® are the copyrighted property of [employer] or [employer].

## 2024-10-16 NOTE — PROGRESS NOTES
Pain Medicine Follow-Up Note    Assessment:  1. Chronic pain syndrome    2. Neck pain    3. Cervical disc disorder with radiculopathy of mid-cervical region    4. Myofascial pain syndrome    5. Long-term current use of opiate analgesic    6. Uncomplicated opioid dependence (HCC)        Plan:  Orders Placed This Encounter   Procedures    MM OF_Alprazolam Definitive    MM OF_Amphetamine Definitive Test    MM OF_Buprenorphine Definitive Test    MM OF_Clonazepam Definitive    MM OF_Cocaine Definitive Test    MM OF_Codeine Definitive    MM OF_Diazepam Definitive    MM OF_Fentanyl Definitive Test    MM OF_Heroin Definitive Test    MM OF_Hydrocodone Definitive    MM OF_Hydromorphone Definitive    MM OF_Lorazepam Definitive    MM OF_MDMA Definitive Test    MM OF_Meperidine Definitive Test    MM OF_Methadone Definitive Test    MM OF_Methylphenidate Definitive Test    MM OF_Morphine Definitive    MM OF_Oxazepam Definitive    MM OF_Oxycodone Definitive Test - Oral Fluid    MM OF_Oxymorphone Definitive Test    MM OF_Pregabalin Definitive Test    MM OF_Tapentadol Definitive Test    MM OF_Temazepam Definitive    MM OF_THC Definitive Test    MM OF_Tramadol Definitive Test    MM ALL_Prescribed Meds and Special Instructions     Order Specific Question:   [facility] Is ALBUTEROL prescribed?     Answer:   Yes     Order Specific Question:   [facility] Is CYCLOBENZAPRINE Prescribed?     Answer:   Yes     Order Specific Question:   [facility] Is FLUOXETINE Prescribed?     Answer:   Yes     Order Specific Question:   [facility] Is GABAPENTIN prescribed?     Answer:   Yes     Order Specific Question:   [facility] Is METFORMIN prescribed?     Answer:   Yes     Order Specific Question:   [facility] Is METOPROLOL prescribed?     Answer:   Yes     Order Specific Question:   [facility] Is OXYCODONE/APAP prescribed?     Answer:   Yes     Order Specific Question:   [facility] Is TRAZODONE prescribed?     Answer:   Yes    Ambulatory Referral to  Neurology     Standing Status:   Future     Standing Expiration Date:   10/16/2025     Referral Priority:   Routine     Referral Type:   Consult - AMB     Referral Reason:   Specialty Services Required     Referred to Provider:   Ricky Gallagher MD     Requested Specialty:   Neurology     Number of Visits Requested:   1     Expiration Date:   10/16/2025       New Medications Ordered This Visit   Medications    Probiotic Product (PROBIOTIC BLEND PO)     Sig: Take 1 tablet by mouth every morning    FLUoxetine (PROzac) 40 MG capsule     Sig: Take 40 mg by mouth daily    montelukast (SINGULAIR) 10 mg tablet     Sig: take 1 tablet by mouth every day at night    metoprolol succinate (TOPROL-XL) 50 mg 24 hr tablet     Sig: Take 50 mg by mouth daily    oxyCODONE-acetaminophen (PERCOCET) 5-325 mg per tablet     Sig: Take 1 tablet by mouth 2 (two) times a day as needed for severe pain Max Daily Amount: 2 tablets Do not start before December 21, 2024.     Dispense:  60 tablet     Refill:  0     DNF until 12/21/2024    oxyCODONE-acetaminophen (PERCOCET) 5-325 mg per tablet     Sig: Take 1 tablet by mouth 2 (two) times a day as needed for severe pain Max Daily Amount: 2 tablets Do not start before November 21, 2024.     Dispense:  60 tablet     Refill:  0     DNF until 11/21/2024    oxyCODONE-acetaminophen (PERCOCET) 5-325 mg per tablet     Sig: Take 1 tablet by mouth 2 (two) times a day as needed for severe pain Max Daily Amount: 2 tablets Do not start before October 22, 2024.     Dispense:  60 tablet     Refill:  0     DNF until 10/22/2024    Diclofenac Sodium (VOLTAREN) 1 %     Sig: Apply 2 g topically 4 (four) times a day as needed (pain)     Dispense:  100 g     Refill:  3       My impressions and treatment recommendations were discussed in detail with the patient who verbalized understanding and had no further questions.      Patient returns the office concerned that she may have MS.  I expressed that on her previous  cervical MRI there was no concerns for MS.  Patient states that she would like to be evaluated for this advised that she should follow-up with her PCP or neurology.  Referral provided.    Patient does have worsening neck pain in the past the patient has had trigger point with mixed results she states sometimes she has excellent pain relief that is long-lasting and other times it only lasts for a few days.  I recommend that the patient repeat trigger point injections since the patient has notable trigger points with a positive jump sign.  Patient also reporting worsening lumbar radiculopathy down her left posterior leg.  In the past she has benefited from epidural steroid injection at L5-S1.  I recommend that she have a repeat lumbar epidural steroid injection at L5-S1. Complete risks and benefits including bleeding, infection, tissue reaction, nerve injury and allergic reaction were discussed. The approach was demonstrated using models and literature was provided. Verbal and written consent was obtained.    Patient provided physician guided home exercise plan for her neck as well as her core.  If patient's pain persists after doing these exercises for at least 6 weeks we may consider updated imaging.    The patient continues to report an overall reduction of her pain level and improvement with her functioning without significant side effects using oxycodone/acetaminophen 5/325 mg tablet patient uses 1 tablet up to twice daily as needed for severe pain along with gabapentin 800 mg tablet 3 times daily and cyclobenzaprine 10 mg tablet 1 tablet up to 3 times a day as needed for pain/muscle spasm, therefore I will continue the patient on these medications.  Oxycodone/acetaminophen 5/325 mg tablet E-prescribed to the patient's pharmacy with a do not fill until date of 10/22/2024, 11/21/2024 and 12/21/2024.    [place] Prescription Drug Monitoring Program report was reviewed and was appropriate     A urine drug screen  was collected at today's office visit as part of our medication management protocol. The point of care testing results were appropriate for what was being prescribed. The specimen will be sent for confirmatory testing. The drug screen is medically necessary because the patient is either dependent on opioid medication or is being considered for opioid medication therapy and the results could impact ongoing or future treatment. The drug screen is to evaluate for the presences or absence of prescribed, non-prescribed, and/or illicit drugs/substances.    There are risks associated with opioid medications, including dependence, addiction and tolerance. The patient understands and agrees to use these medications only as prescribed. Potential side effects of the medications include, but are not limited to, constipation, drowsiness, addiction, impaired judgment and risk of fatal overdose if not taken as prescribed. The patient was warned against driving while taking sedation medications.  Sharing medications is a felony. At this point in time, the patient is showing no signs of addiction, abuse, diversion or suicidal ideation.    Follow-up is planned in 3 months for medication refills time or sooner as warranted.  Discharge instructions were provided. I personally saw and examined the patient and I agree with the above discussed plan of care.    History of Present Illness:    Ольга Manning is a 58 y.o. female who presents to [facility] for interval re-evaluation of the above stated pain complaints. The patient has a past medical and chronic pain history as outlined in the assessment section. She was last seen on 7/17/2024.    At today's visit patient states that their pain symptoms are worse with a pain score of 6/10 on the verbal numeric pain scale.  The patient's pain is worse in the morning, evening, and at night.  The patient's pain is constant in nature.  And the quality of the patient's  pain is described as dull-aching, sharp, throbbing, shooting, numbness, and pins-and-needles.  The patient's pain is located in the posterior neck radiating down her right arm and bilateral low back radiating down her left posterior leg patient states the amount of pain relief she is obtaining from her current pain relievers is enough to make a difference in her life by reducing her pain symptoms by 30%.  Patient denies any side effects using Oxycodone/acetaminophen, gabapentin and cyclobenzaprine.    Pain Contract Signed: 1/5/2024   Last Urine Drug Screen:  4/22/2024  New Urine Drug Screen: 10/17/2024    Other than as stated above, the patient denies any interval changes in medications, medical condition, mental condition, symptoms, or allergies since the last office visit.         Review of Systems:    Review of Systems   Constitutional:  Positive for activity change. Negative for chills and fever.   HENT:  Negative for sore throat.    Respiratory:  Negative for cough and shortness of breath.    Cardiovascular:  Negative for chest pain.   Gastrointestinal:  Negative for abdominal pain, constipation, diarrhea, nausea and vomiting.   Musculoskeletal:  Positive for gait problem, joint swelling and neck pain. Negative for arthralgias, back pain and myalgias.   Skin:  Negative for color change and rash.   Allergic/Immunologic: Food allergies: ..   Neurological:  Positive for weakness, numbness and headaches. Negative for dizziness, seizures and syncope.   All other systems reviewed and are negative.        Past Medical History:   Diagnosis Date    Anxiety     Asthma     exercise induced asthma    Cervical disc disorder     Chronic pain     Chronic pain disorder     Chronic pain syndrome 01/21/2016    Depression     Dysuria     Fibromyalgia, primary Est 2016    GERD (gastroesophageal reflux disease) 2004    Headache(784.0) As a child    Low back pain     Lung abnormality     nodules    Lung nodule     Neck pain      Peripheral neuropathy     Pituitary abnormality (HCC)     tumor    Right hip pain 10/29/2019    Thrombocytopenia (HCC)        Past Surgical History:   Procedure Laterality Date    APPENDECTOMY      BREAST BIOPSY Right 1995    CHOLECYSTECTOMY      COLECTOMY      COLON SURGERY      EPIDURAL BLOCK INJECTION N/A 6/23/2016    Procedure: BLOCK / INJECTION EPIDURAL STEROID CERVICAL  C7-T1;  Surgeon: Brian Cash MD;  Location: [facility] MAIN OR;  Service:     EPIDURAL BLOCK INJECTION N/A 3/31/2016    Procedure: BLOCK / INJECTION EPIDURAL STEROID CERVICAL C7-T1  (C-ARM);  Surgeon: Brian Cash MD;  Location: [facility] MAIN OR;  Service:     EPIDURAL BLOCK INJECTION N/A 8/8/2018    Procedure: C6 C7 Cervical Epidural Steroid Injection (09262);  Surgeon: Brian Cash MD;  Location: [facility] MAIN OR;  Service: Pain Management     EPIDURAL BLOCK INJECTION N/A 12/16/2021    Procedure: T4 T5 thoracic epidural steroid injection (34740);  Surgeon: Brian Cash MD;  Location: [facility] MAIN OR;  Service: Pain Management     EPIDURAL BLOCK INJECTION N/A 6/10/2022    Procedure: T4 T5 THORACIC EPIDURAL STEROID INJECTION (03213);  Surgeon: Brian Cash MD;  Location: [facility] MAIN OR;  Service: Pain Management     HYSTERECTOMY  1996    OOPHORECTOMY Bilateral 1996    PITUITARY SURGERY      PA ARTHROCENTESIS ASPIR&/INJ MAJOR JT/BURSA W/O US Right 11/8/2019    Procedure: Trochanteric Bursa Injection (20610);  Surgeon: Brian Cash MD;  Location: [facility] MAIN OR;  Service: Pain Management     PA ARTHROCENTESIS ASPIR&/INJ MAJOR JT/BURSA W/O US Right 1/23/2020    Procedure: Trochanteric Bursa Injection (20610);  Surgeon: Brian Cash MD;  Location: [facility] MAIN OR;  Service: Pain Management     PA NJX DX/THER SBST EPIDURAL/SUBRACH CERV/THORACIC N/A 1/21/2016    Procedure: BLOCK / INJECTION EPIDURAL STEROID CERVICAL C7-T1 (C-ARM);  Surgeon: Brian Cash MD;  Location: [facility] MAIN OR;  Service: Pain Management     REDUCTION MAMMAPLASTY  Bilateral 2000       Family History   Problem Relation Age of Onset    Thyroid disease Mother     Hyperlipidemia Mother     Depression Mother     Stroke Mother         TIA    Thyroid disease Father     Hyperlipidemia Father     Depression Father     Arthritis Father         Spine and knees    Endometrial cancer Sister     Obesity Sister     Migraines Sister     No Known Problems Daughter     Cancer Maternal Grandmother         Kidney cancer    Melanoma Maternal Grandmother     Heart disease Maternal Grandfather     Breast cancer Paternal Grandmother 40    Cancer Paternal Grandmother         Breast, Lung, Liver and skin cancer    Diabetes Paternal Grandfather     Obesity Brother     No Known Problems Son     No Known Problems Maternal Uncle     Obesity Paternal Aunt     Colon cancer Paternal Aunt     Obesity Paternal Aunt     Diabetes Paternal Aunt     Cancer Paternal Aunt         Thyroid and colon cancer    No Known Problems Paternal Uncle     ADD / ADHD Neg Hx     Anesthesia problems Neg Hx     Cancer Neg Hx     Clotting disorder Neg Hx     Collagen disease Neg Hx     Dislocations Neg Hx     Learning disabilities Neg Hx     Neurological problems Neg Hx     Osteoporosis Neg Hx     Rheumatologic disease Neg Hx     Scoliosis Neg Hx     Vascular Disease Neg Hx        Social History     Occupational History    Not on file   Tobacco Use    Smoking status: Never     Passive exposure: Current    Smokeless tobacco: Never   Vaping Use    Vaping status: Never Used   Substance and Sexual Activity    Alcohol use: No    Drug use: No    Sexual activity: Not Currently     Partners: Male     Birth control/protection: Post-menopausal         Current Outpatient Medications:     albuterol (ProAir HFA) 90 mcg/act inhaler, Inhale 2 puffs every 6 (six) hours as needed for wheezing, Disp: 8.5 g, Rfl: 0    aspirin 81 MG tablet, Take 81 mg by mouth daily, Disp: , Rfl:     cholecalciferol (VITAMIN D3) 1,000 units tablet, Take 2 tablets by  mouth daily, Disp: , Rfl:     cyclobenzaprine (FLEXERIL) 10 mg tablet, Take 1 tablet (10 mg total) by mouth 3 (three) times a day as needed for muscle spasms, Disp: 270 tablet, Rfl: 1    Diclofenac Sodium (VOLTAREN) 1 %, Apply 2 g topically 4 (four) times a day as needed (pain), Disp: 100 g, Rfl: 3    FLUoxetine (PROzac) 40 MG capsule, Take 40 mg by mouth daily, Disp: , Rfl:     gabapentin (NEURONTIN) 800 mg tablet, Take 1 tablet (800 mg total) by mouth 3 (three) times a day, Disp: 270 tablet, Rfl: 1    ibuprofen (MOTRIN) 600 mg tablet, Take 1 tablet (600 mg total) by mouth every 6 (six) hours as needed for mild pain, Disp: 30 tablet, Rfl: 0    levothyroxine (Synthroid) 50 mcg tablet, Take 1 tablet (50 mcg total) by mouth daily, Disp: 90 tablet, Rfl: 0    metFORMIN (GLUCOPHAGE) 1000 MG tablet, Take 1,000 mg by mouth 2 (two) times a day with meals, Disp: , Rfl:     metoprolol succinate (TOPROL-XL) 50 mg 24 hr tablet, Take 50 mg by mouth daily, Disp: , Rfl:     montelukast (SINGULAIR) 10 mg tablet, take 1 tablet by mouth every day at night, Disp: , Rfl:     ondansetron (Zofran ODT) 4 mg disintegrating tablet, Take 1 tablet (4 mg total) by mouth every 6 (six) hours as needed for nausea or vomiting, Disp: 20 tablet, Rfl: 1    [START ON 12/21/2024] oxyCODONE-acetaminophen (PERCOCET) 5-325 mg per tablet, Take 1 tablet by mouth 2 (two) times a day as needed for severe pain Max Daily Amount: 2 tablets Do not start before December 21, 2024., Disp: 60 tablet, Rfl: 0    [START ON 11/21/2024] oxyCODONE-acetaminophen (PERCOCET) 5-325 mg per tablet, Take 1 tablet by mouth 2 (two) times a day as needed for severe pain Max Daily Amount: 2 tablets Do not start before November 21, 2024., Disp: 60 tablet, Rfl: 0    [START ON 10/22/2024] oxyCODONE-acetaminophen (PERCOCET) 5-325 mg per tablet, Take 1 tablet by mouth 2 (two) times a day as needed for severe pain Max Daily Amount: 2 tablets Do not start before October 22, 2024., Disp: 60  "tablet, Rfl: 0    Probiotic Product (PROBIOTIC BLEND PO), Take 1 tablet by mouth every morning, Disp: , Rfl:     semaglutide, 2 mg/dose, (Ozempic, 2 MG/DOSE,) 8 mg/ mL injection pen, Inject 0.75 mL (2 mg total) under the skin every 7 days, Disp: 3 mL, Rfl: 2    traZODone (DESYREL) 50 mg tablet, , Disp: , Rfl: 0    metoprolol succinate (TOPROL-XL) 25 mg 24 hr tablet, , Disp: , Rfl:     naloxone (NARCAN) 4 mg/0.1 mL nasal spray, Administer 1 spray into a nostril. If no response after 2-3 minutes, give another dose in the other nostril using a new spray., Disp: 1 each, Rfl: 1    Allergies   Allergen Reactions    Levaquin [Levofloxacin In D5w] Shortness Of Breath     Also hives      Clarithromycin Hives and Rash    Lubiprostone Hives and Other (See Comments)     Reaction Date: 10Aug2011;     Migraines and vomiting    Reaction Date: 10Aug2011;  Migraines and vomiting    Cephradine     Erythromycin Hives     Reaction Date: 10Aug2011;     Macrobid [Nitrofurantoin Monohyd Macro] Other (See Comments)     C/o passing out    Morphine     Penicillins Hives     Reaction Date: 10Aug2011;     Sulfa Antibiotics     Tetracycline     Tetracyclines & Related Hives    Levofloxacin Rash    Morphine And Codeine Rash    Valium [Diazepam] Rash and Vomiting     Reaction Date: 14Jun2012;        Physical Exam:    /66 (BP Location: Right arm, Patient Position: Sitting)   Pulse 102   Ht 5' 3\" (1.6 m)   Wt 79.4 kg (175 lb)   BMI 31.00 kg/m²     Constitutional:normal, well developed, well nourished, alert, in no distress and non-toxic and no overt pain behavior.  Eyes:anicteric  HEENT:grossly intact  Neck: Tightness, trigger points noted throughout right cervical paraspinal/trapezius area  Pulmonary:even and unlabored  Cardiovascular:No edema or pitting edema present  Skin:Normal without rashes or lesions and well hydrated  Psychiatric:Mood and affect appropriate  Neurologic:Cranial Nerves II-XII grossly intact  Musculoskeletal:antalgic " and positive slump test on the left      Orders Placed This Encounter   Procedures    MM OF_Alprazolam Definitive    MM OF_Amphetamine Definitive Test    MM OF_Buprenorphine Definitive Test    MM OF_Clonazepam Definitive    MM OF_Cocaine Definitive Test    MM OF_Codeine Definitive    MM OF_Diazepam Definitive    MM OF_Fentanyl Definitive Test    MM OF_Heroin Definitive Test    MM OF_Hydrocodone Definitive    MM OF_Hydromorphone Definitive    MM OF_Lorazepam Definitive    MM OF_MDMA Definitive Test    MM OF_Meperidine Definitive Test    MM OF_Methadone Definitive Test    MM OF_Methylphenidate Definitive Test    MM OF_Morphine Definitive    MM OF_Oxazepam Definitive    MM OF_Oxycodone Definitive Test - Oral Fluid    MM OF_Oxymorphone Definitive Test    MM OF_Pregabalin Definitive Test    MM OF_Tapentadol Definitive Test    MM OF_Temazepam Definitive    MM OF_THC Definitive Test    MM OF_Tramadol Definitive Test    MM ALL_Prescribed Meds and Special Instructions    Ambulatory Referral to Neurology       This document was created using speech voice recognition software.   Grammatical errors, random word insertions, pronoun errors, and incomplete sentences are an occasional consequence of this system due to software limitations, ambient noise, and hardware issues.   Any formal questions or concerns about content, text, or information contained within the body of this dictation should be directly addressed to the provider for clarification.

## 2024-10-18 DIAGNOSIS — R73.03 PREDIABETES: ICD-10-CM

## 2024-10-18 DIAGNOSIS — E66.01 SEVERE OBESITY (HCC): ICD-10-CM

## 2024-10-19 LAB
7AMINOCLONAZEPAM SAL QL CFM: NEGATIVE NG/ML
AMPHET SAL QL CFM: NEGATIVE NG/ML
BUPRENORPHINE SAL QL SCN: NEGATIVE NG/ML
CARBOXYTHC SAL QL CFM: NEGATIVE NG/ML
CCP IGG SERPL-ACNC: NEGATIVE NG/ML
COCAINE SAL QL CFM: NEGATIVE NG/ML
CODEINE SAL QL CFM: NEGATIVE NG/ML
EDDP SAL QL CFM: NEGATIVE NG/ML
HYDROCODONE SAL QL CFM: NEGATIVE NG/ML
HYDROCODONE SAL QL CFM: NEGATIVE NG/ML
HYDROMORPHONE SAL QL CFM: NEGATIVE NG/ML
LEUKEMIA MARKERS BLD-IMP: NEGATIVE NG/ML
M PROTEIN 3 UR ELPH-MCNC: NORMAL NG/ML
M TB TUBERC IGNF/MITOGEN IGNF CONTROL: NEGATIVE NG/ML
METHADONE SAL QL CFM: NEGATIVE NG/ML
MORPHINE SAL QL CFM: NEGATIVE NG/ML
MORPHINE SAL QL CFM: NEGATIVE NG/ML
OXYMORPHONE SAL QL CFM: NEGATIVE NG/ML
OXYMORPHONE SAL QL CFM: NEGATIVE NG/ML
PREGABALIN SAL QL CFM: NEGATIVE NG/ML
RESULT ALL_PRESCRIBED MEDS AND SPECIAL INSTRUCTIONS: NORMAL
SL AMB 6-MAM (HEROIN METABOLITE) QUANTIFICATION: NEGATIVE NG/ML
SL AMB ALPRAZOLAM QUANTIFICATION: NEGATIVE NG/ML
SL AMB CLONAZEPAM QUANTIFICATION: NEGATIVE NG/ML
SL AMB DIAZEPAM QUANTIFICATION: NEGATIVE NG/ML
SL AMB FENTANYL QUANTIFICATION: NEGATIVE NG/ML
SL AMB MDMA QUANTIFICATION: NEGATIVE NG/ML
SL AMB N-DESMETHYL-TRAMADOL QUANTIFICATION SALIVA: NEGATIVE NG/ML
SL AMB NORBUPRENORPHINE QUANTIFICATION: NEGATIVE NG/ML
SL AMB NORDIAZEPAM QUANTIFICATION: NEGATIVE NG/ML
SL AMB NORFENTANYL QUANTIFICATION: NEGATIVE NG/ML
SL AMB NORHYDROCODONE QUANTIFICATION: NEGATIVE NG/ML
SL AMB NORHYDROCODONE QUANTIFICATION: NEGATIVE NG/ML
SL AMB NORMEPERIDINE QUANTIFICATION: NEGATIVE NG/ML
SL AMB NOROXYCODONE QUANTIFICATION: NEGATIVE NG/ML
SL AMB OXAZEPAM QUANTIFICATION: NEGATIVE NG/ML
SL AMB RITALINIC ACID QUANTIFICATION: NEGATIVE NG/ML
SL AMB TEMAZEPAM QUANTIFICATION: NEGATIVE NG/ML
SL AMB TEMAZEPAM QUANTIFICATION: NEGATIVE NG/ML
SL AMB TRAMADOL QUANTIFICATION: NEGATIVE NG/ML
SQUAMOUS #/AREA URNS HPF: NEGATIVE NG/ML
TAPENTADOL SAL QL CFM: NEGATIVE NG/ML

## 2024-11-07 ENCOUNTER — PROCEDURE VISIT (OUTPATIENT)
Dept: PAIN MEDICINE | Facility: CLINIC | Age: 59
End: 2024-11-07
Payer: MEDICARE

## 2024-11-07 VITALS — SYSTOLIC BLOOD PRESSURE: 100 MMHG | DIASTOLIC BLOOD PRESSURE: 78 MMHG | HEART RATE: 68 BPM

## 2024-11-07 DIAGNOSIS — M79.18 MYOFASCIAL PAIN SYNDROME: ICD-10-CM

## 2024-11-07 DIAGNOSIS — M54.2 NECK PAIN: Primary | ICD-10-CM

## 2024-11-07 PROCEDURE — 20553 NJX 1/MLT TRIGGER POINTS 3/>: CPT | Performed by: STUDENT IN AN ORGANIZED HEALTH CARE EDUCATION/TRAINING PROGRAM

## 2024-11-07 RX ORDER — LIDOCAINE HYDROCHLORIDE 10 MG/ML
5 INJECTION, SOLUTION INFILTRATION; PERINEURAL ONCE
Status: COMPLETED | OUTPATIENT
Start: 2024-11-07 | End: 2024-11-07

## 2024-11-07 RX ORDER — METHYLPREDNISOLONE ACETATE 40 MG/ML
40 INJECTION, SUSPENSION INTRA-ARTICULAR; INTRALESIONAL; INTRAMUSCULAR; SOFT TISSUE ONCE
Status: COMPLETED | OUTPATIENT
Start: 2024-11-07 | End: 2024-11-07

## 2024-11-07 RX ADMIN — METHYLPREDNISOLONE ACETATE 40 MG: 40 INJECTION, SUSPENSION INTRA-ARTICULAR; INTRALESIONAL; INTRAMUSCULAR; SOFT TISSUE at 16:16

## 2024-11-07 RX ADMIN — LIDOCAINE HYDROCHLORIDE 5 ML: 10 INJECTION, SOLUTION INFILTRATION; PERINEURAL at 16:16

## 2024-11-07 RX ADMIN — LIDOCAINE HYDROCHLORIDE 5 ML: 10 INJECTION, SOLUTION INFILTRATION; PERINEURAL at 16:00

## 2024-11-07 RX ADMIN — METHYLPREDNISOLONE ACETATE 40 MG: 40 INJECTION, SUSPENSION INTRA-ARTICULAR; INTRALESIONAL; INTRAMUSCULAR; SOFT TISSUE at 16:00

## 2024-11-07 NOTE — PROGRESS NOTES
" Universal Protocol:  Procedure performed by:  Consent: Verbal consent obtained.  Risks and benefits: risks, benefits and alternatives were discussed  Consent given by: patient  Time out: Immediately prior to procedure a \"time out\" was called to verify the correct patient, procedure, equipment, support staff and site/side marked as required.  Patient understanding: patient states understanding of the procedure being performed  Patient consent: the patient's understanding of the procedure matches consent given  Procedure consent: procedure consent matches procedure scheduled  Relevant documents: relevant documents present and verified  Test results: test results available and properly labeled  Site marked: the operative site was marked  Radiology Images displayed and confirmed. If images not available, report reviewed: imaging studies available  Patient identity confirmed: verbally with patient  Supporting Documentation  Indications: pain   Trigger Point Injections: multiple trigger points: 3 or more muscle groups    Injection site identified by: palpation  Procedure Details  Location(s):    Neck/Upper Back: R upper trapezius, R levator scapulae and R cervical paraspinals     Middle Back: R thoracic paraspinals   Needle size: 25 G  Medications: 5 mL lidocaine (XYLOCAINE) injection 1 %; 40 mg methylPREDNISolone acetate (DEPO-MEDROL) injection 40 mg/mL  Patient tolerance: patient tolerated the procedure well with no immediate complications          "

## 2024-11-21 ENCOUNTER — TELEPHONE (OUTPATIENT)
Dept: PAIN MEDICINE | Facility: MEDICAL CENTER | Age: 59
End: 2024-11-21

## 2024-11-22 ENCOUNTER — HOSPITAL ENCOUNTER (OUTPATIENT)
Facility: AMBULARY SURGERY CENTER | Age: 59
Setting detail: OUTPATIENT SURGERY
Discharge: HOME | End: 2024-11-22
Attending: STUDENT IN AN ORGANIZED HEALTH CARE EDUCATION/TRAINING PROGRAM | Admitting: STUDENT IN AN ORGANIZED HEALTH CARE EDUCATION/TRAINING PROGRAM
Payer: MEDICARE

## 2024-11-22 ENCOUNTER — APPOINTMENT (OUTPATIENT)
Dept: RADIOLOGY | Facility: HOSPITAL | Age: 59
End: 2024-11-22
Payer: MEDICARE

## 2024-11-22 VITALS
RESPIRATION RATE: 18 BRPM | TEMPERATURE: 97.5 F | DIASTOLIC BLOOD PRESSURE: 59 MMHG | HEART RATE: 82 BPM | OXYGEN SATURATION: 100 % | SYSTOLIC BLOOD PRESSURE: 98 MMHG

## 2024-11-22 LAB — GLUCOSE SERPL-MCNC: 121 MG/DL (ref 65–140)

## 2024-11-22 PROCEDURE — NC001 PR NO CHARGE: Performed by: STUDENT IN AN ORGANIZED HEALTH CARE EDUCATION/TRAINING PROGRAM

## 2024-11-22 PROCEDURE — 82948 REAGENT STRIP/BLOOD GLUCOSE: CPT

## 2024-11-22 PROCEDURE — 62323 NJX INTERLAMINAR LMBR/SAC: CPT | Performed by: STUDENT IN AN ORGANIZED HEALTH CARE EDUCATION/TRAINING PROGRAM

## 2024-11-22 RX ORDER — METHYLPREDNISOLONE ACETATE 80 MG/ML
INJECTION, SUSPENSION INTRA-ARTICULAR; INTRALESIONAL; INTRAMUSCULAR; SOFT TISSUE AS NEEDED
Status: DISCONTINUED | OUTPATIENT
Start: 2024-11-22 | End: 2024-11-22 | Stop reason: HOSPADM

## 2024-11-22 RX ORDER — BUPIVACAINE HYDROCHLORIDE 2.5 MG/ML
INJECTION, SOLUTION EPIDURAL; INFILTRATION; INTRACAUDAL AS NEEDED
Status: DISCONTINUED | OUTPATIENT
Start: 2024-11-22 | End: 2024-11-22 | Stop reason: HOSPADM

## 2024-11-22 RX ORDER — LIDOCAINE HYDROCHLORIDE 10 MG/ML
INJECTION, SOLUTION EPIDURAL; INFILTRATION; INTRACAUDAL; PERINEURAL AS NEEDED
Status: DISCONTINUED | OUTPATIENT
Start: 2024-11-22 | End: 2024-11-22 | Stop reason: HOSPADM

## 2024-11-22 RX ORDER — SODIUM CHLORIDE 9 MG/ML
INJECTION INTRAVENOUS AS NEEDED
Status: DISCONTINUED | OUTPATIENT
Start: 2024-11-22 | End: 2024-11-22 | Stop reason: HOSPADM

## 2024-11-22 NOTE — H&P
History of Present Illness: The patient is a 59 y.o. female who presents with complaints of lumbar radiculopathy.     Past Medical History:   Diagnosis Date    Anxiety     Asthma     exercise induced asthma    Cervical disc disorder     Chronic pain     Chronic pain disorder     Chronic pain syndrome 01/21/2016    Depression     Dysuria     Fibromyalgia, primary Est 2016    GERD (gastroesophageal reflux disease) 2004    Headache(784.0) As a child    Low back pain     Lung abnormality     nodules    Lung nodule     Neck pain     Peripheral neuropathy     Pituitary abnormality (HCC)     tumor    Right hip pain 10/29/2019    Thrombocytopenia (HCC)        Past Surgical History:   Procedure Laterality Date    APPENDECTOMY      BREAST BIOPSY Right 1995    CHOLECYSTECTOMY      COLECTOMY      COLON SURGERY      EPIDURAL BLOCK INJECTION N/A 6/23/2016    Procedure: BLOCK / INJECTION EPIDURAL STEROID CERVICAL  C7-T1;  Surgeon: Brian Cash MD;  Location: [facility] MAIN OR;  Service:     EPIDURAL BLOCK INJECTION N/A 3/31/2016    Procedure: BLOCK / INJECTION EPIDURAL STEROID CERVICAL C7-T1  (C-ARM);  Surgeon: Brian Cash MD;  Location: [facility] MAIN OR;  Service:     EPIDURAL BLOCK INJECTION N/A 8/8/2018    Procedure: C6 C7 Cervical Epidural Steroid Injection (16119);  Surgeon: Brian Cash MD;  Location: [facility] MAIN OR;  Service: Pain Management     EPIDURAL BLOCK INJECTION N/A 12/16/2021    Procedure: T4 T5 thoracic epidural steroid injection (53472);  Surgeon: Brian Cash MD;  Location: [facility] MAIN OR;  Service: Pain Management     EPIDURAL BLOCK INJECTION N/A 6/10/2022    Procedure: T4 T5 THORACIC EPIDURAL STEROID INJECTION (94781);  Surgeon: Brian Cash MD;  Location: [facility] MAIN OR;  Service: Pain Management     HYSTERECTOMY  1996    OOPHORECTOMY Bilateral 1996    PITUITARY SURGERY      MO ARTHROCENTESIS ASPIR&/INJ MAJOR JT/BURSA W/O US Right 11/8/2019    Procedure: Trochanteric Bursa Injection (40965);  Surgeon:  Brian Cash MD;  Location: [facility] MAIN OR;  Service: Pain Management     SD ARTHROCENTESIS ASPIR&/INJ MAJOR JT/BURSA W/O US Right 1/23/2020    Procedure: Trochanteric Bursa Injection (20610);  Surgeon: Brian Cash MD;  Location: [facility] MAIN OR;  Service: Pain Management     SD NJX DX/THER SBST EPIDURAL/SUBRACH CERV/THORACIC N/A 1/21/2016    Procedure: BLOCK / INJECTION EPIDURAL STEROID CERVICAL C7-T1 (C-ARM);  Surgeon: Brian Cash MD;  Location: [facility] MAIN OR;  Service: Pain Management     REDUCTION MAMMAPLASTY Bilateral 2000       No current facility-administered medications for this encounter.    Allergies   Allergen Reactions    Levaquin [Levofloxacin In D5w] Shortness Of Breath     Also hives      Clarithromycin Hives and Rash    Lubiprostone Hives and Other (See Comments)     Reaction Date: 10Aug2011;     Migraines and vomiting    Reaction Date: 10Aug2011;  Migraines and vomiting    Cephradine     Erythromycin Hives     Reaction Date: 10Aug2011;     Macrobid [Nitrofurantoin Monohyd Macro] Other (See Comments)     C/o passing out    Morphine     Penicillins Hives     Reaction Date: 10Aug2011;     Sulfa Antibiotics     Tetracycline     Tetracyclines & Related Hives    Levofloxacin Rash    Morphine And Codeine Rash    Valium [Diazepam] Rash and Vomiting     Reaction Date: 14Jun2012;        Physical Exam: There were no vitals filed for this visit.  General: Awake, Alert, Oriented x 3, Mood and affect appropriate  Respiratory: Respirations even and unlabored  Cardiovascular: Peripheral pulses intact; no edema  Musculoskeletal Exam: low back pain.     ASA Score: 3         Assessment: Lumbar radiculopathy    Plan: L5-S1LESI

## 2024-11-22 NOTE — DISCHARGE INSTRUCTIONS
Epidural Steroid Injection   WHAT YOU NEED TO KNOW:   An epidural steroid injection (LEONEL) is a procedure to inject steroid medicine into the epidural space. The epidural space is between your spinal cord and vertebrae. Steroids reduce inflammation and fluid buildup in your spine that may be causing pain. You may be given pain medicine along with the steroids.          ACTIVITY  Do not drive or operate machinery today.  No strenuous activity today - bending, lifting, etc.  You may resume normal activites starting tomorrow - start slowly and as tolerated.  You may shower today, but no tub baths or hot tubs.  You may have numbness for several hours from the local anesthetic. Please use caution and common sense, especially with weight-bearing activities.    CARE OF THE INJECTION SITE  If you have soreness or pain, apply ice to the area today (20 minutes on/20 minutes off).  Starting tomorrow, you may use warm, moist heat or ice if needed.  You may have an increase or change in your discomfort for 36-48 hours after your treatment.  Apply ice and continue with any pain medication you have been prescribed.  Notify the [facility] if you have any of the following: redness, drainage, swelling, headache, stiff neck or fever above 100°F.    SPECIAL INSTRUCTIONS  Our office will contact you in approximately 14 days for a progress report.    MEDICATIONS  Continue to take all routine medications.  Our office may have instructed you to hold some medications.    As no general anesthesia was used in today's procedure, you should not experience any side effects related to anesthesia.     If you are diabetic, the steroids used in today's injection may temporarily increase your blood sugar levels after the first few days after your injection. Please keep a close eye on your sugars and alert the doctor who manages your diabetes if your sugars are significantly high from your baseline or you are symptomatic.     If you have a  problem specifically related to your procedure, please call our office at [phone].  Problems not related to your procedure should be directed to your primary care physician.

## 2024-11-22 NOTE — OP NOTE
Pre-procedure Diagnosis: Lumbar radiculopathy  Post-procedure Diagnosis: Lumbar radiculopathy  Procedure Title(s):  1.  L5-S1 interlaminar epidural steroid injection      2. Intraoperative fluoroscopy  Attending Surgeon:   Sumeet Baker MD  Anesthesia:   Local     Indications: The patient is a 59 y.o. year-old female with a diagnosis of lumbar radiculopathy. The patient's history and physical exam were reviewed.  The risks, benefits and alternatives to the procedure were discussed, and all questions were answered to the patient's satisfaction. The patient agreed to proceed, and written informed consent was obtained.    Procedure in Detail: The patient was brought into the procedure room and placed in the prone position on the fluoroscopy table. The area of the lumbar spine was prepped with chlorhexidine gluconate solution times one and draped in a sterile manner.    The L5-S1 interspace was identified and marked under AP fluoroscopy. The skin and subcutaneous tissues in the area were anesthetized with 1% lidocaine. A 20-gauge Tuohy epidural needle was directed toward the interspace under fluoroscopic guidance until the ligamentum flavum was engaged. From this point, a loss of resistance technique with air was used to identify entrance of the needle into the epidural space. Once an appropriate loss was obtained, negative aspiration was confirmed, and 1 ml Omnipaque 300 contrast solution was injected. An appropriate epidurogram was noted.    Then, after negative aspiration, a solution consisting of 1-mL depo-medrol (80mg/mL) and 1-mL bupivacaine 0.25% and 3-mL preservative-free saline was easily injected. The needle was removed with a 1% lidocaine flush. The patient's back was cleaned and a bandage was placed over the site of needle insertion.    Disposition: The patient tolerated the procedure well, and there were no apparent complications. The patient was taken to the recovery area where written discharge  instructions for the procedure were given.     Estimated Blood Loss: None  Specimens Obtained: N/A

## 2024-11-25 DIAGNOSIS — E66.01 SEVERE OBESITY (HCC): ICD-10-CM

## 2024-11-25 DIAGNOSIS — R73.03 PREDIABETES: ICD-10-CM

## 2024-12-06 ENCOUNTER — TELEPHONE (OUTPATIENT)
Dept: PAIN MEDICINE | Facility: MEDICAL CENTER | Age: 59
End: 2024-12-06

## 2024-12-21 DIAGNOSIS — E03.9 HYPOTHYROIDISM, UNSPECIFIED TYPE: ICD-10-CM

## 2024-12-23 RX ORDER — LEVOTHYROXINE SODIUM 50 UG/1
50 TABLET ORAL DAILY
Qty: 90 TABLET | Refills: 1 | Status: SHIPPED | OUTPATIENT
Start: 2024-12-23

## 2025-01-08 DIAGNOSIS — Z00.6 ENCOUNTER FOR EXAMINATION FOR NORMAL COMPARISON OR CONTROL IN CLINICAL RESEARCH PROGRAM: ICD-10-CM

## 2025-01-13 ENCOUNTER — APPOINTMENT (OUTPATIENT)
Dept: LAB | Facility: CLINIC | Age: 60
End: 2025-01-13

## 2025-01-13 DIAGNOSIS — Z00.6 ENCOUNTER FOR EXAMINATION FOR NORMAL COMPARISON OR CONTROL IN CLINICAL RESEARCH PROGRAM: ICD-10-CM

## 2025-01-13 PROCEDURE — 36415 COLL VENOUS BLD VENIPUNCTURE: CPT

## 2025-01-13 NOTE — PROGRESS NOTES
Name: Ольга Manning      : 1965      MRN: [id number]  Encounter Provider: PAULINA Trujillo  Encounter Date: 2025   Encounter department: [facility]  :  Assessment & Plan  Hypothyroidism, unspecified type  Increase levothyroxine to 50 mcg Monday-Saturday. Take 2 tablets (100 mcg) on . Repeat labs in 6 weeks.   Orders:    TSH, 3rd generation; Future    T4, free; Future    levothyroxine 50 mcg tablet; Take 1 tablet (50 mcg) Monday-Saturday. Take 2 tablets (100 mcg) daily.    Prediabetes  Last A1C from July was very well controlled at 5.4%. Continue metformin 1,000 mg  twice daily and Ozempic 2 mg once weekly. Continue with healthy diet and regular physical activity. Continue to test blood sugars daily.   Orders:    Hemoglobin A1C; Future    semaglutide, 2 mg/dose, (Ozempic, 2 MG/DOSE,) 8 mg/ mL injection pen; Inject 0.75 mL (2 mg total) under the skin every 7 days    Severe obesity (HCC)  Resolved since using Ozempic. BMI now at 27.81. continue 2 mg once weekly for maintenance. Continue healthy diet and regular physical activity.   Orders:    semaglutide, 2 mg/dose, (Ozempic, 2 MG/DOSE,) 8 mg/ mL injection pen; Inject 0.75 mL (2 mg total) under the skin every 7 days        History of Present Illness   HPI  Ольга Manning is a 59 y.o. female with hypothyroidism and prediabetes presenting to the office today for routine follow-up.    Past medical history significant for pituitary adenoma resection.  Pituitary labs checked in 2024.  This demonstrated a very mild elevation of prolactin which patient was asymptomatic.    For prediabetes, she is taking metformin 1000 mg twice daily and Ozempic 2 mg once weekly. She has lost approximately 20 lbs since July. She reports feeling well. Reports excellent fasting sugars. Denies s/e. Reports she is eating enough protein and she is walking daily.     TSH as of 2024 was stable.  She is  "taking 50 mcg of levothyroxine daily. Tsh increased slightly to 4.3. She reports that since we decreased her LT4 dose from 75 mcg to 50 mcg, she has experienced more hair loss.       Review of Systems   Constitutional:  Positive for fatigue. Negative for activity change, appetite change and unexpected weight change.   HENT:  Negative for dental problem, sore throat, trouble swallowing and voice change.    Eyes:  Negative for visual disturbance.   Respiratory:  Negative for cough, chest tightness and shortness of breath.    Cardiovascular:  Negative for chest pain, palpitations and leg swelling.   Gastrointestinal:  Negative for constipation, diarrhea, nausea and vomiting.   Endocrine: Negative for cold intolerance, heat intolerance, polydipsia, polyphagia and polyuria.   Genitourinary:  Negative for frequency.   Musculoskeletal:  Negative for arthralgias, back pain, gait problem and myalgias.   Skin:  Negative for wound.   Allergic/Immunologic: Positive for environmental allergies. Negative for food allergies.   Neurological:  Negative for dizziness, weakness, light-headedness, numbness and headaches.   Psychiatric/Behavioral:  Negative for decreased concentration, dysphoric mood and sleep disturbance. The patient is not nervous/anxious.           Objective   BP 90/56 (BP Location: Right arm, Patient Position: Sitting, Cuff Size: Standard)   Pulse 82   Temp 97.9 °F (36.6 °C) (Temporal)   Resp 18   Ht 5' 3\" (1.6 m)   Wt 71.2 kg (157 lb)   SpO2 95%   BMI 27.81 kg/m²      Physical Exam  Vitals reviewed.   Constitutional:       General: She is not in acute distress.     Appearance: She is well-developed. She is not ill-appearing.   HENT:      Head: Normocephalic and atraumatic.   Eyes:      Conjunctiva/sclera: Conjunctivae normal.      Pupils: Pupils are equal, round, and reactive to light.   Cardiovascular:      Rate and Rhythm: Normal rate.      Pulses: Normal pulses.   Pulmonary:      Effort: Pulmonary effort " is normal. No respiratory distress.   Abdominal:      Palpations: Abdomen is soft.      Tenderness: There is no abdominal tenderness.   Musculoskeletal:      Cervical back: Normal range of motion and neck supple.      Right lower leg: No edema.      Left lower leg: No edema.   Skin:     General: Skin is warm and dry.      Capillary Refill: Capillary refill takes less than 2 seconds.   Neurological:      Mental Status: She is alert.   Psychiatric:         Mood and Affect: Mood normal.

## 2025-01-14 ENCOUNTER — OFFICE VISIT (OUTPATIENT)
Dept: ENDOCRINOLOGY | Facility: CLINIC | Age: 60
End: 2025-01-14
Payer: MEDICARE

## 2025-01-14 VITALS
HEART RATE: 82 BPM | RESPIRATION RATE: 18 BRPM | TEMPERATURE: 97.9 F | SYSTOLIC BLOOD PRESSURE: 90 MMHG | OXYGEN SATURATION: 95 % | DIASTOLIC BLOOD PRESSURE: 56 MMHG | BODY MASS INDEX: 27.82 KG/M2 | WEIGHT: 157 LBS | HEIGHT: 63 IN

## 2025-01-14 DIAGNOSIS — E66.01 SEVERE OBESITY (HCC): ICD-10-CM

## 2025-01-14 DIAGNOSIS — R73.03 PREDIABETES: ICD-10-CM

## 2025-01-14 DIAGNOSIS — E03.9 HYPOTHYROIDISM, UNSPECIFIED TYPE: Primary | ICD-10-CM

## 2025-01-14 PROCEDURE — 99214 OFFICE O/P EST MOD 30 MIN: CPT | Performed by: NURSE PRACTITIONER

## 2025-01-14 RX ORDER — LEVOTHYROXINE SODIUM 50 UG/1
TABLET ORAL
Qty: 90 TABLET | Refills: 1 | Status: SHIPPED | OUTPATIENT
Start: 2025-01-14 | End: 2025-01-14 | Stop reason: SDUPTHER

## 2025-01-14 RX ORDER — LEVOTHYROXINE SODIUM 50 UG/1
TABLET ORAL
Qty: 102 TABLET | Refills: 1 | Status: SHIPPED | OUTPATIENT
Start: 2025-01-14

## 2025-01-14 RX ORDER — SUMATRIPTAN 50 MG/1
50 TABLET, FILM COATED ORAL
COMMUNITY
Start: 2024-12-20 | End: 2025-12-20

## 2025-01-14 NOTE — ASSESSMENT & PLAN NOTE
Increase levothyroxine to 50 mcg Monday-Saturday. Take 2 tablets (100 mcg) on Sunday. Repeat labs in 6 weeks.   Orders:    TSH, 3rd generation; Future    T4, free; Future    levothyroxine 50 mcg tablet; Take 1 tablet (50 mcg) Monday-Saturday. Take 2 tablets (100 mcg) daily.

## 2025-01-14 NOTE — ASSESSMENT & PLAN NOTE
Last A1C from July was very well controlled at 5.4%. Continue metformin 1,000 mg  twice daily and Ozempic 2 mg once weekly. Continue with healthy diet and regular physical activity. Continue to test blood sugars daily.   Orders:    Hemoglobin A1C; Future    semaglutide, 2 mg/dose, (Ozempic, 2 MG/DOSE,) 8 mg/ mL injection pen; Inject 0.75 mL (2 mg total) under the skin every 7 days

## 2025-01-14 NOTE — ASSESSMENT & PLAN NOTE
Resolved since using Ozempic. BMI now at 27.81. continue 2 mg once weekly for maintenance. Continue healthy diet and regular physical activity.   Orders:    semaglutide, 2 mg/dose, (Ozempic, 2 MG/DOSE,) 8 mg/ mL injection pen; Inject 0.75 mL (2 mg total) under the skin every 7 days

## 2025-01-15 ENCOUNTER — OFFICE VISIT (OUTPATIENT)
Dept: PAIN MEDICINE | Facility: CLINIC | Age: 60
End: 2025-01-15
Payer: MEDICARE

## 2025-01-15 VITALS — WEIGHT: 156 LBS | BODY MASS INDEX: 27.64 KG/M2 | HEIGHT: 63 IN

## 2025-01-15 DIAGNOSIS — Z79.891 LONG-TERM CURRENT USE OF OPIATE ANALGESIC: ICD-10-CM

## 2025-01-15 DIAGNOSIS — G89.4 CHRONIC PAIN SYNDROME: Primary | ICD-10-CM

## 2025-01-15 DIAGNOSIS — M50.120 CERVICAL DISC DISORDER WITH RADICULOPATHY OF MID-CERVICAL REGION: ICD-10-CM

## 2025-01-15 DIAGNOSIS — M54.2 NECK PAIN: ICD-10-CM

## 2025-01-15 DIAGNOSIS — M79.18 MYOFASCIAL PAIN SYNDROME: ICD-10-CM

## 2025-01-15 DIAGNOSIS — F11.20 UNCOMPLICATED OPIOID DEPENDENCE (HCC): ICD-10-CM

## 2025-01-15 PROCEDURE — 99214 OFFICE O/P EST MOD 30 MIN: CPT

## 2025-01-15 RX ORDER — OXYCODONE AND ACETAMINOPHEN 5; 325 MG/1; MG/1
1 TABLET ORAL 2 TIMES DAILY PRN
Qty: 60 TABLET | Refills: 0 | Status: SHIPPED | OUTPATIENT
Start: 2025-03-19 | End: 2025-04-18

## 2025-01-15 RX ORDER — OXYCODONE AND ACETAMINOPHEN 5; 325 MG/1; MG/1
1 TABLET ORAL 2 TIMES DAILY PRN
Qty: 60 TABLET | Refills: 0 | Status: SHIPPED | OUTPATIENT
Start: 2025-02-18 | End: 2025-03-20

## 2025-01-15 RX ORDER — OXYCODONE AND ACETAMINOPHEN 5; 325 MG/1; MG/1
1 TABLET ORAL 2 TIMES DAILY PRN
Qty: 60 TABLET | Refills: 0 | Status: SHIPPED | OUTPATIENT
Start: 2025-01-19 | End: 2025-02-18

## 2025-01-15 RX ORDER — GABAPENTIN 800 MG/1
800 TABLET ORAL 3 TIMES DAILY
Qty: 270 TABLET | Refills: 1 | Status: SHIPPED | OUTPATIENT
Start: 2025-01-15 | End: 2025-07-14

## 2025-01-15 RX ORDER — CYCLOBENZAPRINE HCL 10 MG
10 TABLET ORAL 3 TIMES DAILY PRN
Qty: 270 TABLET | Refills: 1 | Status: SHIPPED | OUTPATIENT
Start: 2025-01-15 | End: 2025-07-14

## 2025-01-15 NOTE — PATIENT INSTRUCTIONS

## 2025-01-15 NOTE — PROGRESS NOTES
Pain Medicine Follow-Up Note    Assessment:  1. Chronic pain syndrome    2. Neck pain    3. Cervical disc disorder with radiculopathy of mid-cervical region    4. Long-term current use of opiate analgesic    5. Uncomplicated opioid dependence (HCC)    6. Myofascial pain syndrome        Plan:  Orders Placed This Encounter   Procedures   • MM OF_Alprazolam Definitive   • MM OF_Amphetamine Definitive Test   • MM OF_Buprenorphine Definitive Test   • MM OF_Carisoprodol Definitive Test   • MM OF_Clonazepam Definitive   • MM OF_Cocaine Definitive Test   • MM OF_Codeine Definitive   • MM OF_Dextromethorphan Definitive Test   • MM OF_Diazepam Definitive   • MM OF_Fentanyl Definitive Test   • MM OF_Gabapentin Definitive Test   • MM OF_Heroin Definitive Test   • MM OF_Hydrocodone Definitive   • MM OF_Hydromorphone Definitive   • MM OF_Levorphanol Definitive Test   • MM OF_Lorazepam Definitive   • MM OF_MDMA Definitive Test   • MM OF_Meperidine Definitive Test   • MM OF_Methadone Definitive Test   • MM OF_Methylphenidate Definitive Test   • MM OF_Morphine Definitive   • MM OF_Naltrexone Definitive Test   • MM OF_Oxazepam Definitive   • MM OF_Oxycodone Definitive Test - Oral Fluid   • MM OF_Oxymorphone Definitive Test   • MM OF_Phencyclidine Definitive Test   • MM OF_Pregabalin Definitive Test   • MM OF_Tapentadol Definitive Test   • MM OF_Temazepam Definitive   • MM OF_THC Definitive Test   • MM OF_Tramadol Definitive Test       New Medications Ordered This Visit   Medications   • oxyCODONE-acetaminophen (PERCOCET) 5-325 mg per tablet     Sig: Take 1 tablet by mouth 2 (two) times a day as needed for severe pain Max Daily Amount: 2 tablets Do not start before March 19, 2025.     Dispense:  60 tablet     Refill:  0     DNF until 3/19/2025   • oxyCODONE-acetaminophen (PERCOCET) 5-325 mg per tablet     Sig: Take 1 tablet by mouth 2 (two) times a day as needed for severe pain Max Daily Amount: 2 tablets Do not start before February  18, 2025.     Dispense:  60 tablet     Refill:  0     DNF until 2/18/2025   • oxyCODONE-acetaminophen (PERCOCET) 5-325 mg per tablet     Sig: Take 1 tablet by mouth 2 (two) times a day as needed for severe pain Max Daily Amount: 2 tablets Do not start before January 19, 2025.     Dispense:  60 tablet     Refill:  0     DNF until 1/19/2025   • gabapentin (NEURONTIN) 800 mg tablet     Sig: Take 1 tablet (800 mg total) by mouth 3 (three) times a day     Dispense:  270 tablet     Refill:  1   • cyclobenzaprine (FLEXERIL) 10 mg tablet     Sig: Take 1 tablet (10 mg total) by mouth 3 (three) times a day as needed for muscle spasms     Dispense:  270 tablet     Refill:  1       My impressions and treatment recommendations were discussed in detail with the patient who verbalized understanding and had no further questions.      Patient returns to the office after having a lumbar epidural steroid injection on 11/22/2024 patient is reporting excellent ongoing relief of her lumbar radiculopathy.  Today the patient states she has severe muscle spasms in her bilateral trapezius muscles.  Patient does have tenderness with palpation along with a positive jump sign, I recommend the patient have trigger point injections. Complete risks and benefits including bleeding, infection, tissue reaction, nerve injury and allergic reaction were discussed. The approach was demonstrated using models and literature was provided. Verbal and written consent was obtained.    The patient continues to report an overall reduction of her pain level and improvement with her functioning without significant side effects using oxycodone/acetaminophen 5/325 mg tablet patient uses 1 tablet up to twice daily as needed for severe pain along with gabapentin 800 mg tablet 3 times daily and cyclobenzaprine 10 mg tablet 1 tablet up to 3 times a day as needed for pain/muscle spasm, therefore I will continue the patient on these medications.  Oxycodone/acetaminophen  5/325 mg tablet E-prescribed to the patient's pharmacy with a do not fill until date of 1/19/2025, 2/18/2025 and 3/19/2025.    [place] Prescription Drug Monitoring Program report was reviewed and was appropriate     There are risks associated with opioid medications, including dependence, addiction and tolerance. The patient understands and agrees to use these medications only as prescribed. Potential side effects of the medications include, but are not limited to, constipation, drowsiness, addiction, impaired judgment and risk of fatal overdose if not taken as prescribed. The patient was warned against driving while taking sedation medications.  Sharing medications is a felony. At this point in time, the patient is showing no signs of addiction, abuse, diversion or suicidal ideation.    An oral drug screen swab was collected at today's office visit. The swab will be sent for confirmatory testing. The drug screen is medically necessary because the patient is either dependent on opioid medication or is being considered for opioid medication therapy and the results could impact ongoing or future treatment. The drug screen is to evaluate for the presences or absence of prescribed, non-prescribed, and/or illicit drugs/substances.     An opioid contract was reviewed with the patient.  The patient was made aware they are only to receive opioid medication from our office, and must take the medication as prescribed.  If the medication is lost or stolen, it will not be replaced.  We also do not condone the use of illegal substances or alcohol with opioid medication.  Random urine drug screens and pill counts will also be performed at office visits. Lastly, the patient was informed that office visits are needed for refills.  Patient was agreeable and signed the contract.      Follow-up is planned in 12 weeks time or sooner as warranted.  Discharge instructions were provided. I personally saw and examined the patient and I  agree with the above discussed plan of care.    History of Present Illness:    Ольга Manning is a 59 y.o. female who presents to [facility] for interval re-evaluation of the above stated pain complaints. The patient has a past medical and chronic pain history as outlined in the assessment section. She was last seen on 11/22/2024.    At today's visit patient states that their pain symptoms are better/lower with a pain score of 6/10 on the verbal numeric pain scale.  The patient's pain is worse all the time.  The patient's pain is constant in nature.  And the quality of the patient's pain is described as dull-aching, throbbing, pressure-like, shooting, and numbness.  The patient's pain is located in the posterior neck radiating down her right arm into her fourth fingers, mid back, and low back.  Patient states he Yu pain relief she is now obtained from her current pain relievers is enough to make a real difference in her life by reducing her pain symptoms by 25 to 30%.  Patient denies any side effects using gabapentin, oxycodone/acetaminophen or cyclobenzaprine.    Pain Contract Signed: 1/15/2025  Last Urine Drug Screen: 10/16/2024  New Drug Screen: 1/15/2025    Other than as stated above, the patient denies any interval changes in medications, medical condition, mental condition, symptoms, or allergies since the last office visit.         Review of Systems:    Review of Systems   Respiratory:  Negative for chest tightness and shortness of breath.    Cardiovascular:  Negative for chest pain.   Gastrointestinal:  Negative for constipation, diarrhea, nausea and vomiting.   Musculoskeletal:  Positive for back pain, neck pain and neck stiffness. Negative for arthralgias, gait problem, joint swelling and myalgias.   Skin:  Negative for rash.   Neurological:  Positive for dizziness, weakness, light-headedness, numbness and headaches. Negative for seizures.   Psychiatric/Behavioral:  Positive  for sleep disturbance. Negative for decreased concentration. The patient is not nervous/anxious.    All other systems reviewed and are negative.        Past Medical History:   Diagnosis Date   • Anxiety    • Asthma     exercise induced asthma   • Cervical disc disorder    • Chronic pain    • Chronic pain disorder    • Chronic pain syndrome 01/21/2016   • Depression    • Dysuria    • Fibromyalgia, primary Est 2016   • GERD (gastroesophageal reflux disease) 2004   • Headache(784.0) As a child   • Low back pain    • Lung abnormality     nodules   • Lung nodule    • Neck pain    • Peripheral neuropathy    • Pituitary abnormality (HCC)     tumor   • Right hip pain 10/29/2019   • Thrombocytopenia (HCC)        Past Surgical History:   Procedure Laterality Date   • APPENDECTOMY     • BREAST BIOPSY Right 1995   • CHOLECYSTECTOMY     • COLECTOMY     • COLON SURGERY     • EPIDURAL BLOCK INJECTION N/A 6/23/2016    Procedure: BLOCK / INJECTION EPIDURAL STEROID CERVICAL  C7-T1;  Surgeon: Brian Cash MD;  Location: [facility] MAIN OR;  Service:    • EPIDURAL BLOCK INJECTION N/A 3/31/2016    Procedure: BLOCK / INJECTION EPIDURAL STEROID CERVICAL C7-T1  (C-ARM);  Surgeon: Brian Cash MD;  Location: [facility] MAIN OR;  Service:    • EPIDURAL BLOCK INJECTION N/A 8/8/2018    Procedure: C6 C7 Cervical Epidural Steroid Injection (06014);  Surgeon: Brian Cash MD;  Location: [facility] MAIN OR;  Service: Pain Management    • EPIDURAL BLOCK INJECTION N/A 12/16/2021    Procedure: T4 T5 thoracic epidural steroid injection (62724);  Surgeon: Brian Cash MD;  Location: [facility] MAIN OR;  Service: Pain Management    • EPIDURAL BLOCK INJECTION N/A 6/10/2022    Procedure: T4 T5 THORACIC EPIDURAL STEROID INJECTION (35520);  Surgeon: Brian Cash MD;  Location: [facility] MAIN OR;  Service: Pain Management    • HYSTERECTOMY  1996   • OOPHORECTOMY Bilateral 1996   • PITUITARY SURGERY     • [place] ARTHROCENTESIS ASPIR&/INJ MAJOR JT/BURSA W/O US Right  11/8/2019    Procedure: Trochanteric Bursa Injection (20610);  Surgeon: Brian Cash MD;  Location: [facility] MAIN OR;  Service: Pain Management    • MD ARTHROCENTESIS ASPIR&/INJ MAJOR JT/BURSA W/O US Right 1/23/2020    Procedure: Trochanteric Bursa Injection (20610);  Surgeon: Brian Cash MD;  Location: [facility] MAIN OR;  Service: Pain Management    • MD NJX DX/THER SBST EPIDURAL/SUBRACH CERV/THORACIC N/A 1/21/2016    Procedure: BLOCK / INJECTION EPIDURAL STEROID CERVICAL C7-T1 (C-ARM);  Surgeon: Brian Cash MD;  Location: [facility] MAIN OR;  Service: Pain Management    • MD NJX DX/THER SBST INTRLMNR LMBR/SAC W/IMG GDN N/A 11/22/2024    Procedure: BLOCK / INJECTION EPIDURAL STEROID LUMBAR  L5-S1;  Surgeon: Sumeet Baker MD;  Location: [facility] MAIN OR;  Service: Pain Management    • REDUCTION MAMMAPLASTY Bilateral 2000       Family History   Problem Relation Age of Onset   • Thyroid disease Mother    • Hyperlipidemia Mother    • Depression Mother    • Stroke Mother         TIA   • Thyroid disease Father    • Hyperlipidemia Father    • Depression Father    • Arthritis Father         Spine and knees   • Endometrial cancer Sister    • Obesity Sister    • Migraines Sister    • No Known Problems Daughter    • Cancer Maternal Grandmother         Kidney cancer   • Melanoma Maternal Grandmother    • Heart disease Maternal Grandfather    • Breast cancer Paternal Grandmother 40   • Cancer Paternal Grandmother         Breast, Lung, Liver and skin cancer   • Diabetes Paternal Grandfather    • Obesity Brother    • No Known Problems Son    • No Known Problems Maternal Uncle    • Obesity Paternal Aunt    • Colon cancer Paternal Aunt    • Obesity Paternal Aunt    • Diabetes Paternal Aunt    • Cancer Paternal Aunt         Thyroid and colon cancer   • No Known Problems Paternal Uncle    • ADD / ADHD Neg Hx    • Anesthesia problems Neg Hx    • Cancer Neg Hx    • Clotting disorder Neg Hx    • Collagen disease Neg Hx    • Dislocations  Neg Hx    • Learning disabilities Neg Hx    • Neurological problems Neg Hx    • Osteoporosis Neg Hx    • Rheumatologic disease Neg Hx    • Scoliosis Neg Hx    • Vascular Disease Neg Hx        Social History     Occupational History   • Not on file   Tobacco Use   • Smoking status: Never     Passive exposure: Current   • Smokeless tobacco: Never   Vaping Use   • Vaping status: Never Used   Substance and Sexual Activity   • Alcohol use: No   • Drug use: No   • Sexual activity: Not Currently     Partners: Male     Birth control/protection: Post-menopausal         Current Outpatient Medications:   •  albuterol (ProAir HFA) 90 mcg/act inhaler, Inhale 2 puffs every 6 (six) hours as needed for wheezing, Disp: 8.5 g, Rfl: 0  •  aspirin 81 MG tablet, Take 81 mg by mouth daily, Disp: , Rfl:   •  cholecalciferol (VITAMIN D3) 1,000 units tablet, Take 2 tablets by mouth daily, Disp: , Rfl:   •  cyclobenzaprine (FLEXERIL) 10 mg tablet, Take 1 tablet (10 mg total) by mouth 3 (three) times a day as needed for muscle spasms, Disp: 270 tablet, Rfl: 1  •  Diclofenac Sodium (VOLTAREN) 1 %, Apply 2 g topically 4 (four) times a day as needed (pain), Disp: 100 g, Rfl: 3  •  FLUoxetine (PROzac) 20 mg capsule, Take 3 capsules by mouth in the morning, Disp: , Rfl:   •  FLUoxetine (PROzac) 40 MG capsule, Take 40 mg by mouth daily, Disp: , Rfl:   •  gabapentin (NEURONTIN) 800 mg tablet, Take 1 tablet (800 mg total) by mouth 3 (three) times a day, Disp: 270 tablet, Rfl: 1  •  ibuprofen (MOTRIN) 600 mg tablet, Take 1 tablet (600 mg total) by mouth every 6 (six) hours as needed for mild pain, Disp: 30 tablet, Rfl: 0  •  levothyroxine 50 mcg tablet, Take 1 tablet (50 mcg) Monday-Saturday. Take 2 tablets (100 mcg) daily., Disp: 102 tablet, Rfl: 1  •  metFORMIN (GLUCOPHAGE) 1000 MG tablet, Take 1,000 mg by mouth 2 (two) times a day with meals, Disp: , Rfl:   •  metoprolol succinate (TOPROL-XL) 50 mg 24 hr tablet, Take 50 mg by mouth daily, Disp: ,  Rfl:   •  montelukast (SINGULAIR) 10 mg tablet, take 1 tablet by mouth every day at night, Disp: , Rfl:   •  ondansetron (Zofran ODT) 4 mg disintegrating tablet, Take 1 tablet (4 mg total) by mouth every 6 (six) hours as needed for nausea or vomiting, Disp: 20 tablet, Rfl: 1  •  [START ON 3/19/2025] oxyCODONE-acetaminophen (PERCOCET) 5-325 mg per tablet, Take 1 tablet by mouth 2 (two) times a day as needed for severe pain Max Daily Amount: 2 tablets Do not start before March 19, 2025., Disp: 60 tablet, Rfl: 0  •  [START ON 2/18/2025] oxyCODONE-acetaminophen (PERCOCET) 5-325 mg per tablet, Take 1 tablet by mouth 2 (two) times a day as needed for severe pain Max Daily Amount: 2 tablets Do not start before February 18, 2025., Disp: 60 tablet, Rfl: 0  •  [START ON 1/19/2025] oxyCODONE-acetaminophen (PERCOCET) 5-325 mg per tablet, Take 1 tablet by mouth 2 (two) times a day as needed for severe pain Max Daily Amount: 2 tablets Do not start before January 19, 2025., Disp: 60 tablet, Rfl: 0  •  Probiotic Product (PROBIOTIC BLEND PO), Take 1 tablet by mouth every morning, Disp: , Rfl:   •  semaglutide, 2 mg/dose, (Ozempic, 2 MG/DOSE,) 8 mg/ mL injection pen, Inject 0.75 mL (2 mg total) under the skin every 7 days, Disp: 9 mL, Rfl: 3  •  SUMAtriptan (IMITREX) 50 mg tablet, Take 50 mg by mouth, Disp: , Rfl:   •  traZODone (DESYREL) 50 mg tablet, , Disp: , Rfl: 0  •  naloxone (NARCAN) 4 mg/0.1 mL nasal spray, Administer 1 spray into a nostril. If no response after 2-3 minutes, give another dose in the other nostril using a new spray., Disp: 1 each, Rfl: 1    Allergies   Allergen Reactions   • Levaquin [Levofloxacin In D5w] Shortness Of Breath     Also hives     • Clarithromycin Hives and Rash   • Lubiprostone Hives and Other (See Comments)     Reaction Date: 10Aug2011;     Migraines and vomiting    Reaction Date: 10Aug2011;  Migraines and vomiting   • Cephradine    • Erythromycin Hives     Reaction Date: 10Aug2011;    •  "Macrobid [Nitrofurantoin Monohyd Macro] Other (See Comments)     C/o passing out   • Morphine    • Penicillins Hives     Reaction Date: 10Aug2011;    • Sulfa Antibiotics    • Tetracycline    • Tetracyclines & Related Hives   • Levofloxacin Rash   • Morphine And Codeine Rash   • Valium [Diazepam] Rash and Vomiting     Reaction Date: 14Jun2012;        Physical Exam:    Ht 5' 3\" (1.6 m)   Wt 70.8 kg (156 lb)   BMI 27.63 kg/m²     Constitutional:normal, well developed, well nourished, alert, in no distress and non-toxic and no overt pain behavior.  Eyes:anicteric  HEENT:grossly intact  Neck: Tenderness with palpation throughout bilateral trapezius muscles  Pulmonary:even and unlabored  Cardiovascular:No edema or pitting edema present  Skin:Normal without rashes or lesions and well hydrated  Psychiatric:Mood and affect appropriate  Neurologic:Cranial Nerves II-XII grossly intact  Musculoskeletal:normal gait    Orders Placed This Encounter   Procedures   • MM OF_Alprazolam Definitive   • MM OF_Amphetamine Definitive Test   • MM OF_Buprenorphine Definitive Test   • MM OF_Carisoprodol Definitive Test   • MM OF_Clonazepam Definitive   • MM OF_Cocaine Definitive Test   • MM OF_Codeine Definitive   • MM OF_Dextromethorphan Definitive Test   • MM OF_Diazepam Definitive   • MM OF_Fentanyl Definitive Test   • MM OF_Gabapentin Definitive Test   • MM OF_Heroin Definitive Test   • MM OF_Hydrocodone Definitive   • MM OF_Hydromorphone Definitive   • MM OF_Levorphanol Definitive Test   • MM OF_Lorazepam Definitive   • MM OF_MDMA Definitive Test   • MM OF_Meperidine Definitive Test   • MM OF_Methadone Definitive Test   • MM OF_Methylphenidate Definitive Test   • MM OF_Morphine Definitive   • MM OF_Naltrexone Definitive Test   • MM OF_Oxazepam Definitive   • MM OF_Oxycodone Definitive Test - Oral Fluid   • MM OF_Oxymorphone Definitive Test   • MM OF_Phencyclidine Definitive Test   • MM OF_Pregabalin Definitive Test   • MM OF_Tapentadol " Definitive Test   • MM OF_Temazepam Definitive   • MM OF_THC Definitive Test   • MM OF_Tramadol Definitive Test       This document was created using speech voice recognition software.   Grammatical errors, random word insertions, pronoun errors, and incomplete sentences are an occasional consequence of this system due to software limitations, ambient noise, and hardware issues.   Any formal questions or concerns about content, text, or information contained within the body of this dictation should be directly addressed to the provider for clarification.

## 2025-01-16 ENCOUNTER — PROCEDURE VISIT (OUTPATIENT)
Dept: PAIN MEDICINE | Facility: CLINIC | Age: 60
End: 2025-01-16
Payer: MEDICARE

## 2025-01-16 DIAGNOSIS — M79.18 MYOFASCIAL PAIN SYNDROME: Primary | ICD-10-CM

## 2025-01-16 PROCEDURE — 20553 NJX 1/MLT TRIGGER POINTS 3/>: CPT | Performed by: STUDENT IN AN ORGANIZED HEALTH CARE EDUCATION/TRAINING PROGRAM

## 2025-01-16 RX ORDER — LIDOCAINE HYDROCHLORIDE 10 MG/ML
9 INJECTION, SOLUTION INFILTRATION; PERINEURAL ONCE
Status: COMPLETED | OUTPATIENT
Start: 2025-01-16 | End: 2025-01-16

## 2025-01-16 RX ORDER — METHYLPREDNISOLONE ACETATE 40 MG/ML
40 INJECTION, SUSPENSION INTRA-ARTICULAR; INTRALESIONAL; INTRAMUSCULAR; SOFT TISSUE ONCE
Status: COMPLETED | OUTPATIENT
Start: 2025-01-16 | End: 2025-01-16

## 2025-01-16 RX ADMIN — LIDOCAINE HYDROCHLORIDE 9 ML: 10 INJECTION, SOLUTION INFILTRATION; PERINEURAL at 12:44

## 2025-01-16 RX ADMIN — METHYLPREDNISOLONE ACETATE 40 MG: 40 INJECTION, SUSPENSION INTRA-ARTICULAR; INTRALESIONAL; INTRAMUSCULAR; SOFT TISSUE at 12:45

## 2025-01-16 RX ADMIN — METHYLPREDNISOLONE ACETATE 40 MG: 40 INJECTION, SUSPENSION INTRA-ARTICULAR; INTRALESIONAL; INTRAMUSCULAR; SOFT TISSUE at 11:15

## 2025-01-16 RX ADMIN — LIDOCAINE HYDROCHLORIDE 9 ML: 10 INJECTION, SOLUTION INFILTRATION; PERINEURAL at 11:15

## 2025-01-16 NOTE — PROGRESS NOTES
" Universal Protocol:  Procedure performed by:  Consent: Verbal consent obtained.  Risks and benefits: risks, benefits and alternatives were discussed  Consent given by: patient  Time out: Immediately prior to procedure a \"time out\" was called to verify the correct patient, procedure, equipment, support staff and site/side marked as required.  Patient understanding: patient states understanding of the procedure being performed  Patient consent: the patient's understanding of the procedure matches consent given  Procedure consent: procedure consent matches procedure scheduled  Relevant documents: relevant documents present and verified  Test results: test results available and properly labeled  Site marked: the operative site was marked  Radiology Images displayed and confirmed. If images not available, report reviewed: imaging studies available  Patient identity confirmed: verbally with patient  Supporting Documentation  Indications: pain   Trigger Point Injections: multiple trigger points: 3 or more muscle groups    Injection site identified by: palpation  Procedure Details  Location(s):    Neck/Upper Back: L upper trapezius, R upper trapezius, L levator scapulae, R levator scapulae, L cervical paraspinals and R cervical paraspinals     Prep: patient was prepped and draped in usual sterile fashion  Needle size: 25 G  Medications: 9 mL lidocaine (XYLOCAINE) injection 1 %; 40 mg methylPREDNISolone acetate (DEPO-MEDROL) injection 40 mg/mL  Patient tolerance: patient tolerated the procedure well with no immediate complications          "

## 2025-01-19 LAB
7AMINOCLONAZEPAM SAL QL CFM: NEGATIVE NG/ML
AMPHET SAL QL CFM: NEGATIVE NG/ML
BUPRENORPHINE SAL QL SCN: NEGATIVE NG/ML
CARBOXYTHC SAL QL CFM: NEGATIVE NG/ML
CCP IGG SERPL-ACNC: NEGATIVE NG/ML
COCAINE SAL QL CFM: NEGATIVE NG/ML
CODEINE SAL QL CFM: NEGATIVE NG/ML
D-METHORPHAN SAL QL CFM: NEGATIVE NG/ML
DXO+LEVORPHANOL SAL QL CFM: NEGATIVE NG/ML
DXO+LEVORPHANOL SAL QL CFM: NEGATIVE NG/ML
EDDP SAL QL CFM: NEGATIVE NG/ML
GABAPENTIN SAL QL CFM: NORMAL NG/ML
HYDROCODONE SAL QL CFM: NEGATIVE NG/ML
HYDROCODONE SAL QL CFM: NEGATIVE NG/ML
HYDROMORPHONE SAL QL CFM: NEGATIVE NG/ML
LEUKEMIA MARKERS BLD-IMP: NEGATIVE NG/ML
M PROTEIN 3 UR ELPH-MCNC: ABNORMAL NG/ML
M TB TUBERC IGNF/MITOGEN IGNF CONTROL: NEGATIVE NG/ML
METHADONE SAL QL CFM: NEGATIVE NG/ML
MORPHINE SAL QL CFM: NEGATIVE NG/ML
MORPHINE SAL QL CFM: NEGATIVE NG/ML
NALTREXOL SAL QL CFM: NEGATIVE NG/ML
NALTREXONE SAL QL CFM: NEGATIVE NG/ML
OXYMORPHONE SAL QL CFM: ABNORMAL NG/ML
OXYMORPHONE SAL QL CFM: ABNORMAL NG/ML
PCP SAL QL CFM: NEGATIVE NG/ML
PREGABALIN SAL QL CFM: NEGATIVE NG/ML
SL AMB 6-MAM (HEROIN METABOLITE) QUANTIFICATION: NEGATIVE NG/ML
SL AMB ALPRAZOLAM QUANTIFICATION: NEGATIVE NG/ML
SL AMB CARISOPRODOL QUANTIFICATION: NEGATIVE NG/ML
SL AMB CLONAZEPAM QUANTIFICATION: NEGATIVE NG/ML
SL AMB DIAZEPAM QUANTIFICATION: NEGATIVE NG/ML
SL AMB FENTANYL QUANTIFICATION: NEGATIVE NG/ML
SL AMB MDMA QUANTIFICATION: NEGATIVE NG/ML
SL AMB MEPROBAMATE QUANTIFICATION: NEGATIVE NG/ML
SL AMB N-DESMETHYL-TRAMADOL QUANTIFICATION SALIVA: NEGATIVE NG/ML
SL AMB NORBUPRENORPHINE QUANTIFICATION: NEGATIVE NG/ML
SL AMB NORDIAZEPAM QUANTIFICATION: NEGATIVE NG/ML
SL AMB NORFENTANYL QUANTIFICATION: NEGATIVE NG/ML
SL AMB NORHYDROCODONE QUANTIFICATION: NEGATIVE NG/ML
SL AMB NORHYDROCODONE QUANTIFICATION: NEGATIVE NG/ML
SL AMB NORMEPERIDINE QUANTIFICATION: NEGATIVE NG/ML
SL AMB NOROXYCODONE QUANTIFICATION: ABNORMAL NG/ML
SL AMB OXAZEPAM QUANTIFICATION: NEGATIVE NG/ML
SL AMB RITALINIC ACID QUANTIFICATION: NEGATIVE NG/ML
SL AMB TEMAZEPAM QUANTIFICATION: NEGATIVE NG/ML
SL AMB TEMAZEPAM QUANTIFICATION: NEGATIVE NG/ML
SL AMB TRAMADOL QUANTIFICATION: NEGATIVE NG/ML
SQUAMOUS #/AREA URNS HPF: NEGATIVE NG/ML
TAPENTADOL SAL QL CFM: NEGATIVE NG/ML

## 2025-01-20 ENCOUNTER — RESULTS FOLLOW-UP (OUTPATIENT)
Dept: PAIN MEDICINE | Facility: CLINIC | Age: 60
End: 2025-01-20

## 2025-01-24 LAB
APOB+LDLR+PCSK9 GENE MUT ANL BLD/T: NOT DETECTED
BRCA1+BRCA2 DEL+DUP + FULL MUT ANL BLD/T: NOT DETECTED
MLH1+MSH2+MSH6+PMS2 GN DEL+DUP+FUL M: NOT DETECTED

## 2025-01-29 ENCOUNTER — TELEPHONE (OUTPATIENT)
Dept: PAIN MEDICINE | Facility: MEDICAL CENTER | Age: 60
End: 2025-01-29

## 2025-03-14 ENCOUNTER — HOSPITAL ENCOUNTER (EMERGENCY)
Facility: HOSPITAL | Age: 60
Discharge: HOME | End: 2025-03-14
Attending: EMERGENCY MEDICINE
Payer: MEDICARE

## 2025-03-14 ENCOUNTER — APPOINTMENT (EMERGENCY)
Dept: RADIOLOGY | Facility: HOSPITAL | Age: 60
End: 2025-03-14
Payer: MEDICARE

## 2025-03-14 VITALS
HEART RATE: 67 BPM | TEMPERATURE: 98.8 F | DIASTOLIC BLOOD PRESSURE: 56 MMHG | SYSTOLIC BLOOD PRESSURE: 93 MMHG | OXYGEN SATURATION: 91 % | RESPIRATION RATE: 18 BRPM

## 2025-03-14 DIAGNOSIS — R11.2 NAUSEA VOMITING AND DIARRHEA: Primary | ICD-10-CM

## 2025-03-14 DIAGNOSIS — R19.7 NAUSEA VOMITING AND DIARRHEA: Primary | ICD-10-CM

## 2025-03-14 LAB
ALBUMIN SERPL BCG-MCNC: 4.2 G/DL (ref 3.5–5)
ALP SERPL-CCNC: 71 U/L (ref 34–104)
ALT SERPL W P-5'-P-CCNC: 16 U/L (ref 7–52)
ANION GAP SERPL CALCULATED.3IONS-SCNC: 14 MMOL/L (ref 4–13)
APTT PPP: 28 SECONDS (ref 23–34)
AST SERPL W P-5'-P-CCNC: 25 U/L (ref 13–39)
BASOPHILS # BLD AUTO: 0.03 THOUSANDS/ÂΜL (ref 0–0.1)
BASOPHILS NFR BLD AUTO: 0 % (ref 0–1)
BILIRUB SERPL-MCNC: 0.61 MG/DL (ref 0.2–1)
BUN SERPL-MCNC: 18 MG/DL (ref 5–25)
CALCIUM SERPL-MCNC: 9.5 MG/DL (ref 8.4–10.2)
CHLORIDE SERPL-SCNC: 104 MMOL/L (ref 96–108)
CK SERPL-CCNC: 29 U/L (ref 26–192)
CO2 SERPL-SCNC: 20 MMOL/L (ref 21–32)
CREAT SERPL-MCNC: 0.89 MG/DL (ref 0.6–1.3)
EOSINOPHIL # BLD AUTO: 0.05 THOUSAND/ÂΜL (ref 0–0.61)
EOSINOPHIL NFR BLD AUTO: 1 % (ref 0–6)
ERYTHROCYTE [DISTWIDTH] IN BLOOD BY AUTOMATED COUNT: 12.9 % (ref 11.6–15.1)
FLUAV AG UPPER RESP QL IA.RAPID: NEGATIVE
FLUBV AG UPPER RESP QL IA.RAPID: NEGATIVE
GFR SERPL CREATININE-BSD FRML MDRD: 71 ML/MIN/1.73SQ M
GLUCOSE SERPL-MCNC: 93 MG/DL (ref 65–140)
HCT VFR BLD AUTO: 40.2 % (ref 34.8–46.1)
HGB BLD-MCNC: 13.1 G/DL (ref 11.5–15.4)
IMM GRANULOCYTES # BLD AUTO: 0.04 THOUSAND/UL (ref 0–0.2)
IMM GRANULOCYTES NFR BLD AUTO: 0 % (ref 0–2)
INR PPP: 0.97 (ref 0.85–1.19)
LACTATE SERPL-SCNC: 1.3 MMOL/L (ref 0.5–2)
LACTATE SERPL-SCNC: 2.8 MMOL/L (ref 0.5–2)
LG PLATELETS BLD QL SMEAR: PRESENT
LIPASE SERPL-CCNC: 37 U/L (ref 11–82)
LYMPHOCYTES # BLD AUTO: 0.36 THOUSANDS/ÂΜL (ref 0.6–4.47)
LYMPHOCYTES NFR BLD AUTO: 4 % (ref 14–44)
MACROCYTES BLD QL AUTO: PRESENT
MCH RBC QN AUTO: 29.9 PG (ref 26.8–34.3)
MCHC RBC AUTO-ENTMCNC: 32.6 G/DL (ref 31.4–37.4)
MCV RBC AUTO: 92 FL (ref 82–98)
MONOCYTES # BLD AUTO: 0.33 THOUSAND/ÂΜL (ref 0.17–1.22)
MONOCYTES NFR BLD AUTO: 3 % (ref 4–12)
NEUTROPHILS # BLD AUTO: 8.99 THOUSANDS/ÂΜL (ref 1.85–7.62)
NEUTS SEG NFR BLD AUTO: 92 % (ref 43–75)
NRBC BLD AUTO-RTO: 0 /100 WBCS
PLATELET # BLD AUTO: 461 THOUSANDS/UL (ref 149–390)
PLATELET BLD QL SMEAR: ABNORMAL
PMV BLD AUTO: 9.1 FL (ref 8.9–12.7)
POTASSIUM SERPL-SCNC: 4.3 MMOL/L (ref 3.5–5.3)
PROCALCITONIN SERPL-MCNC: 0.2 NG/ML
PROT SERPL-MCNC: 7.1 G/DL (ref 6.4–8.4)
PROTHROMBIN TIME: 13.3 SECONDS (ref 12.3–15)
RBC # BLD AUTO: 4.38 MILLION/UL (ref 3.81–5.12)
RBC MORPH BLD: PRESENT
SARS-COV+SARS-COV-2 AG RESP QL IA.RAPID: NEGATIVE
SODIUM SERPL-SCNC: 138 MMOL/L (ref 135–147)
WBC # BLD AUTO: 9.8 THOUSAND/UL (ref 4.31–10.16)

## 2025-03-14 PROCEDURE — 96361 HYDRATE IV INFUSION ADD-ON: CPT

## 2025-03-14 PROCEDURE — 83690 ASSAY OF LIPASE: CPT | Performed by: EMERGENCY MEDICINE

## 2025-03-14 PROCEDURE — 85610 PROTHROMBIN TIME: CPT | Performed by: EMERGENCY MEDICINE

## 2025-03-14 PROCEDURE — 96365 THER/PROPH/DIAG IV INF INIT: CPT

## 2025-03-14 PROCEDURE — 96375 TX/PRO/DX INJ NEW DRUG ADDON: CPT

## 2025-03-14 PROCEDURE — 85025 COMPLETE CBC W/AUTO DIFF WBC: CPT | Performed by: EMERGENCY MEDICINE

## 2025-03-14 PROCEDURE — 96376 TX/PRO/DX INJ SAME DRUG ADON: CPT

## 2025-03-14 PROCEDURE — 82550 ASSAY OF CK (CPK): CPT | Performed by: EMERGENCY MEDICINE

## 2025-03-14 PROCEDURE — 85730 THROMBOPLASTIN TIME PARTIAL: CPT | Performed by: EMERGENCY MEDICINE

## 2025-03-14 PROCEDURE — 74177 CT ABD & PELVIS W/CONTRAST: CPT

## 2025-03-14 PROCEDURE — 96367 TX/PROPH/DG ADDL SEQ IV INF: CPT

## 2025-03-14 PROCEDURE — 87804 INFLUENZA ASSAY W/OPTIC: CPT | Performed by: EMERGENCY MEDICINE

## 2025-03-14 PROCEDURE — 80053 COMPREHEN METABOLIC PANEL: CPT | Performed by: EMERGENCY MEDICINE

## 2025-03-14 PROCEDURE — 87811 SARS-COV-2 COVID19 W/OPTIC: CPT | Performed by: EMERGENCY MEDICINE

## 2025-03-14 PROCEDURE — 87040 BLOOD CULTURE FOR BACTERIA: CPT | Performed by: EMERGENCY MEDICINE

## 2025-03-14 PROCEDURE — 99285 EMERGENCY DEPT VISIT HI MDM: CPT | Performed by: EMERGENCY MEDICINE

## 2025-03-14 PROCEDURE — 84145 PROCALCITONIN (PCT): CPT | Performed by: EMERGENCY MEDICINE

## 2025-03-14 PROCEDURE — 99285 EMERGENCY DEPT VISIT HI MDM: CPT

## 2025-03-14 PROCEDURE — 36415 COLL VENOUS BLD VENIPUNCTURE: CPT | Performed by: EMERGENCY MEDICINE

## 2025-03-14 PROCEDURE — 83605 ASSAY OF LACTIC ACID: CPT | Performed by: EMERGENCY MEDICINE

## 2025-03-14 RX ORDER — ONDANSETRON 2 MG/ML
4 INJECTION INTRAMUSCULAR; INTRAVENOUS ONCE
Status: COMPLETED | OUTPATIENT
Start: 2025-03-14 | End: 2025-03-14

## 2025-03-14 RX ORDER — ONDANSETRON 4 MG/1
4 TABLET, ORALLY DISINTEGRATING ORAL EVERY 6 HOURS PRN
Qty: 10 TABLET | Refills: 0 | Status: SHIPPED | OUTPATIENT
Start: 2025-03-14

## 2025-03-14 RX ORDER — ACETAMINOPHEN 10 MG/ML
1000 INJECTION, SOLUTION INTRAVENOUS ONCE
Status: COMPLETED | OUTPATIENT
Start: 2025-03-14 | End: 2025-03-14

## 2025-03-14 RX ORDER — CEFEPIME HYDROCHLORIDE 2 G/50ML
2000 INJECTION, SOLUTION INTRAVENOUS ONCE
Status: COMPLETED | OUTPATIENT
Start: 2025-03-14 | End: 2025-03-14

## 2025-03-14 RX ORDER — KETOROLAC TROMETHAMINE 30 MG/ML
15 INJECTION, SOLUTION INTRAMUSCULAR; INTRAVENOUS ONCE
Status: COMPLETED | OUTPATIENT
Start: 2025-03-14 | End: 2025-03-14

## 2025-03-14 RX ADMIN — IOHEXOL 100 ML: 350 INJECTION, SOLUTION INTRAVENOUS at 21:29

## 2025-03-14 RX ADMIN — SODIUM CHLORIDE 1000 ML: 0.9 INJECTION, SOLUTION INTRAVENOUS at 17:07

## 2025-03-14 RX ADMIN — ONDANSETRON 4 MG: 2 INJECTION INTRAMUSCULAR; INTRAVENOUS at 21:58

## 2025-03-14 RX ADMIN — ONDANSETRON 4 MG: 2 INJECTION INTRAMUSCULAR; INTRAVENOUS at 17:07

## 2025-03-14 RX ADMIN — KETOROLAC TROMETHAMINE 15 MG: 30 INJECTION, SOLUTION INTRAMUSCULAR; INTRAVENOUS at 21:58

## 2025-03-14 RX ADMIN — CEFEPIME HYDROCHLORIDE 2000 MG: 2 INJECTION, SOLUTION INTRAVENOUS at 18:46

## 2025-03-14 RX ADMIN — SODIUM CHLORIDE 1000 ML: 0.9 INJECTION, SOLUTION INTRAVENOUS at 18:38

## 2025-03-14 RX ADMIN — SODIUM CHLORIDE 1000 ML: 0.9 INJECTION, SOLUTION INTRAVENOUS at 19:47

## 2025-03-14 RX ADMIN — ACETAMINOPHEN 1000 MG: 10 INJECTION INTRAVENOUS at 17:07

## 2025-03-14 NOTE — ED PROVIDER NOTES
Time reflects when diagnosis was documented in both MDM as applicable and the Disposition within this note       Time User Action Codes Description Comment    3/14/2025 11:17 PM Mayco Waters Add [R11.2,  R19.7] Nausea vomiting and diarrhea           ED Disposition       ED Disposition   Discharge    Condition   Stable    Date/Time   Fri Mar 14, 2025 11:17 PM    Comment   Ольга Manning discharge to home/self care.                   Assessment & Plan       Medical Decision Making  Patient's nausea, vomiting, diarrhea, fever all consistent with a viral syndrome.  Patient initially hypotensive, tachycardic, continues to feel nauseous.  I ordered and reviewed lab work including CBC, CMP, lactate, procalcitonin, blood cultures, urinalysis, additionally CK given patient's myalgias and stool studies to evaluate for infectious causes of diarrhea.  I ordered and reviewed a COVID and flu swab.  I treated patient with IV fluids, Zofran, IV acetaminophen.  Patient initially with some persistence of hypotension despite initial fluid bolus, ultimately requiring 3 L of normal saline with significant improvement of symptoms.  Patient without significant lab derangements.  I ordered and reviewed a CT abdomen pelvis which demonstrates findings consistent with enteritis.  Patient empirically treated with cefepime while hypotensive however I believe symptoms are most likely viral and not bacterial.  Lactate improving following fluid resuscitation.  Patient offered admission but agreeable with discharge home with prescription for Zofran and continued oral hydration.  Patient discharged with return precautions.    Amount and/or Complexity of Data Reviewed  Labs: ordered.  Radiology: ordered.    Risk  Prescription drug management.             Medications   acetaminophen (Ofirmev) injection 1,000 mg (0 mg Intravenous Stopped 3/14/25 5196)   ondansetron (ZOFRAN) injection 4 mg (4 mg Intravenous Given 3/14/25 1707)   sodium  chloride 0.9 % bolus 1,000 mL (0 mL Intravenous Stopped 3/14/25 1837)   sodium chloride 0.9 % bolus 1,000 mL (0 mL Intravenous Stopped 3/14/25 1947)   cefepime (MAXIPIME) IVPB (premix in dextrose) 2,000 mg 50 mL (0 mg Intravenous Stopped 3/14/25 1932)   sodium chloride 0.9 % bolus 1,000 mL (0 mL Intravenous Stopped 3/14/25 2158)   iohexol (OMNIPAQUE) 350 MG/ML injection (MULTI-DOSE) 100 mL (100 mL Intravenous Given 3/14/25 2129)   ondansetron (ZOFRAN) injection 4 mg (4 mg Intravenous Given 3/14/25 2158)   ketorolac (TORADOL) injection 15 mg (15 mg Intravenous Given 3/14/25 2158)       ED Risk Strat Scores                            SBIRT 22yo+      Flowsheet Row Most Recent Value   Initial Alcohol Screen: US AUDIT-C     1. How often do you have a drink containing alcohol? 0 Filed at: 03/14/2025 1900   2. How many drinks containing alcohol do you have on a typical day you are drinking?  0 Filed at: 03/14/2025 1900   3a. Male UNDER 65: How often do you have five or more drinks on one occasion? 0 Filed at: 03/14/2025 1900   3b. FEMALE Any Age, or MALE 65+: How often do you have 4 or more drinks on one occassion? 0 Filed at: 03/14/2025 1900   Audit-C Score 0 Filed at: 03/14/2025 1900   PATRIZIA: How many times in the past year have you...    Used an illegal drug or used a prescription medication for non-medical reasons? Never Filed at: 03/14/2025 1900                            History of Present Illness       Chief Complaint   Patient presents with    Abdominal Pain     NVD and weakness       Past Medical History:   Diagnosis Date    Anxiety     Asthma     exercise induced asthma    Cervical disc disorder     Chronic pain     Chronic pain disorder     Chronic pain syndrome 01/21/2016    Depression     Dysuria     Fibromyalgia, primary Est 2016    GERD (gastroesophageal reflux disease) 2004    Headache(784.0) As a child    Low back pain     Lung abnormality     nodules    Lung nodule     Neck pain     Peripheral neuropathy      Pituitary abnormality (HCC)     tumor    Right hip pain 10/29/2019    Thrombocytopenia (HCC)       Past Surgical History:   Procedure Laterality Date    APPENDECTOMY      BREAST BIOPSY Right 1995    CHOLECYSTECTOMY      COLECTOMY      COLON SURGERY      EPIDURAL BLOCK INJECTION N/A 6/23/2016    Procedure: BLOCK / INJECTION EPIDURAL STEROID CERVICAL  C7-T1;  Surgeon: Brian Cash MD;  Location: [facility] MAIN OR;  Service:     EPIDURAL BLOCK INJECTION N/A 3/31/2016    Procedure: BLOCK / INJECTION EPIDURAL STEROID CERVICAL C7-T1  (C-ARM);  Surgeon: Brian Cash MD;  Location: [facility] MAIN OR;  Service:     EPIDURAL BLOCK INJECTION N/A 8/8/2018    Procedure: C6 C7 Cervical Epidural Steroid Injection (11200);  Surgeon: Brian Cash MD;  Location: [facility] MAIN OR;  Service: Pain Management     EPIDURAL BLOCK INJECTION N/A 12/16/2021    Procedure: T4 T5 thoracic epidural steroid injection (02737);  Surgeon: Brian Cash MD;  Location: [facility] MAIN OR;  Service: Pain Management     EPIDURAL BLOCK INJECTION N/A 6/10/2022    Procedure: T4 T5 THORACIC EPIDURAL STEROID INJECTION (79729);  Surgeon: Brian Cash MD;  Location: [facility] MAIN OR;  Service: Pain Management     HYSTERECTOMY  1996    OOPHORECTOMY Bilateral 1996    PITUITARY SURGERY      RI ARTHROCENTESIS ASPIR&/INJ MAJOR JT/BURSA W/O US Right 11/8/2019    Procedure: Trochanteric Bursa Injection (20610);  Surgeon: Brian Cash MD;  Location: [facility] MAIN OR;  Service: Pain Management     RI ARTHROCENTESIS ASPIR&/INJ MAJOR JT/BURSA W/O US Right 1/23/2020    Procedure: Trochanteric Bursa Injection (20610);  Surgeon: Brian Cash MD;  Location: [facility] MAIN OR;  Service: Pain Management     RI NJX DX/THER SBST EPIDURAL/SUBRACH CERV/THORACIC N/A 1/21/2016    Procedure: BLOCK / INJECTION EPIDURAL STEROID CERVICAL C7-T1 (C-ARM);  Surgeon: Brian Cash MD;  Location: [facility] MAIN OR;  Service: Pain Management     RI NJX DX/THER SBST INTRLMNR LMBR/SAC W/IMG GDN N/A  11/22/2024    Procedure: BLOCK / INJECTION EPIDURAL STEROID LUMBAR  L5-S1;  Surgeon: Sumeet Baker MD;  Location: [facility] MAIN OR;  Service: Pain Management     REDUCTION MAMMAPLASTY Bilateral 2000      Family History   Problem Relation Age of Onset    Thyroid disease Mother     Hyperlipidemia Mother     Depression Mother     Stroke Mother         TIA    Thyroid disease Father     Hyperlipidemia Father     Depression Father     Arthritis Father         Spine and knees    Endometrial cancer Sister     Obesity Sister     Migraines Sister     No Known Problems Daughter     Cancer Maternal Grandmother         Kidney cancer    Melanoma Maternal Grandmother     Heart disease Maternal Grandfather     Breast cancer Paternal Grandmother 40    Cancer Paternal Grandmother         Breast, Lung, Liver and skin cancer    Diabetes Paternal Grandfather     Obesity Brother     No Known Problems Son     No Known Problems Maternal Uncle     Obesity Paternal Aunt     Colon cancer Paternal Aunt     Obesity Paternal Aunt     Diabetes Paternal Aunt     Cancer Paternal Aunt         Thyroid and colon cancer    No Known Problems Paternal Uncle     ADD / ADHD Neg Hx     Anesthesia problems Neg Hx     Cancer Neg Hx     Clotting disorder Neg Hx     Collagen disease Neg Hx     Dislocations Neg Hx     Learning disabilities Neg Hx     Neurological problems Neg Hx     Osteoporosis Neg Hx     Rheumatologic disease Neg Hx     Scoliosis Neg Hx     Vascular Disease Neg Hx       Social History     Tobacco Use    Smoking status: Never     Passive exposure: Current    Smokeless tobacco: Never   Vaping Use    Vaping status: Never Used   Substance Use Topics    Alcohol use: No    Drug use: No      E-Cigarette/Vaping    E-Cigarette Use Never User       E-Cigarette/Vaping Substances    Nicotine No     CBD No     Flavoring No     Other No     Unknown No       I have reviewed and agree with the history as documented.     Patient is a 59-year-old female history  of chronic pain, myofascial pain syndrome, pots disease, chronic anemia, status post total colectomy in 2011 for bowel obstruction presenting for evaluation of 1 day of nausea, vomiting, diarrhea.  Patient states episodes too numerous to count of nonbloody nonbilious emesis and loose nonbloody stool.  Patient complains of moderate epigastric pain.  Patient states persistent chills and rigors throughout the day, denies subjective fever but noted to be febrile on triage vitals.  Patient states progressive lightheadedness and was unable to sit up ultimately requiring squad to bring her to the emergency department.  Patient additionally complaining of moderate generalized bodyaches.  Patient denies any recent travel or sick contacts.  Patient states over the course of the last week and a half she had some mild rhinorrhea and was treated with several days of antibiotics.        Review of Systems   Constitutional:  Positive for chills, fatigue and fever.   HENT:  Positive for rhinorrhea.    Respiratory:  Negative for cough and shortness of breath.    Gastrointestinal:  Positive for abdominal pain, diarrhea, nausea and vomiting.   Musculoskeletal:  Positive for myalgias. Negative for arthralgias.   Skin:  Negative for color change, pallor, rash and wound.   Neurological:  Positive for light-headedness.   Psychiatric/Behavioral:  Negative for confusion.    All other systems reviewed and are negative.          Objective       ED Triage Vitals [03/14/25 1625]   Temperature Pulse Blood Pressure Respirations SpO2 Patient Position - Orthostatic VS   100.2 °F (37.9 °C) 90 109/69 18 95 % Lying      Temp Source Heart Rate Source BP Location FiO2 (%) Pain Score    Tympanic Monitor Left arm -- 6      Vitals      Date and Time Temp Pulse SpO2 Resp BP Pain Score FACES Pain Rating User   03/14/25 2330 -- 67 91 % -- 93/56 -- -- DD   03/14/25 2300 -- 73 97 % -- 90/55 -- --    03/14/25 2230 -- 72 100 % -- 101/56 -- --    03/14/25 2200  -- 76 100 % 18 112/59 -- --    03/14/25 2158 -- -- -- -- -- 5 --    03/14/25 2137 -- 70 99 % 18 122/60 -- --    03/14/25 2030 -- 75 99 % 18 94/53 -- --    03/14/25 2005 -- 84 99 % 18 92/52 -- --    03/14/25 2000 -- -- -- -- 94/56 -- --    03/14/25 1927 -- 99 -- 18 86/51 -- --    03/14/25 1908 -- 99 99 % 18 80/48 -- --    03/14/25 1830 98.8 °F (37.1 °C) 102 97 % 18 79/46 -- -- [facility]   03/14/25 1800 -- 86 99 % 20 94/52 -- -- [facility]   03/14/25 1730 -- 86 100 % 20 88/51 -- -- [facility]   03/14/25 1700 -- 109 98 % -- 105/68 -- -- [facility]   03/14/25 1630 -- 109 97 % 20 85/51 -- -- [facility]   03/14/25 1625 100.2 °F (37.9 °C) 90 95 % 18 109/69 6 -- [facility]            Physical Exam  Vitals and nursing note reviewed.   Constitutional:       General: She is not in acute distress.     Appearance: Normal appearance. She is not ill-appearing, toxic-appearing or diaphoretic.      Comments: Somewhat ill-appearing but nontoxic nondistressed   HENT:      Head: Normocephalic and atraumatic.      Comments: Moist mucous membranes.  Holding emesis bag but not actively vomiting     Right Ear: External ear normal.      Left Ear: External ear normal.   Eyes:      General:         Right eye: No discharge.         Left eye: No discharge.   Cardiovascular:      Comments: Sinus tachycardia rate of 110.  No murmurs rubs or gallops.  Extremities warm and well-perfused without mottling  Pulmonary:      Effort: No respiratory distress.      Comments: No increased work of breathing.  Speaking in complete sentences.  Lungs clear to auscultation bilaterally without wheezes, rales, rhonchi.  Satting 97% on room air indicating adequate oxygenation  Abdominal:      General: There is no distension.      Tenderness: There is abdominal tenderness.      Comments: Mild epigastric tenderness.  Abdomen nondistended with normal bowel sounds.  No rigidity, rebound, guarding   Musculoskeletal:         General: No deformity.      Cervical back: Normal range of motion.    Skin:     Findings: No lesion or rash.   Neurological:      Mental Status: She is alert and oriented to person, place, and time. Mental status is at baseline.      Comments: Awake, alert, pleasant, interactive   Psychiatric:         Mood and Affect: Mood and affect normal.         Results Reviewed       Procedure Component Value Units Date/Time    Lactic acid 2 Hours [[id number]]  (Normal) Collected: 03/14/25 1915    Lab Status: Final result Specimen: Blood from Arm, Right Updated: 03/14/25 1950     LACTIC ACID 1.3 mmol/L     Narrative:      Result may be elevated if tourniquet was used during collection.    Lipase [[id number]]  (Normal) Collected: 03/14/25 1701    Lab Status: Final result Specimen: Blood from Arm, Right Updated: 03/14/25 1752     Lipase 37 u/L     Comprehensive metabolic panel [[id number]]  (Abnormal) Collected: 03/14/25 1701    Lab Status: Final result Specimen: Blood from Arm, Right Updated: 03/14/25 1741     Sodium 138 mmol/L      Potassium 4.3 mmol/L      Chloride 104 mmol/L      CO2 20 mmol/L      ANION GAP 14 mmol/L      BUN 18 mg/dL      Creatinine 0.89 mg/dL      Glucose 93 mg/dL      Calcium 9.5 mg/dL      AST 25 U/L      ALT 16 U/L      Alkaline Phosphatase 71 U/L      Total Protein 7.1 g/dL      Albumin 4.2 g/dL      Total Bilirubin 0.61 mg/dL      eGFR 71 ml/min/1.73sq m     Narrative:      National Kidney Disease Foundation guidelines for Chronic Kidney Disease (CKD):     Stage 1 with normal or high GFR (GFR > 90 mL/min/1.73 square meters)    Stage 2 Mild CKD (GFR = 60-89 mL/min/1.73 square meters)    Stage 3A Moderate CKD (GFR = 45-59 mL/min/1.73 square meters)    Stage 3B Moderate CKD (GFR = 30-44 mL/min/1.73 square meters)    Stage 4 Severe CKD (GFR = 15-29 mL/min/1.73 square meters)    Stage 5 End Stage CKD (GFR <15 mL/min/1.73 square meters)  Note: GFR calculation is accurate only with a steady state creatinine    CK [[id number]]  (Normal) Collected: 03/14/25 1701    Lab Status:  Final result Specimen: Blood from Arm, Right Updated: 03/14/25 1741     Total CK 29 U/L     Procalcitonin [[id number]]  (Normal) Collected: 03/14/25 1701    Lab Status: Final result Specimen: Blood from Arm, Right Updated: 03/14/25 1738     Procalcitonin 0.20 ng/ml     Smear Review(Phlebs Do Not Order) [[id number]]  (Abnormal) Collected: 03/14/25 1701    Lab Status: Final result Specimen: Blood from Arm, Right Updated: 03/14/25 1738     RBC Morphology Present     Platelet Estimate Increased     Large Platelet Present     Macrocytes Present    CBC and differential [[id number]]  (Abnormal) Collected: 03/14/25 1701    Lab Status: Final result Specimen: Blood from Arm, Right Updated: 03/14/25 1738     WBC 9.80 Thousand/uL      RBC 4.38 Million/uL      Hemoglobin 13.1 g/dL      Hematocrit 40.2 %      MCV 92 fL      MCH 29.9 pg      MCHC 32.6 g/dL      RDW 12.9 %      MPV 9.1 fL      Platelets 461 Thousands/uL      nRBC 0 /100 WBCs      Segmented % 92 %      Immature Grans % 0 %      Lymphocytes % 4 %      Monocytes % 3 %      Eosinophils Relative 1 %      Basophils Relative 0 %      Absolute Neutrophils 8.99 Thousands/µL      Absolute Immature Grans 0.04 Thousand/uL      Absolute Lymphocytes 0.36 Thousands/µL      Absolute Monocytes 0.33 Thousand/µL      Eosinophils Absolute 0.05 Thousand/µL      Basophils Absolute 0.03 Thousands/µL     Narrative:      This is an appended report.  These results have been appended to a previously verified report.    FLU/COVID Rapid Antigen (30 min. TAT) - Preferred screening test in ED [[id number]]  (Normal) Collected: 03/14/25 1701    Lab Status: Final result Specimen: Nares from Nose Updated: 03/14/25 1735     SARS COV Rapid Antigen Negative     Influenza A Rapid Antigen Negative     Influenza B Rapid Antigen Negative    Narrative:      This test has been performed using the [employer] Silvia 2 FLU+SARS Antigen test under the Emergency Use Authorization (EUA). This test has been validated by the   and verified by the performing laboratory. The Silvia uses lateral flow immunofluorescent sandwich assay to detect SARS-COV, Influenza A and Influenza B Antigen.     The [employer] Silvia 2 SARS Antigen test does not differentiate between SARS-CoV and SARS-CoV-2.     Negative results are presumptive and may be confirmed with a molecular assay, if necessary, for patient management. Negative results do not rule out SARS-CoV-2 or influenza infection and should not be used as the sole basis for treatment or patient management decisions. A negative test result may occur if the level of antigen in a sample is below the limit of detection of this test.     Positive results are indicative of the presence of viral antigens, but do not rule out bacterial infection or co-infection with other viruses.     All test results should be used as an adjunct to clinical observations and other information available to the provider.    FOR PEDIATRIC PATIENTS - copy/paste COVID Guidelines URL to browser: [email or web address]    Lactic acid [[id number]]  (Abnormal) Collected: 03/14/25 1701    Lab Status: Final result Specimen: Blood from Arm, Right Updated: 03/14/25 1727     LACTIC ACID 2.8 mmol/L     Narrative:      Result may be elevated if tourniquet was used during collection.    Protime-INR [[id number]]  (Normal) Collected: 03/14/25 1701    Lab Status: Final result Specimen: Blood from Arm, Right Updated: 03/14/25 1724     Protime 13.3 seconds      INR 0.97    Narrative:      INR Therapeutic Range    Indication                                             INR Range      Atrial Fibrillation                                               2.0-3.0  Hypercoagulable State                                    2.0.2.3  Left Ventricular Asist Device                            2.0-3.0  Mechanical Heart Valve                                  -    Aortic(with afib, MI, embolism, HF, LA  enlargement,    and/or coagulopathy)                                     2.0-3.0 (2.5-3.5)     Mitral                                                             2.5-3.5  Prosthetic/Bioprosthetic Heart Valve               2.0-3.0  Venous thromboembolism (VTE: VT, PE        2.0-3.0    APTT [[id number]]  (Normal) Collected: 03/14/25 1701    Lab Status: Final result Specimen: Blood from Arm, Right Updated: 03/14/25 1724     PTT 28 seconds     Blood culture #2 [[id number]] Collected: 03/14/25 1701    Lab Status: In process Specimen: Blood from Arm, Right Updated: 03/14/25 1709    Blood culture #1 [[id number]] Collected: 03/14/25 1701    Lab Status: In process Specimen: Blood from Arm, Right Updated: 03/14/25 1709    Stool Enteric Bacterial Panel by PCR [[id number]]     Lab Status: No result Specimen: Stool     Clostridium difficile toxin by PCR with EIA [[id number]]     Lab Status: No result Specimen: Stool     Ova and parasite examination [[id number]]     Lab Status: No result Specimen: Stool from Rectum     UA w Reflex to Microscopic w Reflex to Culture [[id number]]     Lab Status: No result Specimen: Urine             CT abdomen pelvis with contrast   Final Interpretation by Mk Ballard MD (03/14 2249)      Nondistended fluid-filled small bowel loops which may be due to nonspecific enteritis.         Workstation performed: [id number]             Procedures    ED Medication and Procedure Management   Prior to Admission Medications   Prescriptions Last Dose Informant Patient Reported? Taking?   Diclofenac Sodium (VOLTAREN) 1 %   No No   Sig: Apply 2 g topically 4 (four) times a day as needed (pain)   FLUoxetine (PROzac) 20 mg capsule   Yes No   Sig: Take 3 capsules by mouth in the morning   FLUoxetine (PROzac) 40 MG capsule   Yes No   Sig: Take 40 mg by mouth daily   Probiotic Product (PROBIOTIC BLEND PO)   Yes No   Sig: Take 1 tablet by mouth every morning   SUMAtriptan (IMITREX) 50 mg tablet   Yes No   Sig: Take 50 mg by  mouth   albuterol (ProAir HFA) 90 mcg/act inhaler  Self No No   Sig: Inhale 2 puffs every 6 (six) hours as needed for wheezing   aspirin 81 MG tablet  Self Yes No   Sig: Take 81 mg by mouth daily   cholecalciferol (VITAMIN D3) 1,000 units tablet  Self Yes No   Sig: Take 2 tablets by mouth daily   cyclobenzaprine (FLEXERIL) 10 mg tablet   No No   Sig: Take 1 tablet (10 mg total) by mouth 3 (three) times a day as needed for muscle spasms   gabapentin (NEURONTIN) 800 mg tablet   No No   Sig: Take 1 tablet (800 mg total) by mouth 3 (three) times a day   ibuprofen (MOTRIN) 600 mg tablet  Self No No   Sig: Take 1 tablet (600 mg total) by mouth every 6 (six) hours as needed for mild pain   levothyroxine 50 mcg tablet   No No   Sig: Take 1 tablet (50 mcg) Monday-Saturday. Take 2 tablets (100 mcg) daily.   metFORMIN (GLUCOPHAGE) 1000 MG tablet  Self Yes No   Sig: Take 1,000 mg by mouth 2 (two) times a day with meals   metoprolol succinate (TOPROL-XL) 50 mg 24 hr tablet   Yes No   Sig: Take 50 mg by mouth daily   montelukast (SINGULAIR) 10 mg tablet   Yes No   Sig: take 1 tablet by mouth every day at night   naloxone (NARCAN) 4 mg/0.1 mL nasal spray  Self No No   Sig: Administer 1 spray into a nostril. If no response after 2-3 minutes, give another dose in the other nostril using a new spray.   ondansetron (Zofran ODT) 4 mg disintegrating tablet   No No   Sig: Take 1 tablet (4 mg total) by mouth every 6 (six) hours as needed for nausea or vomiting   oxyCODONE-acetaminophen (PERCOCET) 5-325 mg per tablet   No No   Sig: Take 1 tablet by mouth 2 (two) times a day as needed for severe pain Max Daily Amount: 2 tablets Do not start before March 19, 2025.   oxyCODONE-acetaminophen (PERCOCET) 5-325 mg per tablet   No No   Sig: Take 1 tablet by mouth 2 (two) times a day as needed for severe pain Max Daily Amount: 2 tablets Do not start before February 18, 2025.   oxyCODONE-acetaminophen (PERCOCET) 5-325 mg per tablet   No No   Sig:  Take 1 tablet by mouth 2 (two) times a day as needed for severe pain Max Daily Amount: 2 tablets Do not start before January 19, 2025.   semaglutide, 2 mg/dose, (Ozempic, 2 MG/DOSE,) 8 mg/ mL injection pen   No No   Sig: Inject 0.75 mL (2 mg total) under the skin every 7 days   traZODone (DESYREL) 50 mg tablet  Self Yes No      Facility-Administered Medications: None     Discharge Medication List as of 3/14/2025 11:19 PM        START taking these medications    Details   !! ondansetron (ZOFRAN-ODT) 4 mg disintegrating tablet Take 1 tablet (4 mg total) by mouth every 6 (six) hours as needed for nausea or vomiting for up to 10 doses, Starting Fri 3/14/2025, Normal       !! - Potential duplicate medications found. Please discuss with provider.        CONTINUE these medications which have NOT CHANGED    Details   albuterol (ProAir HFA) 90 mcg/act inhaler Inhale 2 puffs every 6 (six) hours as needed for wheezing, Starting Thu 7/14/2022, Print      aspirin 81 MG tablet Take 81 mg by mouth daily, Historical Med      cholecalciferol (VITAMIN D3) 1,000 units tablet Take 2 tablets by mouth daily, Starting Mon 7/11/2011, Historical Med      cyclobenzaprine (FLEXERIL) 10 mg tablet Take 1 tablet (10 mg total) by mouth 3 (three) times a day as needed for muscle spasms, Starting Wed 1/15/2025, Until Mon 7/14/2025 at 2359, Normal      Diclofenac Sodium (VOLTAREN) 1 % Apply 2 g topically 4 (four) times a day as needed (pain), Starting Wed 10/16/2024, Normal      !! FLUoxetine (PROzac) 20 mg capsule Take 3 capsules by mouth in the morning, Starting Wed 12/11/2024, Historical Med      !! FLUoxetine (PROzac) 40 MG capsule Take 40 mg by mouth daily, Starting Sat 9/28/2024, Historical Med      gabapentin (NEURONTIN) 800 mg tablet Take 1 tablet (800 mg total) by mouth 3 (three) times a day, Starting Wed 1/15/2025, Until Mon 7/14/2025, Normal      ibuprofen (MOTRIN) 600 mg tablet Take 1 tablet (600 mg total) by mouth every 6 (six) hours as  needed for mild pain, Starting Mon 7/15/2019, Normal      levothyroxine 50 mcg tablet Take 1 tablet (50 mcg) Monday-Saturday. Take 2 tablets (100 mcg) daily., Normal      metFORMIN (GLUCOPHAGE) 1000 MG tablet Take 1,000 mg by mouth 2 (two) times a day with meals, Historical Med      metoprolol succinate (TOPROL-XL) 50 mg 24 hr tablet Take 50 mg by mouth daily, Starting Sat 8/10/2024, Historical Med      montelukast (SINGULAIR) 10 mg tablet take 1 tablet by mouth every day at night, Historical Med      naloxone (NARCAN) 4 mg/0.1 mL nasal spray Administer 1 spray into a nostril. If no response after 2-3 minutes, give another dose in the other nostril using a new spray., Normal      !! ondansetron (Zofran ODT) 4 mg disintegrating tablet Take 1 tablet (4 mg total) by mouth every 6 (six) hours as needed for nausea or vomiting, Starting Tue 7/9/2024, Normal      !! oxyCODONE-acetaminophen (PERCOCET) 5-325 mg per tablet Take 1 tablet by mouth 2 (two) times a day as needed for severe pain Max Daily Amount: 2 tablets Do not start before March 19, 2025., Starting Wed 3/19/2025, Until Fri 4/18/2025 at 2359, Normal      !! oxyCODONE-acetaminophen (PERCOCET) 5-325 mg per tablet Take 1 tablet by mouth 2 (two) times a day as needed for severe pain Max Daily Amount: 2 tablets Do not start before February 18, 2025., Starting Tue 2/18/2025, Until Thu 3/20/2025 at 2359, Normal      Probiotic Product (PROBIOTIC BLEND PO) Take 1 tablet by mouth every morning, Historical Med      semaglutide, 2 mg/dose, (Ozempic, 2 MG/DOSE,) 8 mg/ mL injection pen Inject 0.75 mL (2 mg total) under the skin every 7 days, Starting Tue 1/14/2025, Until Tue 12/16/2025, Normal      SUMAtriptan (IMITREX) 50 mg tablet Take 50 mg by mouth, Starting Fri 12/20/2024, Until Sat 12/20/2025 at 2359, Historical Med      traZODone (DESYREL) 50 mg tablet Starting Wed 1/10/2018, Historical Med       !! - Potential duplicate medications found. Please discuss with provider.         No discharge procedures on file.  ED SEPSIS DOCUMENTATION   Time reflects when diagnosis was documented in both MDM as applicable and the Disposition within this note       Time User Action Codes Description Comment    3/14/2025 11:17 PM Mayco Waters Add [R11.2,  R19.7] Nausea vomiting and diarrhea                  Mayco Waters MD  03/14/25 1302

## 2025-03-15 NOTE — DISCHARGE INSTRUCTIONS
Your nausea, vomiting, diarrhea is most consistent with a viral syndrome.  Your blood pressure was low initially but significantly improved after IV fluids.  We have prescribed Zofran to help with ongoing nausea and vomiting.  Make sure you are drinking enough fluids.  If you have any severe worsening abdominal pain, severe nausea and vomiting and are not able to drink any fluids, have significant blood in your vomit or stool, return to the emergency department.  Follow-up with your primary care provider in 1 week for reassessment.

## 2025-03-16 LAB
BACTERIA BLD CULT: NORMAL
BACTERIA BLD CULT: NORMAL

## 2025-03-19 LAB
BACTERIA BLD CULT: NORMAL
BACTERIA BLD CULT: NORMAL

## 2025-04-16 ENCOUNTER — OFFICE VISIT (OUTPATIENT)
Dept: PAIN MEDICINE | Facility: CLINIC | Age: 60
End: 2025-04-16
Payer: MEDICARE

## 2025-04-16 VITALS — BODY MASS INDEX: 26.57 KG/M2 | WEIGHT: 150 LBS

## 2025-04-16 DIAGNOSIS — F11.20 UNCOMPLICATED OPIOID DEPENDENCE (HCC): ICD-10-CM

## 2025-04-16 DIAGNOSIS — M54.16 LUMBAR RADICULOPATHY: ICD-10-CM

## 2025-04-16 DIAGNOSIS — M48.062 SPINAL STENOSIS OF LUMBAR REGION WITH NEUROGENIC CLAUDICATION: Primary | ICD-10-CM

## 2025-04-16 DIAGNOSIS — M54.2 NECK PAIN: ICD-10-CM

## 2025-04-16 DIAGNOSIS — Z79.891 LONG-TERM CURRENT USE OF OPIATE ANALGESIC: ICD-10-CM

## 2025-04-16 DIAGNOSIS — G89.4 CHRONIC PAIN SYNDROME: ICD-10-CM

## 2025-04-16 DIAGNOSIS — M50.120 CERVICAL DISC DISORDER WITH RADICULOPATHY OF MID-CERVICAL REGION: ICD-10-CM

## 2025-04-16 PROCEDURE — G2211 COMPLEX E/M VISIT ADD ON: HCPCS | Performed by: STUDENT IN AN ORGANIZED HEALTH CARE EDUCATION/TRAINING PROGRAM

## 2025-04-16 PROCEDURE — 99214 OFFICE O/P EST MOD 30 MIN: CPT | Performed by: STUDENT IN AN ORGANIZED HEALTH CARE EDUCATION/TRAINING PROGRAM

## 2025-04-16 RX ORDER — OXYCODONE AND ACETAMINOPHEN 5; 325 MG/1; MG/1
1 TABLET ORAL 2 TIMES DAILY PRN
Qty: 60 TABLET | Refills: 0 | Status: SHIPPED | OUTPATIENT
Start: 2025-05-16 | End: 2025-06-15

## 2025-04-16 RX ORDER — OXYCODONE AND ACETAMINOPHEN 5; 325 MG/1; MG/1
1 TABLET ORAL 2 TIMES DAILY PRN
Qty: 60 TABLET | Refills: 0 | Status: SHIPPED | OUTPATIENT
Start: 2025-04-16 | End: 2025-05-16

## 2025-04-16 NOTE — PROGRESS NOTES
Pain Medicine Follow-Up Note    Assessment:  1. Spinal stenosis of lumbar region with neurogenic claudication    2. Long-term current use of opiate analgesic    3. Lumbar radiculopathy    4. Uncomplicated opioid dependence (HCC)    5. Chronic pain syndrome    6. Neck pain    7. Cervical disc disorder with radiculopathy of mid-cervical region      Patient is a pleasant 59-year-old woman who presents as a follow-up visit after last being seen on 1/16/2025 for trigger point injections.  Prior to this patient seen by PAULINA Cruz on 1/15/2025 for chronic opiate prescription as patient is on Percocet 5-3 25 twice daily as needed.  Patient seems noticing rated pain 6 out of 10 on migraine scale.  Pain is worse in the evening and the pain is constant.  The quality pain is burning, dull ache, sharp, throbbing, numbing, pins-and-needles primarily in her axial neck, mid back and low back without any radicular symptoms.     On further discussion with patient patient states her low back pain with left-sided radicular symptoms has worsened and she is interested in repeat epidural steroid injection.  Patient last underwent L5-S1 lumbar dural steroid injection which provided her with 50% improvement in pain and function for over 5 months.  Given this we will schedule patient for repeat injection.  For symptomatic relief we will continue with Percocet 5-3 25 twice daily as needed along with gabapentin.  UDS obtained in the office today.  Will follow-up in 2 months for further medication management.  Plan:  Schedule for repeat L5-S1 LESI  Continue Percocet 5-325 mg BID prn; 60 tablets; 2 months  UDS in office today  Continue gabapentin 800 mg TID  Orders Placed This Encounter   Procedures   • MM ALL_Prescribed Meds and Special Instructions     [place] Is ALBUTEROL prescribed?:   Yes     [place] Is CYCLOBENZAPRINE Prescribed?:   Yes     [place] Is FLUOXETINE Prescribed?:   Yes     [place] Is GABAPENTIN  prescribed?:   Yes     [place] Is METFORMIN prescribed?:   Yes     [place] Is METOPROLOL prescribed?:   Yes     [place] Is OXYCODONE/APAP prescribed?:   Yes     [place] Is TRAZODONE prescribed?:   Yes   • MM DT_Alprazolam Definitive Test   • MM DT_Amphetamine Definitive Test   • MM DT_Buprenorphine Definitive Test   • MM DT_Citalopram/Escitalopram Definitive Test   • MM DT_Clonazepam Definitive Test   • MM DT_Cocaine Definitive Test   • MM DT_Codeine Definitive Test   • MM Diazepam Definitive Test   • MM DT_Ethyl Glucuronide/Ethyl Sulfate Definitive Test   • MM DT_Fentanyl Definitive Test   • MM DT_Heroin Definitive Test   • MM DT_Hydrocodone Definitive Test   • MM DT_Hydromorphone Definitive Test   • MM Lorazepam Definitive Test   • MM DT_MDMA Definitive Test   • MM DT_Methadone Definitive Test   • MM DT_Methamphetamine Definitive Test   • MM DT_Methylphenidate Definitive Test   • MM DT_Morphine Definitive Test   • MM DT_Oxazepam Definitive Test   • MM DT_Oxycodone Definitive Test   • MM DT_Oxymorphone Definitive Test   • MM DT_Phentermine Definitive Test   • MM DT_Pregablin Definitive   • MM DT_Tapentadol Definitive Test   • MM DT_Temazapam Definitive Test   • MM DT_THC Definitive Test   • MM DT_Tramadol Definitive Test   • MM DT_Validity Creatinine   • MM DT_Validity Oxidant   • MM DT_Validity pH   • MM DT_Validity Specific       New Medications Ordered This Visit   Medications   • oxyCODONE-acetaminophen (PERCOCET) 5-325 mg per tablet     Sig: Take 1 tablet by mouth 2 (two) times a day as needed for severe pain Max Daily Amount: 2 tablets     Dispense:  60 tablet     Refill:  0   • oxyCODONE-acetaminophen (PERCOCET) 5-325 mg per tablet     Sig: Take 1 tablet by mouth 2 (two) times a day as needed for severe pain Max Daily Amount: 2 tablets Do not start before May 16, 2025.     Dispense:  60 tablet     Refill:  0       My impressions and treatment recommendations were discussed in detail with the  patient who verbalized understanding and had no further questions.        [place] Prescription Drug Monitoring Program report was reviewed and was appropriate  and [place] Prescription Drug Monitoring Program report was reviewed and was appropriate     A urine drug screen was collected at today's office visit as part of our medication management protocol. The point of care testing results were appropriate for what was being prescribed. The specimen will be sent for confirmatory testing. The drug screen is medically necessary because the patient is either dependent on opioid medication or is being considered for opioid medication therapy and the results could impact ongoing or future treatment. The drug screen is to evaluate for the presences or absence of prescribed, non-prescribed, and/or illicit drugs/substances.    There are risks associated with opioid medications, including dependence, addiction and tolerance. The patient understands and agrees to use these medications only as prescribed. Potential side effects of the medications include, but are not limited to, constipation, drowsiness, addiction, impaired judgment and risk of fatal overdose if not taken as prescribed. The patient was warned against driving while taking sedation medications.  Sharing medications is a felony. At this point in time, the patient is showing no signs of addiction, abuse, diversion or suicidal ideation.    Complete risks and benefits including bleeding, infection, tissue reaction, nerve injury and allergic reaction were discussed. The approach was demonstrated using models and literature was provided. Verbal and written consent was obtained.    Follow-up is planned in two months time or sooner as warranted.  Discharge instructions were provided. I personally saw and examined the patient and I agree with the above discussed plan of care.    History of Present Illness:    Ольга Manning is a 59 y.o. female who presents to  [facility] for interval re-evaluation of the above stated pain complaints. The patient has a past medical and chronic pain history as outlined in the assessment section. She was last seen on 1/16/2025.    Patient is a pleasant 59-year-old woman who presents as a follow-up visit after last being seen on 1/16/2025 for trigger point injections.  Prior to this patient seen by PAULINA Cruz on 1/15/2025 for chronic opiate prescription as patient is on Percocet 5-3 25 twice daily as needed.  Patient seems noticing rated pain 6 out of 10 on migraine scale.  Pain is worse in the evening and the pain is constant.  The quality pain is burning, dull ache, sharp, throbbing, numbing, pins-and-needles primarily in her axial neck, mid back and low back without any radicular symptoms.    Pain Contract Signed:  ACtive  Last Urine Drug Screen:  4/16/2025    Other than as stated above, the patient denies any interval changes in medications, medical condition, mental condition, symptoms, or allergies since the last office visit.         Review of Systems:    Review of Systems   Constitutional:  Negative for unexpected weight change.   HENT:  Negative for ear pain.    Eyes:  Negative for visual disturbance.   Respiratory:  Negative for shortness of breath and wheezing.    Gastrointestinal:  Negative for abdominal pain.   Musculoskeletal:  Positive for back pain, gait problem, neck pain and neck stiffness.        Decreased  muscle pain and joint    Neurological:  Positive for dizziness, weakness, numbness and headaches.   Psychiatric/Behavioral:  Positive for decreased concentration, dysphoric mood and sleep disturbance. The patient is nervous/anxious.          Past Medical History:   Diagnosis Date   • Anxiety    • Asthma     exercise induced asthma   • Cervical disc disorder    • Chronic pain    • Chronic pain disorder    • Chronic pain syndrome 01/21/2016   • Depression    • Dysuria    • Fibromyalgia,  primary Est 2016   • GERD (gastroesophageal reflux disease) 2004   • Headache(784.0) As a child   • Low back pain    • Lung abnormality     nodules   • Lung nodule    • Neck pain    • Peripheral neuropathy    • Pituitary abnormality (HCC)     tumor   • Right hip pain 10/29/2019   • Thrombocytopenia (HCC)        Past Surgical History:   Procedure Laterality Date   • APPENDECTOMY     • BREAST BIOPSY Right 1995   • CHOLECYSTECTOMY     • COLECTOMY     • COLON SURGERY     • EPIDURAL BLOCK INJECTION N/A 6/23/2016    Procedure: BLOCK / INJECTION EPIDURAL STEROID CERVICAL  C7-T1;  Surgeon: Brian Cash MD;  Location: [facility] MAIN OR;  Service:    • EPIDURAL BLOCK INJECTION N/A 3/31/2016    Procedure: BLOCK / INJECTION EPIDURAL STEROID CERVICAL C7-T1  (C-ARM);  Surgeon: Brian Cash MD;  Location: [facility] MAIN OR;  Service:    • EPIDURAL BLOCK INJECTION N/A 8/8/2018    Procedure: C6 C7 Cervical Epidural Steroid Injection (83986);  Surgeon: Brian Cash MD;  Location: [facility] MAIN OR;  Service: Pain Management    • EPIDURAL BLOCK INJECTION N/A 12/16/2021    Procedure: T4 T5 thoracic epidural steroid injection (79391);  Surgeon: Brian Cash MD;  Location: [facility] MAIN OR;  Service: Pain Management    • EPIDURAL BLOCK INJECTION N/A 6/10/2022    Procedure: T4 T5 THORACIC EPIDURAL STEROID INJECTION ([id number]);  Surgeon: Brian Cash MD;  Location: [facility] MAIN OR;  Service: Pain Management    • HYSTERECTOMY  1996   • OOPHORECTOMY Bilateral 1996   • PITUITARY SURGERY     • GA ARTHROCENTESIS ASPIR&/INJ MAJOR JT/BURSA W/O US Right 11/8/2019    Procedure: Trochanteric Bursa Injection ([id number]);  Surgeon: Brian Cash MD;  Location: [facility] MAIN OR;  Service: Pain Management    • GA ARTHROCENTESIS ASPIR&/INJ MAJOR JT/BURSA W/O US Right 1/23/2020    Procedure: Trochanteric Bursa Injection (20610);  Surgeon: Brian Cash MD;  Location: [facility] MAIN OR;  Service: Pain Management    • GA NJX DX/THER SBST EPIDURAL/SUBRACH CERV/THORACIC  N/A 1/21/2016    Procedure: BLOCK / INJECTION EPIDURAL STEROID CERVICAL C7-T1 (C-ARM);  Surgeon: Brian Cash MD;  Location: [facility] MAIN OR;  Service: Pain Management    • OH NJX DX/THER SBST INTRLMNR LMBR/SAC W/IMG GDN N/A 11/22/2024    Procedure: BLOCK / INJECTION EPIDURAL STEROID LUMBAR  L5-S1;  Surgeon: Sumeet Baker MD;  Location: [facility] MAIN OR;  Service: Pain Management    • REDUCTION MAMMAPLASTY Bilateral 2000       Family History   Problem Relation Age of Onset   • Thyroid disease Mother    • Hyperlipidemia Mother    • Depression Mother    • Stroke Mother         TIA   • Thyroid disease Father    • Hyperlipidemia Father    • Depression Father    • Arthritis Father         Spine and knees   • Endometrial cancer Sister    • Obesity Sister    • Migraines Sister    • No Known Problems Daughter    • Cancer Maternal Grandmother         Kidney cancer   • Melanoma Maternal Grandmother    • Heart disease Maternal Grandfather    • Breast cancer Paternal Grandmother 40   • Cancer Paternal Grandmother         Breast, Lung, Liver and skin cancer   • Diabetes Paternal Grandfather    • Obesity Brother    • No Known Problems Son    • No Known Problems Maternal Uncle    • Obesity Paternal Aunt    • Colon cancer Paternal Aunt    • Obesity Paternal Aunt    • Diabetes Paternal Aunt    • Cancer Paternal Aunt         Thyroid and colon cancer   • No Known Problems Paternal Uncle    • ADD / ADHD Neg Hx    • Anesthesia problems Neg Hx    • Cancer Neg Hx    • Clotting disorder Neg Hx    • Collagen disease Neg Hx    • Dislocations Neg Hx    • Learning disabilities Neg Hx    • Neurological problems Neg Hx    • Osteoporosis Neg Hx    • Rheumatologic disease Neg Hx    • Scoliosis Neg Hx    • Vascular Disease Neg Hx        Social History     Occupational History   • Not on file   Tobacco Use   • Smoking status: Never     Passive exposure: Current   • Smokeless tobacco: Never   Vaping Use   • Vaping status: Never Used   Substance and  Sexual Activity   • Alcohol use: No   • Drug use: No   • Sexual activity: Not Currently     Partners: Male     Birth control/protection: Post-menopausal         Current Outpatient Medications:   •  albuterol (ProAir HFA) 90 mcg/act inhaler, Inhale 2 puffs every 6 (six) hours as needed for wheezing, Disp: 8.5 g, Rfl: 0  •  aspirin 81 MG tablet, Take 81 mg by mouth daily, Disp: , Rfl:   •  cholecalciferol (VITAMIN D3) 1,000 units tablet, Take 2 tablets by mouth daily, Disp: , Rfl:   •  cyclobenzaprine (FLEXERIL) 10 mg tablet, Take 1 tablet (10 mg total) by mouth 3 (three) times a day as needed for muscle spasms, Disp: 270 tablet, Rfl: 1  •  Diclofenac Sodium (VOLTAREN) 1 %, Apply 2 g topically 4 (four) times a day as needed (pain), Disp: 100 g, Rfl: 3  •  FLUoxetine (PROzac) 20 mg capsule, Take 3 capsules by mouth in the morning, Disp: , Rfl:   •  FLUoxetine (PROzac) 40 MG capsule, Take 40 mg by mouth daily, Disp: , Rfl:   •  gabapentin (NEURONTIN) 800 mg tablet, Take 1 tablet (800 mg total) by mouth 3 (three) times a day, Disp: 270 tablet, Rfl: 1  •  ibuprofen (MOTRIN) 600 mg tablet, Take 1 tablet (600 mg total) by mouth every 6 (six) hours as needed for mild pain, Disp: 30 tablet, Rfl: 0  •  levothyroxine 50 mcg tablet, Take 1 tablet (50 mcg) Monday-Saturday. Take 2 tablets (100 mcg) daily., Disp: 102 tablet, Rfl: 1  •  metFORMIN (GLUCOPHAGE) 1000 MG tablet, Take 1,000 mg by mouth 2 (two) times a day with meals, Disp: , Rfl:   •  metoprolol succinate (TOPROL-XL) 50 mg 24 hr tablet, Take 50 mg by mouth daily, Disp: , Rfl:   •  montelukast (SINGULAIR) 10 mg tablet, take 1 tablet by mouth every day at night, Disp: , Rfl:   •  ondansetron (Zofran ODT) 4 mg disintegrating tablet, Take 1 tablet (4 mg total) by mouth every 6 (six) hours as needed for nausea or vomiting, Disp: 20 tablet, Rfl: 1  •  ondansetron (ZOFRAN-ODT) 4 mg disintegrating tablet, Take 1 tablet (4 mg total) by mouth every 6 (six) hours as needed for  nausea or vomiting for up to 10 doses, Disp: 10 tablet, Rfl: 0  •  oxyCODONE-acetaminophen (PERCOCET) 5-325 mg per tablet, Take 1 tablet by mouth 2 (two) times a day as needed for severe pain Max Daily Amount: 2 tablets, Disp: 60 tablet, Rfl: 0  •  [START ON 5/16/2025] oxyCODONE-acetaminophen (PERCOCET) 5-325 mg per tablet, Take 1 tablet by mouth 2 (two) times a day as needed for severe pain Max Daily Amount: 2 tablets Do not start before May 16, 2025., Disp: 60 tablet, Rfl: 0  •  Probiotic Product (PROBIOTIC BLEND PO), Take 1 tablet by mouth every morning, Disp: , Rfl:   •  semaglutide, 2 mg/dose, (Ozempic, 2 MG/DOSE,) 8 mg/ mL injection pen, Inject 0.75 mL (2 mg total) under the skin every 7 days, Disp: 9 mL, Rfl: 3  •  SUMAtriptan (IMITREX) 50 mg tablet, Take 50 mg by mouth, Disp: , Rfl:   •  traZODone (DESYREL) 50 mg tablet, , Disp: , Rfl: 0  •  naloxone (NARCAN) 4 mg/0.1 mL nasal spray, Administer 1 spray into a nostril. If no response after 2-3 minutes, give another dose in the other nostril using a new spray., Disp: 1 each, Rfl: 1  •  oxyCODONE-acetaminophen (PERCOCET) 5-325 mg per tablet, Take 1 tablet by mouth 2 (two) times a day as needed for severe pain Max Daily Amount: 2 tablets Do not start before February 18, 2025., Disp: 60 tablet, Rfl: 0  •  oxyCODONE-acetaminophen (PERCOCET) 5-325 mg per tablet, Take 1 tablet by mouth 2 (two) times a day as needed for severe pain Max Daily Amount: 2 tablets Do not start before January 19, 2025., Disp: 60 tablet, Rfl: 0    Allergies   Allergen Reactions   • Levaquin [Levofloxacin In D5w] Shortness Of Breath     Also hives     • Clarithromycin Hives and Rash   • Lubiprostone Hives and Other (See Comments)     Reaction Date: 10Aug2011;     Migraines and vomiting    Reaction Date: 10Aug2011;  Migraines and vomiting   • Cephradine    • Erythromycin Hives     Reaction Date: 10Aug2011;    • Macrobid [Nitrofurantoin Monohyd Macro] Other (See Comments)     C/o passing out    • Morphine    • Penicillins Hives     Reaction Date: 10Aug2011;    • Sulfa Antibiotics    • Tetracycline    • Tetracyclines & Related Hives   • Levofloxacin Rash   • Morphine And Codeine Rash   • Valium [Diazepam] Rash and Vomiting     Reaction Date: 14Jun2012;        Physical Exam:    Wt 68 kg (150 lb)   BMI 26.57 kg/m²     Constitutional:normal, well developed, well nourished, alert, in no distress and non-toxic and no overt pain behavior.  Eyes:anicteric  HEENT:grossly intact  Neck:supple, symmetric, trachea midline and no masses   Pulmonary:even and unlabored  Cardiovascular:No edema or pitting edema present  Skin:Normal without rashes or lesions and well hydrated  Psychiatric:Mood and affect appropriate  Neurologic:Cranial Nerves II-XII grossly intact  Musculoskeletal:antalgic gait. +Modified SLR      Imaging  Study Result    Narrative & Impression   MRI LUMBAR SPINE WITHOUT CONTRAST     INDICATION: M54.42: Lumbago with sciatica, left side  G89.29: Other chronic pain  M54.16: Radiculopathy, lumbar region.     COMPARISON:  X-rays dated 4/28/2009     TECHNIQUE:  Multiplanar, multisequence imaging of the lumbar spine was performed. .          IMAGE QUALITY:  Diagnostic     FINDINGS:     VERTEBRAL BODIES:  There are 5 lumbar type vertebral bodies.  Normal alignment of the lumbar spine.  No spondylolysis or spondylolisthesis. No scoliosis.  No compression fracture.    There is a hemangioma within the T12 vertebral body.     SACRUM:  Normal signal within the sacrum. No evidence of insufficiency or stress fracture.     DISTAL CORD AND CONUS:  Normal size and signal within the distal cord and conus.     PARASPINAL SOFT TISSUES:  Paraspinal soft tissues are unremarkable.     LOWER THORACIC DISC SPACES:  Normal disc height and signal.  No disc herniation, canal stenosis or foraminal narrowing.     LUMBAR DISC SPACES:     L1-L2:  Normal.     L2-L3:  Normal.     L3-L4:  There is a small focal left  foraminal/extraforaminal disc herniation.  Mild left foraminal narrowing without clear nerve impingement.     L4-L5:  Tiny central disc protrusion.  Mild facet arthropathy.  No canal stenosis or foraminal narrowing.     L5-S1:  Normal disc height and signal.  Minor facet degenerative change, left greater than right.  No canal stenosis or foraminal narrowing.     OTHER FINDINGS:  None.     IMPRESSION:     Small disc herniations at L3-4 and L4-5 without canal stenosis or foraminal nerve impingement.     There is mild facet arthropathy present at L4-5 and L5-S1.     No orders to display         Orders Placed This Encounter   Procedures   • MM ALL_Prescribed Meds and Special Instructions   • MM DT_Alprazolam Definitive Test   • MM DT_Amphetamine Definitive Test   • MM DT_Buprenorphine Definitive Test   • MM DT_Citalopram/Escitalopram Definitive Test   • MM DT_Clonazepam Definitive Test   • MM DT_Cocaine Definitive Test   • MM DT_Codeine Definitive Test   • MM Diazepam Definitive Test   • MM DT_Ethyl Glucuronide/Ethyl Sulfate Definitive Test   • MM DT_Fentanyl Definitive Test   • MM DT_Heroin Definitive Test   • MM DT_Hydrocodone Definitive Test   • MM DT_Hydromorphone Definitive Test   • MM Lorazepam Definitive Test   • MM DT_MDMA Definitive Test   • MM DT_Methadone Definitive Test   • MM DT_Methamphetamine Definitive Test   • MM DT_Methylphenidate Definitive Test   • MM DT_Morphine Definitive Test   • MM DT_Oxazepam Definitive Test   • MM DT_Oxycodone Definitive Test   • MM DT_Oxymorphone Definitive Test   • MM DT_Phentermine Definitive Test   • MM DT_Pregablin Definitive   • MM DT_Tapentadol Definitive Test   • MM DT_Temazapam Definitive Test   • MM DT_THC Definitive Test   • MM DT_Tramadol Definitive Test   • MM DT_Validity Creatinine   • MM DT_Validity Oxidant   • MM DT_Validity pH   • MM DT_Validity Specific

## 2025-04-18 LAB
6MAM UR QL CFM: NEGATIVE NG/ML
7AMINOCLONAZEPAM UR QL CFM: NEGATIVE NG/ML
A-OH ALPRAZ UR QL CFM: NEGATIVE NG/ML
ACCEPTABLE CREAT UR QL: NORMAL MG/DL
ACCEPTIBLE SP GR UR QL: NORMAL
AMPHET UR QL CFM: NEGATIVE NG/ML
AMPHET UR QL CFM: NEGATIVE NG/ML
BUPRENORPHINE UR QL CFM: NEGATIVE NG/ML
BZE UR QL CFM: NEGATIVE NG/ML
CODEINE UR QL CFM: NEGATIVE NG/ML
EDDP UR QL CFM: NEGATIVE NG/ML
ETHYL GLUCURONIDE UR QL CFM: NEGATIVE NG/ML
ETHYL SULFATE UR QL SCN: NEGATIVE NG/ML
FENTANYL UR QL CFM: NEGATIVE NG/ML
HYDROCODONE UR QL CFM: NEGATIVE NG/ML
HYDROCODONE UR QL CFM: NEGATIVE NG/ML
HYDROMORPHONE UR QL CFM: NEGATIVE NG/ML
LORAZEPAM UR QL CFM: NEGATIVE NG/ML
MDMA UR QL CFM: NEGATIVE NG/ML
ME-PHENIDATE UR QL CFM: NEGATIVE NG/ML
METHADONE UR QL CFM: NEGATIVE NG/ML
METHAMPHET UR QL CFM: NEGATIVE NG/ML
MORPHINE UR QL CFM: NEGATIVE NG/ML
MORPHINE UR QL CFM: NEGATIVE NG/ML
NITRITE UR QL: NORMAL UG/ML
NORBUPRENORPHINE UR QL CFM: NEGATIVE NG/ML
NORDIAZEPAM UR QL CFM: NEGATIVE NG/ML
NORFENTANYL UR QL CFM: NEGATIVE NG/ML
NORHYDROCODONE UR QL CFM: NEGATIVE NG/ML
NORHYDROCODONE UR QL CFM: NEGATIVE NG/ML
NOROXYCODONE UR QL CFM: NORMAL NG/ML
OXAZEPAM UR QL CFM: NEGATIVE NG/ML
OXYCODONE UR QL CFM: NORMAL NG/ML
OXYMORPHONE UR QL CFM: NORMAL NG/ML
OXYMORPHONE UR QL CFM: NORMAL NG/ML
PARA-FLUOROFENTANYL QUANTIFICATION: NORMAL NG/ML
RESULT ALL_PRESCRIBED MEDS AND SPECIAL INSTRUCTIONS: NORMAL
SL AMB ACETYL FENTANYL QUANTIFICATION: NORMAL NG/ML
SL AMB ACETYL NORFENTANYL QUANTIFICATION: NORMAL NG/ML
SL AMB ACRYL FENTANYL QUANTIFICATION: NORMAL NG/ML
SL AMB CARFENTANIL QUANTIFICATION: NORMAL NG/ML
SL AMB CITALOPRAM/ESCITALOPRAM QUANTIFICATION: NEGATIVE NG/ML
SL AMB CITALOPRAM/ESCITALOPRAM QUANTIFICATION: NEGATIVE NG/ML
SL AMB CTHC (MARIJUANA METABOLITE) QUANTIFICATION: NEGATIVE NG/ML
SL AMB N-DESMETHYL-TRAMADOL QUANTIFICATION: NEGATIVE NG/ML
SL AMB PHENTERMINE QUANTIFICATION: NEGATIVE NG/ML
SL AMB PREGABALIN QUANTIFICATION: NEGATIVE NG/ML
SL AMB RITALINIC ACID QUANTIFICATION: NEGATIVE NG/ML
SPECIMEN PH ACCEPTABLE UR: NORMAL
TAPENTADOL UR QL CFM: NEGATIVE NG/ML
TEMAZEPAM UR QL CFM: NEGATIVE NG/ML
TEMAZEPAM UR QL CFM: NEGATIVE NG/ML
TRAMADOL UR QL CFM: NEGATIVE NG/ML
URATE/CREAT 24H UR: NEGATIVE NG/ML

## 2025-05-16 ENCOUNTER — APPOINTMENT (OUTPATIENT)
Dept: RADIOLOGY | Facility: HOSPITAL | Age: 60
End: 2025-05-16
Payer: MEDICARE

## 2025-05-16 ENCOUNTER — HOSPITAL ENCOUNTER (OUTPATIENT)
Facility: AMBULARY SURGERY CENTER | Age: 60
Setting detail: OUTPATIENT SURGERY
Discharge: HOME | End: 2025-05-16
Attending: STUDENT IN AN ORGANIZED HEALTH CARE EDUCATION/TRAINING PROGRAM | Admitting: STUDENT IN AN ORGANIZED HEALTH CARE EDUCATION/TRAINING PROGRAM
Payer: MEDICARE

## 2025-05-16 VITALS
TEMPERATURE: 97.4 F | BODY MASS INDEX: 25.34 KG/M2 | HEART RATE: 77 BPM | SYSTOLIC BLOOD PRESSURE: 92 MMHG | RESPIRATION RATE: 18 BRPM | WEIGHT: 143 LBS | HEIGHT: 63 IN | OXYGEN SATURATION: 96 % | DIASTOLIC BLOOD PRESSURE: 54 MMHG

## 2025-05-16 LAB — GLUCOSE SERPL-MCNC: 118 MG/DL (ref 65–140)

## 2025-05-16 PROCEDURE — NC001 PR NO CHARGE: Performed by: STUDENT IN AN ORGANIZED HEALTH CARE EDUCATION/TRAINING PROGRAM

## 2025-05-16 PROCEDURE — 82948 REAGENT STRIP/BLOOD GLUCOSE: CPT

## 2025-05-16 PROCEDURE — 62323 NJX INTERLAMINAR LMBR/SAC: CPT | Performed by: STUDENT IN AN ORGANIZED HEALTH CARE EDUCATION/TRAINING PROGRAM

## 2025-05-16 RX ORDER — METHYLPREDNISOLONE ACETATE 80 MG/ML
INJECTION, SUSPENSION INTRA-ARTICULAR; INTRALESIONAL; INTRAMUSCULAR; SOFT TISSUE AS NEEDED
Status: DISCONTINUED | OUTPATIENT
Start: 2025-05-16 | End: 2025-05-16 | Stop reason: HOSPADM

## 2025-05-16 RX ORDER — BUPIVACAINE HYDROCHLORIDE 2.5 MG/ML
INJECTION, SOLUTION EPIDURAL; INFILTRATION; INTRACAUDAL; PERINEURAL AS NEEDED
Status: DISCONTINUED | OUTPATIENT
Start: 2025-05-16 | End: 2025-05-16 | Stop reason: HOSPADM

## 2025-05-16 RX ORDER — LIDOCAINE HYDROCHLORIDE 10 MG/ML
INJECTION, SOLUTION EPIDURAL; INFILTRATION; INTRACAUDAL; PERINEURAL AS NEEDED
Status: DISCONTINUED | OUTPATIENT
Start: 2025-05-16 | End: 2025-05-16 | Stop reason: HOSPADM

## 2025-05-16 NOTE — OP NOTE
Pre-procedure Diagnosis: Lumbar radiculopathy  Post-procedure Diagnosis: Lumbar radiculopathy  Procedure Title(s):  1.  L5-S1 interlaminar epidural steroid injection      2. Intraoperative fluoroscopy  Attending Surgeon:   Sumeet Baker MD  Fellow: Bryce Fernandez DO    Anesthesia:   Local     Indications: The patient is a 59 y.o. year-old female with a diagnosis of lumbar radiculopathy. The patient's history and physical exam were reviewed.  The risks, benefits and alternatives to the procedure were discussed, and all questions were answered to the patient's satisfaction. The patient agreed to proceed, and written informed consent was obtained.    Procedure in Detail: The patient was brought into the procedure room and placed in the prone position on the fluoroscopy table. The area of the lumbar spine was prepped with chlorhexidine gluconate solution times one and draped in a sterile manner.    The L5-S1 interspace was identified and marked under AP fluoroscopy. The skin and subcutaneous tissues in the area were anesthetized with 1% lidocaine. A 20-gauge Tuohy epidural needle was directed toward the interspace under fluoroscopic guidance until the ligamentum flavum was engaged. From this point, a loss of resistance technique with air was used to identify entrance of the needle into the epidural space. Once an appropriate loss was obtained, negative aspiration was confirmed, and 1 ml Omnipaque 300 contrast solution was injected. An appropriate epidurogram was noted.    Then, after negative aspiration, a solution consisting of 1-mL depo-medrol (80mg/mL) and 1-mL bupivacaine 0.25% and 3-mL preservative-free saline was easily injected. The needle was removed with a 1% lidocaine flush. The patient's back was cleaned and a bandage was placed over the site of needle insertion.    Disposition: The patient tolerated the procedure well, and there were no apparent complications. The patient was taken to the recovery area  where written discharge instructions for the procedure were given.     Estimated Blood Loss: None  Specimens Obtained: N/A    I was present for all critical portions of the case and directly supervised the fellow involved.

## 2025-05-16 NOTE — H&P
History of Present Illness: The patient is a 59 y.o. female who presents with complaints of low back pain.     Past Medical History:   Diagnosis Date    Anxiety     Asthma     exercise induced asthma    Cervical disc disorder     Chronic pain     Chronic pain disorder     Chronic pain syndrome 01/21/2016    Depression     Dysuria     Fibromyalgia, primary Est 2016    GERD (gastroesophageal reflux disease) 2004    Headache(784.0) As a child    Low back pain     Lung abnormality     nodules    Lung nodule     Neck pain     Peripheral neuropathy     Pituitary abnormality (HCC)     tumor    Right hip pain 10/29/2019    Thrombocytopenia (HCC)        Past Surgical History:   Procedure Laterality Date    APPENDECTOMY      BREAST BIOPSY Right 1995    CHOLECYSTECTOMY      COLECTOMY      COLON SURGERY      EPIDURAL BLOCK INJECTION N/A 6/23/2016    Procedure: BLOCK / INJECTION EPIDURAL STEROID CERVICAL  C7-T1;  Surgeon: Brian Cash MD;  Location: [facility] MAIN OR;  Service:     EPIDURAL BLOCK INJECTION N/A 3/31/2016    Procedure: BLOCK / INJECTION EPIDURAL STEROID CERVICAL C7-T1  (C-ARM);  Surgeon: Brian Cash MD;  Location: [facility] MAIN OR;  Service:     EPIDURAL BLOCK INJECTION N/A 8/8/2018    Procedure: C6 C7 Cervical Epidural Steroid Injection (97369);  Surgeon: Brian Cash MD;  Location: [facility] MAIN OR;  Service: Pain Management     EPIDURAL BLOCK INJECTION N/A 12/16/2021    Procedure: T4 T5 thoracic epidural steroid injection (62097);  Surgeon: Brian Cash MD;  Location: [facility] MAIN OR;  Service: Pain Management     EPIDURAL BLOCK INJECTION N/A 6/10/2022    Procedure: T4 T5 THORACIC EPIDURAL STEROID INJECTION ([id number]);  Surgeon: Brian Cash MD;  Location: [facility] MAIN OR;  Service: Pain Management     HYSTERECTOMY  1996    OOPHORECTOMY Bilateral 1996    PITUITARY SURGERY      AL ARTHROCENTESIS ASPIR&/INJ MAJOR JT/BURSA W/O US Right 11/8/2019    Procedure: Trochanteric Bursa Injection (44318);  Surgeon: Brian  MD Shreya;  Location: [facility] MAIN OR;  Service: Pain Management     NM ARTHROCENTESIS ASPIR&/INJ MAJOR JT/BURSA W/O US Right 1/23/2020    Procedure: Trochanteric Bursa Injection (20610);  Surgeon: Brian Cash MD;  Location: [facility] MAIN OR;  Service: Pain Management     NM NJX DX/THER SBST EPIDURAL/SUBRACH CERV/THORACIC N/A 1/21/2016    Procedure: BLOCK / INJECTION EPIDURAL STEROID CERVICAL C7-T1 (C-ARM);  Surgeon: Brian Cash MD;  Location: [facility] MAIN OR;  Service: Pain Management     NM NJX DX/THER SBST INTRLMNR LMBR/SAC W/IMG GDN N/A 11/22/2024    Procedure: BLOCK / INJECTION EPIDURAL STEROID LUMBAR  L5-S1;  Surgeon: Sumeet Baker MD;  Location: [facility] MAIN OR;  Service: Pain Management     REDUCTION MAMMAPLASTY Bilateral 2000       Current Medications[1]    Allergies[2]    Physical Exam: There were no vitals filed for this visit.  General: Awake, Alert, Oriented x 3, Mood and affect appropriate  Respiratory: Respirations even and unlabored  Cardiovascular: Peripheral pulses intact; no edema  Musculoskeletal Exam: low back pain.     ASA Score: 3         Assessment: Lumbar radiculopathy    Plan: L5-S1 LESI         [1] No current facility-administered medications for this encounter.  [2]   Allergies  Allergen Reactions    Levaquin [Levofloxacin In D5w] Shortness Of Breath     Also hives      Clarithromycin Hives and Rash    Lubiprostone Hives and Other (See Comments)     Reaction Date: 10Aug2011;     Migraines and vomiting    Reaction Date: 10Aug2011;  Migraines and vomiting    Cephradine     Erythromycin Hives     Reaction Date: 10Aug2011;     Macrobid [Nitrofurantoin Monohyd Macro] Other (See Comments)     C/o passing out    Morphine     Penicillins Hives     Reaction Date: 10Aug2011;     Sulfa Antibiotics     Tetracycline     Tetracyclines & Related Hives    Levofloxacin Rash    Morphine And Codeine Rash    Valium [Diazepam] Rash and Vomiting     Reaction Date: 14Jun2012;

## 2025-05-30 ENCOUNTER — TELEPHONE (OUTPATIENT)
Dept: PAIN MEDICINE | Facility: MEDICAL CENTER | Age: 60
End: 2025-05-30

## 2025-06-04 ENCOUNTER — APPOINTMENT (EMERGENCY)
Dept: RADIOLOGY | Facility: HOSPITAL | Age: 60
End: 2025-06-04
Payer: MEDICARE

## 2025-06-04 ENCOUNTER — HOSPITAL ENCOUNTER (EMERGENCY)
Facility: HOSPITAL | Age: 60
Discharge: HOME | End: 2025-06-04
Attending: EMERGENCY MEDICINE
Payer: MEDICARE

## 2025-06-04 ENCOUNTER — APPOINTMENT (EMERGENCY)
Dept: CT IMAGING | Facility: HOSPITAL | Age: 60
End: 2025-06-04
Payer: MEDICARE

## 2025-06-04 VITALS
RESPIRATION RATE: 18 BRPM | HEART RATE: 80 BPM | WEIGHT: 146.39 LBS | DIASTOLIC BLOOD PRESSURE: 56 MMHG | TEMPERATURE: 98.7 F | BODY MASS INDEX: 25.93 KG/M2 | SYSTOLIC BLOOD PRESSURE: 93 MMHG | OXYGEN SATURATION: 99 %

## 2025-06-04 DIAGNOSIS — S80.212A ABRASION OF LEFT KNEE, INITIAL ENCOUNTER: ICD-10-CM

## 2025-06-04 DIAGNOSIS — S01.81XA FACIAL LACERATION, INITIAL ENCOUNTER: ICD-10-CM
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
## 2025-06-04 DIAGNOSIS — S40.011A CONTUSION OF RIGHT SHOULDER, INITIAL ENCOUNTER: ICD-10-CM

## 2025-06-04 DIAGNOSIS — S16.1XXA ACUTE STRAIN OF NECK MUSCLE, INITIAL ENCOUNTER: ICD-10-CM

## 2025-06-04 DIAGNOSIS — G89.4 CHRONIC PAIN SYNDROME: ICD-10-CM

## 2025-06-04 DIAGNOSIS — S09.90XA INJURY OF HEAD, INITIAL ENCOUNTER: ICD-10-CM

## 2025-06-04 LAB
ABO GROUP BLD: NORMAL
ALBUMIN SERPL BCG-MCNC: 4.4 G/DL (ref 3.5–5)
ALP SERPL-CCNC: 71 U/L (ref 34–104)
ALT SERPL W P-5'-P-CCNC: 10 U/L (ref 7–52)
ANION GAP SERPL CALCULATED.3IONS-SCNC: 11 MMOL/L (ref 4–13)
AST SERPL W P-5'-P-CCNC: 15 U/L (ref 13–39)
BASOPHILS # BLD AUTO: 0.04 THOUSANDS/ÂΜL (ref 0–0.1)
BASOPHILS NFR BLD AUTO: 0 % (ref 0–1)
BILIRUB SERPL-MCNC: 0.51 MG/DL (ref 0.2–1)
BLD GP AB SCN SERPL QL: NEGATIVE
BUN SERPL-MCNC: 18 MG/DL (ref 5–25)
CALCIUM SERPL-MCNC: 9.5 MG/DL (ref 8.4–10.2)
CARDIAC TROPONIN I PNL SERPL HS: <2 NG/L (ref ?–50)
CHLORIDE SERPL-SCNC: 103 MMOL/L (ref 96–108)
CO2 SERPL-SCNC: 24 MMOL/L (ref 21–32)
CREAT SERPL-MCNC: 0.94 MG/DL (ref 0.6–1.3)
EOSINOPHIL # BLD AUTO: 0.06 THOUSAND/ÂΜL (ref 0–0.61)
EOSINOPHIL NFR BLD AUTO: 1 % (ref 0–6)
ERYTHROCYTE [DISTWIDTH] IN BLOOD BY AUTOMATED COUNT: 12.2 % (ref 11.6–15.1)
GFR SERPL CREATININE-BSD FRML MDRD: 67 ML/MIN/1.73SQ M
GLUCOSE SERPL-MCNC: 80 MG/DL (ref 65–140)
HCT VFR BLD AUTO: 41.8 % (ref 34.8–46.1)
HGB BLD-MCNC: 14 G/DL (ref 11.5–15.4)
IMM GRANULOCYTES # BLD AUTO: 0.04 THOUSAND/UL (ref 0–0.2)
IMM GRANULOCYTES NFR BLD AUTO: 0 % (ref 0–2)
LYMPHOCYTES # BLD AUTO: 2.06 THOUSANDS/ÂΜL (ref 0.6–4.47)
LYMPHOCYTES NFR BLD AUTO: 23 % (ref 14–44)
MCH RBC QN AUTO: 30.4 PG (ref 26.8–34.3)
MCHC RBC AUTO-ENTMCNC: 33.5 G/DL (ref 31.4–37.4)
MCV RBC AUTO: 91 FL (ref 82–98)
MONOCYTES # BLD AUTO: 0.75 THOUSAND/ÂΜL (ref 0.17–1.22)
MONOCYTES NFR BLD AUTO: 8 % (ref 4–12)
NEUTROPHILS # BLD AUTO: 5.96 THOUSANDS/ÂΜL (ref 1.85–7.62)
NEUTS SEG NFR BLD AUTO: 68 % (ref 43–75)
NRBC BLD AUTO-RTO: 0 /100 WBCS
PLATELET # BLD AUTO: 474 THOUSANDS/UL (ref 149–390)
PMV BLD AUTO: 10.1 FL (ref 8.9–12.7)
POTASSIUM SERPL-SCNC: 4.2 MMOL/L (ref 3.5–5.3)
PROT SERPL-MCNC: 7.2 G/DL (ref 6.4–8.4)
RBC # BLD AUTO: 4.61 MILLION/UL (ref 3.81–5.12)
RH BLD: POSITIVE
SODIUM SERPL-SCNC: 138 MMOL/L (ref 135–147)
SPECIMEN EXPIRATION DATE: NORMAL
WBC # BLD AUTO: 8.91 THOUSAND/UL (ref 4.31–10.16)

## 2025-06-04 PROCEDURE — 72125 CT NECK SPINE W/O DYE: CPT

## 2025-06-04 PROCEDURE — 99285 EMERGENCY DEPT VISIT HI MDM: CPT | Performed by: EMERGENCY MEDICINE

## 2025-06-04 PROCEDURE — 99285 EMERGENCY DEPT VISIT HI MDM: CPT

## 2025-06-04 PROCEDURE — 93005 ELECTROCARDIOGRAM TRACING: CPT

## 2025-06-04 PROCEDURE — 84484 ASSAY OF TROPONIN QUANT: CPT

## 2025-06-04 PROCEDURE — 70450 CT HEAD/BRAIN W/O DYE: CPT

## 2025-06-04 PROCEDURE — 86901 BLOOD TYPING SEROLOGIC RH(D): CPT

## 2025-06-04 PROCEDURE — 12011 RPR F/E/E/N/L/M 2.5 CM/<: CPT | Performed by: EMERGENCY MEDICINE

## 2025-06-04 PROCEDURE — 85025 COMPLETE CBC W/AUTO DIFF WBC: CPT

## 2025-06-04 PROCEDURE — 74177 CT ABD & PELVIS W/CONTRAST: CPT

## 2025-06-04 PROCEDURE — 96367 TX/PROPH/DG ADDL SEQ IV INF: CPT

## 2025-06-04 PROCEDURE — 86900 BLOOD TYPING SEROLOGIC ABO: CPT

## 2025-06-04 PROCEDURE — 36415 COLL VENOUS BLD VENIPUNCTURE: CPT

## 2025-06-04 PROCEDURE — 73030 X-RAY EXAM OF SHOULDER: CPT

## 2025-06-04 PROCEDURE — 96365 THER/PROPH/DIAG IV INF INIT: CPT

## 2025-06-04 PROCEDURE — 71260 CT THORAX DX C+: CPT

## 2025-06-04 PROCEDURE — 86850 RBC ANTIBODY SCREEN: CPT

## 2025-06-04 PROCEDURE — 80053 COMPREHEN METABOLIC PANEL: CPT

## 2025-06-04 PROCEDURE — 96375 TX/PRO/DX INJ NEW DRUG ADDON: CPT

## 2025-06-04 RX ORDER — LIDOCAINE HYDROCHLORIDE AND EPINEPHRINE 20; 5 MG/ML; UG/ML
20 INJECTION, SOLUTION EPIDURAL; INFILTRATION; INTRACAUDAL; PERINEURAL ONCE
Status: COMPLETED | OUTPATIENT
Start: 2025-06-04 | End: 2025-06-04

## 2025-06-04 RX ORDER — FENTANYL CITRATE 50 UG/ML
30 INJECTION, SOLUTION INTRAMUSCULAR; INTRAVENOUS ONCE
Refills: 0 | Status: COMPLETED | OUTPATIENT
Start: 2025-06-04 | End: 2025-06-04

## 2025-06-04 RX ORDER — ACETAMINOPHEN 10 MG/ML
1000 INJECTION, SOLUTION INTRAVENOUS ONCE
Status: COMPLETED | OUTPATIENT
Start: 2025-06-04 | End: 2025-06-04

## 2025-06-04 RX ORDER — OXYCODONE HYDROCHLORIDE 5 MG/1
5 TABLET ORAL EVERY 6 HOURS PRN
Qty: 8 TABLET | Refills: 0 | Status: SHIPPED | OUTPATIENT
Start: 2025-06-04 | End: 2025-06-12

## 2025-06-04 RX ADMIN — ACETAMINOPHEN 1000 MG: 10 INJECTION INTRAVENOUS at 14:58

## 2025-06-04 RX ADMIN — FENTANYL CITRATE 30 MCG: 50 INJECTION INTRAMUSCULAR; INTRAVENOUS at 14:58

## 2025-06-04 RX ADMIN — SODIUM CHLORIDE, SODIUM LACTATE, POTASSIUM CHLORIDE, AND CALCIUM CHLORIDE 500 ML: .6; .31; .03; .02 INJECTION, SOLUTION INTRAVENOUS at 13:59

## 2025-06-04 RX ADMIN — LIDOCAINE HYDROCHLORIDE,EPINEPHRINE BITARTRATE 20 ML: 20; .005 INJECTION, SOLUTION EPIDURAL; INFILTRATION; INTRACAUDAL; PERINEURAL at 14:58

## 2025-06-04 RX ADMIN — IOHEXOL 100 ML: 350 INJECTION, SOLUTION INTRAVENOUS at 14:20

## 2025-06-04 NOTE — DISCHARGE INSTRUCTIONS
Please follow-up with your primary care provider in regards to this emergency department visit in 1 week.    Please take the oxycodone over the next 1 to 2 days for severe breakthrough pain from your normal pain regimen.  Do not take the Percocet in addition to acetaminophen.    Please consider warm water soaks and ice over your areas of bruising.

## 2025-06-05 NOTE — TELEPHONE ENCOUNTER
Caller: Ольга    Doctor: Dr. Baker     Reason for call: pt stated she had a fall and was given more oxycodone. She wants to make you aware.     Call back#: [phone]

## 2025-06-05 NOTE — TELEPHONE ENCOUNTER
Caller: Ольга   Doctor/office: Dr Baker  CB#: [phone]    % of improvement:50 %    Pain Scale (1-10): 0/10

## 2025-06-06 NOTE — ED PROVIDER NOTES
Emergency Department Trauma Note  Ольга Manning 59 y.o. female MRN: [id number]  Unit/Bed#: ED-27/ED-27 Encounter: [id number]      Trauma Alert: Trauma Acuity: C  Model of Arrival: Mode of Arrival: Direct from scene via    Trauma Team: Current Providers  Attending Provider: David Kim MD  Attending Provider: Katelin Beavers MD  Attending Provider: David Kim MD  ED Technician: Chacha Eduardo  Registered Nurse: Anna Friedman RN  Resident: Jose Alberto Kim DO  Consultants:     None      History of Present Illness     Chief Complaint:   Chief Complaint   Patient presents with    Fall     Mechanical fall down six steps today hitting head. Laceration above left eye. C/o right eye and nose pain, neck pain, and mid sternal pain. Denies LOC. + Asa. No other thinners     HPI:  Ольга Manning is a 59 y.o. female who presents with syncopal fall from 5 steps impacting her head with possible LOC.  Mechanism:Details of Incident: hx of POTS, looked down, felt dizzy, fell down approx 8 steps. 3cm laceration to left eyebrow. GCS 15 Injury Date: 06/04/25 Injury Time: 1200      HPI  The patient reports that she often has posturally induced dizziness due to POTS and was climbing up her stairs when she passed out impacting the ground.  She states that she regained, just noticed after the initial impact and recalls rolling down the stairs and lying at the bottom of her stairs.  She has complaints of right shoulder and increased back and neck pain.  She also has a laceration to the left upper orbit.    Review of Systems   Constitutional:  Negative for chills and fever.   HENT:  Negative for ear pain and sore throat.    Eyes:  Negative for pain and visual disturbance.   Respiratory:  Negative for cough and shortness of breath.    Cardiovascular:  Negative for chest pain and palpitations.   Gastrointestinal:  Negative for abdominal pain and vomiting.   Genitourinary:  Negative for dysuria and hematuria.    Musculoskeletal:  Positive for arthralgias, back pain and neck pain.   Skin:  Positive for wound. Negative for color change and rash.   Neurological:  Positive for dizziness, syncope and headaches. Negative for seizures.   All other systems reviewed and are negative.      Historical Information     Immunizations:   Immunization History   Administered Date(s) Administered    COVID-19 MODERNA VACC 0.5 ML IM 03/17/2021, 04/14/2021    INFLUENZA 11/19/2013    Tdap 08/28/2018       Past Medical History[1]  Family History[2]  Past Surgical History[3]  Social History[4]  E-Cigarette/Vaping    E-Cigarette Use Never User      E-Cigarette/Vaping Substances    Nicotine No     CBD No     Flavoring No     Other No     Unknown No        Family History: non-contributory    Meds/Allergies   Prior to Admission Medications   Prescriptions Last Dose Informant Patient Reported? Taking?   Diclofenac Sodium (VOLTAREN) 1 %   No No   Sig: Apply 2 g topically 4 (four) times a day as needed (pain)   FLUoxetine (PROzac) 20 mg capsule   Yes No   Sig: Take 3 capsules by mouth in the morning   FLUoxetine (PROzac) 40 MG capsule   Yes No   Sig: Take 40 mg by mouth in the morning.   Probiotic Product (PROBIOTIC BLEND PO)   Yes No   Sig: Take 1 tablet by mouth every morning   SUMAtriptan (IMITREX) 50 mg tablet   Yes No   Sig: Take 50 mg by mouth   albuterol (ProAir HFA) 90 mcg/act inhaler  Self No No   Sig: Inhale 2 puffs every 6 (six) hours as needed for wheezing   aspirin 81 MG tablet  Self Yes No   Sig: Take 81 mg by mouth in the morning.   cholecalciferol (VITAMIN D3) 1,000 units tablet  Self Yes No   Sig: Take 2 tablets by mouth in the morning.   cyclobenzaprine (FLEXERIL) 10 mg tablet   No No   Sig: Take 1 tablet (10 mg total) by mouth 3 (three) times a day as needed for muscle spasms   gabapentin (NEURONTIN) 800 mg tablet   No No   Sig: Take 1 tablet (800 mg total) by mouth 3 (three) times a day   ibuprofen (MOTRIN) 600 mg tablet  Self No No    Sig: Take 1 tablet (600 mg total) by mouth every 6 (six) hours as needed for mild pain   levothyroxine 50 mcg tablet   No No   Sig: Take 1 tablet (50 mcg) Monday-Saturday. Take 2 tablets (100 mcg) daily.   metFORMIN (GLUCOPHAGE) 1000 MG tablet  Self Yes No   Sig: Take 1,000 mg by mouth in the morning and 1,000 mg in the evening. Take with meals.   metoprolol succinate (TOPROL-XL) 50 mg 24 hr tablet   Yes No   Sig: Take 50 mg by mouth in the morning.   montelukast (SINGULAIR) 10 mg tablet   Yes No   naloxone (NARCAN) 4 mg/0.1 mL nasal spray  Self No No   Sig: Administer 1 spray into a nostril. If no response after 2-3 minutes, give another dose in the other nostril using a new spray.   ondansetron (ZOFRAN-ODT) 4 mg disintegrating tablet   No No   Sig: Take 1 tablet (4 mg total) by mouth every 6 (six) hours as needed for nausea or vomiting for up to 10 doses   ondansetron (Zofran ODT) 4 mg disintegrating tablet   No No   Sig: Take 1 tablet (4 mg total) by mouth every 6 (six) hours as needed for nausea or vomiting   oxyCODONE-acetaminophen (PERCOCET) 5-325 mg per tablet   No No   Sig: Take 1 tablet by mouth 2 (two) times a day as needed for severe pain Max Daily Amount: 2 tablets Do not start before February 18, 2025.   oxyCODONE-acetaminophen (PERCOCET) 5-325 mg per tablet   No No   Sig: Take 1 tablet by mouth 2 (two) times a day as needed for severe pain Max Daily Amount: 2 tablets Do not start before January 19, 2025.   oxyCODONE-acetaminophen (PERCOCET) 5-325 mg per tablet   No No   Sig: Take 1 tablet by mouth 2 (two) times a day as needed for severe pain Max Daily Amount: 2 tablets   oxyCODONE-acetaminophen (PERCOCET) 5-325 mg per tablet   No No   Sig: Take 1 tablet by mouth 2 (two) times a day as needed for severe pain Max Daily Amount: 2 tablets Do not start before May 16, 2025.   semaglutide, 2 mg/dose, (Ozempic, 2 MG/DOSE,) 8 mg/ mL injection pen   No No   Sig: Inject 0.75 mL (2 mg total) under the skin  every 7 days   traZODone (DESYREL) 50 mg tablet  Self Yes No      Facility-Administered Medications: None       Allergies[5]    PHYSICAL EXAM    PE limited by: None    Objective   Vitals:   First set: Temperature: 98.7 °F (37.1 °C) (06/04/25 1314)  Pulse: 98 (06/04/25 1314)  Respirations: 18 (06/04/25 1314)  Blood Pressure: 95/56 (06/04/25 1314)  SpO2: 99 % (06/04/25 1314)    Primary Survey:   (A) Airway: Intact  (B) Breathing: equal bilaterally  (C) Circulation: Pulses:   normal  (D) Disabliity:  GCS Total:  15  (E) Expose:  Completed    Secondary Survey: (Click on Physical Exam tab above)  Physical Exam  Vitals and nursing note reviewed.   Constitutional:       General: She is not in acute distress.     Appearance: She is well-developed.   HENT:      Head: Normocephalic.      Comments: 2.5 cm laceration with underlying ecchymosis and swelling to the left upper orbit.  No crepitus or ecchymosis palpated over the scalp.    Eyes:      Conjunctiva/sclera: Conjunctivae normal.       Cardiovascular:      Rate and Rhythm: Normal rate and regular rhythm.      Heart sounds: No murmur heard.  Pulmonary:      Effort: Pulmonary effort is normal. No respiratory distress.      Breath sounds: Normal breath sounds.   Abdominal:      Palpations: Abdomen is soft.      Tenderness: There is no abdominal tenderness.     Musculoskeletal:         General: No swelling.      Cervical back: Neck supple.      Comments: Midline spinal tenderness palpated in the C, T and L-spine.     Skin:     General: Skin is warm and dry.      Capillary Refill: Capillary refill takes less than 2 seconds.      Comments: Abrasion without swelling over the left knee.     Neurological:      General: No focal deficit present.      Mental Status: She is alert and oriented to person, place, and time.     Psychiatric:         Mood and Affect: Mood normal.         Cervical spine cleared by clinical criteria? Yes     Invasive Devices       None                   Lab  Results:   Results Reviewed       Procedure Component Value Units Date/Time    HS Troponin 0hr (reflex protocol) [[id number]]  (Normal) Collected: 06/04/25 1359    Lab Status: Final result Specimen: Blood from Arm, Left Updated: 06/04/25 1436     hs TnI 0hr <2 ng/L     Comprehensive metabolic panel [[id number]] Collected: 06/04/25 1359    Lab Status: Final result Specimen: Blood from Arm, Left Updated: 06/04/25 1435     Sodium 138 mmol/L      Potassium 4.2 mmol/L      Chloride 103 mmol/L      CO2 24 mmol/L      ANION GAP 11 mmol/L      BUN 18 mg/dL      Creatinine 0.94 mg/dL      Glucose 80 mg/dL      Calcium 9.5 mg/dL      AST 15 U/L      ALT 10 U/L      Alkaline Phosphatase 71 U/L      Total Protein 7.2 g/dL      Albumin 4.4 g/dL      Total Bilirubin 0.51 mg/dL      eGFR 67 ml/min/1.73sq m     Narrative:      National Kidney Disease Foundation guidelines for Chronic Kidney Disease (CKD):     Stage 1 with normal or high GFR (GFR > 90 mL/min/1.73 square meters)    Stage 2 Mild CKD (GFR = 60-89 mL/min/1.73 square meters)    Stage 3A Moderate CKD (GFR = 45-59 mL/min/1.73 square meters)    Stage 3B Moderate CKD (GFR = 30-44 mL/min/1.73 square meters)    Stage 4 Severe CKD (GFR = 15-29 mL/min/1.73 square meters)    Stage 5 End Stage CKD (GFR <15 mL/min/1.73 square meters)  Note: GFR calculation is accurate only with a steady state creatinine    CBC and differential [[id number]]  (Abnormal) Collected: 06/04/25 1359    Lab Status: Final result Specimen: Blood from Arm, Left Updated: 06/04/25 1413     WBC 8.91 Thousand/uL      RBC 4.61 Million/uL      Hemoglobin 14.0 g/dL      Hematocrit 41.8 %      MCV 91 fL      MCH 30.4 pg      MCHC 33.5 g/dL      RDW 12.2 %      MPV 10.1 fL      Platelets 474 Thousands/uL      nRBC 0 /100 WBCs      Segmented % 68 %      Immature Grans % 0 %      Lymphocytes % 23 %      Monocytes % 8 %      Eosinophils Relative 1 %      Basophils Relative 0 %      Absolute Neutrophils 5.96 Thousands/µL       Absolute Immature Grans 0.04 Thousand/uL      Absolute Lymphocytes 2.06 Thousands/µL      Absolute Monocytes 0.75 Thousand/µL      Eosinophils Absolute 0.06 Thousand/µL      Basophils Absolute 0.04 Thousands/µL                    Imaging Studies:   Direct to CT: No  XR shoulder 2+ views RIGHT   Final Result by Farhan Mccain MD (06/04 1622)      No acute osseous abnormality.         Computerized Assisted Algorithm (CAA) may have been used to analyze all applicable images.         Workstation performed: [id number]         TRAUMA - CT head wo contrast   Final Result by Jesus Diaz MD (06/04 1428)      1.  No acute intracranial abnormality.   2.  No cervical spine fracture or traumatic malalignment                  Workstation performed: [id number]         TRAUMA - CT spine cervical wo contrast   Final Result by Jesus Diaz MD (06/04 1428)      1.  No acute intracranial abnormality.   2.  No cervical spine fracture or traumatic malalignment                  Workstation performed: [id number]         TRAUMA - CT chest abdomen pelvis w contrast   Final Result by Jesus Diaz MD (06/04 8839)      1.  No evidence of traumatic injury in the chest, abdomen or pelvis.   2.  Slightly increased size of a 6 mm solid pulmonary nodule in the right lower lobe, previously 4 mm. Recommend follow-up chest CT in 6 months.         Workstation performed: [id number]               Procedures    Universal Protocol:  procedure performed by consultantConsent: Verbal consent obtained  Consent given by: patient  Patient understanding: patient states understanding of the procedure being performed  Patient consent: the patient's understanding of the procedure matches consent given  Procedure consent: procedure consent matches procedure scheduled  Relevant documents: relevant documents present and verified  Required items: required blood products, implants, devices, and special equipment available  Patient identity confirmed: verbally with  patient  Laceration repair    Date/Time: 6/6/2025 7:27 PM    Performed by: Jose Alberto Kim DO  Authorized by: Jose Alberto iKm DO  Body area: head/neck  Location details: left eyebrow  Laceration length: 2.5 cm  Foreign bodies: no foreign bodies  Tendon involvement: none  Nerve involvement: none  Vascular damage: no  Anesthesia: local infiltration    Anesthesia:  Local Anesthetic: lidocaine 2% with epinephrine  Anesthetic total: 1.5 mL    Sedation:  Patient sedated: no      Wound Dehiscence:  Superficial Wound Dehiscence: simple closure      Procedure Details:  Preparation: Patient was prepped and draped in the usual sterile fashion.  Debridement: none  Skin closure: Ethilon  Technique: buried suture  Approximation: close  Approximation difficulty: simple  Dressing: 4x4 sterile gauze  Patient tolerance: patient tolerated the procedure well with no immediate complications               ED Course           Medical Decision Making  Amount and/or Complexity of Data Reviewed  Labs: ordered.  Radiology: ordered.    Risk  Prescription drug management.    Ольга Manning is a 59 y.o. female who presents with syncopal fall from 5 steps impacting her head with possible LOC.    Pathology at risk for includes but is not limited to     Tests and Results: CMP WNL.  CBC WNL.  Type and screen A+.  Troponin negative at undetectable levels. EKG shows normal sinus rhythm with no sign of acute ischemic change. No change from previous. No ectopy. Normal Conduction.  Indeterminate QRS axis and normal QRS intervals. Normal T waves in all leads.      Imaging: CT chest abdomen pelvis shows no acute traumatic injury in the chest abdomen or pelvis, but does show slightly increased size of a 6 mm solid pulmonary nodule in the lower right lower lobe.  This incidental finding was reported to the patient. CT head shows no acute intracranial abnormality.  CT C-spine shows no C-spine fracture or traumatic malalignment.    Fentanyl 30 mcg and acetaminophen  1000 mg IV was given for pain with improvement.  2.5 cm laceration was stitched using subcutaneous sutures and bandage was applied.   mL liter bolus was given for IVF.  Patient was ambulated roughly 15 feet with baseline difficulty.  Recommended outpatient follow-up to primary care doctor within 1 week after ED visit.  Oxycodone 4 tablets was given for severe breakthrough pain.  Recommended the patient not take Percocet in addition to the acetaminophen.  Also recommended ibuprofen 400 mg alternating with acetaminophen 1000 mg as needed every 8 hours for pain and anti-inflammation.  Return precautions were given including nausea, unremitting headache or for any other concern.  Patient stable for discharge.        Disposition  [facility] Transfer: No  Final diagnoses:   Fall, initial encounter   Injury of head, initial encounter   Facial laceration, initial encounter   Acute strain of neck muscle, initial encounter   Chronic pain syndrome   Abrasion of left knee, initial encounter   Contusion of right shoulder, initial encounter     Time reflects when diagnosis was documented in both MDM as applicable and the Disposition within this note       Time User Action Codes Description Comment    6/4/2025  3:54 PM David Kim [W19.XXXA] Fall, initial encounter     6/4/2025  3:55 PM David Kim [S09.90XA] Injury of head, initial encounter     6/4/2025  3:55 PM David Kim [S01.81XA] Facial laceration, initial encounter     6/4/2025  3:55 PM aDvid Kim [S16.1XXA] Acute strain of neck muscle, initial encounter     6/4/2025  3:55 PM David Kim [G89.4] Chronic pain syndrome     6/4/2025  3:55 PM David Kim [S80.212A] Abrasion of left knee, initial encounter     6/4/2025  3:56 PM David Kim [S40.011A] Contusion of right shoulder, initial encounter           ED Disposition       ED Disposition   Discharge    Condition   Stable    Date/Time   Wed  Jun 4, 2025  3:55 PM    Comment   Ольга Manning discharge to home/self care.                   Follow-up Information       Follow up With Specialties Details Why Contact Info Additional Information     [facility] Emergency Department Emergency Medicine  As needed, If symptoms worsen [address] [facility] [address]  [phone] [facility] Emergency Department, [address], [place], [address]    Ольга Meehan PA-C Physician Assistant Schedule an appointment as soon as possible for a visit in 1 week  [address] [facility]  [place] [id number] [phone]             Discharge Medication List as of 6/4/2025  4:01 PM        START taking these medications    Details   oxyCODONE (Roxicodone) 5 immediate release tablet Take 1 tablet (5 mg total) by mouth every 6 (six) hours as needed for severe pain for up to 8 days Max Daily Amount: 20 mg, Starting Wed 6/4/2025, Until Thu 6/12/2025 at 2359, Normal           CONTINUE these medications which have NOT CHANGED    Details   albuterol (ProAir HFA) 90 mcg/act inhaler Inhale 2 puffs every 6 (six) hours as needed for wheezing, Starting Thu 7/14/2022, Print      aspirin 81 MG tablet Take 81 mg by mouth in the morning., Historical Med      cholecalciferol (VITAMIN D3) 1,000 units tablet Take 2 tablets by mouth in the morning., Starting Mon 7/11/2011, Historical Med      cyclobenzaprine (FLEXERIL) 10 mg tablet Take 1 tablet (10 mg total) by mouth 3 (three) times a day as needed for muscle spasms, Starting Wed 1/15/2025, Until Mon 7/14/2025 at 2359, Normal      Diclofenac Sodium (VOLTAREN) 1 % Apply 2 g topically 4 (four) times a day as needed (pain), Starting Wed 10/16/2024, Normal      !! FLUoxetine (PROzac) 20 mg capsule Take 3 capsules by mouth in the morning, Starting Wed 12/11/2024, Historical Med      !! FLUoxetine (PROzac) 40 MG capsule Take 40 mg by mouth in the morning., Starting Sat  9/28/2024, Historical Med      gabapentin (NEURONTIN) 800 mg tablet Take 1 tablet (800 mg total) by mouth 3 (three) times a day, Starting Wed 1/15/2025, Until Mon 7/14/2025, Normal      ibuprofen (MOTRIN) 600 mg tablet Take 1 tablet (600 mg total) by mouth every 6 (six) hours as needed for mild pain, Starting Mon 7/15/2019, Normal      levothyroxine 50 mcg tablet Take 1 tablet (50 mcg) Monday-Saturday. Take 2 tablets (100 mcg) daily., Normal      metFORMIN (GLUCOPHAGE) 1000 MG tablet Take 1,000 mg by mouth in the morning and 1,000 mg in the evening. Take with meals., Historical Med      metoprolol succinate (TOPROL-XL) 50 mg 24 hr tablet Take 50 mg by mouth in the morning., Starting Sat 8/10/2024, Historical Med      montelukast (SINGULAIR) 10 mg tablet Historical Med      naloxone (NARCAN) 4 mg/0.1 mL nasal spray Administer 1 spray into a nostril. If no response after 2-3 minutes, give another dose in the other nostril using a new spray., Normal      !! ondansetron (Zofran ODT) 4 mg disintegrating tablet Take 1 tablet (4 mg total) by mouth every 6 (six) hours as needed for nausea or vomiting, Starting Tue 7/9/2024, Normal      !! ondansetron (ZOFRAN-ODT) 4 mg disintegrating tablet Take 1 tablet (4 mg total) by mouth every 6 (six) hours as needed for nausea or vomiting for up to 10 doses, Starting Fri 3/14/2025, Normal      oxyCODONE-acetaminophen (PERCOCET) 5-325 mg per tablet Take 1 tablet by mouth 2 (two) times a day as needed for severe pain Max Daily Amount: 2 tablets Do not start before May 16, 2025., Starting Fri 5/16/2025, Until Sun 6/15/2025 at 2359, Normal      Probiotic Product (PROBIOTIC BLEND PO) Take 1 tablet by mouth every morning, Historical Med      semaglutide, 2 mg/dose, (Ozempic, 2 MG/DOSE,) 8 mg/ mL injection pen Inject 0.75 mL (2 mg total) under the skin every 7 days, Starting Tue 1/14/2025, Until Tue 12/16/2025, Normal      SUMAtriptan (IMITREX) 50 mg tablet Take 50 mg by mouth, Starting Fri  12/20/2024, Until Sat 12/20/2025 at 2359, Historical Med      traZODone (DESYREL) 50 mg tablet Starting Wed 1/10/2018, Historical Med       !! - Potential duplicate medications found. Please discuss with provider.        No discharge procedures on file.    PDMP Review         Value Time User    PDMP Reviewed  Yes 1/15/2025  2:23 PM PAULINA Cruz            ED Provider  Electronically Signed by             [1]   Past Medical History:  Diagnosis Date    Anxiety     Asthma     exercise induced asthma    Cervical disc disorder     Chronic pain     Chronic pain disorder     Chronic pain syndrome 01/21/2016    Depression     Dysuria     Fibromyalgia, primary Est 2016    GERD (gastroesophageal reflux disease) 2004    Headache(784.0) As a child    Low back pain     Lung abnormality     nodules    Lung nodule     Neck pain     Peripheral neuropathy     Pituitary abnormality (HCC)     tumor    Right hip pain 10/29/2019    Thrombocytopenia (HCC)    [2]   Family History  Problem Relation Name Age of Onset    Thyroid disease Mother Jo Ann Freddy     Hyperlipidemia Mother Jo Ann Freddy     Depression Mother Jo Ann Hayes     Stroke Mother Jo Ann Cordovao         TIA    Thyroid disease Father Kenneth Hayes     Hyperlipidemia Father Kenneth Hayes     Depression Father Kenneth Hayes     Arthritis Father Kenneth Hayes         Spine and knees    Endometrial cancer Sister      Obesity Sister Jaelyn Peace     Migraines Sister Jaelyn Peace     No Known Problems Daughter      Cancer Maternal Grandmother Daly Bee         Kidney cancer    Melanoma Maternal Grandmother Dalyamanda Tuckermaggie     Heart disease Maternal Grandfather      Breast cancer Paternal Grandmother Dayan Hayes 40    Cancer Paternal Grandmother Dayan Hayes         Breast, Lung, Liver and skin cancer    Diabetes Paternal Grandfather      Obesity Brother      No Known Problems Son      No Known Problems Maternal Uncle      Obesity Paternal Aunt      Colon cancer Paternal  Aunt Marni Lunger     Obesity Paternal Aunt Marni Lunger     Diabetes Paternal Aunt Marni Lunger     Cancer Paternal Aunt Marni Lunger         Thyroid and colon cancer    No Known Problems Paternal Uncle      ADD / ADHD Neg Hx      Anesthesia problems Neg Hx      Cancer Neg Hx      Clotting disorder Neg Hx      Collagen disease Neg Hx      Dislocations Neg Hx      Learning disabilities Neg Hx      Neurological problems Neg Hx      Osteoporosis Neg Hx      Rheumatologic disease Neg Hx      Scoliosis Neg Hx      Vascular Disease Neg Hx     [3]   Past Surgical History:  Procedure Laterality Date    APPENDECTOMY      BREAST BIOPSY Right 1995    CHOLECYSTECTOMY      COLECTOMY      COLON SURGERY      EPIDURAL BLOCK INJECTION N/A 6/23/2016    Procedure: BLOCK / INJECTION EPIDURAL STEROID CERVICAL  C7-T1;  Surgeon: Brian Cash MD;  Location: [facility] MAIN OR;  Service:     EPIDURAL BLOCK INJECTION N/A 3/31/2016    Procedure: BLOCK / INJECTION EPIDURAL STEROID CERVICAL C7-T1  (C-ARM);  Surgeon: Brian Cash MD;  Location: [facility] MAIN OR;  Service:     EPIDURAL BLOCK INJECTION N/A 8/8/2018    Procedure: C6 C7 Cervical Epidural Steroid Injection (84655);  Surgeon: Brian Cash MD;  Location: [facility] MAIN OR;  Service: Pain Management     EPIDURAL BLOCK INJECTION N/A 12/16/2021    Procedure: T4 T5 thoracic epidural steroid injection ([id number]);  Surgeon: Brian Cash MD;  Location: [facility] MAIN OR;  Service: Pain Management     EPIDURAL BLOCK INJECTION N/A 6/10/2022    Procedure: T4 T5 THORACIC EPIDURAL STEROID INJECTION ([id number]);  Surgeon: Brian Cash MD;  Location: [facility] MAIN OR;  Service: Pain Management     EPIDURAL BLOCK INJECTION N/A 5/16/2025    Procedure: L5-S1 LUMBAR EPIDURAL STEROID INJECTION;  Surgeon: Sumeet Baker MD;  Location: [facility] MAIN OR;  Service: Pain Management     HYSTERECTOMY  1996    OOPHORECTOMY Bilateral 1996    PITUITARY SURGERY      [place] ARTHROCENTESIS ASPIR&/INJ MAJOR JT/BURSA W/O US Right  11/8/2019    Procedure: Trochanteric Bursa Injection (20610);  Surgeon: Brian Cash MD;  Location: [facility] MAIN OR;  Service: Pain Management     [place] ARTHROCENTESIS ASPIR&/INJ MAJOR JT/BURSA W/O US Right 1/23/2020    Procedure: Trochanteric Bursa Injection (20610);  Surgeon: Brian Cash MD;  Location: [facility] MAIN OR;  Service: Pain Management     [place] NJX DX/THER SBST EPIDURAL/SUBRACH CERV/THORACIC N/A 1/21/2016    Procedure: BLOCK / INJECTION EPIDURAL STEROID CERVICAL C7-T1 (C-ARM);  Surgeon: Brian Cash MD;  Location: [facility] MAIN OR;  Service: Pain Management     [place] NJX DX/THER SBST INTRLMNR LMBR/SAC W/IMG GDN N/A 11/22/2024    Procedure: BLOCK / INJECTION EPIDURAL STEROID LUMBAR  L5-S1;  Surgeon: Sumeet Baker MD;  Location: [facility] MAIN OR;  Service: Pain Management     REDUCTION MAMMAPLASTY Bilateral 2000   [4]   Social History  Tobacco Use    Smoking status: Never     Passive exposure: Current    Smokeless tobacco: Never   Vaping Use    Vaping status: Never Used   Substance Use Topics    Alcohol use: No    Drug use: No   [5]   Allergies  Allergen Reactions    Levaquin [Levofloxacin In D5w] Shortness Of Breath     Also hives      Clarithromycin Hives and Rash    Lubiprostone Hives and Other (See Comments)     Reaction Date: 10Aug2011;     Migraines and vomiting    Reaction Date: 10Aug2011;  Migraines and vomiting    Cephradine     Erythromycin Hives     Reaction Date: 10Aug2011;     Macrobid [Nitrofurantoin Monohyd Macro] Other (See Comments)     C/o passing out    Morphine Headache    Penicillins Hives     Reaction Date: 10Aug2011;     Sulfa Antibiotics Hives    Tetracycline     Tetracyclines & Related Hives    Levofloxacin Rash    Morphine And Codeine Rash    Valium [Diazepam] Rash and Vomiting     Reaction Date: 14Jun2012;         Jose Alberto Kim DO  06/06/25 1940

## 2025-06-08 LAB
ATRIAL RATE: 96 BPM
P AXIS: 69 DEGREES
PR INTERVAL: 138 MS
QRS AXIS: -49 DEGREES
QRSD INTERVAL: 70 MS
QT INTERVAL: 394 MS
QTC INTERVAL: 497 MS
T WAVE AXIS: -12 DEGREES
VENTRICULAR RATE: 96 BPM

## 2025-06-08 PROCEDURE — 93010 ELECTROCARDIOGRAM REPORT: CPT | Performed by: INTERNAL MEDICINE

## 2025-06-08 NOTE — ED ATTENDING ATTESTATION
6/4/2025  IDavid MD, saw and evaluated the patient. I have discussed the patient with the resident/non-physician practitioner and agree with the resident's/non-physician practitioner's findings, Plan of Care, and MDM as documented in the resident's/non-physician practitioner's note, except where noted. All available labs and Radiology studies were reviewed.  I was present for key portions of any procedure(s) performed by the resident/non-physician practitioner and I was immediately available to provide assistance.       At this point I agree with the current assessment done in the Emergency Department.  I have conducted an independent evaluation of this patient a history and physical is as follows:see h and p above- agree with er resident tx plan / dispo     ED Course  ED Course as of 06/08/25 1616   Wed Jun 04, 2025   1347 ER MD NOTE- 12 LEAD ECG REVIEWED AND COMPARED TO PREVIOUS BY ER MD- NSR RATE OF 96- DIFFUSE T WAVE FLATTENING SIMILAR TO PREVIOUS 10/6/17-    1425 -er md note-  pt seen and thoroughly evaluated by er md- case /d/w er resident -  59 yr female with hx of  pots- chronic back pain  - recent lumbar epsi- bid oxy ir-  --  pt states has chronic balance / non vertigo dizziness -- walking- up indoor hardwood stairs  6 steps up into hardwood landing - went to kick something off stairs-  next thing she remembers her face hit landing-- then fell down the 6 steps -- ended up with head on floor and feet on lower stairs staring up at ceiling-- daughter heard sounds- came to see mother- initially was not responding then   very soon started responding at baseline - with normal mental status-- - no recent illness- other than chronic ow back pain/dizziness- no v/d- no melena./brbpr-  no fevers- no exertional cp/laura- pt has a hx of low bp- pt c/o head neck - luq pain - and  worsening of mid and chronic  lower back pain - bilateral shoulder pain -- -avss-  pulse ox  % on ra- interpretation is  normal- in nad- 3 cm linear superficial horizontal laceration  abov e left lateral eyebrow- pt in c -collar- with pmt - rrr  s1-s2- cta-b/- soft abd- midl lue- umb ilcal tendenress- no peritoneal signs- no chestwa//rib/sternal tendenress to palpation -- bilateral lateral shoulder tendenress- no clavicle/ac joint tenderness- no overlying ecchymosis- no deformity - left knee superfical ab rasion - nt with normal rom at left knee-  no ble hip-knee/ankle tenderness- normal equal bilateral radial/dp pulses- normal non focal neuro exam- normal cn exam- normal bue/ble strength/sensation    1437 Er md note recent labs- reviewed by er md    1517 Er md note- last td 2018 from [facility]  [facility]    1552 R shoulder xray - no fx /dislocation/separation          Critical Care Time  Procedures

## 2025-06-10 NOTE — PROGRESS NOTES
Pain Medicine Follow-Up Note    Assessment:  1  Chronic pain syndrome    2  Neck pain    3  Cervical disc disorder with radiculopathy of mid-cervical region    4  Myofascial pain syndrome        Plan:  No orders of the defined types were placed in this encounter  New Medications Ordered This Visit   Medications    oxyCODONE-acetaminophen (PERCOCET) 5-325 mg per tablet     Sig: Take 1 tablet by mouth up to twice daily as needed for pain  Dispense:  60 tablet     Refill:  0    oxyCODONE-acetaminophen (PERCOCET) 5-325 mg per tablet     Sig: Take 1 tablet by mouth up to twice daily as needed for pain  Dispense:  60 tablet     Refill:  0    cyclobenzaprine (FLEXERIL) 10 mg tablet     Sig: Take 1 tablet (10 mg total) by mouth 3 (three) times a day as needed for muscle spasms     Dispense:  90 tablet     Refill:  1    gabapentin (NEURONTIN) 600 MG tablet     Sig: Take 1 tablet (600 mg total) by mouth 3 (three) times a day     Dispense:  90 tablet     Refill:  1       My impressions and treatment recommendations were discussed in detail with the patient who verbalized understanding and had no further questions  Given that the patient reports overall reduced pain with improved level of functioning, we will continue her on Percocet 5/325 mg up to 3 times daily as needed for her pain  She reports no side effects from this medication and states that it helps her pain by about 40% depending on the day  Fill dates for this medication are 01/20/2022 and 02/19/2022  Refills were sent to the pharmacy on file  We will also continue her on Flexeril 10 mg and gabapentin 600 mg  She does not have any side effects from these medications and refills were sent to the pharmacy on file  Patient also had a T4-T5 thoracic epidural steroid injection on 12/16/2021  Patient states that this injection helped her pain symptoms by at least 80%    She states that it completely got rid of the radiating pain that was going into her nipples  At this time, we will schedule the patient for bilateral cervical paraspinal and upper trapezius trigger point injections  She states that these are the only injections that helped relieve her radiating in pain that goes into her hands  [place] Prescription Drug Monitoring Program report was reviewed and was appropriate     There are risks associated with opioid medications, including dependence, addiction and tolerance  The patient understands and agrees to use these medications only as prescribed  Potential side effects of the medications include, but are not limited to, constipation, drowsiness, addiction, impaired judgment and risk of fatal overdose if not taken as prescribed  The patient was warned against driving while taking sedation medications  Sharing medications is a felony  At this point in time, the patient is showing no signs of addiction, abuse, diversion or suicidal ideation  Complete risks and benefits including bleeding, infection, tissue reaction, nerve injury and allergic reaction were discussed  The approach was demonstrated using models and literature was provided  Verbal and written consent was obtained  Follow-up is planned in 8 weeks time or sooner as warranted  Discharge instructions were provided  I personally saw and examined the patient and I agree with the above discussed plan of care  History of Present Illness:    Paramjit Ivan is a 55 y o  female who presents to [facility] for interval re-evaluation of the above stated pain complaints  The patient has a past medical and chronic pain history as outlined in the assessment section  She was last seen on 11/15/2021  She reports a pain score today of 6/10 that is constant to some degree and worse in the morning  She describes the quality as burning, dull aching, sharp, throbbing with pressure and numbness and pins and needles    She states that the worst of her pain symptoms right now our in her neck radiating into both arms and hands bilaterally  She tells me that she is in need of trigger point injections  Pain Contract Signed:  09/28/2021  Last Urine Drug Screen:  11/15/2021    Other than as stated above, the patient denies any interval changes in medications, medical condition, mental condition, symptoms, or allergies since the last office visit  Review of Systems:    Review of Systems   Respiratory: Negative for shortness of breath  Cardiovascular: Negative for chest pain  Gastrointestinal: Negative for constipation, diarrhea, nausea and vomiting  Musculoskeletal: Positive for gait problem  Negative for arthralgias, joint swelling and myalgias  Decreased ROM  Muscle weakness  Joint stiffness  Swelling in neck  Pain in b/l and left leg   Skin: Negative for rash  Neurological: Negative for dizziness, seizures and weakness  All other systems reviewed and are negative          Patient Active Problem List   Diagnosis    Chronic pain syndrome    Acute muscle stiffness of neck    Cervical disc disorder with radiculopathy of mid-cervical region    Neck pain    Chronic left-sided low back pain with left-sided sciatica    Lumbar radiculopathy    Cervicalgia    Right hip pain    Trochanteric bursitis of right hip    Pain in right hip    Myofascial pain syndrome       Past Medical History:   Diagnosis Date    Anxiety     Asthma     exercise induced asthma    Cervical disc disorder     Chronic pain     Chronic pain disorder     Depression     Dysuria     GERD (gastroesophageal reflux disease) 2004    Headache(784 0) As a child    Low back pain     Lung abnormality     nodules    Lung nodule     Neck pain     Peripheral neuropathy     Pituitary abnormality (HCC)     tumor    Right hip pain 10/29/2019    Thrombocytopenia ([address] )     Thyroid disease        Past Surgical History:   Procedure Laterality Date    APPENDECTOMY      BREAST BIOPSY Right 1995    CHOLECYSTECTOMY      COLECTOMY      COLON SURGERY      EPIDURAL BLOCK INJECTION N/A 6/23/2016    Procedure: BLOCK / INJECTION EPIDURAL STEROID CERVICAL  C7-T1;  Surgeon: Destinee Andrea MD;  Location: [facility] MAIN OR;  Service:     EPIDURAL BLOCK INJECTION N/A 3/31/2016    Procedure: BLOCK / INJECTION EPIDURAL STEROID CERVICAL C7-T1  (C-ARM); Surgeon: Destinee Andrea MD;  Location: [facility] MAIN OR;  Service:     EPIDURAL BLOCK INJECTION N/A 8/8/2018    Procedure: C6 C7 Cervical Epidural Steroid Injection (95208); Surgeon: Destinee Andrea MD;  Location: [facility] MAIN OR;  Service: Pain Management     EPIDURAL BLOCK INJECTION N/A 12/16/2021    Procedure: T4 T5 thoracic epidural steroid injection (98066); Surgeon: Destinee Andrea MD;  Location: [facility] MAIN OR;  Service: Pain Management     HYSTERECTOMY  1996    OOPHORECTOMY Bilateral 1996    PITUITARY SURGERY      DC ARTHROCENTESIS ASPIR&/INJ MAJOR JT/BURSA W/O US Right 11/8/2019    Procedure: Trochanteric Bursa Injection (57735); Surgeon: Destinee Andrea MD;  Location: [facility] MAIN OR;  Service: Pain Management     DC ARTHROCENTESIS ASPIR&/INJ MAJOR JT/BURSA W/O US Right 1/23/2020    Procedure: Trochanteric Bursa Injection (51936); Surgeon: Destinee Andrea MD;  Location: [facility] OR;  Service: Pain Management     DC NJX DX/THER SBST EPIDURAL/SUBRACH CERV/THORACIC N/A 1/21/2016    Procedure: BLOCK / INJECTION EPIDURAL STEROID CERVICAL C7-T1 (C-ARM);   Surgeon: Destinee Andrea MD;  Location: [facility] OR;  Service: Pain Management     REDUCTION MAMMAPLASTY Bilateral 2000       Family History   Problem Relation Age of Onset    Thyroid disease Mother     Hyperlipidemia Mother     Depression Mother     Stroke Mother         TIA    Thyroid disease Father     Hyperlipidemia Father     Depression Father     Arthritis Father         Spine and knees    Ovarian cancer Sister 39    No Known Problems Brother     No Known Problems Maternal Uncle     No Known Problems Paternal Aunt     No Known Problems Paternal Uncle     Cancer Maternal Grandmother         Kidney cancer    No Known Problems Maternal Grandfather     Breast cancer Paternal Grandmother 36    Cancer Paternal Grandmother         Breast, Lung, Liver and skin cancer    No Known Problems Paternal Grandfather     No Known Problems Daughter     No Known Problems Son     Cancer Paternal Aunt         Thyroid and colon cancer    ADD / ADHD Neg Hx     Anesthesia problems Neg Hx     Cancer Neg Hx     Clotting disorder Neg Hx     Collagen disease Neg Hx     Diabetes Neg Hx     Dislocations Neg Hx     Learning disabilities Neg Hx     Neurological problems Neg Hx     Osteoporosis Neg Hx     Rheumatologic disease Neg Hx     Scoliosis Neg Hx     Vascular Disease Neg Hx        Social History     Occupational History    Not on file   Tobacco Use    Smoking status: Never Smoker    Smokeless tobacco: Never Used   Vaping Use    Vaping Use: Never used   Substance and Sexual Activity    Alcohol use: No    Drug use: No    Sexual activity: Not Currently     Partners: Male     Birth control/protection: Post-menopausal         Current Outpatient Medications:     albuterol (VENTOLIN HFA) 90 mcg/act inhaler, Inhale, Disp: , Rfl:     aspirin 81 MG tablet, Take 81 mg by mouth daily  , Disp: , Rfl:     cholecalciferol (VITAMIN D3) 1,000 units tablet, Take 2 tablets by mouth daily, Disp: , Rfl:     cyclobenzaprine (FLEXERIL) 10 mg tablet, Take 1 tablet (10 mg total) by mouth 3 (three) times a day as needed for muscle spasms, Disp: 90 tablet, Rfl: 1    diclofenac sodium (VOLTAREN) 1 %, Apply 2 g topically 4 (four) times a day as needed (pain), Disp: 100 g, Rfl: 0    gabapentin (NEURONTIN) 600 MG tablet, Take 1 tablet (600 mg total) by mouth 3 (three) times a day, Disp: 90 tablet, Rfl: 1    hydrocortisone (ANUSOL-HC) 25 mg suppository, , Disp: , Rfl: 0    ibuprofen (MOTRIN) 600 mg tablet, Take 1 tablet (600 mg total) by mouth every 6 (six) hours as needed for mild pain, Disp: 30 tablet, Rfl: 0    levothyroxine 50 mcg tablet, , Disp: , Rfl:     metoprolol succinate (TOPROL-XL) 25 mg 24 hr tablet, , Disp: , Rfl:     mupirocin (BACTROBAN) 2 % ointment, , Disp: , Rfl: 0    [START ON 1/20/2022] oxyCODONE-acetaminophen (PERCOCET) 5-325 mg per tablet, Take 1 tablet by mouth up to twice daily as needed for pain , Disp: 60 tablet, Rfl: 0    [START ON 2/19/2022] oxyCODONE-acetaminophen (PERCOCET) 5-325 mg per tablet, Take 1 tablet by mouth up to twice daily as needed for pain , Disp: 60 tablet, Rfl: 0    traZODone (DESYREL) 50 mg tablet, , Disp: , Rfl: 0    Allergies   Allergen Reactions    Levaquin [Levofloxacin In D5w] Shortness Of Breath     Also hives      Amitiza [Lubiprostone] Other (See Comments) and Hives     Reaction Date: 10Aug2011;   Migraines and vomiting    Biaxin [Clarithromycin] Hives    Cephradine     Erythromycin Hives     Reaction Date: 10Aug2011;     Macrobid [Nitrofurantoin Monohyd Macro] Other (See Comments)     C/o passing out    Morphine     Penicillins Hives     Reaction Date: 10Aug2011;     Sulfa Antibiotics     Tetracycline     Tetracyclines & Related Hives    Morphine And Related Rash    Valium [Diazepam] Rash and Vomiting     Reaction Date: 14Jun2012;        Physical Exam:    /67   Pulse 80   Ht 5' 5" (1 651 m)   Wt 86 6 kg (191 lb)   BMI 31 78 kg/m²     Constitutional:obese  Eyes:anicteric  HEENT:grossly intact  Neck:supple, symmetric, trachea midline and no masses   Pulmonary:even and unlabored  Cardiovascular:No edema or pitting edema present  Skin:Normal without rashes or lesions and well hydrated  Psychiatric:Mood and affect appropriate  Neurologic:Cranial Nerves II-XII grossly intact  Musculoskeletal:Limited range of motion with neck movement due to pain and stiffness      Imaging  No orders to display         No orders of the defined types were placed in this encounter

## 2025-06-16 ENCOUNTER — OFFICE VISIT (OUTPATIENT)
Dept: PAIN MEDICINE | Facility: CLINIC | Age: 60
End: 2025-06-16
Payer: MEDICARE

## 2025-06-16 DIAGNOSIS — M50.120 CERVICAL DISC DISORDER WITH RADICULOPATHY OF MID-CERVICAL REGION: ICD-10-CM

## 2025-06-16 DIAGNOSIS — M54.2 NECK PAIN: ICD-10-CM

## 2025-06-16 DIAGNOSIS — G89.4 CHRONIC PAIN SYNDROME: ICD-10-CM

## 2025-06-16 DIAGNOSIS — M79.18 MYOFASCIAL PAIN SYNDROME: Primary | ICD-10-CM

## 2025-06-16 PROCEDURE — 99214 OFFICE O/P EST MOD 30 MIN: CPT | Performed by: STUDENT IN AN ORGANIZED HEALTH CARE EDUCATION/TRAINING PROGRAM

## 2025-06-16 PROCEDURE — G2211 COMPLEX E/M VISIT ADD ON: HCPCS | Performed by: STUDENT IN AN ORGANIZED HEALTH CARE EDUCATION/TRAINING PROGRAM

## 2025-06-16 RX ORDER — OXYCODONE AND ACETAMINOPHEN 5; 325 MG/1; MG/1
1 TABLET ORAL 2 TIMES DAILY PRN
Qty: 70 TABLET | Refills: 0 | Status: SHIPPED | OUTPATIENT
Start: 2025-06-16 | End: 2025-07-16

## 2025-06-16 RX ORDER — GABAPENTIN 800 MG/1
800 TABLET ORAL 3 TIMES DAILY
Qty: 270 TABLET | Refills: 1 | Status: SHIPPED | OUTPATIENT
Start: 2025-06-16 | End: 2025-12-13

## 2025-06-16 RX ORDER — OXYCODONE AND ACETAMINOPHEN 5; 325 MG/1; MG/1
1 TABLET ORAL 2 TIMES DAILY PRN
Qty: 60 TABLET | Refills: 0 | Status: SHIPPED | OUTPATIENT
Start: 2025-07-16 | End: 2025-08-15

## 2025-06-16 NOTE — PROGRESS NOTES
Pain Medicine Follow-Up Note    Assessment:  1. Myofascial pain syndrome    2. Chronic pain syndrome    3. Neck pain    4. Cervical disc disorder with radiculopathy of mid-cervical region      Patient is a pleasant 59-year-old woman who presents as a follow-up visit after last being seen in April for chronic medication management.  The patient has been managed on Percocet 5-3 25 twice daily as needed along with gabapentin 800 mg 3 times daily.  Since that time patient did undergo an L5-S1 lumbar epidural on 5/16/2025 which he states was helpful but unfortunately patient had a fall in early June.  Her symptoms are now worse rating her pain as a 6 out of 10 on numeric rating scale.  Patient planing mainly of neck pain which radiates into her right shoulder.  Fortunately imaging was unremarkable but patient continues to complain of Twitching.  She does have a referral to see neurology.    On examination patient with tenderness in the right cervical spine consistent with her known right cervical myofascial pain.  Given this we will schedule patient for repeat trigger point injections targeting this area.  Patient stable on her current regimen of Percocet 5-3 25 but we will send in an additional 10 tablets for this month given patient's acute fall and injury.  Lastly will continue gabapentin 800 mg 3 times daily.  Patient amenable to this plan.  Plan:  Schedule for TPI of right cervical paraspinal muscles with LA and steroid  Continue Percocet at below dose  Continue gabapentin at below dose   No orders of the defined types were placed in this encounter.      New Medications Ordered This Visit   Medications   • oxyCODONE-acetaminophen (PERCOCET) 5-325 mg per tablet     Sig: Take 1 tablet by mouth 2 (two) times a day as needed for severe pain 10 additional tablets this month for acute fall. Max Daily Amount: 2 tablets     Dispense:  70 tablet     Refill:  0   • oxyCODONE-acetaminophen (PERCOCET) 5-325 mg per tablet      Sig: Take 1 tablet by mouth 2 (two) times a day as needed for severe pain Max Daily Amount: 2 tablets Do not start before July 16, 2025.     Dispense:  60 tablet     Refill:  0     DNF until 2/18/2025   • gabapentin (NEURONTIN) 800 mg tablet     Sig: Take 1 tablet (800 mg total) by mouth 3 (three) times a day     Dispense:  270 tablet     Refill:  1       My impressions and treatment recommendations were discussed in detail with the patient who verbalized understanding and had no further questions.      [place] Prescription Drug Monitoring Program report was reviewed and was appropriate  and [place] Prescription Drug Monitoring Program report was reviewed and was appropriate       There are risks associated with opioid medications, including dependence, addiction and tolerance. The patient understands and agrees to use these medications only as prescribed. Potential side effects of the medications include, but are not limited to, constipation, drowsiness, addiction, impaired judgment and risk of fatal overdose if not taken as prescribed. The patient was warned against driving while taking sedation medications.  Sharing medications is a felony. At this point in time, the patient is showing no signs of addiction, abuse, diversion or suicidal ideation.    Complete risks and benefits including bleeding, infection, tissue reaction, nerve injury and allergic reaction were discussed. The approach was demonstrated using models and literature was provided. Verbal and written consent was obtained.      Follow-up is planned in two months time or sooner as warranted.  Discharge instructions were provided. I personally saw and examined the patient and I agree with the above discussed plan of care.    History of Present Illness:    Ольга Manning is a 59 y.o. female who presents to [facility] for interval re-evaluation of the above stated pain complaints. The patient has a past medical and  chronic pain history as outlined in the assessment section. She was last seen on April 2025.    Patient is a pleasant 59-year-old woman who presents as a follow-up visit after last being seen in April for chronic medication management.  The patient has been managed on Percocet 5-3 25 twice daily as needed along with gabapentin 800 mg 3 times daily.  Since that time patient did undergo an L5-S1 lumbar epidural on 5/16/2025 which he states was helpful but unfortunately patient had a fall in early June.  Her symptoms are now worse rating her pain as a 6 out of 10 on numeric rating scale.  Patient planing mainly of neck pain which radiates into her right shoulder.  Fortunately imaging was unremarkable but patient continues to complain of Twitching.  She does have a referral to see neurology.    Pain Contract Signed:  Active  Last Urine Drug Screen:  4/2025    Other than as stated above, the patient denies any interval changes in medications, medical condition, mental condition, symptoms, or allergies since the last office visit.         Review of Systems:    Review of Systems   Constitutional:  Negative for unexpected weight change.   HENT:  Negative for ear pain.    Eyes:  Negative for visual disturbance.   Respiratory:  Negative for shortness of breath and wheezing.    Gastrointestinal:  Negative for abdominal pain.   Musculoskeletal:  Positive for back pain, gait problem, joint swelling, neck pain and neck stiffness.        Decreased ROM, joint and muscle pain   Neurological:  Positive for dizziness, tremors, weakness, light-headedness, numbness and headaches.   Psychiatric/Behavioral:  Negative for decreased concentration. The patient is nervous/anxious.          Past Medical History[1]    Past Surgical History[2]    Family History[3]    Social History     Occupational History   • Not on file   Tobacco Use   • Smoking status: Never     Passive exposure: Current   • Smokeless tobacco: Never   Vaping Use   • Vaping  status: Never Used   Substance and Sexual Activity   • Alcohol use: No   • Drug use: No   • Sexual activity: Not Currently     Partners: Male     Birth control/protection: Post-menopausal       Current Medications[4]    Allergies[5]    Physical Exam:    There were no vitals taken for this visit.    Constitutional:normal, well developed, well nourished, alert, in no distress and non-toxic and no overt pain behavior.  Eyes:anicteric  HEENT:grossly intact  Neck:supple, symmetric, trachea midline and no masses   Pulmonary:even and unlabored  Cardiovascular:No edema or pitting edema present  Skin:Normal without rashes or lesions and well hydrated  Psychiatric:Mood and affect appropriate  Neurologic:Cranial Nerves II-XII grossly intact  Musculoskeletal:normal gait. TTP over right cervical paraspinal muscles with taut bands.       Imaging  No orders to display         No orders of the defined types were placed in this encounter.           [1]  Past Medical History:  Diagnosis Date   • Anxiety    • Asthma     exercise induced asthma   • Cervical disc disorder    • Chronic pain    • Chronic pain disorder    • Chronic pain syndrome 01/21/2016   • Depression    • Dysuria    • Fibromyalgia, primary Est 2016   • GERD (gastroesophageal reflux disease) 2004   • Headache(784.0) As a child   • Low back pain    • Lung abnormality     nodules   • Lung nodule    • Neck pain    • Peripheral neuropathy    • Pituitary abnormality (HCC)     tumor   • Right hip pain 10/29/2019   • Thrombocytopenia (HCC)    [2]  Past Surgical History:  Procedure Laterality Date   • APPENDECTOMY     • BREAST BIOPSY Right 1995   • CHOLECYSTECTOMY     • COLECTOMY     • COLON SURGERY     • EPIDURAL BLOCK INJECTION N/A 6/23/2016    Procedure: BLOCK / INJECTION EPIDURAL STEROID CERVICAL  C7-T1;  Surgeon: Brian Cash MD;  Location: [facility] MAIN OR;  Service:    • EPIDURAL BLOCK INJECTION N/A 3/31/2016    Procedure: BLOCK / INJECTION EPIDURAL STEROID CERVICAL  C7-T1  (C-ARM);  Surgeon: Brian Cash MD;  Location: [facility] MAIN OR;  Service:    • EPIDURAL BLOCK INJECTION N/A 8/8/2018    Procedure: C6 C7 Cervical Epidural Steroid Injection (00855);  Surgeon: Brian Cash MD;  Location: [facility] MAIN OR;  Service: Pain Management    • EPIDURAL BLOCK INJECTION N/A 12/16/2021    Procedure: T4 T5 thoracic epidural steroid injection (86360);  Surgeon: Brian Cash MD;  Location: [facility] MAIN OR;  Service: Pain Management    • EPIDURAL BLOCK INJECTION N/A 6/10/2022    Procedure: T4 T5 THORACIC EPIDURAL STEROID INJECTION (44261);  Surgeon: Brian Cash MD;  Location: [facility] MAIN OR;  Service: Pain Management    • EPIDURAL BLOCK INJECTION N/A 5/16/2025    Procedure: L5-S1 LUMBAR EPIDURAL STEROID INJECTION;  Surgeon: Sumeet Baker MD;  Location: [facility] MAIN OR;  Service: Pain Management    • HYSTERECTOMY  1996   • OOPHORECTOMY Bilateral 1996   • PITUITARY SURGERY     • AR ARTHROCENTESIS ASPIR&/INJ MAJOR JT/BURSA W/O US Right 11/8/2019    Procedure: Trochanteric Bursa Injection (20610);  Surgeon: Brian Cash MD;  Location: [facility] MAIN OR;  Service: Pain Management    • AR ARTHROCENTESIS ASPIR&/INJ MAJOR JT/BURSA W/O US Right 1/23/2020    Procedure: Trochanteric Bursa Injection (20610);  Surgeon: Brian Cash MD;  Location: [facility] MAIN OR;  Service: Pain Management    • AR NJX DX/THER SBST EPIDURAL/SUBRACH CERV/THORACIC N/A 1/21/2016    Procedure: BLOCK / INJECTION EPIDURAL STEROID CERVICAL C7-T1 (C-ARM);  Surgeon: Brian Cash MD;  Location: [facility] MAIN OR;  Service: Pain Management    • AR NJX DX/THER SBST INTRLMNR LMBR/SAC W/IMG GDN N/A 11/22/2024    Procedure: BLOCK / INJECTION EPIDURAL STEROID LUMBAR  L5-S1;  Surgeon: Sumeet Baker MD;  Location: [facility] MAIN OR;  Service: Pain Management    • REDUCTION MAMMAPLASTY Bilateral 2000   [3]  Family History  Problem Relation Name Age of Onset   • Thyroid disease Mother Jo Ann Freddy    • Hyperlipidemia Mother Jo Ann  Freddy    • Depression Mother Jo Ann Hayes    • Stroke Mother Jo Ann Hayes         TIA   • Thyroid disease Father Kenneth Hayes    • Hyperlipidemia Father Kenneth Hayes    • Depression Father Kenneth Hayes    • Arthritis Father Kenneth Hayes         Spine and knees   • Endometrial cancer Sister     • Obesity Sister Jaelyn Mccollum    • Migraines Sister Jaelyn Mccollum    • No Known Problems Daughter     • Cancer Maternal Grandmother Daly Bee         Kidney cancer   • Melanoma Maternal Grandmother Daly Bee    • Heart disease Maternal Grandfather     • Breast cancer Paternal Grandmother Dayan Hayes 40   • Cancer Paternal Grandmother Dayan Hayes         Breast, Lung, Liver and skin cancer   • Diabetes Paternal Grandfather     • Obesity Brother     • No Known Problems Son     • No Known Problems Maternal Uncle     • Obesity Paternal Aunt     • Colon cancer Paternal Aunt Marni Lunger    • Obesity Paternal Aunt Marni Lunger    • Diabetes Paternal Aunt Marni Lunger    • Cancer Paternal Aunt Marni Lunger         Thyroid and colon cancer   • No Known Problems Paternal Uncle     • ADD / ADHD Neg Hx     • Anesthesia problems Neg Hx     • Cancer Neg Hx     • Clotting disorder Neg Hx     • Collagen disease Neg Hx     • Dislocations Neg Hx     • Learning disabilities Neg Hx     • Neurological problems Neg Hx     • Osteoporosis Neg Hx     • Rheumatologic disease Neg Hx     • Scoliosis Neg Hx     • Vascular Disease Neg Hx     [4]    Current Outpatient Medications:   •  albuterol (ProAir HFA) 90 mcg/act inhaler, Inhale 2 puffs every 6 (six) hours as needed for wheezing, Disp: 8.5 g, Rfl: 0  •  aspirin 81 MG tablet, Take 81 mg by mouth in the morning., Disp: , Rfl:   •  cholecalciferol (VITAMIN D3) 1,000 units tablet, Take 2 tablets by mouth in the morning., Disp: , Rfl:   •  cyclobenzaprine (FLEXERIL) 10 mg tablet, Take 1 tablet (10 mg total) by mouth 3 (three) times a day as needed for muscle spasms, Disp: 270 tablet, Rfl: 1  •   FLUoxetine (PROzac) 20 mg capsule, Take 3 capsules by mouth in the morning, Disp: , Rfl:   •  FLUoxetine (PROzac) 40 MG capsule, Take 40 mg by mouth in the morning., Disp: , Rfl:   •  gabapentin (NEURONTIN) 800 mg tablet, Take 1 tablet (800 mg total) by mouth 3 (three) times a day, Disp: 270 tablet, Rfl: 1  •  ibuprofen (MOTRIN) 600 mg tablet, Take 1 tablet (600 mg total) by mouth every 6 (six) hours as needed for mild pain, Disp: 30 tablet, Rfl: 0  •  levothyroxine 50 mcg tablet, Take 1 tablet (50 mcg) Monday-Saturday. Take 2 tablets (100 mcg) daily., Disp: 102 tablet, Rfl: 1  •  metFORMIN (GLUCOPHAGE) 1000 MG tablet, Take 1,000 mg by mouth in the morning and 1,000 mg in the evening. Take with meals., Disp: , Rfl:   •  metoprolol succinate (TOPROL-XL) 50 mg 24 hr tablet, Take 50 mg by mouth in the morning., Disp: , Rfl:   •  montelukast (SINGULAIR) 10 mg tablet, , Disp: , Rfl:   •  ondansetron (Zofran ODT) 4 mg disintegrating tablet, Take 1 tablet (4 mg total) by mouth every 6 (six) hours as needed for nausea or vomiting, Disp: 20 tablet, Rfl: 1  •  ondansetron (ZOFRAN-ODT) 4 mg disintegrating tablet, Take 1 tablet (4 mg total) by mouth every 6 (six) hours as needed for nausea or vomiting for up to 10 doses, Disp: 10 tablet, Rfl: 0  •  oxyCODONE-acetaminophen (PERCOCET) 5-325 mg per tablet, Take 1 tablet by mouth 2 (two) times a day as needed for severe pain 10 additional tablets this month for acute fall. Max Daily Amount: 2 tablets, Disp: 70 tablet, Rfl: 0  •  [START ON 7/16/2025] oxyCODONE-acetaminophen (PERCOCET) 5-325 mg per tablet, Take 1 tablet by mouth 2 (two) times a day as needed for severe pain Max Daily Amount: 2 tablets Do not start before July 16, 2025., Disp: 60 tablet, Rfl: 0  •  semaglutide, 2 mg/dose, (Ozempic, 2 MG/DOSE,) 8 mg/ mL injection pen, Inject 0.75 mL (2 mg total) under the skin every 7 days, Disp: 9 mL, Rfl: 3  •  SUMAtriptan (IMITREX) 50 mg tablet, Take 50 mg by mouth, Disp: , Rfl:    •  traZODone (DESYREL) 50 mg tablet, , Disp: , Rfl: 0  •  Diclofenac Sodium (VOLTAREN) 1 %, Apply 2 g topically 4 (four) times a day as needed (pain) (Patient not taking: Reported on 6/16/2025), Disp: 100 g, Rfl: 3  •  naloxone (NARCAN) 4 mg/0.1 mL nasal spray, Administer 1 spray into a nostril. If no response after 2-3 minutes, give another dose in the other nostril using a new spray., Disp: 1 each, Rfl: 1  •  oxyCODONE-acetaminophen (PERCOCET) 5-325 mg per tablet, Take 1 tablet by mouth 2 (two) times a day as needed for severe pain Max Daily Amount: 2 tablets, Disp: 60 tablet, Rfl: 0  •  oxyCODONE-acetaminophen (PERCOCET) 5-325 mg per tablet, Take 1 tablet by mouth 2 (two) times a day as needed for severe pain Max Daily Amount: 2 tablets Do not start before May 16, 2025., Disp: 60 tablet, Rfl: 0  •  Probiotic Product (PROBIOTIC BLEND PO), Take 1 tablet by mouth every morning (Patient not taking: Reported on 6/16/2025), Disp: , Rfl: [5]  Allergies  Allergen Reactions   • Levaquin [Levofloxacin In D5w] Shortness Of Breath     Also hives     • Clarithromycin Hives and Rash   • Lubiprostone Hives and Other (See Comments)     Reaction Date: 10Aug2011;     Migraines and vomiting    Reaction Date: 10Aug2011;  Migraines and vomiting   • Cephradine    • Erythromycin Hives     Reaction Date: 10Aug2011;    • Macrobid [Nitrofurantoin Monohyd Macro] Other (See Comments)     C/o passing out   • Morphine Headache   • Penicillins Hives     Reaction Date: 10Aug2011;    • Sulfa Antibiotics Hives   • Tetracycline    • Tetracyclines & Related Hives   • Levofloxacin Rash   • Morphine And Codeine Rash   • Valium [Diazepam] Rash and Vomiting     Reaction Date: 14Jun2012;

## 2025-08-11 ENCOUNTER — OFFICE VISIT (OUTPATIENT)
Dept: PAIN MEDICINE | Facility: CLINIC | Age: 60
End: 2025-08-11
Payer: MEDICARE

## (undated) DEVICE — PLASTIC ADHESIVE BANDAGE: Brand: CURITY

## (undated) DEVICE — GLOVE SRG BIOGEL 7.5

## (undated) DEVICE — SKIN MARKER DUAL TIP WITH RULER CAP, FLEXIBLE RULER AND LABELS: Brand: DEVON

## (undated) DEVICE — NEEDLE SPINAL 25G X 3.5 IN QUINCKE

## (undated) DEVICE — WIPES BABY PAMPERS SENSITIVE 36/PK

## (undated) DEVICE — RADIOLOGY STERILE LABELS: Brand: CENTURION

## (undated) DEVICE — CHLORAPREP APPLICATOR TINTED 10.5ML ONE-STEP

## (undated) DEVICE — TRAY PAIN SUPPORT

## (undated) DEVICE — TOWEL SET X-RAY

## (undated) DEVICE — GLOVE SRG LF STRL BGL SKNSNS 7.5 PF

## (undated) DEVICE — BRACHIAL PLEXUS SET

## (undated) DEVICE — NEEDLE BLUNT 18 G X 1 1/2 W FILTER

## (undated) DEVICE — SYRINGE LUER LOK 7ML LOSS OF RESISTANCE

## (undated) DEVICE — SMALL NEEDLE COUNTER NEST

## (undated) DEVICE — NEEDLE SPINAL 22G X 5IN QUINCKE

## (undated) DEVICE — TRAY EPID CONT PERIFIX 18G X 3.5IN 5ML CLSD TIP DRAPE

## (undated) DEVICE — SYRINGE EPI 8ML LUER SLIP LOSS OF RESISTANCE PLASTIC PERFIX

## (undated) DEVICE — SYRINGE 5ML LL

## (undated) DEVICE — 18G X 6"(152MM)PLASTIC HUBWITH WINGS, CALIBRATIONS: Brand: TUOHY EPIDURAL NEEDLE

## (undated) DEVICE — CHLORAPREP HI-LITE 10.5ML ORANGE

## (undated) DEVICE — NEEDLE SPINAL 22G X 3.5 IN PLST HUB

## (undated) DEVICE — UNIVERSAL BLOCK TRAY: Brand: INTEGRA®

## (undated) DEVICE — IV SET EXT SM BORE CARESITE 8IN

## (undated) DEVICE — TRAY EPIDURAL PERIFIX 20GA X 3.5IN TUOHY 8ML